# Patient Record
Sex: FEMALE | Race: WHITE | NOT HISPANIC OR LATINO | Employment: OTHER
[De-identification: names, ages, dates, MRNs, and addresses within clinical notes are randomized per-mention and may not be internally consistent; named-entity substitution may affect disease eponyms.]

---

## 2017-02-07 ENCOUNTER — MYAURORA ACCOUNT LINK (OUTPATIENT)
Dept: OTHER | Age: 67
End: 2017-02-07

## 2017-03-08 ENCOUNTER — APPOINTMENT (OUTPATIENT)
Dept: GENERAL RADIOLOGY | Facility: HOSPITAL | Age: 67
DRG: 190 | End: 2017-03-08
Attending: EMERGENCY MEDICINE
Payer: MEDICARE

## 2017-03-08 ENCOUNTER — PRIOR ORIGINAL RECORDS (OUTPATIENT)
Dept: OTHER | Age: 67
End: 2017-03-08

## 2017-03-08 ENCOUNTER — APPOINTMENT (OUTPATIENT)
Dept: CV DIAGNOSTICS | Facility: HOSPITAL | Age: 67
DRG: 190 | End: 2017-03-08
Attending: INTERNAL MEDICINE
Payer: MEDICARE

## 2017-03-08 ENCOUNTER — HOSPITAL ENCOUNTER (INPATIENT)
Facility: HOSPITAL | Age: 67
LOS: 10 days | Discharge: NURSING, REHAB OR LONG-TERM CARE | DRG: 190 | End: 2017-03-18
Attending: EMERGENCY MEDICINE | Admitting: INTERNAL MEDICINE
Payer: MEDICARE

## 2017-03-08 DIAGNOSIS — I50.9 CONGESTIVE HEART FAILURE OF UNKNOWN ETIOLOGY (HCC): Primary | ICD-10-CM

## 2017-03-08 LAB
ANION GAP SERPL CALC-SCNC: 7 MMOL/L (ref 0–18)
BASOPHILS # BLD: 0 K/UL (ref 0–0.2)
BASOPHILS NFR BLD: 1 %
BNP SERPL-MCNC: 1042 PG/ML (ref 0–100)
BUN SERPL-MCNC: 24 MG/DL (ref 8–20)
BUN/CREAT SERPL: 18.6 (ref 10–20)
CALCIUM SERPL-MCNC: 8.7 MG/DL (ref 8.5–10.5)
CHLORIDE SERPL-SCNC: 108 MMOL/L (ref 95–110)
CHOLEST SERPL-MCNC: 100 MG/DL (ref 110–200)
CO2 SERPL-SCNC: 24 MMOL/L (ref 22–32)
CREAT SERPL-MCNC: 1.29 MG/DL (ref 0.5–1.5)
EOSINOPHIL # BLD: 0.1 K/UL (ref 0–0.7)
EOSINOPHIL NFR BLD: 1 %
ERYTHROCYTE [DISTWIDTH] IN BLOOD BY AUTOMATED COUNT: 19.6 % (ref 11–15)
FLUAV + FLUBV RNA SPEC NAA+PROBE: NEGATIVE
GLUCOSE SERPL-MCNC: 118 MG/DL (ref 70–99)
HCT VFR BLD AUTO: 36.1 % (ref 35–48)
HDLC SERPL-MCNC: 30 MG/DL
HGB BLD-MCNC: 11.7 G/DL (ref 12–16)
INR BLD: 2.3 (ref 0.9–1.2)
LDLC SERPL CALC-MCNC: 43 MG/DL (ref 0–99)
LYMPHOCYTES # BLD: 0.8 K/UL (ref 1–4)
LYMPHOCYTES NFR BLD: 12 %
MCH RBC QN AUTO: 28 PG (ref 27–32)
MCHC RBC AUTO-ENTMCNC: 32.4 G/DL (ref 32–37)
MCV RBC AUTO: 86.5 FL (ref 80–100)
MONOCYTES # BLD: 0.7 K/UL (ref 0–1)
MONOCYTES NFR BLD: 11 %
MRSA DNA SPEC QL NAA+PROBE: POSITIVE
NEUTROPHILS # BLD AUTO: 5 K/UL (ref 1.8–7.7)
NEUTROPHILS NFR BLD: 75 %
NONHDLC SERPL-MCNC: 70 MG/DL
OSMOLALITY UR CALC.SUM OF ELEC: 293 MOSM/KG (ref 275–295)
PLATELET # BLD AUTO: 150 K/UL (ref 140–400)
PMV BLD AUTO: 10.1 FL (ref 7.4–10.3)
POTASSIUM SERPL-SCNC: 5.5 MMOL/L (ref 3.3–5.1)
PROTHROMBIN TIME: 24.9 SECONDS (ref 11.8–14.5)
RBC # BLD AUTO: 4.17 M/UL (ref 3.7–5.4)
SODIUM SERPL-SCNC: 139 MMOL/L (ref 136–144)
TRIGL SERPL-MCNC: 135 MG/DL (ref 1–149)
TROPONIN I SERPL-MCNC: 0.03 NG/ML (ref ?–0.03)
TROPONIN I SERPL-MCNC: 0.04 NG/ML (ref ?–0.03)
WBC # BLD AUTO: 6.6 K/UL (ref 4–11)

## 2017-03-08 PROCEDURE — 85025 COMPLETE CBC W/AUTO DIFF WBC: CPT | Performed by: EMERGENCY MEDICINE

## 2017-03-08 PROCEDURE — 96374 THER/PROPH/DIAG INJ IV PUSH: CPT

## 2017-03-08 PROCEDURE — 51702 INSERT TEMP BLADDER CATH: CPT

## 2017-03-08 PROCEDURE — 83880 ASSAY OF NATRIURETIC PEPTIDE: CPT | Performed by: EMERGENCY MEDICINE

## 2017-03-08 PROCEDURE — 87641 MR-STAPH DNA AMP PROBE: CPT | Performed by: EMERGENCY MEDICINE

## 2017-03-08 PROCEDURE — 93005 ELECTROCARDIOGRAM TRACING: CPT

## 2017-03-08 PROCEDURE — 80061 LIPID PANEL: CPT | Performed by: EMERGENCY MEDICINE

## 2017-03-08 PROCEDURE — 80048 BASIC METABOLIC PNL TOTAL CA: CPT | Performed by: EMERGENCY MEDICINE

## 2017-03-08 PROCEDURE — 84484 ASSAY OF TROPONIN QUANT: CPT | Performed by: EMERGENCY MEDICINE

## 2017-03-08 PROCEDURE — 94640 AIRWAY INHALATION TREATMENT: CPT

## 2017-03-08 PROCEDURE — 93010 ELECTROCARDIOGRAM REPORT: CPT | Performed by: EMERGENCY MEDICINE

## 2017-03-08 PROCEDURE — 99285 EMERGENCY DEPT VISIT HI MDM: CPT

## 2017-03-08 PROCEDURE — 85610 PROTHROMBIN TIME: CPT | Performed by: EMERGENCY MEDICINE

## 2017-03-08 PROCEDURE — 96375 TX/PRO/DX INJ NEW DRUG ADDON: CPT

## 2017-03-08 PROCEDURE — 93306 TTE W/DOPPLER COMPLETE: CPT

## 2017-03-08 PROCEDURE — 93306 TTE W/DOPPLER COMPLETE: CPT | Performed by: INTERNAL MEDICINE

## 2017-03-08 PROCEDURE — 87631 RESP VIRUS 3-5 TARGETS: CPT | Performed by: EMERGENCY MEDICINE

## 2017-03-08 PROCEDURE — 71010 XR CHEST AP PORTABLE  (CPT=71010): CPT

## 2017-03-08 RX ORDER — METHYLPREDNISOLONE SODIUM SUCCINATE 40 MG/ML
20 INJECTION, POWDER, LYOPHILIZED, FOR SOLUTION INTRAMUSCULAR; INTRAVENOUS EVERY 12 HOURS
Status: DISCONTINUED | OUTPATIENT
Start: 2017-03-08 | End: 2017-03-15

## 2017-03-08 RX ORDER — ATORVASTATIN CALCIUM 20 MG/1
20 TABLET, FILM COATED ORAL NIGHTLY
Status: DISCONTINUED | OUTPATIENT
Start: 2017-03-08 | End: 2017-03-18

## 2017-03-08 RX ORDER — CARVEDILOL 12.5 MG/1
50 TABLET ORAL 2 TIMES DAILY
Status: DISCONTINUED | OUTPATIENT
Start: 2017-03-08 | End: 2017-03-18

## 2017-03-08 RX ORDER — TEMAZEPAM 15 MG/1
15 CAPSULE ORAL NIGHTLY PRN
Status: DISCONTINUED | OUTPATIENT
Start: 2017-03-08 | End: 2017-03-18

## 2017-03-08 RX ORDER — IPRATROPIUM BROMIDE AND ALBUTEROL SULFATE 2.5; .5 MG/3ML; MG/3ML
3 SOLUTION RESPIRATORY (INHALATION) ONCE
Status: COMPLETED | OUTPATIENT
Start: 2017-03-08 | End: 2017-03-08

## 2017-03-08 RX ORDER — FUROSEMIDE 10 MG/ML
40 INJECTION INTRAMUSCULAR; INTRAVENOUS ONCE
Status: COMPLETED | OUTPATIENT
Start: 2017-03-08 | End: 2017-03-08

## 2017-03-08 RX ORDER — GUAIFENESIN 600 MG
600 TABLET, EXTENDED RELEASE 12 HR ORAL 2 TIMES DAILY
Status: DISCONTINUED | OUTPATIENT
Start: 2017-03-08 | End: 2017-03-18

## 2017-03-08 RX ORDER — METHYLPREDNISOLONE SODIUM SUCCINATE 125 MG/2ML
125 INJECTION, POWDER, LYOPHILIZED, FOR SOLUTION INTRAMUSCULAR; INTRAVENOUS ONCE
Status: COMPLETED | OUTPATIENT
Start: 2017-03-08 | End: 2017-03-08

## 2017-03-08 RX ORDER — ALLOPURINOL 100 MG/1
100 TABLET ORAL DAILY
Status: DISCONTINUED | OUTPATIENT
Start: 2017-03-09 | End: 2017-03-18

## 2017-03-08 RX ORDER — ASPIRIN 81 MG/1
81 TABLET, CHEWABLE ORAL DAILY
COMMUNITY

## 2017-03-08 RX ORDER — IPRATROPIUM BROMIDE AND ALBUTEROL SULFATE 2.5; .5 MG/3ML; MG/3ML
3 SOLUTION RESPIRATORY (INHALATION)
Status: DISCONTINUED | OUTPATIENT
Start: 2017-03-08 | End: 2017-03-09

## 2017-03-08 RX ORDER — PANTOPRAZOLE SODIUM 40 MG/1
40 TABLET, DELAYED RELEASE ORAL DAILY
Status: DISCONTINUED | OUTPATIENT
Start: 2017-03-09 | End: 2017-03-18

## 2017-03-08 RX ORDER — ASPIRIN 81 MG/1
81 TABLET, CHEWABLE ORAL DAILY
Status: DISCONTINUED | OUTPATIENT
Start: 2017-03-09 | End: 2017-03-18

## 2017-03-08 RX ORDER — 0.9 % SODIUM CHLORIDE 0.9 %
VIAL (ML) INJECTION
Status: COMPLETED
Start: 2017-03-08 | End: 2017-03-08

## 2017-03-08 RX ORDER — CODEINE PHOSPHATE AND GUAIFENESIN 10; 100 MG/5ML; MG/5ML
5 SOLUTION ORAL EVERY 6 HOURS PRN
Status: DISCONTINUED | OUTPATIENT
Start: 2017-03-08 | End: 2017-03-11

## 2017-03-08 RX ORDER — WARFARIN SODIUM 5 MG/1
5 TABLET ORAL NIGHTLY
Status: COMPLETED | OUTPATIENT
Start: 2017-03-08 | End: 2017-03-08

## 2017-03-08 RX ORDER — BUDESONIDE 0.5 MG/2ML
0.5 INHALANT ORAL
Status: DISCONTINUED | OUTPATIENT
Start: 2017-03-08 | End: 2017-03-18

## 2017-03-08 RX ORDER — NYSTATIN 100000 U/G
100000 CREAM TOPICAL 2 TIMES DAILY
Status: DISCONTINUED | OUTPATIENT
Start: 2017-03-08 | End: 2017-03-18

## 2017-03-08 RX ORDER — LISINOPRIL 20 MG/1
20 TABLET ORAL 2 TIMES DAILY
Status: DISCONTINUED | OUTPATIENT
Start: 2017-03-08 | End: 2017-03-09

## 2017-03-08 RX ORDER — HYDROCODONE BITARTRATE AND ACETAMINOPHEN 5; 325 MG/1; MG/1
1 TABLET ORAL EVERY 6 HOURS PRN
Status: DISCONTINUED | OUTPATIENT
Start: 2017-03-08 | End: 2017-03-18

## 2017-03-08 RX ORDER — EPLERENONE 25 MG/1
25 TABLET, FILM COATED ORAL DAILY
Status: DISCONTINUED | OUTPATIENT
Start: 2017-03-09 | End: 2017-03-09

## 2017-03-08 RX ORDER — CLONIDINE HYDROCHLORIDE 0.1 MG/1
0.1 TABLET ORAL 2 TIMES DAILY
Status: DISCONTINUED | OUTPATIENT
Start: 2017-03-08 | End: 2017-03-13

## 2017-03-08 RX ORDER — MONTELUKAST SODIUM 10 MG/1
10 TABLET ORAL NIGHTLY
Status: DISCONTINUED | OUTPATIENT
Start: 2017-03-08 | End: 2017-03-18

## 2017-03-08 RX ORDER — FUROSEMIDE 10 MG/ML
40 INJECTION INTRAMUSCULAR; INTRAVENOUS
Status: DISCONTINUED | OUTPATIENT
Start: 2017-03-08 | End: 2017-03-11

## 2017-03-08 RX ORDER — IPRATROPIUM BROMIDE AND ALBUTEROL SULFATE 2.5; .5 MG/3ML; MG/3ML
3 SOLUTION RESPIRATORY (INHALATION) EVERY 6 HOURS PRN
Status: DISCONTINUED | OUTPATIENT
Start: 2017-03-08 | End: 2017-03-18

## 2017-03-08 NOTE — ED NOTES
A copy of the patient's medication list was copied and placed in the chart. Pt is breathing much easier now.

## 2017-03-08 NOTE — ED PROVIDER NOTES
Patient Seen in: [facility] AND [facility] Emergency Department    History   Patient presents with:  Dyspnea DAWNA SOB (respiratory)    Stated Complaint: sob/ cough    HPI    Patient presents with difficulty breathing.   She has had cough and congestion for about a (two) times daily. allopurinol 100 MG Oral Tab,  Take 1 tablet (100 mg total) by mouth daily. Warfarin Sodium 5 MG Oral Tab,  Take 5 mg by mouth As Directed. eplerenone 25 MG Oral Tab,  Take 25 mg by mouth daily.    CloNIDine HCl (CATAPRES) 0.1 MG Ora 03/08/17 0900 Oral   SpO2 03/08/17 0900 93 %   O2 Device 03/08/17 0900 None (Room air)       Current:/63 mmHg  Pulse 72  Temp(Src) 97.9 °F (36.6 °C) (Oral)  Resp 19  Ht 162.6 cm (5' 4\")  Wt 136.079 kg  BMI 51.47 kg/m2  SpO2 95%        Physical Exam (B TYPE NATRIUERTIC PEPTIDE) - Abnormal; Notable for the following:     Beta Natriuretic Peptide 1042 (*)     All other components within normal limits   PROTHROMBIN TIME (PT) - Abnormal; Notable for the following:     PT 24.9 (*)     INR 2.3 (*)     All o 10/16/2016          ICD-10-CM Noted POA    Congestive heart failure of unknown etiology ([facility] I50.9 3/8/2017 Unknown

## 2017-03-08 NOTE — HISTORICAL OFFICE NOTE
Evan Ceballos  : 1950  ACCOUNT:  [id number]  [id number]  PCP: Dr. Onel Pandya     TODAY'S DATE: 2016  DICTATED BY:  Brian Collins MD]    CHIEF COMPLAINT: [Followup of [place] [facility] ICD 2214,5309 and Followup of Paroxysmal atrial fibrillat for echocardiography.       PAST HISTORY: obesity, acute renal failure 2/07 and cholecystectomy    PAST CV HISTORY: BM stent to RCA, cardiac cath 1/93 with normal coronary arteries & with LV dysfunction, cardiomyopathy, congestive heart failure, dyslipidemi ventricular  6. Coumadin Management, MHS  7. Coumadin Management, MHS  8. Dilated cardiomyopathy  9. Fatigue/malaise, chronic  10. History of bare metal stent to RCA 2/2015  11. Hypercholesteremia, pure  12. Hypertension, benign  13.  ICD [facility] 2006, 2

## 2017-03-08 NOTE — ED INITIAL ASSESSMENT (HPI)
Via medics from [facility]---c/o cough, cold like symptoms x 1 week with increase in SOB over the last few days    Hx of COPD---patient reports she has not christian taking her diuretic

## 2017-03-08 NOTE — PROGRESS NOTES
[facility]    Progress Note    Arianna Vergara Patient Status:  Inpatient    1950 MRN [id number]   Location [facility] 3W/SW Attending Genie Arnold, *   Hosp Day # 0 PCP Cyndie Sahu MD       Subjective:   Iman Delvalle normal    Assessment and Plan:     Congestive heart failure of unknown etiology ([facility]  Per cards      Asthmatic bronchitis , chronic (HCC)  Steroids,nebs, rocephin 9multiple abx interactions      Severe obesity (BMI 35.0-35.9 with comorbidity) ([facility]

## 2017-03-08 NOTE — CONSULTS
[facility]    Report of Consultation    Jayla Westbrook Patient Status:  Inpatient    1950 MRN [id number]   Location [facility] 1SW Attending Melissa Rivera, *   Hosp Day # 0 PCP Sonja Foster MD     Date of Ad much.  She has no associated fever or leg swelling. She chronically sleeps on the couch without change. Patient also has history of sleep apnea but has not use a mask for some time. Patient is limited in her mobility.   She is presently comfortable and d Medications:  furosemide (LASIX) injection 40 mg 40 mg Intravenous BID (Diuretic)   aspirin chewable tab 81 mg 81 mg Oral Daily   carvedilol (COREG) tab 50 mg 50 mg Oral BID   dronedarone HCl (MULTAQ) tab 400 mg 400 mg Oral BID with meals   CloNIDine HCl ( Net   -100 ml      This shift: I/O this shift:  In: -   Out: 100 [Urine:100]       Scheduled Meds:    • furosemide  40 mg Intravenous BID (Diuretic)   • aspirin  81 mg Oral Daily   • carvedilol  50 mg Oral BID   • dronedarone HCl  400 mg Oral BID with me

## 2017-03-08 NOTE — CONSULTS
[facility]    PATIENT'S NAME: Devante Hortonestine   ATTENDING PHYSICIAN: Pratik Fleming MD   CONSULTING PHYSICIAN: Wagner Lucas MD   PATIENT ACCOUNT#:   [id number]    LOCATION:  [address] [place] [address] #:   [id number]       DATE OF CHAN 22, heart rate 83, temperature 97.9, saturation 95% on room air. HEENT:  Normal.  NECK:  Supple. No JVP. LUNGS:  Diminished air entry, bilateral wheezing and crackles. HEART:  S1, S2 normal.  No S3 or S4. ABDOMEN:  Soft.     EXTREMITIES:  Minimal ped

## 2017-03-08 NOTE — RESPIRATORY THERAPY NOTE
MAURICIO ASSESSMENT: Patient stated that she used to wear CPAP at home but she stopped wearing it since she feels that it was not helping her. Explained the purpose and benefits of the CPAP machine to the patient. But patient refused to use one.

## 2017-03-09 LAB
ANION GAP SERPL CALC-SCNC: 6 MMOL/L (ref 0–18)
BUN SERPL-MCNC: 38 MG/DL (ref 8–20)
BUN/CREAT SERPL: 22.2 (ref 10–20)
CALCIUM SERPL-MCNC: 8.7 MG/DL (ref 8.5–10.5)
CHLORIDE SERPL-SCNC: 109 MMOL/L (ref 95–110)
CO2 SERPL-SCNC: 27 MMOL/L (ref 22–32)
CREAT SERPL-MCNC: 1.71 MG/DL (ref 0.5–1.5)
GLUCOSE SERPL-MCNC: 139 MG/DL (ref 70–99)
INR BLD: 2.3 (ref 0.9–1.2)
OSMOLALITY UR CALC.SUM OF ELEC: 305 MOSM/KG (ref 275–295)
POTASSIUM SERPL-SCNC: 5.3 MMOL/L (ref 3.3–5.1)
PROTHROMBIN TIME: 24.7 SECONDS (ref 11.8–14.5)
SODIUM SERPL-SCNC: 142 MMOL/L (ref 136–144)

## 2017-03-09 PROCEDURE — 85610 PROTHROMBIN TIME: CPT | Performed by: INTERNAL MEDICINE

## 2017-03-09 PROCEDURE — 94640 AIRWAY INHALATION TREATMENT: CPT

## 2017-03-09 PROCEDURE — 97162 PT EVAL MOD COMPLEX 30 MIN: CPT

## 2017-03-09 PROCEDURE — 80048 BASIC METABOLIC PNL TOTAL CA: CPT | Performed by: INTERNAL MEDICINE

## 2017-03-09 RX ORDER — 0.9 % SODIUM CHLORIDE 0.9 %
VIAL (ML) INJECTION
Status: COMPLETED
Start: 2017-03-09 | End: 2017-03-09

## 2017-03-09 RX ORDER — IPRATROPIUM BROMIDE AND ALBUTEROL SULFATE 2.5; .5 MG/3ML; MG/3ML
3 SOLUTION RESPIRATORY (INHALATION)
Status: DISCONTINUED | OUTPATIENT
Start: 2017-03-09 | End: 2017-03-12

## 2017-03-09 NOTE — PLAN OF CARE
RESPIRATORY - ADULT    • Achieves optimal ventilation and oxygenation Not Progressing    PRODUCTIVE COUGH~ ROBITUSSIN PRN &  NEBS       RISK FOR INFECTION - ADULT    • Absence of fever/infection during anticipated neutropenic period Progressing    ANTIBIOT

## 2017-03-09 NOTE — HOME CARE LIAISON
MET WITH PATIENT AND HER SISTER TO DISCUSS HOME HEALTH SERVICES AND COVERAGE CRITERIA. PATIENT IN AGREEMENT WITH SERVICES BEING PROVIDED BY [facility]IDENT[facility]. PROVIDED RESIDENTIAL BROCHURE WITH CONTACT INFORMATION, ALONG WITH LIAISON'S BUSINESS CARD.

## 2017-03-09 NOTE — H&P
[facility]    PATIENT'S NAME: Cooper Adin   ATTENDING PHYSICIAN: Vanessa Alexis MD   PATIENT ACCOUNT#:   [id number]    LOCATION:  [address] [place] [address] #:   [id number]       YOB: 1950  ADMISSION DATE:       03/08 hepatosplenomegaly. SKIN:  Warm, dry, and well perfused. NEUROLOGIC:  Moving all extremities equally. Good coordination. No cranial nerve asymmetry. PSYCHIATRIC:  Normal affect, oriented. No unusual behavior. Interacting well.     LABORATORY DATA:

## 2017-03-09 NOTE — DISCHARGE PLANNING
3/9CM-MD orders received in regards to discharge planning- Patient would like [facility] on discharge RN & PT Case Management previously noted that a referral was made to Brennan Jain ([id number])  [facility].      -[facility] [phone]

## 2017-03-09 NOTE — CM/SW NOTE
Met with patient to discuss discharge planning. Patient states she is home with her niece, use either a walker or a motorized electric scooter (newly bought).  Patient states she drives when she has to, but her niece does most of the shopping/errand running

## 2017-03-09 NOTE — PROGRESS NOTES
[place] [facility]    Progress Note    Star Poser Patient Status:  Inpatient    1950 MRN [id number]   Location [facility] 3W/SW Attending Jaxson Scanlon [facility] Day # 1 PCP Brittany Sutherland MD       Subjective:   Holly Macias thighs  Neurologic: Grossly normal    Assessment and Plan:     Congestive heart failure of unknown etiology ([facility]  cards      Asthmatic bronchitis , chronic (HCC)  Rocephin nebs steroids      Severe obesity (BMI 35.0-35.9 with comorbidity) (HCC)  chronic

## 2017-03-09 NOTE — PHYSICAL THERAPY NOTE
PHYSICAL THERAPY EVALUATION - INPATIENT     Room Number: 325/325-A  Evaluation Date: 3/9/2017  Type of Evaluation: Initial  Physician Order: PT Eval and Treat       Reason for Therapy: Mobility Dysfunction and Discharge Planning    History related to cur are getting to difficult to do\"    Patient self-stated goal is to sell her house    OBJECTIVE  Pt requires min A for bed mobility, transfers & ambulation    PAIN ASSESSMENT  Ratin  R shld with walker use secondary to R rotator cuff tear    COGNITION limitations in ambulation. The patient is below her baseline and would benefit from skilled inpatient PT to address the above deficits to assist patient in returning to prior level of function.     DISCHARGE RECOMMENDATIONS  PT Discharge Recommendations: S

## 2017-03-09 NOTE — PROGRESS NOTES
[facility]    Progress Note    Bon Martinez Patient Status:  Inpatient    1950 MRN [id number]   Location [facility] 3W/SW Attending Abdifatah Mena, [address] Day # 1 PCP Megan Ge MD       Assessment and Occurrence  0 x 0 x        Wt Readings from Last 4 Encounters:  03/09/17 : 311 lb 9.6 oz (141.341 kg)  10/15/16 : 317 lb 4.8 oz (143.926 kg)  10/15/16 : 300 lb (136.079 kg)  06/24/15 : 286 lb (129.729 kg)        Exam  Gen: No acute distress, alert and orie CONCLUSION:  1. Little change from October 20, 2016. 2. Cardiomegaly. 3. Atherosclerosis. 4. Prominent markings. 5. Defibrillator.          Ekg 12-lead    3/8/2017  ECG Report  Interpretation  -------------------------- Sinus Rhythm - occasional ectopic lory

## 2017-03-10 LAB
ALBUMIN SERPL BCP-MCNC: 3.2 G/DL (ref 3.5–4.8)
ALBUMIN/GLOB SERPL: 0.9 {RATIO} (ref 1–2)
ALP SERPL-CCNC: 104 U/L (ref 32–100)
ALT SERPL-CCNC: 22 U/L (ref 14–54)
ANION GAP SERPL CALC-SCNC: 11 MMOL/L (ref 0–18)
ANION GAP SERPL CALC-SCNC: 5 MMOL/L (ref 0–18)
AST SERPL-CCNC: 28 U/L (ref 15–41)
BILIRUB SERPL-MCNC: 0.5 MG/DL (ref 0.3–1.2)
BUN SERPL-MCNC: 57 MG/DL (ref 8–20)
BUN SERPL-MCNC: 59 MG/DL (ref 8–20)
BUN/CREAT SERPL: 35.8 (ref 10–20)
BUN/CREAT SERPL: 38.6 (ref 10–20)
CALCIUM SERPL-MCNC: 8.4 MG/DL (ref 8.5–10.5)
CALCIUM SERPL-MCNC: 8.5 MG/DL (ref 8.5–10.5)
CHLORIDE SERPL-SCNC: 106 MMOL/L (ref 95–110)
CHLORIDE SERPL-SCNC: 107 MMOL/L (ref 95–110)
CO2 SERPL-SCNC: 23 MMOL/L (ref 22–32)
CO2 SERPL-SCNC: 26 MMOL/L (ref 22–32)
CREAT SERPL-MCNC: 1.53 MG/DL (ref 0.5–1.5)
CREAT SERPL-MCNC: 1.59 MG/DL (ref 0.5–1.5)
GLOBULIN PLAS-MCNC: 3.5 G/DL (ref 2.5–3.7)
GLUCOSE SERPL-MCNC: 116 MG/DL (ref 70–99)
GLUCOSE SERPL-MCNC: 118 MG/DL (ref 70–99)
INR BLD: 2.4 (ref 0.9–1.2)
OSMOLALITY UR CALC.SUM OF ELEC: 303 MOSM/KG (ref 275–295)
OSMOLALITY UR CALC.SUM OF ELEC: 308 MOSM/KG (ref 275–295)
POTASSIUM SERPL-SCNC: 5.2 MMOL/L (ref 3.3–5.1)
POTASSIUM SERPL-SCNC: 5.4 MMOL/L (ref 3.3–5.1)
PROT SERPL-MCNC: 6.7 G/DL (ref 5.9–8.4)
PROTHROMBIN TIME: 25.7 SECONDS (ref 11.8–14.5)
SODIUM SERPL-SCNC: 138 MMOL/L (ref 136–144)
SODIUM SERPL-SCNC: 140 MMOL/L (ref 136–144)

## 2017-03-10 PROCEDURE — 85610 PROTHROMBIN TIME: CPT | Performed by: INTERNAL MEDICINE

## 2017-03-10 PROCEDURE — 97530 THERAPEUTIC ACTIVITIES: CPT

## 2017-03-10 PROCEDURE — 80053 COMPREHEN METABOLIC PANEL: CPT | Performed by: NURSE PRACTITIONER

## 2017-03-10 PROCEDURE — 97116 GAIT TRAINING THERAPY: CPT

## 2017-03-10 PROCEDURE — 94640 AIRWAY INHALATION TREATMENT: CPT

## 2017-03-10 PROCEDURE — 80048 BASIC METABOLIC PNL TOTAL CA: CPT | Performed by: NURSE PRACTITIONER

## 2017-03-10 RX ORDER — DOCUSATE SODIUM 100 MG/1
100 CAPSULE, LIQUID FILLED ORAL 2 TIMES DAILY
Status: DISCONTINUED | OUTPATIENT
Start: 2017-03-10 | End: 2017-03-18

## 2017-03-10 NOTE — PROGRESS NOTES
[place] [facility] - [facility]    Cardiology Progress Note  [facility] AND HEALTH SERVICES Specialists    Maximiliano Bach Patient Status:  Inpatient    1950 MRN [id number]   Location [facility] 3W/SW Attending Aleksandra Townsend, [facility] Day # 2 PCP 03/10/2017    03/10/2017   K 5.2* 03/10/2017    03/10/2017   CO2 26 03/10/2017   * 03/10/2017   CA 8.5 03/10/2017   ALB 2.8* 10/17/2016   ALKPHO 108* 10/17/2016   BILT 0.7 10/17/2016   TP 6.6 10/17/2016   AST 26 10/17/2016   ALT 21 10/17

## 2017-03-10 NOTE — PHYSICAL THERAPY NOTE
PHYSICAL THERAPY TREATMENT NOTE - INPATIENT    Room Number: 325/325-A            Problem List  Principal Problem:    Congestive heart failure of unknown etiology ([facility]  Active Problems:    Asthmatic bronchitis , chronic (HCC)    Severe obesity (BMI 35.0-3 Scale): 42.13   CMS Modifier (G-Code): CK    FUNCTIONAL ABILITY STATUS  Gait Assessment   Gait Assistance: Minimum assistance  Distance (ft):  (12)  Assistive Device: Rolling walker platform attachment    Additional information: pt requires CGA for transfe

## 2017-03-10 NOTE — PROGRESS NOTES
[facility]    Progress Note    Herb Coronado Patient Status:  Inpatient    1950 MRN [id number]   Location [facility] 3W/SW Attending Beata Bautista, [address] [place] [address] Day # 2 PCP Micheal Ruby MD     Subjective: 11.7* 03/08/2017   HCT 36.1 03/08/2017    03/08/2017   CREATSERUM 1.59* 03/10/2017   BUN 57* 03/10/2017    03/10/2017   K 5.2* 03/10/2017    03/10/2017   CO2 26 03/10/2017   * 03/10/2017   CA 8.5 03/10/2017   ALB 2.8* 10/17/2016

## 2017-03-10 NOTE — PROGRESS NOTES
[facility]    Progress Note    Bon Martinez Patient Status:  Inpatient    1950 MRN [id number]   Location [facility] 3W/SW Attending Abdifatah Mena, [facility] Day # 2 PCP Megan Ge MD       Subjective:   Laney Cotton bronchitis , chronic (HCC)  Steroids, nebs abx      Severe obesity (BMI 35.0-35.9 with comorbidity) ([facility]  chronic      Sleep apnea, obstructive  Refused tx      Cardiomyopathy with implantable cardioverter-defibrillator [facility])  chronic        Results:

## 2017-03-11 LAB
INR BLD: 2.1 (ref 0.9–1.2)
PROTHROMBIN TIME: 23.5 SECONDS (ref 11.8–14.5)

## 2017-03-11 PROCEDURE — 85610 PROTHROMBIN TIME: CPT | Performed by: INTERNAL MEDICINE

## 2017-03-11 PROCEDURE — 94640 AIRWAY INHALATION TREATMENT: CPT

## 2017-03-11 RX ORDER — CODEINE PHOSPHATE AND GUAIFENESIN 10; 100 MG/5ML; MG/5ML
5 SOLUTION ORAL EVERY 4 HOURS PRN
Status: DISCONTINUED | OUTPATIENT
Start: 2017-03-11 | End: 2017-03-18

## 2017-03-11 RX ORDER — FUROSEMIDE 10 MG/ML
40 INJECTION INTRAMUSCULAR; INTRAVENOUS DAILY
Status: DISCONTINUED | OUTPATIENT
Start: 2017-03-11 | End: 2017-03-15

## 2017-03-11 RX ORDER — 0.9 % SODIUM CHLORIDE 0.9 %
VIAL (ML) INJECTION
Status: DISPENSED
Start: 2017-03-11 | End: 2017-03-11

## 2017-03-11 NOTE — PLAN OF CARE
DISCHARGE PLANNING    • Discharge to home or other facility with appropriate resources Progressing    depending on clinical coarse, client IV lasix held due to kidney function, Iv steroids, client continues to feel SOB with activity    PAIN - ADULT    • Ve

## 2017-03-11 NOTE — PROGRESS NOTES
[facility]    Progress Note    Kasia Guerra Patient Status:  Inpatient    1950 MRN [id number]   Location [facility] 3W/SW Attending Ca Regalado, [facility] Day # 3 PCP Genie Hooks MD       Subjective:   Donnie Holt [facility]  cards      Asthmatic bronchitis , chronic (HCC)  Steroids, nebs      Severe obesity (BMI 35.0-35.9 with comorbidity) ([facility]  chronic      Sleep apnea, obstructive  Not compliant      Cardiomyopathy with implantable cardioverter-defibrillator

## 2017-03-11 NOTE — PLAN OF CARE
CARDIOVASCULAR - ADULT    • Maintains optimal cardiac output and hemodynamic stability Progressing    • Absence of cardiac arrhythmias or at baseline  REINFORCEMENT NEEDED IN REGARDS TO 24 HOUR FLUID RESTRICTION Progressing        DISCHARGE PLANNING    • D

## 2017-03-11 NOTE — PLAN OF CARE
CARDIOVASCULAR - ADULT    • Maintains optimal cardiac output and hemodynamic stability Progressing    • Absence of cardiac arrhythmias or at baseline Progressing    VSS. IV lasix being held per MD due to kidney fx.      DISCHARGE PLANNING    • Discharge to

## 2017-03-11 NOTE — PROGRESS NOTES
[place] [facility] - [facility]    Cardiology Progress Note  [facility] AND [facility]ALTH SERVICES Specialists    Abhilash Garcia Patient Status:  Inpatient    1950 MRN [id number]   Location [facility] 3W/SW Attending Afsaneh Harley, [address] Day # 3 PCP CREATSERUM 1.59* 03/10/2017   CREATSERUM 1.53* 03/10/2017   BUN 57* 03/10/2017   BUN 59* 03/10/2017    03/10/2017    03/10/2017   K 5.2* 03/10/2017   K 5.4* 03/10/2017    03/10/2017    03/10/2017   CO2 26 03/10/2017   CO2 23 03/10

## 2017-03-11 NOTE — PROGRESS NOTES
[facility]    Progress Note    Tito Garzon Patient Status:  Inpatient    1950 MRN [id number]   Location [facility] 3W/SW Attending Michael Espinoza, [address] Day # 3 PCP Krys Tello MD     Subjective: creatinine mild chronic renal insuffiiciency        Results:     Lab Results  Component Value Date   WBC 6.6 03/08/2017   HGB 11.7* 03/08/2017   HCT 36.1 03/08/2017    03/08/2017   CREATSERUM 1.59* 03/10/2017   CREATSERUM 1.53* 03/10/2017   BUN 57*

## 2017-03-12 LAB
INR BLD: 1.8 (ref 0.9–1.2)
PROTHROMBIN TIME: 20 SECONDS (ref 11.8–14.5)

## 2017-03-12 PROCEDURE — 94640 AIRWAY INHALATION TREATMENT: CPT

## 2017-03-12 PROCEDURE — 85610 PROTHROMBIN TIME: CPT | Performed by: INTERNAL MEDICINE

## 2017-03-12 RX ORDER — 0.9 % SODIUM CHLORIDE 0.9 %
VIAL (ML) INJECTION
Status: COMPLETED
Start: 2017-03-12 | End: 2017-03-12

## 2017-03-12 RX ORDER — WARFARIN SODIUM 2.5 MG/1
2.5 TABLET ORAL NIGHTLY
Status: DISCONTINUED | OUTPATIENT
Start: 2017-03-12 | End: 2017-03-13

## 2017-03-12 RX ORDER — IPRATROPIUM BROMIDE AND ALBUTEROL SULFATE 2.5; .5 MG/3ML; MG/3ML
3 SOLUTION RESPIRATORY (INHALATION) 2 TIMES DAILY
Status: DISCONTINUED | OUTPATIENT
Start: 2017-03-12 | End: 2017-03-18

## 2017-03-12 NOTE — PROGRESS NOTES
[place] [facility] - [facility]    Progress Note    David Lerma Patient Status:  Inpatient    1950 MRN [id number]   Location [facility] 3W/SW Attending Eleonora Quiros, [facility] Day # 4 PCP Diamond Esquivel MD     Subjective:  03/08/2017   CREATSERUM 1.59* 03/10/2017   CREATSERUM 1.53* 03/10/2017   BUN 57* 03/10/2017   BUN 59* 03/10/2017    03/10/2017    03/10/2017   K 5.2* 03/10/2017   K 5.4* 03/10/2017    03/10/2017    03/10/2017   CO2 26 03/1

## 2017-03-12 NOTE — PROGRESS NOTES
[place] [facility] - [facility]    Progress Note    Maximiliano Bach Patient Status:  Inpatient    1950 MRN [id number]   Location [facility] 3W/SW Attending Aleksandra Townsend, [address] Day # 4 PCP Mario Rodriges MD       Subjective:   Noreen Mcdowell ([facility]  cards      Asthmatic bronchitis , chronic (HCC)  Steroids, nebs, sbx      Severe obesity (BMI 35.0-35.9 with comorbidity) ([facility]        Sleep apnea, obstructive  refused      Cardiomyopathy with implantable cardioverter-defibrillator ([facility]          R

## 2017-03-12 NOTE — PROGRESS NOTES
[facility]  Progress Note    Richie Stanislaus Patient Status:  Inpatient    1950 MRN [id number]   Location [facility] 3W/SW Attending Jake Gordon, [facility] Day # 4 PCP KATHERINE Fraga 03/10/2017   CO2 26 03/10/2017   CO2 23 03/10/2017   * 03/10/2017   * 03/10/2017   CA 8.5 03/10/2017   CA 8.4* 03/10/2017   ALB 3.2* 03/10/2017   ALKPHO 104* 03/10/2017   BILT 0.5 03/10/2017   TP 6.7 03/10/2017   AST 28 03/10/2017   ALT 22 03

## 2017-03-13 LAB
ANION GAP SERPL CALC-SCNC: 8 MMOL/L (ref 0–18)
BASOPHILS # BLD: 0 K/UL (ref 0–0.2)
BASOPHILS NFR BLD: 0 %
BUN SERPL-MCNC: 90 MG/DL (ref 8–20)
BUN/CREAT SERPL: 54.5 (ref 10–20)
CALCIUM SERPL-MCNC: 8.4 MG/DL (ref 8.5–10.5)
CHLORIDE SERPL-SCNC: 101 MMOL/L (ref 95–110)
CO2 SERPL-SCNC: 27 MMOL/L (ref 22–32)
CREAT SERPL-MCNC: 1.65 MG/DL (ref 0.5–1.5)
EOSINOPHIL # BLD: 0 K/UL (ref 0–0.7)
EOSINOPHIL NFR BLD: 0 %
ERYTHROCYTE [DISTWIDTH] IN BLOOD BY AUTOMATED COUNT: 19.7 % (ref 11–15)
GLUCOSE SERPL-MCNC: 154 MG/DL (ref 70–99)
HCT VFR BLD AUTO: 40.3 % (ref 35–48)
HGB BLD-MCNC: 13.2 G/DL (ref 12–16)
INR BLD: 1.4 (ref 0.9–1.2)
LYMPHOCYTES # BLD: 0.5 K/UL (ref 1–4)
LYMPHOCYTES NFR BLD: 5 %
MCH RBC QN AUTO: 28 PG (ref 27–32)
MCHC RBC AUTO-ENTMCNC: 32.7 G/DL (ref 32–37)
MCV RBC AUTO: 85.5 FL (ref 80–100)
MONOCYTES # BLD: 0.3 K/UL (ref 0–1)
MONOCYTES NFR BLD: 4 %
NEUTROPHILS # BLD AUTO: 8.7 K/UL (ref 1.8–7.7)
NEUTROPHILS NFR BLD: 91 %
OSMOLALITY UR CALC.SUM OF ELEC: 313 MOSM/KG (ref 275–295)
PLATELET # BLD AUTO: 163 K/UL (ref 140–400)
PMV BLD AUTO: 9.7 FL (ref 7.4–10.3)
POTASSIUM SERPL-SCNC: 5.2 MMOL/L (ref 3.3–5.1)
PROTHROMBIN TIME: 16.7 SECONDS (ref 11.8–14.5)
RBC # BLD AUTO: 4.72 M/UL (ref 3.7–5.4)
SODIUM SERPL-SCNC: 136 MMOL/L (ref 136–144)
WBC # BLD AUTO: 9.5 K/UL (ref 4–11)

## 2017-03-13 PROCEDURE — 97110 THERAPEUTIC EXERCISES: CPT

## 2017-03-13 PROCEDURE — 97116 GAIT TRAINING THERAPY: CPT

## 2017-03-13 PROCEDURE — 85025 COMPLETE CBC W/AUTO DIFF WBC: CPT | Performed by: NURSE PRACTITIONER

## 2017-03-13 PROCEDURE — 80048 BASIC METABOLIC PNL TOTAL CA: CPT | Performed by: NURSE PRACTITIONER

## 2017-03-13 PROCEDURE — 94640 AIRWAY INHALATION TREATMENT: CPT

## 2017-03-13 PROCEDURE — 85610 PROTHROMBIN TIME: CPT | Performed by: NURSE PRACTITIONER

## 2017-03-13 RX ORDER — WARFARIN SODIUM 5 MG/1
5 TABLET ORAL NIGHTLY
Status: DISCONTINUED | OUTPATIENT
Start: 2017-03-13 | End: 2017-03-14

## 2017-03-13 RX ORDER — ISOSORBIDE DINITRATE 10 MG/1
10 TABLET ORAL
Status: DISCONTINUED | OUTPATIENT
Start: 2017-03-13 | End: 2017-03-18

## 2017-03-13 RX ORDER — 0.9 % SODIUM CHLORIDE 0.9 %
VIAL (ML) INJECTION
Status: COMPLETED
Start: 2017-03-13 | End: 2017-03-13

## 2017-03-13 RX ORDER — 0.9 % SODIUM CHLORIDE 0.9 %
3 VIAL (ML) INJECTION AS NEEDED
Status: DISCONTINUED | OUTPATIENT
Start: 2017-03-13 | End: 2017-03-18

## 2017-03-13 RX ORDER — HYDRALAZINE HYDROCHLORIDE 10 MG/1
10 TABLET, FILM COATED ORAL EVERY 8 HOURS SCHEDULED
Status: DISCONTINUED | OUTPATIENT
Start: 2017-03-13 | End: 2017-03-18

## 2017-03-13 NOTE — PROGRESS NOTES
[facility]  Progress Note    David Lerma Patient Status:  Inpatient    1950 MRN [id number]   Location [facility] 3W/SW Attending Eleonora Quiros, [facility] Day # 5 PCP Diamond Esquivel MD     Assessment:    1.   Nonischem 40.3 03/13/2017    03/13/2017       Lab Results  Component Value Date   INR 1.4* 03/13/2017       Lab Results  Component Value Date    03/13/2017   K 5.2 03/13/2017    03/13/2017   CO2 27 03/13/2017   BUN 90 03/13/2017   CREATSERUM 1.65

## 2017-03-13 NOTE — PROGRESS NOTES
[facility]    Progress Note    Radha Herradha Patient Status:  Inpatient    1950 MRN [id number]   Location [facility] 3W/SW Attending Alfredo Bowers, [facility] Day # 5 PCP Jostin Bal MD     Subjective: 1.65* 03/13/2017   BUN 90* 03/13/2017    03/13/2017   K 5.2* 03/13/2017    03/13/2017   CO2 27 03/13/2017   * 03/13/2017   CA 8.4* 03/13/2017   ALB 3.2* 03/10/2017   ALKPHO 104* 03/10/2017   BILT 0.5 03/10/2017   TP 6.7 03/10/2017   AST

## 2017-03-13 NOTE — PROGRESS NOTES
[facility]    Progress Note    Shanika Linares Patient Status:  Inpatient    1950 MRN [id number]   Location [facility] 3W/SW Attending Gm Stanton, [facility] Day # 5 PCP Estephania Mayer MD       Subjective:   Shalom Mindi Congestive heart failure of unknown etiology ([facility]  Per cards      Asthmatic bronchitis , chronic (HCC)  Steroids nebs      Severe obesity (BMI 35.0-35.9 with comorbidity) ([facility]        Sleep apnea, obstructive  Refused cpap, hme 2-3 days      Cardiomyopath

## 2017-03-13 NOTE — PHYSICAL THERAPY NOTE
PHYSICAL THERAPY TREATMENT NOTE - INPATIENT    Room Number: 325/325-A            Problem List  Principal Problem:    Congestive heart failure of unknown etiology ([facility]  Active Problems:    Asthmatic bronchitis , chronic (HCC)    Severe obesity (BMI 35.0-3 Modifier (G-Code): CK    FUNCTIONAL ABILITY STATUS  Gait Assessment   Gait Assistance: Minimum assistance  Distance (ft): 3 x 10 ft  Assistive Device: Rolling walker     Stoop/Curb Assistance: Not tested  Comment : pt needs forearm platforms    Additional

## 2017-03-13 NOTE — PLAN OF CARE
Problem: Patient/Family Goals  Goal: Patient/Family Long Term Goal  Patient’s Long Term Goal: Return home and be free or admission to hospital for 30 daays    Interventions:  - Follow instructions MD provided  - follow up with CHF clinic to decrease readmi

## 2017-03-14 ENCOUNTER — APPOINTMENT (OUTPATIENT)
Dept: GENERAL RADIOLOGY | Facility: HOSPITAL | Age: 67
DRG: 190 | End: 2017-03-14
Attending: INTERNAL MEDICINE
Payer: MEDICARE

## 2017-03-14 LAB
ANION GAP SERPL CALC-SCNC: 9 MMOL/L (ref 0–18)
BUN SERPL-MCNC: 88 MG/DL (ref 8–20)
BUN/CREAT SERPL: 58.7 (ref 10–20)
CALCIUM SERPL-MCNC: 8.6 MG/DL (ref 8.5–10.5)
CHLORIDE SERPL-SCNC: 102 MMOL/L (ref 95–110)
CO2 SERPL-SCNC: 26 MMOL/L (ref 22–32)
CREAT SERPL-MCNC: 1.5 MG/DL (ref 0.5–1.5)
GLUCOSE SERPL-MCNC: 155 MG/DL (ref 70–99)
INR BLD: 1.4 (ref 0.9–1.2)
MAGNESIUM SERPL-MCNC: 2.8 MG/DL (ref 1.8–2.5)
OSMOLALITY UR CALC.SUM OF ELEC: 314 MOSM/KG (ref 275–295)
POTASSIUM SERPL-SCNC: 5 MMOL/L (ref 3.3–5.1)
PROTHROMBIN TIME: 16.5 SECONDS (ref 11.8–14.5)
SODIUM SERPL-SCNC: 137 MMOL/L (ref 136–144)

## 2017-03-14 PROCEDURE — 97110 THERAPEUTIC EXERCISES: CPT

## 2017-03-14 PROCEDURE — 80048 BASIC METABOLIC PNL TOTAL CA: CPT | Performed by: INTERNAL MEDICINE

## 2017-03-14 PROCEDURE — 94640 AIRWAY INHALATION TREATMENT: CPT

## 2017-03-14 PROCEDURE — 83735 ASSAY OF MAGNESIUM: CPT | Performed by: INTERNAL MEDICINE

## 2017-03-14 PROCEDURE — 71020 XR CHEST PA + LAT CHEST (CPT=71020): CPT

## 2017-03-14 PROCEDURE — 85610 PROTHROMBIN TIME: CPT | Performed by: INTERNAL MEDICINE

## 2017-03-14 PROCEDURE — 97116 GAIT TRAINING THERAPY: CPT

## 2017-03-14 RX ORDER — WARFARIN SODIUM 7.5 MG/1
7.5 TABLET ORAL
Status: COMPLETED | OUTPATIENT
Start: 2017-03-14 | End: 2017-03-14

## 2017-03-14 RX ORDER — WARFARIN SODIUM 7.5 MG/1
7.5 TABLET ORAL
Status: DISCONTINUED | OUTPATIENT
Start: 2017-03-14 | End: 2017-03-14

## 2017-03-14 NOTE — PLAN OF CARE
Maintains optimal cardiac output and hemodynamic stability Progressing    Iv lasix on hold, continue to monitor fluid balance  Absence of cardiac arrhythmias or at baseline Progressing    Rhythm remains SR with a paced.   Discharge to home or other facility

## 2017-03-14 NOTE — PROGRESS NOTES
[facility]    Cardiology Progress Note    Clementeen Page Patient Status:  Inpatient    1950 MRN [id number]   Location [facility] 3W/SW Attending Yosi Carroll, *   Hosp Day # 6 PCP MD Geraldine Millan Oral BID   • aspirin  81 mg Oral Daily   • carvedilol  50 mg Oral BID   • dronedarone HCl  400 mg Oral BID with meals   • allopurinol  100 mg Oral Daily   • Montelukast Sodium  10 mg Oral Nightly   • nystatin  100,000 Units Topical BID   • Atorvastatin Moreno

## 2017-03-14 NOTE — PHYSICAL THERAPY NOTE
PHYSICAL THERAPY TREATMENT NOTE - INPATIENT    Room Number: 325/325-A            Problem List  Principal Problem:    Congestive heart failure of unknown etiology ([facility]  Active Problems:    Asthmatic bronchitis , chronic (HCC)    Severe obesity (BMI 35.0-3 much difficulty does the patient currently have. ..  -   Turning over in bed (including adjusting bedclothes, sheets and blankets)?: A Little   -   Sitting down on and standing up from a chair with arms (e.g., wheelchair, bedside commode, etc.): A Little walker - platform at assistance level: modified independent    10' with SBA RW 3/14/2017     Goal #4   Pt will be ind in AROM on B LE while seated   Goal #5      Goal #6

## 2017-03-14 NOTE — PLAN OF CARE
CARDIOVASCULAR - ADULT    • Maintains optimal cardiac output and hemodynamic stability Progressing    • Absence of cardiac arrhythmias or at baseline Progressing    VSS    DISCHARGE PLANNING    • Discharge to home or other facility with appropriate resourc

## 2017-03-14 NOTE — SPIRITUAL CARE NOTE
Chp checked in on pt - length of stay 6 days. Pt mentioned her desire to improve walking so she can get home and take care matters there. Chp provided pastoral support through empathetic listening and prayer.  Pt hopes to be discharged soon; welcomes period

## 2017-03-14 NOTE — PROGRESS NOTES
[facility]    Progress Note    Cynthia Keen Patient Status:  Inpatient    1950 MRN [id number]   Location [facility] 3W/SW Attending Robert Isbell, [address] Day # 6 PCP Hemant Jacobsen MD     Subjective: 03/13/2017   HGB 13.2 03/13/2017   HCT 40.3 03/13/2017    03/13/2017   CREATSERUM 1.50 03/14/2017   BUN 88* 03/14/2017    03/14/2017   K 5.0 03/14/2017    03/14/2017   CO2 26 03/14/2017   * 03/14/2017   CA 8.6 03/14/2017   ALB 3.2

## 2017-03-14 NOTE — PROGRESS NOTES
[place] [facility] - [facility]    Progress Note    Esau Marlee Patient Status:  Inpatient    1950 MRN [id number]   Location [facility] 3W/SW Attending Keanu Elizabeth, [address] Day # 6 PCP Libertad Mendez MD       Subjective:   Rebel Kim the calves or thighs  Neurologic: Grossly normal    Assessment and Plan:     Congestive heart failure of unknown etiology ([facility]  Per cards      Asthmatic bronchitis , chronic ([address]  D/c abx      Severe obesity (BMI 35.0-35.9 with comorbidity) ([facility]  chroni

## 2017-03-15 LAB
ANION GAP SERPL CALC-SCNC: 4 MMOL/L (ref 0–18)
BUN SERPL-MCNC: 79 MG/DL (ref 8–20)
BUN/CREAT SERPL: 56.8 (ref 10–20)
CALCIUM SERPL-MCNC: 8.4 MG/DL (ref 8.5–10.5)
CHLORIDE SERPL-SCNC: 106 MMOL/L (ref 95–110)
CO2 SERPL-SCNC: 27 MMOL/L (ref 22–32)
CREAT SERPL-MCNC: 1.39 MG/DL (ref 0.5–1.5)
GLUCOSE SERPL-MCNC: 131 MG/DL (ref 70–99)
INR BLD: 1.4 (ref 0.9–1.2)
MAGNESIUM SERPL-MCNC: 2.6 MG/DL (ref 1.8–2.5)
OSMOLALITY UR CALC.SUM OF ELEC: 309 MOSM/KG (ref 275–295)
POTASSIUM SERPL-SCNC: 5.1 MMOL/L (ref 3.3–5.1)
PROTHROMBIN TIME: 16.5 SECONDS (ref 11.8–14.5)
SODIUM SERPL-SCNC: 137 MMOL/L (ref 136–144)

## 2017-03-15 PROCEDURE — 83735 ASSAY OF MAGNESIUM: CPT | Performed by: INTERNAL MEDICINE

## 2017-03-15 PROCEDURE — 85610 PROTHROMBIN TIME: CPT | Performed by: INTERNAL MEDICINE

## 2017-03-15 PROCEDURE — 94640 AIRWAY INHALATION TREATMENT: CPT

## 2017-03-15 PROCEDURE — 80048 BASIC METABOLIC PNL TOTAL CA: CPT | Performed by: INTERNAL MEDICINE

## 2017-03-15 PROCEDURE — 97116 GAIT TRAINING THERAPY: CPT

## 2017-03-15 RX ORDER — PREDNISONE 20 MG/1
20 TABLET ORAL
Status: DISCONTINUED | OUTPATIENT
Start: 2017-03-16 | End: 2017-03-18

## 2017-03-15 RX ORDER — 0.9 % SODIUM CHLORIDE 0.9 %
VIAL (ML) INJECTION
Status: COMPLETED
Start: 2017-03-15 | End: 2017-03-15

## 2017-03-15 RX ORDER — DOXYCYCLINE 100 MG/1
100 CAPSULE ORAL EVERY 12 HOURS SCHEDULED
Status: DISCONTINUED | OUTPATIENT
Start: 2017-03-15 | End: 2017-03-18

## 2017-03-15 RX ORDER — WARFARIN SODIUM 10 MG/1
10 TABLET ORAL ONCE
Status: COMPLETED | OUTPATIENT
Start: 2017-03-15 | End: 2017-03-15

## 2017-03-15 NOTE — PLAN OF CARE
GASTROINTESTINAL - ADULT    • Maintains or returns to baseline bowel function Completed    • Achieves appropriate nutritional intake (bariatric) Completed          CARDIOVASCULAR - ADULT    • Maintains optimal cardiac output and hemodynamic stability Progr

## 2017-03-15 NOTE — PHYSICAL THERAPY NOTE
PHYSICAL THERAPY TREATMENT NOTE - INPATIENT    Room Number: 325/325-A            Problem List  Principal Problem:    Congestive heart failure of unknown etiology ([facility]  Active Problems:    Asthmatic bronchitis , chronic (HCC)    Severe obesity (BMI 35.0-3 bedclothes, sheets and blankets)?: A Little   -   Sitting down on and standing up from a chair with arms (e.g., wheelchair, bedside commode, etc.): A Little   -   Moving from lying on back to sitting on the side of the bed?: A Little   How much help from a

## 2017-03-15 NOTE — PROGRESS NOTES
[facility]    Cardiology Progress Note    Herb Coronado Patient Status:  Inpatient    1950 MRN [id number]   Location [facility] 3W/SW Attending Beata Bautista, *   Hosp Day # 7 PCP MD Geraldine Lake BID   • docusate sodium  100 mg Oral BID   • aspirin  81 mg Oral Daily   • carvedilol  50 mg Oral BID   • dronedarone HCl  400 mg Oral BID with meals   • allopurinol  100 mg Oral Daily   • Montelukast Sodium  10 mg Oral Nightly   • nystatin  100,000 Units left lung base from March 8, 2017 secondary to atelectasis/consolidation. 2. Cardiomegaly and mild CHF remains. 3. Kyphoscoliosis. 4. Demineralization. 5. Osteoarthritis. 6. Chronic wedging of multiple vertebra. 7. Defibrillator.                  Luana Fulling

## 2017-03-15 NOTE — PLAN OF CARE
CARDIOVASCULAR - ADULT    • Maintains optimal cardiac output and hemodynamic stability Progressing    • Absence of cardiac arrhythmias or at baseline Progressing      HAD  SHORT EPISODE OF V-TACK    DISCHARGE PLANNING    • Discharge to home or other facili

## 2017-03-15 NOTE — PROGRESS NOTES
Holding sinus on Multaq  Has ICD   SOB - mulifactorial  Multaq unlikely contributing to sob/chf, ok to continue

## 2017-03-15 NOTE — PROGRESS NOTES
[facility]    Abhilash Garcia Patient Status:  Inpatient    1950 MRN [id number]   Location [facility] 3W/SW Attending Afsaneh Harley, [facility] Day # 7 PCP Salma Russell MD       Subjective:   Christie Fontana Congestive heart failure of unknown etiology (HCC)  detter      Asthmatic bronchitis , chronic (HCC)  Levaquin, prednisone neb IS      Severe obesity (BMI 35.0-35.9 with comorbidity) (HCC)        Sleep apnea, obstructive  refused      Cardiomyopathy with i

## 2017-03-15 NOTE — PROGRESS NOTES
[facility]    Progress Note    Tito Garzon Patient Status:  Inpatient    1950 MRN [id number]   Location [facility] 3W/SW Attending Michael Espinoza, [address] Day # 7 PCP Krys Tello MD     Subjective:  03/15/2017   CO2 27 03/15/2017   * 03/15/2017   CA 8.4* 03/15/2017   ALB 3.2* 03/10/2017   ALKPHO 104* 03/10/2017   BILT 0.5 03/10/2017   TP 6.7 03/10/2017   AST 28 03/10/2017   ALT 22 03/10/2017   INR 1.4* 03/15/2017   MG 2.6* 03/15/2017

## 2017-03-15 NOTE — DISCHARGE PLANNING
315/17 CM Discharge planning   Met with pt, discussed short term rehab options and provided NH Resource List.  Pt requested rehab at [facility] where she has been in the past.  Referral and medical records faxed to [facility], also requested NH DON f

## 2017-03-16 LAB
ANION GAP SERPL CALC-SCNC: 5 MMOL/L (ref 0–18)
BUN SERPL-MCNC: 84 MG/DL (ref 8–20)
BUN/CREAT SERPL: 52.8 (ref 10–20)
CALCIUM SERPL-MCNC: 8.3 MG/DL (ref 8.5–10.5)
CHLORIDE SERPL-SCNC: 105 MMOL/L (ref 95–110)
CO2 SERPL-SCNC: 28 MMOL/L (ref 22–32)
CREAT SERPL-MCNC: 1.59 MG/DL (ref 0.5–1.5)
GLUCOSE SERPL-MCNC: 108 MG/DL (ref 70–99)
INR BLD: 1.4 (ref 0.9–1.2)
MAGNESIUM SERPL-MCNC: 2.6 MG/DL (ref 1.8–2.5)
OSMOLALITY UR CALC.SUM OF ELEC: 312 MOSM/KG (ref 275–295)
POTASSIUM SERPL-SCNC: 4.1 MMOL/L (ref 3.3–5.1)
PROTHROMBIN TIME: 17.1 SECONDS (ref 11.8–14.5)
SODIUM SERPL-SCNC: 138 MMOL/L (ref 136–144)

## 2017-03-16 PROCEDURE — 94640 AIRWAY INHALATION TREATMENT: CPT

## 2017-03-16 PROCEDURE — 80048 BASIC METABOLIC PNL TOTAL CA: CPT | Performed by: INTERNAL MEDICINE

## 2017-03-16 PROCEDURE — 85610 PROTHROMBIN TIME: CPT | Performed by: INTERNAL MEDICINE

## 2017-03-16 PROCEDURE — 83735 ASSAY OF MAGNESIUM: CPT | Performed by: INTERNAL MEDICINE

## 2017-03-16 RX ORDER — WARFARIN SODIUM 10 MG/1
10 TABLET ORAL
Status: COMPLETED | OUTPATIENT
Start: 2017-03-16 | End: 2017-03-16

## 2017-03-16 NOTE — PLAN OF CARE
Up in chair, kelsy. Well, plan [facility] rehab when ready.        CARDIOVASCULAR - ADULT    • Maintains optimal cardiac output and hemodynamic stability Progressing    • Absence of cardiac arrhythmias or at baseline Progressing        DISCHARGE PLANN

## 2017-03-16 NOTE — PROGRESS NOTES
[facility] Heart Cardiology Progress Note      Kendal Jean Paulirvin Patient Status:  Inpatient    1950 MRN [id number]   Location [facility] 3W/SW Attending Salbador Dobson, *   Hosp Day # 8 PCP KATHERINE To Polo 20 mg Oral Daily with breakfast   • hydrALAzine HCl  10 mg Oral Q8H [address]   • isosorbide dinitrate  10 mg Oral TID (Nitrates)   • ipratropium-albuterol  3 mL Nebulization BID   • docusate sodium  100 mg Oral BID   • aspirin  81 mg Oral Daily   • carvedilol

## 2017-03-16 NOTE — PROGRESS NOTES
[facility] Heart Cardiology Progress Note      Lakia Cristian Patient Status:  Inpatient    1950 MRN [id number]   Location [facility] 3W/SW Attending Justo Perez, *   Hosp Day # 8 PCP KATHERINE Keen predniSONE  20 mg Oral Daily with breakfast   • hydrALAzine HCl  10 mg Oral Q8H [facility]   • isosorbide dinitrate  10 mg Oral TID (Nitrates)   • ipratropium-albuterol  3 mL Nebulization BID   • docusate sodium  100 mg Oral BID   • aspirin  81 mg Oral Daily   •

## 2017-03-16 NOTE — PLAN OF CARE
CARDIOVASCULAR - ADULT    • Maintains optimal cardiac output and hemodynamic stability Progressing    • Absence of cardiac arrhythmias or at baseline Progressing    VSS. A-paced/NSR on tele with non-sustaining beats of v-tach. MD is aware.      DISCHARGE PL

## 2017-03-16 NOTE — PROGRESS NOTES
[facility]    Progress Note    Maximiliano Bach Patient Status:  Inpatient    1950 MRN [id number]   Location [facility] 3W/SW Attending Aleksandra Townsend, [facility] Day # 8 PCP Mario Rodriges MD     Subjective: 03/13/2017    03/13/2017   CREATSERUM 1.59* 03/16/2017   BUN 84* 03/16/2017    03/16/2017   K 4.1 03/16/2017    03/16/2017   CO2 28 03/16/2017   * 03/16/2017   CA 8.3* 03/16/2017   ALB 3.2* 03/10/2017   ALKPHO 104* 03/10/2017   BI

## 2017-03-16 NOTE — RESPIRATORY THERAPY NOTE
Guadalupe Riedel MAURICIO ASSESSMENT:    Pt does have a previous diagnosis of MAURICIO. Pt does not routinely use a CPAP device at home. This pt is suspected to be at high risk for MAURICIO.     RCP recommends: Pt states she has a CPAP at home but it is in the closet and has not used it

## 2017-03-16 NOTE — PROGRESS NOTES
[facility]    Progress Note    Maximiliano Bach Patient Status:  Inpatient    1950 MRN [id number]   Location [facility] 3W/SW Attending Aleksandra Townsend, [address] Day # 8 PCP Mario Rodriges MD       Subjective:   Noreen Mcdowell failure of unknown etiology ([facility]  Per cards      Asthmatic bronchitis , chronic ([facility]  abx nebs steroids      Severe obesity (BMI 35.0-35.9 with comorbidity) ([facility]        Sleep apnea, obstructive  Refused        Cardiomyopathy with implantable cardioverte

## 2017-03-17 LAB
INR BLD: 1.7 (ref 0.9–1.2)
PROTHROMBIN TIME: 19.7 SECONDS (ref 11.8–14.5)

## 2017-03-17 PROCEDURE — 85610 PROTHROMBIN TIME: CPT | Performed by: INTERNAL MEDICINE

## 2017-03-17 PROCEDURE — 97110 THERAPEUTIC EXERCISES: CPT

## 2017-03-17 PROCEDURE — 94640 AIRWAY INHALATION TREATMENT: CPT

## 2017-03-17 PROCEDURE — 97116 GAIT TRAINING THERAPY: CPT

## 2017-03-17 RX ORDER — WARFARIN SODIUM 10 MG/1
10 TABLET ORAL
Status: COMPLETED | OUTPATIENT
Start: 2017-03-17 | End: 2017-03-17

## 2017-03-17 NOTE — PROGRESS NOTES
[facility]    Progress Note    Star Poser Patient Status:  Inpatient    1950 MRN [id number]   Location [facility] 3W/SW Attending Jaxson Scanlon [facility] Day # 9 PCP Brittany Sutherland MD       Subjective:   Holly Macias normal    Assessment and Plan:     Congestive heart failure of unknown etiology ([facility]  cards      Asthmatic bronchitis , chronic (HCC)  Prednisone, nebs      Severe obesity (BMI 35.0-35.9 with comorbidity) ([facility]        Sleep apnea, obstructive  D/w pt tx b

## 2017-03-17 NOTE — PROGRESS NOTES
[facility]    Progress Note    Soto Moody Patient Status:  Inpatient    1950 MRN [id number]   Location [facility] 3W/SW Attending Micheline Koyanagi, [address] Day # 9 PCP Jyothi Brito MD       Assessment and ,no murmur  Abd: Abdomen soft, nontender, nondistended, no organomegaly, bowel sounds present  Ext:  no clubbing, no cyanosis,no edema  Neuro: no focal deficits  Skin: no rashes or lesions    Scheduled Meds:   • Doxycycline Monohydrate  100 mg Oral 2 times

## 2017-03-17 NOTE — PHYSICAL THERAPY NOTE
PHYSICAL THERAPY TREATMENT NOTE - INPATIENT    Room Number: 325/325-A            Problem List  Principal Problem:    Congestive heart failure of unknown etiology ([facility]  Active Problems:    Asthmatic bronchitis , chronic (HCC)    Severe obesity (BMI 35.0-3 Little   -   Moving from lying on back to sitting on the side of the bed?: A Little   How much help from another person does the patient currently need. ..   -   Moving to and from a bed to a chair (including a wheelchair)?: A Little   -   Need to walk in h

## 2017-03-17 NOTE — PROGRESS NOTES
[facility]    Progress Note    Alexandra Rice Patient Status:  Inpatient    1950 MRN [id number]   Location [facility] 3W/SW Attending Rolan Coleman, [facility] Day # 9 PCP Rae Azar MD     Subjective:  03/13/2017   CREATSERUM 1.59* 03/16/2017   BUN 84* 03/16/2017    03/16/2017   K 4.1 03/16/2017    03/16/2017   CO2 28 03/16/2017   * 03/16/2017   CA 8.3* 03/16/2017   ALB 3.2* 03/10/2017   ALKPHO 104* 03/10/2017   BILT 0.5 03/1

## 2017-03-17 NOTE — PLAN OF CARE
Problem: PAIN - ADULT  Goal: Verbalizes/displays adequate comfort level or patient’s stated pain goal  INTERVENTIONS:  - Encourage pt to monitor pain and request assistance  - Assess pain using appropriate pain scale  - Administer analgesics based on type wound care, etc)  - Arrange for interpreters to assist at discharge as needed  - Consider post-discharge preferences of patient/family/discharge partner  - Complete POLST form as appropriate  - Assess patient’s ability to be responsible for managing their HOLD, FLUID RESTRICTION, EF LOW, HAS ICD   Goal: Absence of cardiac arrhythmias or at baseline  INTERVENTIONS:  - Continuous cardiac monitoring, monitor vital signs, obtain 12 lead EKG if indicated  - Evaluate effectiveness of antiarrhythmic and heart rate

## 2017-03-17 NOTE — RESPIRATORY THERAPY NOTE
RT CPAP PROGRESS NOTE:    Patient is compliant with nightly CPAP:  NO  Patient refused:  YES  WILL MONITOR THE PATIENT

## 2017-03-18 VITALS
SYSTOLIC BLOOD PRESSURE: 118 MMHG | HEART RATE: 75 BPM | TEMPERATURE: 97 F | DIASTOLIC BLOOD PRESSURE: 73 MMHG | HEIGHT: 64 IN | WEIGHT: 293 LBS | OXYGEN SATURATION: 97 % | BODY MASS INDEX: 50.02 KG/M2 | RESPIRATION RATE: 19 BRPM

## 2017-03-18 LAB
INR BLD: 1.8 (ref 0.9–1.2)
PROTHROMBIN TIME: 20.6 SECONDS (ref 11.8–14.5)

## 2017-03-18 PROCEDURE — 94640 AIRWAY INHALATION TREATMENT: CPT

## 2017-03-18 PROCEDURE — 85610 PROTHROMBIN TIME: CPT | Performed by: INTERNAL MEDICINE

## 2017-03-18 RX ORDER — FUROSEMIDE 40 MG/1
80 TABLET ORAL DAILY
Qty: 40 TABLET | Refills: 0 | Status: ON HOLD | OUTPATIENT
Start: 2017-03-18 | End: 2017-04-09

## 2017-03-18 RX ORDER — ALPRAZOLAM 0.25 MG/1
0.25 TABLET ORAL 2 TIMES DAILY
Qty: 60 TABLET | Refills: 0 | Status: SHIPPED | OUTPATIENT
Start: 2017-03-18

## 2017-03-18 RX ORDER — PERMETHRIN 50 MG/G
CREAM TOPICAL ONCE
Status: DISCONTINUED | OUTPATIENT
Start: 2017-03-19 | End: 2017-03-18

## 2017-03-18 RX ORDER — PERMETHRIN 50 MG/G
CREAM TOPICAL ONCE
Status: COMPLETED | OUTPATIENT
Start: 2017-03-18 | End: 2017-03-18

## 2017-03-18 RX ORDER — ALLOPURINOL 100 MG/1
100 TABLET ORAL DAILY
Qty: 40 TABLET | Refills: 0 | Status: SHIPPED | OUTPATIENT
Start: 2017-03-18

## 2017-03-18 RX ORDER — PREDNISONE 20 MG/1
20 TABLET ORAL
Qty: 10 TABLET | Refills: 0 | Status: ON HOLD | OUTPATIENT
Start: 2017-03-18 | End: 2017-04-09

## 2017-03-18 RX ORDER — ISOSORBIDE DINITRATE 10 MG/1
10 TABLET ORAL
Qty: 60 TABLET | Refills: 0 | Status: SHIPPED | OUTPATIENT
Start: 2017-03-18 | End: 2017-12-21

## 2017-03-18 RX ORDER — FUROSEMIDE 40 MG/1
40 TABLET ORAL DAILY
Status: DISCONTINUED | OUTPATIENT
Start: 2017-03-18 | End: 2017-03-18

## 2017-03-18 RX ORDER — DOXYCYCLINE 100 MG/1
100 CAPSULE ORAL EVERY 12 HOURS SCHEDULED
Qty: 30 CAPSULE | Refills: 0 | Status: ON HOLD | OUTPATIENT
Start: 2017-03-18 | End: 2017-04-15

## 2017-03-18 RX ORDER — PSEUDOEPHEDRINE HCL 30 MG
100 TABLET ORAL 2 TIMES DAILY
Qty: 40 CAPSULE | Refills: 0 | Status: ON HOLD | OUTPATIENT
Start: 2017-03-18 | End: 2018-10-26

## 2017-03-18 RX ORDER — WARFARIN SODIUM 10 MG/1
10 TABLET ORAL
Status: COMPLETED | OUTPATIENT
Start: 2017-03-18 | End: 2017-03-18

## 2017-03-18 RX ORDER — TEMAZEPAM 15 MG/1
15 CAPSULE ORAL NIGHTLY PRN
Qty: 40 CAPSULE | Refills: 0 | Status: SHIPPED | OUTPATIENT
Start: 2017-03-18

## 2017-03-18 RX ORDER — FUROSEMIDE 40 MG/1
40 TABLET ORAL DAILY
Qty: 40 TABLET | Refills: 0 | Status: SHIPPED | OUTPATIENT
Start: 2017-03-18 | End: 2017-09-06

## 2017-03-18 RX ORDER — ALPRAZOLAM 0.25 MG/1
0.25 TABLET ORAL 2 TIMES DAILY
Status: DISCONTINUED | OUTPATIENT
Start: 2017-03-18 | End: 2017-03-18

## 2017-03-18 RX ORDER — GUAIFENESIN 600 MG
600 TABLET, EXTENDED RELEASE 12 HR ORAL 2 TIMES DAILY
Qty: 10 TABLET | Refills: 0 | Status: ON HOLD | OUTPATIENT
Start: 2017-03-18 | End: 2017-08-30

## 2017-03-18 NOTE — DISCHARGE PLANNING
The pt. Does not need a second treatment for lice, because the first was preventative. The pt. Is scheduled to discharge to [facility] today 3/18 at 5p, via [facility]. The pt. Is aware of medicar costs.       Report [phone]    Karl Morfin

## 2017-03-18 NOTE — DISCHARGE SUMMARY
[facility]    PATIENT'S NAME: Devante Chambers   ATTENDING PHYSICIAN: Pratik Fleming MD   PATIENT ACCOUNT#:   [id number]    LOCATION:  [address] [place] [address] #:   [id number]       YOB: 1950  ADMISSION DATE:       03/08 HEART:  Regular. ABDOMEN:  Soft. EXTREMITIES:  No edema. DISPOSITION:  Transfer to the nursing home today.       Dictated By Rachael Dhillon MD  d: 03/18/2017 11:31:10  t: 03/18/2017 12:41:31  Job [id number]  AP/

## 2017-03-18 NOTE — DISCHARGE PLANNING
SW was notified of the pt. Being exposed to lice. The pt. Was treated with lice shampoo today. Per [employer] the pt. Will need to be shampooed twice prior to discharge. Plan is for the pt.  To be treated again tomorrow and discharge will be able t

## 2017-03-18 NOTE — PLAN OF CARE
CARDIOVASCULAR - ADULT    • Maintains optimal cardiac output and hemodynamic stability Completed    • Absence of cardiac arrhythmias or at baseline Completed        DISCHARGE PLANNING    • Discharge to home or other facility with appropriate resources Comp

## 2017-03-18 NOTE — RESPIRATORY THERAPY NOTE
RT CPAP PROGRESS NOTE:    Patient is compliant with nightly CPAP:No  Patient refused: Yes  AutoCPAP in use: No      RCP recommends: pt resused to use the cpap for tonight. Uncomfortable with the mask. do not wants to bring her own mask from home and pt said

## 2017-03-18 NOTE — PROGRESS NOTES
[facility] - [facility]    Cardiology Progress Note    Nicolasa Cade Patient Status:  Inpatient    1950 MRN [id number]   Location [facility] 3W/SW Attending Halina Chapa, *   Hosp Day # 10 PCP Shira Barron MD         A with meals   • allopurinol  100 mg Oral Daily   • Montelukast Sodium  10 mg Oral Nightly   • nystatin  100,000 Units Topical BID   • Atorvastatin Calcium  20 mg Oral Nightly   • Pantoprazole Sodium  40 mg Oral Daily   • budesonide  0.5 mg Nebulization Ananth

## 2017-03-18 NOTE — PROGRESS NOTES
[facility]    Progress Note    Julianne Mccarthy Patient Status:  Inpatient    1950 MRN [id number]   Location [facility] 3W/SW Attending Dennis Linares, [facility] Day # 10 PCP Tracy Graff MD       Subjective: MD  3/18/2017

## 2017-03-18 NOTE — PLAN OF CARE
Follow-Up Infection Control and Prevention:  Received notification from [facility] advised they would not accept patient until tomorrow and requested 2nd treatment to be administered for Lice.   Infection Control spoke with Kevin Ramirez Supervisor

## 2017-03-20 ENCOUNTER — SNF/IP PROF CHARGE ONLY (OUTPATIENT)
Dept: INTERNAL MEDICINE CLINIC | Facility: CLINIC | Age: 67
End: 2017-03-20

## 2017-03-20 DIAGNOSIS — Z95.810 CARDIOMYOPATHY WITH IMPLANTABLE CARDIOVERTER-DEFIBRILLATOR (HCC): Chronic | ICD-10-CM

## 2017-03-20 DIAGNOSIS — G47.33 SLEEP APNEA, OBSTRUCTIVE: Chronic | ICD-10-CM

## 2017-03-20 DIAGNOSIS — R26.2 DIFFICULTY WALKING: ICD-10-CM

## 2017-03-20 DIAGNOSIS — E66.01 SEVERE OBESITY (BMI 35.0-35.9 WITH COMORBIDITY) (HCC): Chronic | ICD-10-CM

## 2017-03-20 DIAGNOSIS — I50.9 CONGESTIVE HEART FAILURE OF UNKNOWN ETIOLOGY (HCC): ICD-10-CM

## 2017-03-20 DIAGNOSIS — J44.9 ASTHMATIC BRONCHITIS , CHRONIC (HCC): Primary | Chronic | ICD-10-CM

## 2017-03-20 DIAGNOSIS — I42.9 CARDIOMYOPATHY WITH IMPLANTABLE CARDIOVERTER-DEFIBRILLATOR (HCC): Chronic | ICD-10-CM

## 2017-03-20 DIAGNOSIS — I48.20 CHRONIC ATRIAL FIBRILLATION (HCC): ICD-10-CM

## 2017-03-20 PROCEDURE — 99305 1ST NF CARE MODERATE MDM 35: CPT | Performed by: INTERNAL MEDICINE

## 2017-03-23 PROCEDURE — 99308 SBSQ NF CARE LOW MDM 20: CPT | Performed by: INTERNAL MEDICINE

## 2017-03-24 ENCOUNTER — SNF/IP PROF CHARGE ONLY (OUTPATIENT)
Dept: INTERNAL MEDICINE CLINIC | Facility: CLINIC | Age: 67
End: 2017-03-24

## 2017-03-24 DIAGNOSIS — E66.01 SEVERE OBESITY (BMI 35.0-35.9 WITH COMORBIDITY) (HCC): Chronic | ICD-10-CM

## 2017-03-24 DIAGNOSIS — I42.9 CARDIOMYOPATHY WITH IMPLANTABLE CARDIOVERTER-DEFIBRILLATOR (HCC): Chronic | ICD-10-CM

## 2017-03-24 DIAGNOSIS — I50.9 CONGESTIVE HEART FAILURE OF UNKNOWN ETIOLOGY (HCC): ICD-10-CM

## 2017-03-24 DIAGNOSIS — G47.33 SLEEP APNEA, OBSTRUCTIVE: Chronic | ICD-10-CM

## 2017-03-24 DIAGNOSIS — Z95.810 CARDIOMYOPATHY WITH IMPLANTABLE CARDIOVERTER-DEFIBRILLATOR (HCC): Chronic | ICD-10-CM

## 2017-03-24 DIAGNOSIS — J44.9 ASTHMATIC BRONCHITIS , CHRONIC (HCC): Primary | Chronic | ICD-10-CM

## 2017-04-03 ENCOUNTER — SNF/IP PROF CHARGE ONLY (OUTPATIENT)
Dept: INTERNAL MEDICINE CLINIC | Facility: CLINIC | Age: 67
End: 2017-04-03

## 2017-04-03 DIAGNOSIS — G47.33 SLEEP APNEA, OBSTRUCTIVE: Chronic | ICD-10-CM

## 2017-04-03 DIAGNOSIS — I50.9 CONGESTIVE HEART FAILURE OF UNKNOWN ETIOLOGY (HCC): ICD-10-CM

## 2017-04-03 DIAGNOSIS — R26.2 DIFFICULTY WALKING: ICD-10-CM

## 2017-04-03 DIAGNOSIS — Z95.810 CARDIOMYOPATHY WITH IMPLANTABLE CARDIOVERTER-DEFIBRILLATOR (HCC): Chronic | ICD-10-CM

## 2017-04-03 DIAGNOSIS — I42.9 CARDIOMYOPATHY WITH IMPLANTABLE CARDIOVERTER-DEFIBRILLATOR (HCC): Chronic | ICD-10-CM

## 2017-04-03 DIAGNOSIS — E66.01 SEVERE OBESITY (BMI 35.0-35.9 WITH COMORBIDITY) (HCC): Chronic | ICD-10-CM

## 2017-04-03 DIAGNOSIS — D69.6 THROMBOCYTOPENIA (HCC): ICD-10-CM

## 2017-04-03 DIAGNOSIS — J44.9 ASTHMATIC BRONCHITIS , CHRONIC (HCC): Primary | Chronic | ICD-10-CM

## 2017-04-03 PROCEDURE — 99308 SBSQ NF CARE LOW MDM 20: CPT | Performed by: INTERNAL MEDICINE

## 2017-04-05 PROCEDURE — 99308 SBSQ NF CARE LOW MDM 20: CPT | Performed by: INTERNAL MEDICINE

## 2017-04-09 ENCOUNTER — HOSPITAL ENCOUNTER (INPATIENT)
Facility: HOSPITAL | Age: 67
LOS: 4 days | Discharge: NURSING, REHAB OR LONG-TERM CARE | DRG: 292 | End: 2017-04-15
Attending: EMERGENCY MEDICINE | Admitting: INTERNAL MEDICINE
Payer: MEDICARE

## 2017-04-09 ENCOUNTER — APPOINTMENT (OUTPATIENT)
Dept: GENERAL RADIOLOGY | Facility: HOSPITAL | Age: 67
DRG: 292 | End: 2017-04-09
Attending: EMERGENCY MEDICINE
Payer: MEDICARE

## 2017-04-09 DIAGNOSIS — I50.21 ACUTE SYSTOLIC CONGESTIVE HEART FAILURE (HCC): ICD-10-CM

## 2017-04-09 DIAGNOSIS — I25.5 ISCHEMIC CARDIOMYOPATHY: Primary | ICD-10-CM

## 2017-04-09 PROCEDURE — 83880 ASSAY OF NATRIURETIC PEPTIDE: CPT | Performed by: EMERGENCY MEDICINE

## 2017-04-09 PROCEDURE — 93010 ELECTROCARDIOGRAM REPORT: CPT | Performed by: EMERGENCY MEDICINE

## 2017-04-09 PROCEDURE — 71020 XR CHEST PA + LAT CHEST (CPT=71020): CPT

## 2017-04-09 PROCEDURE — 96375 TX/PRO/DX INJ NEW DRUG ADDON: CPT

## 2017-04-09 PROCEDURE — 80048 BASIC METABOLIC PNL TOTAL CA: CPT | Performed by: EMERGENCY MEDICINE

## 2017-04-09 PROCEDURE — 36415 COLL VENOUS BLD VENIPUNCTURE: CPT

## 2017-04-09 PROCEDURE — 87641 MR-STAPH DNA AMP PROBE: CPT | Performed by: EMERGENCY MEDICINE

## 2017-04-09 PROCEDURE — 93005 ELECTROCARDIOGRAM TRACING: CPT

## 2017-04-09 PROCEDURE — 94640 AIRWAY INHALATION TREATMENT: CPT

## 2017-04-09 PROCEDURE — 85610 PROTHROMBIN TIME: CPT | Performed by: EMERGENCY MEDICINE

## 2017-04-09 PROCEDURE — 85025 COMPLETE CBC W/AUTO DIFF WBC: CPT | Performed by: EMERGENCY MEDICINE

## 2017-04-09 PROCEDURE — 96374 THER/PROPH/DIAG INJ IV PUSH: CPT

## 2017-04-09 PROCEDURE — 71010 XR CHEST AP PORTABLE  (CPT=71010): CPT

## 2017-04-09 PROCEDURE — 99285 EMERGENCY DEPT VISIT HI MDM: CPT

## 2017-04-09 PROCEDURE — 87040 BLOOD CULTURE FOR BACTERIA: CPT | Performed by: EMERGENCY MEDICINE

## 2017-04-09 RX ORDER — ASPIRIN 81 MG/1
81 TABLET, CHEWABLE ORAL DAILY
Status: DISCONTINUED | OUTPATIENT
Start: 2017-04-10 | End: 2017-04-15

## 2017-04-09 RX ORDER — IPRATROPIUM BROMIDE AND ALBUTEROL SULFATE 2.5; .5 MG/3ML; MG/3ML
3 SOLUTION RESPIRATORY (INHALATION) ONCE
Status: COMPLETED | OUTPATIENT
Start: 2017-04-09 | End: 2017-04-09

## 2017-04-09 RX ORDER — FLUTICASONE PROPIONATE 50 MCG
1 SPRAY, SUSPENSION (ML) NASAL DAILY
Status: DISCONTINUED | OUTPATIENT
Start: 2017-04-10 | End: 2017-04-15

## 2017-04-09 RX ORDER — GUAIFENESIN 600 MG
600 TABLET, EXTENDED RELEASE 12 HR ORAL 2 TIMES DAILY
Status: DISCONTINUED | OUTPATIENT
Start: 2017-04-09 | End: 2017-04-15

## 2017-04-09 RX ORDER — METHYLPREDNISOLONE SODIUM SUCCINATE 40 MG/ML
40 INJECTION, POWDER, LYOPHILIZED, FOR SOLUTION INTRAMUSCULAR; INTRAVENOUS EVERY 6 HOURS
Status: DISCONTINUED | OUTPATIENT
Start: 2017-04-09 | End: 2017-04-10

## 2017-04-09 RX ORDER — CODEINE PHOSPHATE AND GUAIFENESIN 10; 100 MG/5ML; MG/5ML
5 SOLUTION ORAL 3 TIMES DAILY PRN
Status: DISCONTINUED | OUTPATIENT
Start: 2017-04-09 | End: 2017-04-15

## 2017-04-09 RX ORDER — ATORVASTATIN CALCIUM 20 MG/1
20 TABLET, FILM COATED ORAL NIGHTLY
COMMUNITY
Start: 2017-04-09

## 2017-04-09 RX ORDER — POTASSIUM CHLORIDE 20 MEQ/1
20 TABLET, EXTENDED RELEASE ORAL 2 TIMES DAILY
COMMUNITY
End: 2017-09-21

## 2017-04-09 RX ORDER — ATORVASTATIN CALCIUM 20 MG/1
20 TABLET, FILM COATED ORAL NIGHTLY
Status: DISCONTINUED | OUTPATIENT
Start: 2017-04-09 | End: 2017-04-15

## 2017-04-09 RX ORDER — POTASSIUM CHLORIDE 20 MEQ/1
20 TABLET, EXTENDED RELEASE ORAL 2 TIMES DAILY
Status: DISCONTINUED | OUTPATIENT
Start: 2017-04-09 | End: 2017-04-15

## 2017-04-09 RX ORDER — ALPRAZOLAM 0.25 MG/1
0.25 TABLET ORAL 2 TIMES DAILY
Status: DISCONTINUED | OUTPATIENT
Start: 2017-04-09 | End: 2017-04-15

## 2017-04-09 RX ORDER — TEMAZEPAM 15 MG/1
15 CAPSULE ORAL NIGHTLY PRN
Status: DISCONTINUED | OUTPATIENT
Start: 2017-04-09 | End: 2017-04-15

## 2017-04-09 RX ORDER — NYSTATIN 100000 U/G
100000 CREAM TOPICAL 2 TIMES DAILY
Status: DISCONTINUED | OUTPATIENT
Start: 2017-04-09 | End: 2017-04-09

## 2017-04-09 RX ORDER — IPRATROPIUM BROMIDE AND ALBUTEROL SULFATE 2.5; .5 MG/3ML; MG/3ML
3 SOLUTION RESPIRATORY (INHALATION)
Status: DISCONTINUED | OUTPATIENT
Start: 2017-04-09 | End: 2017-04-10

## 2017-04-09 RX ORDER — METHYLPREDNISOLONE SODIUM SUCCINATE 125 MG/2ML
125 INJECTION, POWDER, LYOPHILIZED, FOR SOLUTION INTRAMUSCULAR; INTRAVENOUS ONCE
Status: COMPLETED | OUTPATIENT
Start: 2017-04-09 | End: 2017-04-09

## 2017-04-09 RX ORDER — CLONIDINE HYDROCHLORIDE 0.1 MG/1
0.1 TABLET ORAL 2 TIMES DAILY
Status: DISCONTINUED | OUTPATIENT
Start: 2017-04-09 | End: 2017-04-15

## 2017-04-09 RX ORDER — CARVEDILOL 25 MG/1
50 TABLET ORAL 2 TIMES DAILY
Status: DISCONTINUED | OUTPATIENT
Start: 2017-04-09 | End: 2017-04-15

## 2017-04-09 RX ORDER — MONTELUKAST SODIUM 10 MG/1
10 TABLET ORAL NIGHTLY
Status: DISCONTINUED | OUTPATIENT
Start: 2017-04-09 | End: 2017-04-15

## 2017-04-09 RX ORDER — FLUTICASONE PROPIONATE 50 MCG
1 SPRAY, SUSPENSION (ML) NASAL DAILY
Status: ON HOLD | COMMUNITY
Start: 2017-04-09 | End: 2017-08-30

## 2017-04-09 RX ORDER — PANTOPRAZOLE SODIUM 40 MG/1
40 TABLET, DELAYED RELEASE ORAL DAILY
Status: DISCONTINUED | OUTPATIENT
Start: 2017-04-10 | End: 2017-04-15

## 2017-04-09 RX ORDER — EPLERENONE 25 MG/1
25 TABLET, FILM COATED ORAL DAILY
Status: DISCONTINUED | OUTPATIENT
Start: 2017-04-10 | End: 2017-04-15

## 2017-04-09 RX ORDER — FLUTICASONE PROPIONATE AND SALMETEROL 500; 50 UG/1; UG/1
1 POWDER RESPIRATORY (INHALATION) 2 TIMES DAILY
COMMUNITY
Start: 2017-04-09 | End: 2018-03-21

## 2017-04-09 RX ORDER — ALLOPURINOL 100 MG/1
100 TABLET ORAL DAILY
Status: DISCONTINUED | OUTPATIENT
Start: 2017-04-10 | End: 2017-04-15

## 2017-04-09 RX ORDER — IPRATROPIUM BROMIDE AND ALBUTEROL SULFATE 2.5; .5 MG/3ML; MG/3ML
3 SOLUTION RESPIRATORY (INHALATION) EVERY 6 HOURS PRN
Status: DISCONTINUED | OUTPATIENT
Start: 2017-04-09 | End: 2017-04-10

## 2017-04-09 RX ORDER — ISOSORBIDE DINITRATE 10 MG/1
10 TABLET ORAL
Status: DISCONTINUED | OUTPATIENT
Start: 2017-04-09 | End: 2017-04-15

## 2017-04-09 RX ORDER — FUROSEMIDE 10 MG/ML
40 INJECTION INTRAMUSCULAR; INTRAVENOUS ONCE
Status: COMPLETED | OUTPATIENT
Start: 2017-04-09 | End: 2017-04-09

## 2017-04-09 RX ORDER — FUROSEMIDE 10 MG/ML
40 INJECTION INTRAMUSCULAR; INTRAVENOUS
Status: DISCONTINUED | OUTPATIENT
Start: 2017-04-09 | End: 2017-04-12

## 2017-04-09 RX ORDER — DOCUSATE SODIUM 100 MG/1
100 CAPSULE, LIQUID FILLED ORAL 2 TIMES DAILY
Status: DISCONTINUED | OUTPATIENT
Start: 2017-04-09 | End: 2017-04-15

## 2017-04-09 NOTE — PLAN OF CARE
Received pt from ed with c/o increasing sob over the past 5-6 days. Pt has audible wheezes noted. Vital signs stable. Awaiting further orders from dr. Marco Antonio Torres (called twice),  And [facility]. Will continue with theresa. Pt resting comfortably in bed.   Will con

## 2017-04-09 NOTE — ED PROVIDER NOTES
Patient Seen in: [facility] AND [facility] Emergency Department    History   Patient presents with:  Dyspnea DAWNA SOB (respiratory)    Stated Complaint: Shortness of breath    HPI  Pt is 79 yo F who p/w SOB and wheezing x 5-6 days.   Patient was admitted to the [facility] (20 mg total) by mouth daily with breakfast.   GuaiFENesin  MG Oral Tablet 12 Hr,  Take 1 tablet (600 mg total) by mouth 2 (two) times daily. furosemide (LASIX) 40 MG Oral Tab,  Take 2 tablets (80 mg total) by mouth daily.    furosemide 40 MG Oral T Alcohol Use: No                Review of Systems    Positive for stated complaint: Shortness of breath  Other systems are as noted in HPI. Constitutional and vital signs reviewed.       All other systems reviewed and negative except as note panel order CBC WITH DIFFERENTIAL WITH PLATELET.   Procedure                               Abnormality         Status                     ---------                               -----------         ------                     CBC W/ DIFFERENTIAL[[id number]]

## 2017-04-09 NOTE — CONSULTS
[facility] HOSP - [facility]    Cardiology Consultation    Maximiliano Edsonjenni Location: [address] [place] [address] 1950 MRN [id number]   Consulting Date 2017 [facility] [id number]   Consulting Physician Jay Ku MD Attending Physician Aleksandra Townsend, have any smokeless tobacco history on file. She reports that she does not drink alcohol or use illicit drugs.     Allergies:    Penicillins               Shellfish-Derived P*      Ultrasound Gel          Hives    Comment:Needs prednisone prior to testing OR) Take 50 mg by mouth 2 (two) times daily. Disp:  Rfl:    Montelukast Sodium (SINGULAIR) 10 MG Oral Tab Take 10 mg by mouth nightly. Disp:  Rfl:    nystatin [id number] UNIT/GM External Cream Apply 100,000 Units topically 2 (two) times daily.  Disp: 30 g Rfl acute CHF. Restart it as soon as she is compensated/stable. -- hold warfarin today. Check INR in AM.  -- Lasix 40 IV BID  -- monitor I/Os, weights, renal fxn, lytes    Thank you for the consultation.      Whitley Guzman MD  Cardiology  [facility]  4/9/2017

## 2017-04-09 NOTE — ED NOTES
Patient in room 42, complaining of shortness of breath with audible wheezing. Says she was just released two weeks ago from this hospital for asthma, bronchitis, and COPD exacerbation.

## 2017-04-10 ENCOUNTER — SNF/IP PROF CHARGE ONLY (OUTPATIENT)
Dept: INTERNAL MEDICINE CLINIC | Facility: CLINIC | Age: 67
End: 2017-04-10

## 2017-04-10 DIAGNOSIS — G47.33 SLEEP APNEA, OBSTRUCTIVE: Chronic | ICD-10-CM

## 2017-04-10 DIAGNOSIS — R60.0 LOCALIZED EDEMA: ICD-10-CM

## 2017-04-10 DIAGNOSIS — J44.9 ASTHMATIC BRONCHITIS , CHRONIC (HCC): Primary | Chronic | ICD-10-CM

## 2017-04-10 DIAGNOSIS — E66.01 SEVERE OBESITY (BMI 35.0-35.9 WITH COMORBIDITY) (HCC): Chronic | ICD-10-CM

## 2017-04-10 DIAGNOSIS — I42.9 CARDIOMYOPATHY WITH IMPLANTABLE CARDIOVERTER-DEFIBRILLATOR (HCC): Chronic | ICD-10-CM

## 2017-04-10 DIAGNOSIS — Z95.810 CARDIOMYOPATHY WITH IMPLANTABLE CARDIOVERTER-DEFIBRILLATOR (HCC): Chronic | ICD-10-CM

## 2017-04-10 DIAGNOSIS — I25.5 ISCHEMIC CARDIOMYOPATHY: ICD-10-CM

## 2017-04-10 DIAGNOSIS — R26.2 DIFFICULTY WALKING: ICD-10-CM

## 2017-04-10 PROCEDURE — 85610 PROTHROMBIN TIME: CPT | Performed by: INTERNAL MEDICINE

## 2017-04-10 PROCEDURE — 85025 COMPLETE CBC W/AUTO DIFF WBC: CPT | Performed by: INTERNAL MEDICINE

## 2017-04-10 PROCEDURE — 02HV33Z INSERTION OF INFUSION DEVICE INTO SUPERIOR VENA CAVA, PERCUTANEOUS APPROACH: ICD-10-PCS | Performed by: INTERNAL MEDICINE

## 2017-04-10 PROCEDURE — 80048 BASIC METABOLIC PNL TOTAL CA: CPT | Performed by: INTERNAL MEDICINE

## 2017-04-10 PROCEDURE — 94640 AIRWAY INHALATION TREATMENT: CPT

## 2017-04-10 PROCEDURE — 36569 INSJ PICC 5 YR+ W/O IMAGING: CPT

## 2017-04-10 RX ORDER — IPRATROPIUM BROMIDE AND ALBUTEROL SULFATE 2.5; .5 MG/3ML; MG/3ML
3 SOLUTION RESPIRATORY (INHALATION)
Status: DISCONTINUED | OUTPATIENT
Start: 2017-04-11 | End: 2017-04-15

## 2017-04-10 RX ORDER — METHYLPREDNISOLONE SODIUM SUCCINATE 40 MG/ML
40 INJECTION, POWDER, LYOPHILIZED, FOR SOLUTION INTRAMUSCULAR; INTRAVENOUS EVERY 8 HOURS
Status: DISCONTINUED | OUTPATIENT
Start: 2017-04-10 | End: 2017-04-11

## 2017-04-10 RX ORDER — BENZONATATE 100 MG/1
100 CAPSULE ORAL 2 TIMES DAILY PRN
Status: DISCONTINUED | OUTPATIENT
Start: 2017-04-10 | End: 2017-04-15

## 2017-04-10 RX ORDER — SODIUM CHLORIDE 9 MG/ML
INJECTION, SOLUTION INTRAVENOUS
Status: COMPLETED
Start: 2017-04-10 | End: 2017-04-10

## 2017-04-10 RX ORDER — IPRATROPIUM BROMIDE AND ALBUTEROL SULFATE 2.5; .5 MG/3ML; MG/3ML
3 SOLUTION RESPIRATORY (INHALATION) AS NEEDED
Status: DISCONTINUED | OUTPATIENT
Start: 2017-04-10 | End: 2017-04-15

## 2017-04-10 RX ORDER — SODIUM CHLORIDE 0.9 % (FLUSH) 0.9 %
10 SYRINGE (ML) INJECTION AS NEEDED
Status: DISCONTINUED | OUTPATIENT
Start: 2017-04-10 | End: 2017-04-15

## 2017-04-10 NOTE — PROGRESS NOTES
[facility]    Progress Note    Anette Verakeley Patient Status:  Observation    1950 MRN [id number]   Location [facility] Attending Rochelle Gupta, *   Hosp Day # 1 PCP Delia Nicolas MD thighs  Neurologic: Grossly normal    Assessment and Plan:     Ischemic cardiomyopathy  Cards follow      Acute systolic congestive heart failure (HCC)  Cards    Copd-steroids, nebs abx    cherie- consult to follow        Results:     Lab Results  Component V compared with ECG of 03/08/2017 09:00:38 No significant changes have occurred Electronically signed on 04/10/2017 at 09:13 by Ashli Noel MD  4/10/2017

## 2017-04-10 NOTE — PLAN OF CARE
Vss, assessment wnl. Will continue iv lasix, iv steroids, iv abx, and neb tx. Will encourage pt to increase activity as tolerated. No complaints at this time. Will continue to monitor pt closely for changes.

## 2017-04-10 NOTE — HISTORICAL OFFICE NOTE
Peg Rob  [phone]  : 1950  ACCOUNT: [id number]  PCP: Jakob Becker M.D.   CARDIOLOGIST: [address]: [facility]  Admitted: 2017  Discharged: 2017    Ana COURSE: The patient Angi Lucero reduced. EF 20-25%. Regional wall motion abnormalities could not be excluded. Mitral valve with mildly calcified annulus. Mildly thickened leaflets. Anterior with moderate regurgitation. Left atrium is severely dilated.  Systolic pressure is mildly increase 2015 she developed some coronary artery disease on top of her dilated cardiomyopathy. She had a bare metal stent placed to her RCA in February of 2015. She comes in to the office today in a wheelchair as she usually is.   Her biggest limitation is her RESIDENCE: homeowner and lives with niece & her boyfriend. ILLICIT DRUG USE: denies use of street drugs. VITAL SIGNS: [B/P - / ]    CONS: morbid obesity, wheelchair bound. WEIGHT: BMI parameters reviewed and discussed.  HEAD/FACE: no trauma and normocep the side effect profile on amiodarone, she is happy to stay on the Multaq. She will continue to follow in our ICD Clinic.   I will plan on seeing her in a years time, sooner if there are any new EP or arrhythmia issues.]      Higinio Gonzalez MD  SC:lls/cs heart failure, dyslipidemia, GDT, implantable cardioverter-defibrillator 2006 and PTCA/Stent February 2015    FAMILY HISTORY: Significant for premature CAD. Negative for AAA. SOCIAL HISTORY: SMOKING: Never used tobacco. Denies smoking.  CAFFEINE: >5 beve Coumadin Management,[facility]  8. Dilated cardiomyopathy  9. Fatigue/malaise, chronic  10. History of bare metal stent to RCA 2/2015  11. Hypercholesterolemia  12. Hypertension, benign  13. ICD [facility] 2006, 2013  14. ICD reprogramming  15.  Morbid Obesity  16 : 1950                 ACCOUNT: [id number]  Patient Phone: [phone]              Date: 2015  Ordering Physician:  Zeina Medina MD              PCP:  Dr. Trudi Zepeda by: Karen Ruby  Interpreted by: Kathleen Mcdonnell MD    Age: 10 79% of predicted oxygen consumption (11.6 ml/kg/min, Blum Class C). O2 consumption corrected for ideal body weight, exceeded the predicted value, suggestive of the effect of weight on exertion.   The expended work provided by the patient was submaximal. Valve area: 2.1 cm² (Vmax). ·  Mitral valve: Mild regurgitation. ·  Left atrium: The atrium was moderately dilated. ·  Right atrium: The atrium was mildly dilated. ·  Pericardium, extracardiac: There was no pericardial effusion.   Pacemaker/ICD wire vis There was no evidence for stenosis. Trivial  regurgitation. Tricuspid valve: Structurally normal valve. Leaflet separation was normal. Doppler: Transvalvular velocity was within the  normal range. There was no evidence for stenosis. Trivial regurgitation.   ­­     Aorta  Root diam, ED    31.24 mm    71­90     Left atrium  AP dim, ES    * 69.76 mm    94­87     AP dim index, ES    * 2.91 cm/m²   <2.2     Legend:    Mean values are shown as µ=mean value.    Asterisk (*) marks values outside specified normal ra

## 2017-04-10 NOTE — H&P
[facility]    PATIENT'S NAME: Kaylie Yoshi   ATTENDING PHYSICIAN: Anjum Mcintosh MD   PATIENT ACCOUNT#:   [id number]    LOCATION:  [address] RECORD #:   [id number]       YOB: 1950  ADMISSION DATE:       04/09/2 bilaterally. No retractions. HEART:    ABDOMEN:  Soft, nontender, obese. EXTREMITIES:  All extremities 1-2+ pitting bilateral edema. NEUROLOGIC:  CN intact. Normal speech. 5/4 motor strength, lower extremities. No focal deficits.   SKIN:  Warm and dr

## 2017-04-10 NOTE — PLAN OF CARE
Pt's integumentary status will be adequate for discharge Progressing      Patient/Family Long Term Goal Progressing      Patient/Family Short Term Goal Progressing      Achieves optimal ventilation and oxygenation Progressing

## 2017-04-10 NOTE — PROGRESS NOTES
MIDLine Catheter Insertion Note    Procedure: Insertion of # g18 powerglide Midline    Indications:  Poor Access    Procedure Details   Patient and/or significant others educated regarding insertion of PICC line, rationale for procedure, and after care.  In

## 2017-04-10 NOTE — PROGRESS NOTES
[facility] Heart Cardiology Progress Note      Gene Fry Patient Status:  Observation    1950 MRN [id number]   Location [address] [place] [address] Attending Isabel Floyd [facility] Day # 1 PCP Timothy Dailey Abdomen soft, nontender, nondistended, no organomegaly, bowel sounds present  Ext:  no clubbing, no cyanosis, trace bilat ankle edema  Neuro: no focal deficits  Skin: no rashes or lesions, left chest ICD incision well healed.     Scheduled Meds:   • cefTRIA limited by technical factors related to the patient's size and body habitus. 3. No gross large lower lobe consolidation or large effusion. Small effusions can't be excluded.  4. Overall appearance of the chest consistent with cardiomyopathy and chronic yamileth

## 2017-04-10 NOTE — DISCHARGE PLANNING
URIEL confirmed the pt is a resident of [facility] . Updated clinicals sent. Pt is currently Observation status.     CADENCE bradford [id number]

## 2017-04-10 NOTE — PROGRESS NOTES
[facility] Pharmacy Note: Antimicrobial Weight Dose Adjustment for: Rocephin (ceftriaxone)    Nicolasa Cade is a 78year old female who has been prescribed Rocephin (ceftriaxone) 1 gm Q 24 hours. Pt has a weight of 300 lbs.  Based on this criteria and renal funct

## 2017-04-11 PROBLEM — J44.1 COPD EXACERBATION (HCC): Status: ACTIVE | Noted: 2017-04-11

## 2017-04-11 PROCEDURE — 94640 AIRWAY INHALATION TREATMENT: CPT

## 2017-04-11 PROCEDURE — 85610 PROTHROMBIN TIME: CPT | Performed by: INTERNAL MEDICINE

## 2017-04-11 RX ORDER — METHYLPREDNISOLONE SODIUM SUCCINATE 40 MG/ML
40 INJECTION, POWDER, LYOPHILIZED, FOR SOLUTION INTRAMUSCULAR; INTRAVENOUS EVERY 12 HOURS
Status: DISCONTINUED | OUTPATIENT
Start: 2017-04-11 | End: 2017-04-12

## 2017-04-11 NOTE — PROGRESS NOTES
[facility]    Progress Note    Tito Garzon Patient Status:  Inpatient    1950 MRN [id number]   Location [facility] Attending Michael Espinoza, *   Hosp Day # 2 PCP Krys Tello MD       Subjective:   Shea Mcmahon redness or tenderness in the calves or thighs  Neurologic: Grossly normal    Assessment and Plan:     Ischemic cardiomyopathy  cards      Asthmatic bronchitis , chronic (HCC)  Decrease steroids      Sleep apnea, obstructive  Refuses tx      Acute systolic

## 2017-04-11 NOTE — CONSULTS
[facility]    PATIENT'S NAME: Darryle Javy   ATTENDING PHYSICIAN: Rachael Dhillon MD   CONSULTING PHYSICIAN: Wagner Chowdhury MD   PATIENT ACCOUNT#:   [id number]    LOCATION:  [address] [place] [address] Road #:   [id number]       DATE OF BIRTH: edema.    LABORATORY DATA:  INR is 3.8. Sodium 145, potassium 4.2, chloride 104, CO2 of 31, BUN 33, creatinine 1.22. WBC 3.9, hemoglobin 11.8, platelets 027.     Chest x-ray showed cardiomegaly, vascular congestion consistent with congestive heart failu

## 2017-04-11 NOTE — PROGRESS NOTES
[facility]    Progress Note    Candi Manifold Patient Status:  Observation    1950 MRN [id number]   Location [facility] Attending Charla Miller, [facility] Day # 2 PCP Merry Short MD     Subjective:     C 04/10/2017   BUN 33* 04/10/2017   * 04/10/2017   K 4.2 04/10/2017    04/10/2017   CO2 31 04/10/2017   * 04/10/2017   CA 8.5 04/10/2017   ALB 3.2* 03/10/2017   ALKPHO 104* 03/10/2017   BILT 0.5 03/10/2017   TP 6.7 03/10/2017   AST 28 03/1

## 2017-04-11 NOTE — PROGRESS NOTES
[facility]    Progress Note    Gene Fry Patient Status:  Observation    1950 MRN [id number]   Location [address] [place] [address] Attending [facility], [address] Day # 1 PCP Timothy Dailey MD     Subjective:     C K 4.2 04/10/2017    04/10/2017   CO2 31 04/10/2017   * 04/10/2017   CA 8.5 04/10/2017   ALB 3.2* 03/10/2017   ALKPHO 104* 03/10/2017   BILT 0.5 03/10/2017   TP 6.7 03/10/2017   AST 28 03/10/2017   ALT 22 03/10/2017   INR 3.8* 04/10/2017   MG

## 2017-04-11 NOTE — PROGRESS NOTES
[facility] Heart Cardiology  Progress Note    Marty Hardy Patient Status:  Observation    1950 MRN [id number]   Location [facility] Attendin HCT 36.5 04/10/2017   * 04/10/2017   CREATSERUM 1.22 04/10/2017   BUN 33* 04/10/2017   * 04/10/2017   K 4.2 04/10/2017    04/10/2017   CO2 31 04/10/2017   * 04/10/2017   CA 8.5 04/10/2017   ALB 3.2* 03/10/2017   ALKPHO 104* 03/1

## 2017-04-12 PROCEDURE — 97161 PT EVAL LOW COMPLEX 20 MIN: CPT

## 2017-04-12 PROCEDURE — 94640 AIRWAY INHALATION TREATMENT: CPT

## 2017-04-12 PROCEDURE — 97165 OT EVAL LOW COMPLEX 30 MIN: CPT

## 2017-04-12 PROCEDURE — 85610 PROTHROMBIN TIME: CPT | Performed by: INTERNAL MEDICINE

## 2017-04-12 RX ORDER — PREDNISONE 20 MG/1
40 TABLET ORAL
Status: DISCONTINUED | OUTPATIENT
Start: 2017-04-13 | End: 2017-04-15

## 2017-04-12 RX ORDER — BISACODYL 10 MG
10 SUPPOSITORY, RECTAL RECTAL
Status: DISCONTINUED | OUTPATIENT
Start: 2017-04-12 | End: 2017-04-15

## 2017-04-12 RX ORDER — FUROSEMIDE 40 MG/1
40 TABLET ORAL DAILY
Status: DISCONTINUED | OUTPATIENT
Start: 2017-04-13 | End: 2017-04-15

## 2017-04-12 RX ORDER — FUROSEMIDE 10 MG/ML
40 INJECTION INTRAMUSCULAR; INTRAVENOUS ONCE
Status: COMPLETED | OUTPATIENT
Start: 2017-04-12 | End: 2017-04-12

## 2017-04-12 RX ORDER — FUROSEMIDE 40 MG/1
40 TABLET ORAL DAILY
Status: DISCONTINUED | OUTPATIENT
Start: 2017-04-12 | End: 2017-04-12

## 2017-04-12 NOTE — PROGRESS NOTES
[facility] Heart Cardiology  Progress Note    Bon Ces Patient Status:  Inpatient    1950 MRN [id number]   Location [facility] Attending Abdifatah Mena, *   Hosp Day # 3 PCP Megan Ge MD * 04/10/2017   CA 8.5 04/10/2017   ALB 3.2* 03/10/2017   ALKPHO 104* 03/10/2017   BILT 0.5 03/10/2017   TP 6.7 03/10/2017   AST 28 03/10/2017   ALT 22 03/10/2017   INR 4.3* 04/12/2017   MG 2.6* 03/16/2017   TROP 0.03 03/08/2017

## 2017-04-12 NOTE — PROGRESS NOTES
[facility]    Progress Note    Soto Moody Patient Status:  Inpatient    1950 MRN [id number]   Location [facility] Attending Micheline Koyanagi, *   Hosp Day # 3 PCP Jyothi Brito MD       Subjective:   Mary Tolentino regular rate and rhythm  Abdominal: soft, non-tender; bowel sounds normal  Extremities: no edema, redness or tenderness in the calves or thighs  Neurologic: Grossly normal    Assessment and Plan:     Ischemic cardiomyopathy  cards      Asthmatic bronchitis

## 2017-04-12 NOTE — PROGRESS NOTES
[facility]    Progress Note    Rosalie Mercer Patient Status:  Inpatient    1950 MRN [id number]   Location [facility]5W Attending Kristal Tejada, *   Hosp Day # 3 PCP Papi Carroll MD     Subjective:     Con 145* 04/10/2017   K 4.2 04/10/2017    04/10/2017   CO2 31 04/10/2017   * 04/10/2017   CA 8.5 04/10/2017   ALB 3.2* 03/10/2017   ALKPHO 104* 03/10/2017   BILT 0.5 03/10/2017   TP 6.7 03/10/2017   AST 28 03/10/2017   ALT 22 03/10/2017   INR 4.3*

## 2017-04-12 NOTE — OCCUPATIONAL THERAPY NOTE
OCCUPATIONAL THERAPY EVALUATION - INPATIENT     Room Number: 507/507-A  Evaluation Date: 4/12/2017  Type of Evaluation: Initial  Presenting Problem:  (SOB; wheezing for 5 days with BLE swelling); dx: ischemic cardiomyopathy, acute systolic CHF    Physician heart failure (HCC)    COPD exacerbation (HCC)      Past Medical History  Past Medical History   Diagnosis Date   • CHF (congestive heart failure) ([facility]    • A-fib (HCC)    • Asthma    • Rotator cuff (capsule) sprain    • Kidney stone    • Gall bladder inf functional limits  Orientation Level:  oriented x4  Following Commands:  follows one step commands consistently    VISION  Current Vision: wears glasses for distance only  Acuity:  able to read clock/calendar on wall without difficulty      SENSATION  [facility] aware of session/findings; All patient questions and concerns addressed      OT Goals     Patient will complete functional transfer with SBA w/RW. Comment:     Patient will complete toileting with min.  A.  Comment:     Patient will tolerate stand

## 2017-04-12 NOTE — PHYSICAL THERAPY NOTE
PHYSICAL THERAPY EVALUATION - INPATIENT     Room Number: 507/507-A  Evaluation Date: 4/12/2017  Type of Evaluation: Initial  Physician Order: PT Eval and Treat    Presenting Problem: ischemic cardiomyopathy  Reason for Therapy: Mobility Dysfunction and Ischemic cardiomyopathy  Active Problems:    Asthmatic bronchitis , chronic (HCC)    Sleep apnea, obstructive    Acute systolic congestive heart failure (HCC)    COPD exacerbation (HCC)      Past Medical History  Past Medical History   Diagnosis Date   • C limits  · Orientation Level:  oriented x4    RANGE OF MOTION AND STRENGTH ASSESSMENT    Lower extremity ROM is within functional limits     Lower extremity strength is within functional limits Right Hip flexion  4+/5  Left Hip flexion  4+/5  Right Knee ext to demonstrate supine - sit EOB @ level: independent     Goal #1   Current Status NT pt up in chair   Goal #2 Patient is able to demonstrate transfers Sit to/from Stand at assistance level: modified independent with walker - rolling     Goal #2  Current St

## 2017-04-13 PROCEDURE — 85610 PROTHROMBIN TIME: CPT | Performed by: INTERNAL MEDICINE

## 2017-04-13 PROCEDURE — 80048 BASIC METABOLIC PNL TOTAL CA: CPT | Performed by: NURSE PRACTITIONER

## 2017-04-13 PROCEDURE — 85025 COMPLETE CBC W/AUTO DIFF WBC: CPT | Performed by: NURSE PRACTITIONER

## 2017-04-13 PROCEDURE — 83735 ASSAY OF MAGNESIUM: CPT | Performed by: NURSE PRACTITIONER

## 2017-04-13 PROCEDURE — 94640 AIRWAY INHALATION TREATMENT: CPT

## 2017-04-13 RX ORDER — WARFARIN SODIUM 2 MG/1
2 TABLET ORAL
Status: COMPLETED | OUTPATIENT
Start: 2017-04-13 | End: 2017-04-13

## 2017-04-13 NOTE — PROGRESS NOTES
[facility]    Progress Note    Colten Ruma Patient Status:  Inpatient    1950 MRN [id number]   Location [facility]5W Attending Sravanthi Espinosa, [facility] Day # 4 PCP Cindy Brown MD     Subjective:     Con 04/13/2017   CO2 31 04/13/2017   * 04/13/2017   CA 8.6 04/13/2017   ALB 3.2* 03/10/2017   ALKPHO 104* 03/10/2017   BILT 0.5 03/10/2017   TP 6.7 03/10/2017   AST 28 03/10/2017   ALT 22 03/10/2017   INR 2.8* 04/13/2017   MG 2.6* 03/16/2017   TROP 0.03

## 2017-04-13 NOTE — PROGRESS NOTES
[facility]    Progress Note    Terrell Fisher Patient Status:  Inpatient    1950 MRN [id number]   Location [facility]5 [facility] Attending Myriam Squires, *   Hosp Day # 4 PCP Donnie Medrano MD       Subjective:   Meir Thompson rhythm  Abdominal: soft, non-tender; bowel sounds normal  Extremities: no edema, redness or tenderness in the calves or thighs  Neurologic: Grossly normal    Assessment and Plan:     Ischemic cardiomyopathy  chronic      Asthmatic bronchitis , chronic ([address]

## 2017-04-13 NOTE — PLAN OF CARE
Problem: Patient/Family Goals  Goal: Patient/Family Long Term Goal  Patient’s Long Term Goal: Return to optimal health  Interventions:  - take all meds as prescribed  - increase activity as tolerated  - follow strict fluid restriction  - follow low salt/lo edema, trend weights   Outcome: Progressing    Comments:   VSS, no complaints of pain, prn cough medicine given, lasix and prednisone PO to begin today, iv abx as ordered, will cont to monitor

## 2017-04-13 NOTE — PROGRESS NOTES
[facility] Heart Cardiology   Progress Note    Bon Martinez Patient Status:  Inpatient    1950 MRN [id number]   Location [facility]5W Attending Abdifatah Mena, [facility] Day # 4 PCP NARCISA Gallardo Isordil   -considering Cardiomems device although patient seems reluctant, per Dr. Tosin Mohamud    2) Asthmatic bronchitis , chronic ([facility] [address]  -per PCP and pulmonology    3) History of afib  -NSR/paced on telemetry with acceptable rates  -Multaq 400mg BID restarted

## 2017-04-14 PROCEDURE — 85610 PROTHROMBIN TIME: CPT | Performed by: NURSE PRACTITIONER

## 2017-04-14 PROCEDURE — 97530 THERAPEUTIC ACTIVITIES: CPT

## 2017-04-14 PROCEDURE — 97116 GAIT TRAINING THERAPY: CPT

## 2017-04-14 PROCEDURE — 97535 SELF CARE MNGMENT TRAINING: CPT

## 2017-04-14 PROCEDURE — 80048 BASIC METABOLIC PNL TOTAL CA: CPT | Performed by: INTERNAL MEDICINE

## 2017-04-14 PROCEDURE — 94640 AIRWAY INHALATION TREATMENT: CPT

## 2017-04-14 RX ORDER — SODIUM CHLORIDE 9 MG/ML
INJECTION, SOLUTION INTRAVENOUS
Status: COMPLETED
Start: 2017-04-14 | End: 2017-04-14

## 2017-04-14 RX ORDER — WARFARIN SODIUM 4 MG/1
4 TABLET ORAL NIGHTLY
Status: DISCONTINUED | OUTPATIENT
Start: 2017-04-14 | End: 2017-04-15

## 2017-04-14 RX ORDER — 0.9 % SODIUM CHLORIDE 0.9 %
VIAL (ML) INJECTION
Status: COMPLETED
Start: 2017-04-14 | End: 2017-04-14

## 2017-04-14 NOTE — PROGRESS NOTES
[facility]    Progress Note    Shirley Restrepo Patient Status:  Inpatient    1950 MRN [id number]   Location [employer] [facility] Attending Dagoberto Kerns, *   Hosp Day # 5 PCP Donnie James MD       Subjective:   Madai Ohara tenderness in the calves or thighs  Neurologic: Grossly normal    Assessment and Plan:     Ischemic cardiomyopathy  cards      Asthmatic bronchitis , chronic (HCC)  Prednisone nebs      Sleep apnea, obstructive  refusing      Acute systolic congestive hear

## 2017-04-14 NOTE — PROGRESS NOTES
[facility]    Cardiology Progress Note  [facility]    Arianna Vergara Patient Status:  Inpatient    1950 MRN [id number]   Location [address] [place] [address] Attending Genie Arnold, [facility] Day # 5 PCP BERTRAND Value Date   WBC 7.8 04/13/2017   HGB 12.4 04/13/2017   HCT 38.0 04/13/2017    04/13/2017   CREATSERUM 1.33 04/14/2017   BUN 56* 04/14/2017    04/14/2017   K 4.8 04/14/2017   CL 98 04/14/2017   CO2 34* 04/14/2017   * 04/14/2017   CA 8.7

## 2017-04-14 NOTE — PROGRESS NOTES
Cardiology Progress Note  [facility]  [address] Patient Status:  Bryanna Rangel 12/28/1962  [facility] [id number]    [address] [facility]5W  Attending  Beth Chowdhury MD    Hosp Day #  2  PCP  Jose Maguire MD      Assessment and Plan:    Subclavian stenosis  Syncope    Await cv surg input  No cardiac guerrero needed otherwise        Results:      Lab Results  Component  Value  Date    [facility] SHEA 6.8  04/12/2017    HGB  13.7  04/12/2017    HCT  40.9  04/12/2017    PLT  2 Chart Correction History       Date/Time User Action        4/14/2017 12:02 PM Jimmie Bonds MD Delete Data

## 2017-04-14 NOTE — OCCUPATIONAL THERAPY NOTE
Attempted to see patient for follow up OT treatment session. However, nursing requested therapy be deferred at this time as patient was with excessive coughing. Will follow up as appropriate.

## 2017-04-14 NOTE — PHYSICAL THERAPY NOTE
PHYSICAL THERAPY TREATMENT NOTE - INPATIENT    Room Number: 736/915-G       Presenting Problem: ischemic cardiomyopathy    Problem List  Principal Problem:    Ischemic cardiomyopathy  Active Problems:    Asthmatic bronchitis , chronic (HCC)    Sleep apnea in bed (including adjusting bedclothes, sheets and blankets)?: A Little   -   Sitting down on and standing up from a chair with arms (e.g., wheelchair, bedside commode, etc.): A Little   -   Moving from lying on back to sitting on the side of the bed?: A L gait pattern with wide KIET and SOB with activity. Goal #4 Patient to demonstrate independence with home activity/exercise instructions provided to patient in preparation for discharge.    Goal #4   Current Status NT   Goal #5    Goal #5   Current Status

## 2017-04-14 NOTE — PROGRESS NOTES
[facility]    Progress Note    Martykhushi Hardy Patient Status:  Inpatient    1950 MRN [id number]   Location [facility]5W Attending Jin Mcelroy, [address] Day # 5 PCP Lisandro Cleveland MD     Subjective:     Con 34* 04/14/2017   * 04/14/2017   CA 8.7 04/14/2017   ALB 3.2* 03/10/2017   ALKPHO 104* 03/10/2017   BILT 0.5 03/10/2017   TP 6.7 03/10/2017   AST 28 03/10/2017   ALT 22 03/10/2017   INR 1.7* 04/14/2017   MG 2.1 04/13/2017   TROP 0.03 03/08/2017

## 2017-04-14 NOTE — PLAN OF CARE
Problem: Patient/Family Goals  Goal: Patient/Family Long Term Goal  Patient’s Long Term Goal: Return to optimal health  Interventions:  - take all meds as prescribed  - increase activity as tolerated  - follow strict fluid restriction  - follow low salt/lo edema, trend weights   Outcome: Progressing  Goal: Absence of cardiac arrhythmias or at baseline  INTERVENTIONS:  - Continuous cardiac monitoring, monitor vital signs, obtain 12 lead EKG if indicated  - Evaluate effectiveness of antiarrhythmic and heart ra

## 2017-04-14 NOTE — OCCUPATIONAL THERAPY NOTE
OCCUPATIONAL THERAPY TREATMENT NOTE - INPATIENT     Room Number: 568/439-V          Presenting Problem:  (SOB; wheezing for 5 days with BLE swelling)    Problem List  Principal Problem:    Ischemic cardiomyopathy  Active Problems:    Asthmatic bronchitis , Restriction: None                PAIN ASSESSMENT  Ratin           ACTIVITY TOLERANCE  No SOB    ACTIVITIES OF DAILY LIVING ASSESSMENT  AM-PAC ‘6-Clicks’ Inpatient Daily Activity Short Form  How much help from another person does the patient currently n

## 2017-04-15 VITALS
DIASTOLIC BLOOD PRESSURE: 62 MMHG | TEMPERATURE: 98 F | WEIGHT: 293 LBS | OXYGEN SATURATION: 93 % | HEART RATE: 65 BPM | BODY MASS INDEX: 52 KG/M2 | SYSTOLIC BLOOD PRESSURE: 137 MMHG | RESPIRATION RATE: 18 BRPM

## 2017-04-15 PROCEDURE — 94640 AIRWAY INHALATION TREATMENT: CPT

## 2017-04-15 PROCEDURE — 85610 PROTHROMBIN TIME: CPT | Performed by: INTERNAL MEDICINE

## 2017-04-15 RX ORDER — CODEINE PHOSPHATE AND GUAIFENESIN 10; 100 MG/5ML; MG/5ML
5 SOLUTION ORAL 3 TIMES DAILY PRN
Qty: 100 ML | Refills: 0 | Status: ON HOLD | OUTPATIENT
Start: 2017-04-15 | End: 2017-08-30

## 2017-04-15 RX ORDER — BENZONATATE 100 MG/1
100 CAPSULE ORAL 2 TIMES DAILY PRN
Qty: 30 CAPSULE | Refills: 1 | Status: ON HOLD | OUTPATIENT
Start: 2017-04-15 | End: 2017-08-30

## 2017-04-15 RX ORDER — PREDNISONE 20 MG/1
40 TABLET ORAL
Qty: 10 TABLET | Refills: 1 | Status: ON HOLD | OUTPATIENT
Start: 2017-04-15 | End: 2017-08-30

## 2017-04-15 RX ORDER — WARFARIN SODIUM 5 MG/1
5 TABLET ORAL ONCE
Qty: 1 TABLET | Refills: 0 | Status: SHIPPED | OUTPATIENT
Start: 2017-04-15 | End: 2017-04-15

## 2017-04-15 RX ORDER — IPRATROPIUM BROMIDE AND ALBUTEROL SULFATE 2.5; .5 MG/3ML; MG/3ML
3 SOLUTION RESPIRATORY (INHALATION)
Qty: 100 ML | Refills: 0 | Status: SHIPPED | OUTPATIENT
Start: 2017-04-15 | End: 2017-09-27

## 2017-04-15 RX ORDER — WARFARIN SODIUM 5 MG/1
5 TABLET ORAL ONCE
Status: COMPLETED | OUTPATIENT
Start: 2017-04-15 | End: 2017-04-15

## 2017-04-15 RX ORDER — BISACODYL 10 MG
10 SUPPOSITORY, RECTAL RECTAL
Qty: 10 SUPPOSITORY | Refills: 0 | Status: ON HOLD | OUTPATIENT
Start: 2017-04-15 | End: 2017-08-30

## 2017-04-15 NOTE — PLAN OF CARE
Problem: Patient/Family Goals  Goal: Patient/Family Long Term Goal  Patient’s Long Term Goal: Return to optimal health  Interventions:  - take all meds as prescribed  - increase activity as tolerated  - follow strict fluid restriction  - follow low salt/lo Monitor vital signs, rhythm, and trends  - Monitor for bleeding, hypotension and signs of decreased cardiac output  - Evaluate effectiveness of vasoactive medications to optimize hemodynamic stability  - Monitor arterial and/or venous puncture sites for bl

## 2017-04-15 NOTE — DISCHARGE SUMMARY
[facility]    PATIENT'S NAME: Jerel Lung   ATTENDING PHYSICIAN: Santo Scanlon MD   PATIENT ACCOUNT#:   [id number]    LOCATION:  [facility]  RECORD #:   [id number]       YOB: 1950  ADMISSION DATE:       04/09/2

## 2017-04-15 NOTE — DISCHARGE PLANNING
URIEL informed by RN that pt is stable to return to snf. RN states MD will round in about an hour, so request made for 5pm. SW confirmed the bed at [facility] for today.  RN states the pt is most appropriate for a Medicar, so this has been arranged with a wi

## 2017-04-17 PROCEDURE — 99308 SBSQ NF CARE LOW MDM 20: CPT | Performed by: INTERNAL MEDICINE

## 2017-04-19 PROCEDURE — 99308 SBSQ NF CARE LOW MDM 20: CPT | Performed by: INTERNAL MEDICINE

## 2017-04-24 PROCEDURE — 99308 SBSQ NF CARE LOW MDM 20: CPT | Performed by: INTERNAL MEDICINE

## 2017-04-26 PROCEDURE — 99308 SBSQ NF CARE LOW MDM 20: CPT | Performed by: INTERNAL MEDICINE

## 2017-04-30 ENCOUNTER — SNF/IP PROF CHARGE ONLY (OUTPATIENT)
Dept: INTERNAL MEDICINE CLINIC | Facility: CLINIC | Age: 67
End: 2017-04-30

## 2017-04-30 DIAGNOSIS — R26.2 DIFFICULTY WALKING: ICD-10-CM

## 2017-04-30 DIAGNOSIS — R33.9 URINARY RETENTION: ICD-10-CM

## 2017-04-30 DIAGNOSIS — J44.1 COPD EXACERBATION (HCC): ICD-10-CM

## 2017-04-30 DIAGNOSIS — I42.9 CARDIOMYOPATHY WITH IMPLANTABLE CARDIOVERTER-DEFIBRILLATOR (HCC): Primary | Chronic | ICD-10-CM

## 2017-04-30 DIAGNOSIS — E66.01 SEVERE OBESITY (BMI 35.0-35.9 WITH COMORBIDITY) (HCC): Chronic | ICD-10-CM

## 2017-04-30 DIAGNOSIS — G47.33 SLEEP APNEA, OBSTRUCTIVE: Chronic | ICD-10-CM

## 2017-04-30 DIAGNOSIS — Z95.810 CARDIOMYOPATHY WITH IMPLANTABLE CARDIOVERTER-DEFIBRILLATOR (HCC): Primary | Chronic | ICD-10-CM

## 2017-04-30 DIAGNOSIS — I25.5 ISCHEMIC CARDIOMYOPATHY: ICD-10-CM

## 2017-05-01 ENCOUNTER — PRIOR ORIGINAL RECORDS (OUTPATIENT)
Dept: OTHER | Age: 67
End: 2017-05-01

## 2017-05-01 ENCOUNTER — LAB REQUISITION (OUTPATIENT)
Dept: LAB | Facility: HOSPITAL | Age: 67
End: 2017-05-01
Attending: INTERNAL MEDICINE
Payer: MEDICARE

## 2017-05-01 DIAGNOSIS — I48.91 ATRIAL FIBRILLATION (HCC): ICD-10-CM

## 2017-05-01 PROCEDURE — 80048 BASIC METABOLIC PNL TOTAL CA: CPT | Performed by: INTERNAL MEDICINE

## 2017-05-02 LAB
BUN: 37 MG/DL
CALCIUM: 8.7 MG/DL
CHLORIDE: 101 MEQ/L
CREATININE, SERUM: 1.53 MG/DL
GLUCOSE: 109 MG/DL
POTASSIUM, SERUM: 3.8 MEQ/L
SODIUM: 141 MEQ/L

## 2017-05-03 ENCOUNTER — TELEPHONE (OUTPATIENT)
Dept: CARDIOLOGY CLINIC | Facility: HOSPITAL | Age: 67
End: 2017-05-03

## 2017-05-03 NOTE — TELEPHONE ENCOUNTER
Pt called to cancel her appt for today at the HF clinic. She is fatigued and does not want to come to the appt, she was discharged from rehab 5 days ago. Denies worsening sob, swelling or wt gain. [facility]idential home health rn following with her.  Recommended

## 2017-05-09 LAB
CHOLESTEROL, TOTAL: 100 MG/DL
HDL CHOLESTEROL: 30 MG/DL
LDL CHOLESTEROL: 43 MG/DL
TRIGLYCERIDES: 135 MG/DL

## 2017-05-10 ENCOUNTER — MYAURORA ACCOUNT LINK (OUTPATIENT)
Dept: OTHER | Age: 67
End: 2017-05-10

## 2017-05-10 ENCOUNTER — PRIOR ORIGINAL RECORDS (OUTPATIENT)
Dept: OTHER | Age: 67
End: 2017-05-10

## 2017-05-24 ENCOUNTER — SNF/IP PROF CHARGE ONLY (OUTPATIENT)
Dept: INTERNAL MEDICINE CLINIC | Facility: CLINIC | Age: 67
End: 2017-05-24

## 2017-05-24 DIAGNOSIS — J44.1 COPD EXACERBATION (HCC): ICD-10-CM

## 2017-05-24 DIAGNOSIS — Z95.810 CARDIOMYOPATHY WITH IMPLANTABLE CARDIOVERTER-DEFIBRILLATOR (HCC): Chronic | ICD-10-CM

## 2017-05-24 DIAGNOSIS — I42.9 CARDIOMYOPATHY WITH IMPLANTABLE CARDIOVERTER-DEFIBRILLATOR (HCC): Chronic | ICD-10-CM

## 2017-05-24 DIAGNOSIS — I50.9 CONGESTIVE HEART FAILURE OF UNKNOWN ETIOLOGY (HCC): ICD-10-CM

## 2017-05-24 DIAGNOSIS — J44.9 ASTHMATIC BRONCHITIS , CHRONIC (HCC): Chronic | ICD-10-CM

## 2017-05-24 DIAGNOSIS — E66.01 SEVERE OBESITY (BMI 35.0-35.9 WITH COMORBIDITY) (HCC): Chronic | ICD-10-CM

## 2017-05-24 DIAGNOSIS — R26.2 DIFFICULTY WALKING: ICD-10-CM

## 2017-05-24 DIAGNOSIS — G47.33 SLEEP APNEA, OBSTRUCTIVE: Chronic | ICD-10-CM

## 2017-05-24 DIAGNOSIS — I50.21 ACUTE SYSTOLIC CONGESTIVE HEART FAILURE (HCC): Primary | ICD-10-CM

## 2017-05-24 DIAGNOSIS — I25.5 ISCHEMIC CARDIOMYOPATHY: ICD-10-CM

## 2017-06-07 ENCOUNTER — PRIOR ORIGINAL RECORDS (OUTPATIENT)
Dept: OTHER | Age: 67
End: 2017-06-07

## 2017-08-30 ENCOUNTER — HOSPITAL ENCOUNTER (INPATIENT)
Facility: HOSPITAL | Age: 67
LOS: 7 days | Discharge: HOME HEALTH | DRG: 291 | End: 2017-09-06
Attending: EMERGENCY MEDICINE | Admitting: INTERNAL MEDICINE
Payer: MEDICARE

## 2017-08-30 ENCOUNTER — APPOINTMENT (OUTPATIENT)
Dept: CV DIAGNOSTICS | Facility: HOSPITAL | Age: 67
DRG: 291 | End: 2017-08-30
Attending: INTERNAL MEDICINE
Payer: MEDICARE

## 2017-08-30 ENCOUNTER — APPOINTMENT (OUTPATIENT)
Dept: GENERAL RADIOLOGY | Facility: HOSPITAL | Age: 67
DRG: 291 | End: 2017-08-30
Attending: EMERGENCY MEDICINE
Payer: MEDICARE

## 2017-08-30 ENCOUNTER — PRIOR ORIGINAL RECORDS (OUTPATIENT)
Dept: OTHER | Age: 67
End: 2017-08-30

## 2017-08-30 DIAGNOSIS — I50.9 ACUTE ON CHRONIC CONGESTIVE HEART FAILURE, UNSPECIFIED CONGESTIVE HEART FAILURE TYPE: Primary | ICD-10-CM

## 2017-08-30 LAB
ANION GAP SERPL CALC-SCNC: 8 MMOL/L (ref 0–18)
BASOPHILS # BLD: 0.1 K/UL (ref 0–0.2)
BASOPHILS NFR BLD: 1 %
BILIRUB UR QL: NEGATIVE
BNP SERPL-MCNC: 920 PG/ML (ref 0–100)
BUN SERPL-MCNC: 22 MG/DL (ref 8–20)
BUN/CREAT SERPL: 14.6 (ref 10–20)
CALCIUM SERPL-MCNC: 8.5 MG/DL (ref 8.5–10.5)
CHLORIDE SERPL-SCNC: 99 MMOL/L (ref 95–110)
CHOLEST SERPL-MCNC: 77 MG/DL (ref 110–200)
CO2 SERPL-SCNC: 29 MMOL/L (ref 22–32)
COLOR UR: YELLOW
CREAT SERPL-MCNC: 1.51 MG/DL (ref 0.5–1.5)
EOSINOPHIL # BLD: 0.1 K/UL (ref 0–0.7)
EOSINOPHIL NFR BLD: 2 %
ERYTHROCYTE [DISTWIDTH] IN BLOOD BY AUTOMATED COUNT: 20.9 % (ref 11–15)
GLUCOSE SERPL-MCNC: 114 MG/DL (ref 70–99)
GLUCOSE UR-MCNC: NEGATIVE MG/DL
HCT VFR BLD AUTO: 34.6 % (ref 35–48)
HDLC SERPL-MCNC: 32 MG/DL
HGB BLD-MCNC: 11.1 G/DL (ref 12–16)
INR BLD: 4.1 (ref 0.9–1.2)
KETONES UR-MCNC: NEGATIVE MG/DL
LDLC SERPL CALC-MCNC: 28 MG/DL (ref 0–99)
LYMPHOCYTES # BLD: 0.7 K/UL (ref 1–4)
LYMPHOCYTES NFR BLD: 15 %
MCH RBC QN AUTO: 25.8 PG (ref 27–32)
MCHC RBC AUTO-ENTMCNC: 31.9 G/DL (ref 32–37)
MCV RBC AUTO: 80.8 FL (ref 80–100)
MONOCYTES # BLD: 0.5 K/UL (ref 0–1)
MONOCYTES NFR BLD: 11 %
NEUTROPHILS # BLD AUTO: 3.3 K/UL (ref 1.8–7.7)
NEUTROPHILS NFR BLD: 72 %
NITRITE UR QL STRIP.AUTO: NEGATIVE
NONHDLC SERPL-MCNC: 45 MG/DL
OSMOLALITY UR CALC.SUM OF ELEC: 286 MOSM/KG (ref 275–295)
PH UR: 6 [PH] (ref 5–8)
PLATELET # BLD AUTO: 135 K/UL (ref 140–400)
PMV BLD AUTO: 9.9 FL (ref 7.4–10.3)
POTASSIUM SERPL-SCNC: 4.3 MMOL/L (ref 3.3–5.1)
PROT UR-MCNC: NEGATIVE MG/DL
PROTHROMBIN TIME: 39.2 SECONDS (ref 11.8–14.5)
RBC # BLD AUTO: 4.28 M/UL (ref 3.7–5.4)
RBC #/AREA URNS AUTO: 11 /HPF
SODIUM SERPL-SCNC: 136 MMOL/L (ref 136–144)
SP GR UR STRIP: 1 (ref 1–1.03)
TRIGL SERPL-MCNC: 87 MG/DL (ref 1–149)
TROPONIN I SERPL-MCNC: 0.03 NG/ML (ref ?–0.03)
TROPONIN I SERPL-MCNC: 0.03 NG/ML (ref ?–0.03)
UROBILINOGEN UR STRIP-ACNC: <2
VIT C UR-MCNC: NEGATIVE MG/DL
WBC # BLD AUTO: 4.7 K/UL (ref 4–11)
WBC #/AREA URNS AUTO: 15 /HPF

## 2017-08-30 PROCEDURE — 93306 TTE W/DOPPLER COMPLETE: CPT | Performed by: INTERNAL MEDICINE

## 2017-08-30 PROCEDURE — 5A09357 ASSISTANCE WITH RESPIRATORY VENTILATION, LESS THAN 24 CONSECUTIVE HOURS, CONTINUOUS POSITIVE AIRWAY PRESSURE: ICD-10-PCS | Performed by: INTERNAL MEDICINE

## 2017-08-30 PROCEDURE — 99222 1ST HOSP IP/OBS MODERATE 55: CPT | Performed by: INTERNAL MEDICINE

## 2017-08-30 PROCEDURE — 71020 XR CHEST PA + LAT CHEST (CPT=71020): CPT | Performed by: EMERGENCY MEDICINE

## 2017-08-30 RX ORDER — COLCHICINE 0.6 MG/1
0.6 TABLET ORAL DAILY
COMMUNITY
End: 2017-12-21

## 2017-08-30 RX ORDER — CARVEDILOL 25 MG/1
50 TABLET ORAL 2 TIMES DAILY WITH MEALS
Status: DISCONTINUED | OUTPATIENT
Start: 2017-08-30 | End: 2017-08-31

## 2017-08-30 RX ORDER — EPLERENONE 25 MG/1
25 TABLET, FILM COATED ORAL DAILY
Status: DISCONTINUED | OUTPATIENT
Start: 2017-08-31 | End: 2017-09-06

## 2017-08-30 RX ORDER — FUROSEMIDE 10 MG/ML
40 INJECTION INTRAMUSCULAR; INTRAVENOUS 3 TIMES DAILY
Status: DISCONTINUED | OUTPATIENT
Start: 2017-08-30 | End: 2017-09-03

## 2017-08-30 RX ORDER — TEMAZEPAM 15 MG/1
15 CAPSULE ORAL NIGHTLY PRN
Status: DISCONTINUED | OUTPATIENT
Start: 2017-08-30 | End: 2017-09-06

## 2017-08-30 RX ORDER — ASPIRIN 81 MG/1
81 TABLET, CHEWABLE ORAL DAILY
Status: DISCONTINUED | OUTPATIENT
Start: 2017-08-30 | End: 2017-09-06

## 2017-08-30 RX ORDER — FUROSEMIDE 10 MG/ML
40 INJECTION INTRAMUSCULAR; INTRAVENOUS ONCE
Status: COMPLETED | OUTPATIENT
Start: 2017-08-30 | End: 2017-08-30

## 2017-08-30 RX ORDER — ATORVASTATIN CALCIUM 20 MG/1
20 TABLET, FILM COATED ORAL NIGHTLY
Status: DISCONTINUED | OUTPATIENT
Start: 2017-08-30 | End: 2017-09-06

## 2017-08-30 RX ORDER — NYSTATIN 100000 U/G
100000 CREAM TOPICAL 2 TIMES DAILY
Status: DISCONTINUED | OUTPATIENT
Start: 2017-08-30 | End: 2017-09-02

## 2017-08-30 RX ORDER — COLCHICINE 0.6 MG/1
0.6 TABLET ORAL DAILY
Status: DISCONTINUED | OUTPATIENT
Start: 2017-08-31 | End: 2017-09-06

## 2017-08-30 RX ORDER — IPRATROPIUM BROMIDE AND ALBUTEROL SULFATE 2.5; .5 MG/3ML; MG/3ML
3 SOLUTION RESPIRATORY (INHALATION)
Status: DISCONTINUED | OUTPATIENT
Start: 2017-08-30 | End: 2017-08-31

## 2017-08-30 RX ORDER — ALPRAZOLAM 0.25 MG/1
0.25 TABLET ORAL 2 TIMES DAILY PRN
Status: DISCONTINUED | OUTPATIENT
Start: 2017-08-30 | End: 2017-09-01

## 2017-08-30 RX ORDER — POTASSIUM CHLORIDE 20 MEQ/1
20 TABLET, EXTENDED RELEASE ORAL 2 TIMES DAILY
Status: DISCONTINUED | OUTPATIENT
Start: 2017-08-30 | End: 2017-09-06

## 2017-08-30 RX ORDER — DOCUSATE SODIUM 100 MG/1
100 CAPSULE, LIQUID FILLED ORAL 2 TIMES DAILY
Status: DISCONTINUED | OUTPATIENT
Start: 2017-08-30 | End: 2017-09-06

## 2017-08-30 RX ORDER — CLONIDINE HYDROCHLORIDE 0.2 MG/1
0.2 TABLET ORAL 2 TIMES DAILY
Status: DISCONTINUED | OUTPATIENT
Start: 2017-08-30 | End: 2017-08-31

## 2017-08-30 RX ORDER — 0.9 % SODIUM CHLORIDE 0.9 %
VIAL (ML) INJECTION
Status: COMPLETED
Start: 2017-08-30 | End: 2017-08-30

## 2017-08-30 RX ORDER — PANTOPRAZOLE SODIUM 40 MG/1
40 TABLET, DELAYED RELEASE ORAL DAILY
Status: DISCONTINUED | OUTPATIENT
Start: 2017-08-31 | End: 2017-09-06

## 2017-08-30 RX ORDER — ALLOPURINOL 100 MG/1
100 TABLET ORAL DAILY
Status: DISCONTINUED | OUTPATIENT
Start: 2017-08-31 | End: 2017-09-06

## 2017-08-30 RX ORDER — MONTELUKAST SODIUM 10 MG/1
10 TABLET ORAL NIGHTLY
Status: DISCONTINUED | OUTPATIENT
Start: 2017-08-30 | End: 2017-09-06

## 2017-08-30 RX ORDER — ISOSORBIDE DINITRATE 10 MG/1
10 TABLET ORAL
Status: DISCONTINUED | OUTPATIENT
Start: 2017-08-30 | End: 2017-09-06

## 2017-08-30 NOTE — CONSULTS
Chen NOTE  Cardiology Consultation    [facility] Patient Status:  Inpatient    1950 MRN [id number]   Location [facility] 3W/SW Attending Josue Robb, *   Hosp Day # 0 PCP Cindy Vieyra MD     Reas • Asthma    • [facility] 10/16/2016   • Cardiomyopathy [facility]    • Cardiomyopathy with implantable cardioverter-defibrillator ([facility] 10/16/2016   • Cellulitis    • Cellulitis of leg, left 10/15/2016   • CHF (congestive heart failu fluticasone-salmeterol 500-50 MCG/DOSE Inhalation Aerosol Powder, Breath Activated Inhale 1 puff into the lungs 2 (two) times daily. Disp:  Rfl:  8/30/2017 at 0800   Potassium Chloride ER 20 MEQ Oral Tab CR Take 20 mEq by mouth 2 (two) times daily.  Disp: 8/30/2017 at 0800   Montelukast Sodium (SINGULAIR) 10 MG Oral Tab Take 10 mg by mouth nightly.  Disp:  Rfl:  8/29/2017 at 2100       Current Hospital Medications:    Current Facility-Administered Medications:   •  ipratropium-albuterol (DUONEB) nebulizer so 6.4 oz (136.3 kg)  04/14/17 0600 : (!) 300 lb 12.8 oz (136.4 kg)  04/13/17 0500 : (!) 302 lb 4.8 oz (137.1 kg)  04/12/17 0500 : 300 lb (136.1 kg)  04/11/17 0500 : 299 lb 11.2 oz (135.9 kg)  04/10/17 0450 : 298 lb 6.4 oz (135.4 kg)  03/18/17 0532 : (!) 306

## 2017-08-30 NOTE — ED NOTES
PT safe to transport to the floor per MD. Report given to Aleah Dean, [facility]. Telebox applied.

## 2017-08-30 NOTE — ED NOTES
Pt had multiple runs of MD Renetta aware. Strip put in chart. Pt denies CP. Speaking in full sentences, RR even and nonlabored.

## 2017-08-30 NOTE — ED PROVIDER NOTES
Patient Seen in: [facility] AND [facility] Emergency Department    History   No chief complaint on file.     Stated Complaint: Dyspnea    HPI    79-year-old female with history of atrial fibrillation presently taking Coumadin, hypertension, hypercholesterolemia, (CORONARY)  2010: CARDIAC DEFIBRILLATOR PLACEMENT  No date: CARDIAC PACEMAKER PLACEMENT    Medications :   ipratropium-albuterol 0.5-2.5 (3) MG/3ML Inhalation Solution,  Take 3 mL by nebulization every 4 (four) hours while awake - 4 times daily.    predniSO Oral Tab,  Take 5 mg by mouth As Directed. eplerenone 25 MG Oral Tab,  Take 25 mg by mouth daily. CloNIDine HCl (CATAPRES) 0.1 MG Oral Tab,  Take 0.1 mg by mouth 2 (two) times daily.    dronedarone HCl (MULTAQ) 400 MG Oral Tab,  Take 400 mg by mouth 2 ( tender        Extremities are symmetrical, full range of motion. Bilateral leg edema. No erythema or warmth noted. No tenderness noted.   Psychiatric: patient is oriented X 3, there is no agitation    DIFFERENTIAL DIAGNOSIS: After history and physical ex RAINBOW DRAW BLUE   RAINBOW DRAW GOLD   RAINBOW DRAW DARK GREEN   RAINBOW DRAW LAVENDER TALL (BNP)     EKG    Rate, intervals and axes as noted on EKG Report.   Rate: 70  Rhythm: Sinus Rhythm  Axis: Normal  Reading: Nonspecific EKG, no significant change

## 2017-08-30 NOTE — HISTORICAL OFFICE NOTE
Shonna Yepez  : 1950  ACCOUNT:  [id number]  [phone]  PCP: Dr. Jennifer Rucker    TODAY'S DATE: 2017  DICTATED BY:  Gamal Mina M.D.]    CHIEF COMPLAINT: [Followup of . CAD, Established, Followup of .  CHF, left ventricular, Followu sedentary lifestyle. OCCUPATION: retired. RESIDENCE: homeowner and lives with niece & her boyfriend. ILLICIT DRUG USE: denies use of street drugs.     ALLERGIES: Penicillins - CLASS    MEDICATIONS: Selected prescriptions see below    VITAL SIGNS: [B/P -132/ NEEDED.  01/09/17 *Warfarin Sodium      5MG       1 and 1/2 tabs daily as directed  01/03/17 *Carvedilol           25MG      2 TABLETS BY MOUTH TWICE DAILY  11/08/16 *Multaq               400MG     1 TABLET TWICE DAILY WITH FOOD.  06/20/16 *Eplerenone exertion. 5.  Normal oximetry without exercise-induced oxygen desaturation. 6.  Minimal gas exchange abnormalities with abnormal breathing reserve consistent with a ventilatory mechanical defect.      CPX FINDINGS:      INDICATION: Cardiomyopathy, ICD, P with abnormal spirometry and a ventilatory mechanical defect is present. Treadmill portion will be reported separately.     Preliminary Report/MW  Formal Interpretation to follow           Cassi Mosher M.D.  ERIN//randy - DD: 02/04/2015 DT: 02/04/2015 - Dyspnea  R0      Recovery 0           140/78    108   O2 sat, 98%, Dyspnea  R1      Min. Post-ex  2:00   144/80    70    O2 sat, 98%  R2      Min.  Post-ex  4:00   114/68    67    O2 sat, 98%    Total Test Time: 3:00         Reason For Stopping Test: Dyspne

## 2017-08-30 NOTE — H&P
[facility]    PATIENT'S NAME: Anoop Vasquez   ATTENDING PHYSICIAN: Michael Peters MD   PATIENT ACCOUNT#:   [id number]    LOCATION:  [address] [place] [address] #:   [id number]       YOB: 1950  ADMISSION DATE:       08/30 nontender. EXTREMITIES:  Edema 2+. JOINTS:  Arthritis in both knees. NEUROLOGIC:  Intact. Strength is equal.  Alert and oriented. LABORATORY DATA:  Urinalysis 11 RBCs, 15 WBCs, few bacteria. .   Glucose 114, sodium 136, potassium 4.3, chlor

## 2017-08-30 NOTE — PROGRESS NOTES
08/30/17 1421   Clinical Encounter Type   Visited With Patient   Routine Visit Introduction   Continue Visiting Yes   Referral From Patient   Denominational Encounters   Spiritual Requests During Visit / Hospitalization Confucianism Communion  (Daily)   Chp intr

## 2017-08-30 NOTE — CONSULTS
[facility]    PATIENT'S NAME: [facility]   ATTENDING PHYSICIAN: Iraida Chahal MD   CONSULTING PHYSICIAN: Wagner He MD   PATIENT ACCOUNT#:   [id number]    LOCATION:  [address] [place] [address] #:   [id number]       DATE OF CHAN JVP.  LUNGS:  Diminished air entry to both lungs. Minimal bibasilar crackles. HEART:  S1, S2 normal.  No S3 or S4. ABDOMEN:  Soft. EXTREMITIES:  Minimal pedal edema. No erythema.     LABORATORY DATA:  Sodium 136, potassium 4.3, chloride 99, CO2 of 29,

## 2017-08-30 NOTE — PROGRESS NOTES
[facility]    Progress Note    Jennifer Wiggins Patient Status:  Inpatient    1950 MRN [id number]   Location [facility] 3W/SW Attending Roma Osorio, *   Hosp Day # 0 PCP Kiesha Larsen MD       Subjective:   Angela Clifford 03/10/2017   ALT 22 03/10/2017   INR 4.1 (H) 08/30/2017   MG 2.1 04/13/2017   TROP 0.03 08/30/2017       Xr Chest Pa + Lat Chest ([id number])    Result Date: 8/30/2017  CONCLUSION:  1. Moderate cardiomegaly and mild pulmonary congestion.  Right ventricular l

## 2017-08-31 LAB
ALBUMIN SERPL BCP-MCNC: 3.2 G/DL (ref 3.5–4.8)
ALBUMIN/GLOB SERPL: 1.1 {RATIO} (ref 1–2)
ALP SERPL-CCNC: 125 U/L (ref 32–100)
ALT SERPL-CCNC: 21 U/L (ref 14–54)
ANION GAP SERPL CALC-SCNC: 6 MMOL/L (ref 0–18)
AST SERPL-CCNC: 23 U/L (ref 15–41)
BILIRUB SERPL-MCNC: 0.9 MG/DL (ref 0.3–1.2)
BUN SERPL-MCNC: 26 MG/DL (ref 8–20)
BUN/CREAT SERPL: 18.6 (ref 10–20)
CALCIUM SERPL-MCNC: 8.5 MG/DL (ref 8.5–10.5)
CHLORIDE SERPL-SCNC: 102 MMOL/L (ref 95–110)
CO2 SERPL-SCNC: 33 MMOL/L (ref 22–32)
CREAT SERPL-MCNC: 1.4 MG/DL (ref 0.5–1.5)
GLOBULIN PLAS-MCNC: 3 G/DL (ref 2.5–3.7)
GLUCOSE SERPL-MCNC: 104 MG/DL (ref 70–99)
OSMOLALITY UR CALC.SUM OF ELEC: 297 MOSM/KG (ref 275–295)
POTASSIUM SERPL-SCNC: 3.9 MMOL/L (ref 3.3–5.1)
PROT SERPL-MCNC: 6.2 G/DL (ref 5.9–8.4)
SODIUM SERPL-SCNC: 141 MMOL/L (ref 136–144)

## 2017-08-31 PROCEDURE — 99232 SBSQ HOSP IP/OBS MODERATE 35: CPT | Performed by: INTERNAL MEDICINE

## 2017-08-31 RX ORDER — 0.9 % SODIUM CHLORIDE 0.9 %
3 VIAL (ML) INJECTION AS NEEDED
Status: DISCONTINUED | OUTPATIENT
Start: 2017-08-31 | End: 2017-09-06

## 2017-08-31 RX ORDER — HYDRALAZINE HYDROCHLORIDE 25 MG/1
25 TABLET, FILM COATED ORAL EVERY 8 HOURS SCHEDULED
Status: DISCONTINUED | OUTPATIENT
Start: 2017-08-31 | End: 2017-09-06

## 2017-08-31 RX ORDER — 0.9 % SODIUM CHLORIDE 0.9 %
VIAL (ML) INJECTION
Status: COMPLETED
Start: 2017-08-31 | End: 2017-08-31

## 2017-08-31 RX ORDER — POLYETHYLENE GLYCOL 3350 17 G/17G
17 POWDER, FOR SOLUTION ORAL 2 TIMES DAILY PRN
Status: DISCONTINUED | OUTPATIENT
Start: 2017-08-31 | End: 2017-09-06

## 2017-08-31 RX ORDER — IPRATROPIUM BROMIDE AND ALBUTEROL SULFATE 2.5; .5 MG/3ML; MG/3ML
3 SOLUTION RESPIRATORY (INHALATION)
Status: DISCONTINUED | OUTPATIENT
Start: 2017-08-31 | End: 2017-09-02

## 2017-08-31 RX ORDER — CARVEDILOL 25 MG/1
25 TABLET ORAL 2 TIMES DAILY WITH MEALS
Status: DISCONTINUED | OUTPATIENT
Start: 2017-08-31 | End: 2017-09-06

## 2017-08-31 NOTE — CONSULTS
[facility]    Report of Consultation    Richie San Mateo Patient Status:  Inpatient    1950 MRN [id number]   Location [facility] 3W/SW Attending Jaek Gordon, *   Hosp Day # 0 PCP Lonny Self MD     Date of obstructive 10/16/2016   • TIA (transient ischemic attack)        Past Surgical History  Past Surgical History:  2014: ANGIOPLASTY (CORONARY)  2010: CARDIAC DEFIBRILLATOR PLACEMENT  No date: CARDIAC PACEMAKER PLACEMENT    Family History  Family History   P MG Oral Tab Take 0.6 mg by mouth daily. ipratropium-albuterol 0.5-2.5 (3) MG/3ML Inhalation Solution Take 3 mL by nebulization every 4 (four) hours while awake - 4 times daily. Atorvastatin Calcium 20 MG Oral Tab Take 20 mg by mouth nightly.    fluticas vision loss  Cardio: denies chest pain, chest pressure, palpitations  Respiratory: dyspnea, cough, denies wheezing, hemoptysis   GI: denies nausea, vomiting, abdominal pain  : denies dysuria, hematuria  Musculoskeletal: denies arthralgia, myalgia  Integu multifactorial in nature. Severe systolic heart failure with ejection fraction 10% on current hospitalization. Also in the patient has underlying asthma, paroxysmal atrial fibrillation, MAURICIO/OHS.   -Gentle diuresis as tolerated  -Chest x-ray reviewed with

## 2017-08-31 NOTE — PLAN OF CARE
PT. ADMITTED FROM ER TODAY FOR CHF, IVP LASIX STARTED, EKG, CXR, AND 2D ECHO DONE TODAY, ROTHMAN CATHETER INSERTED, NEBS ORDERED

## 2017-08-31 NOTE — PROGRESS NOTES
Core measure update: EF 10%. On carvedilol. On eplerenone. Currently no ace/arb secondary to prior renal issues.

## 2017-08-31 NOTE — PROGRESS NOTES
S: no c/o     O:   Blood pressure 115/77, pulse 77, temperature (!) 97.4 °F (36.3 °C), temperature source Oral, resp. rate 19, height 160 cm (5' 3\"), weight 290 lb (131.5 kg), SpO2 95 %.        08/30/17  2219 08/31/17  0000 08/31/17  0076 08/31/17  0720 HCl  0.2 mg Oral BID   • colchicine  0.6 mg Oral Daily   • docusate sodium  100 mg Oral BID   • eplerenone  25 mg Oral Daily   • Fluticasone Furoate-Vilanterol  1 puff Inhalation Q24H   • isosorbide dinitrate  10 mg Oral TID (Nitrates)   • Montelukast Sodi Recc:   1. Miguel Angel  2. Eventual RHC on this admission  3. Potential Cardiomems device implant candidate   4.  Start afterload reduction     Bonifacio Nair MD, [facility]  Heart Failure Transplant / Pulmonary HTN

## 2017-08-31 NOTE — PROGRESS NOTES
[facility]    Progress Note    Soto Moody Patient Status:  Inpatient    1950 MRN [id number]   Location [facility] 3W/SW Attending Micheline Koyanagi, [facility] Day # 1 PCP Jyothi Brito MD     Subjective: 08/30/2017   HGB 11.1 (L) 08/30/2017   HCT 34.6 (L) 08/30/2017    (L) 08/30/2017   CREATSERUM 1.40 08/31/2017   BUN 26 (H) 08/31/2017    08/31/2017   K 3.9 08/31/2017    08/31/2017   CO2 33 (H) 08/31/2017    (H) 08/31/2017   CA 8.

## 2017-08-31 NOTE — PROGRESS NOTES
[facility]     Progress Note        Marty Hardy Patient Status:  Inpatient    1950 MRN [id number]   Location [facility] 3W/SW Attending Jin Mcelroy, [facility] Day # 1 PCP Lisandro Cleveland MD       Subjective (RESTORIL) cap 15 mg 15 mg Oral Nightly PRN   furosemide (LASIX) injection 40 mg 40 mg Intravenous TID       Continuous Infusions:      Physical Exam  Constitutional: no acute distress, alert, oriented  HEENT: PERRL  Cardio: RRR, S1 S2 + JVD  Respiratory: steroids from pulmonary standpoint at this time.  -Nebulizer treatments  -ICS/L ZACKERY  -Nightly CPAP. Stressed importance of compliance with CPAP therapy given her severe cardiomyopathy.   Encouraged weight loss if possible.  -Coumadin on hold due to suprath

## 2017-08-31 NOTE — RESPIRATORY THERAPY NOTE
RESPIRATORY THERAPY CPAP PROGRESS NOTE:    Patient is compliant with nightly CPAP: yes  Patient refused: no  AutoCPAP in use: no  Peak EPAP noted: 4 cmH20  CPAP interface:Nasal  Patient tolerating: fair    RCP recommends: pt.stated that her family will mariah

## 2017-09-01 LAB
INR BLD: 2.8 (ref 0.9–1.2)
PROTHROMBIN TIME: 29.1 SECONDS (ref 11.8–14.5)

## 2017-09-01 PROCEDURE — 99233 SBSQ HOSP IP/OBS HIGH 50: CPT | Performed by: INTERNAL MEDICINE

## 2017-09-01 RX ORDER — 0.9 % SODIUM CHLORIDE 0.9 %
VIAL (ML) INJECTION
Status: COMPLETED
Start: 2017-09-01 | End: 2017-09-01

## 2017-09-01 RX ORDER — SOTALOL HYDROCHLORIDE 80 MG/1
80 TABLET ORAL EVERY 12 HOURS SCHEDULED
Status: DISCONTINUED | OUTPATIENT
Start: 2017-09-01 | End: 2017-09-06

## 2017-09-01 RX ORDER — ALPRAZOLAM 0.25 MG/1
0.25 TABLET ORAL 3 TIMES DAILY PRN
Status: DISCONTINUED | OUTPATIENT
Start: 2017-09-01 | End: 2017-09-06

## 2017-09-01 NOTE — PROGRESS NOTES
[facility]    Cardiology Progress Note  [facility]    Leah Tyler Patient Status:  Inpatient    1950 MRN [id number]   Location [facility] 3W/SW Attending Ignacio Persaud, [address] [place] [address] Day # 2 PCP WORSTENING LV   CONT IV DIURETICS. HAS RESPONDED WELL SO FAR  HAS CAD BUT LIKELY NOT CULPRIT NOW   POSS CARDIOMEMS ELECTIVELY    AF, IN NSR. MULTAQ STOPPED. SEES DR CHOPRA. DR Lacho Wilson HAS ASKED FOR EP TO [facility]   ON ANTICOGS    NSVT.  HAS ICD

## 2017-09-01 NOTE — HOME CARE LIAISON
MET WITH PATIENT EARLIER TO DISCUSS HOME HEALTH SERVICES. PATIENT IN AGREEMENT WITH SERVICES BEING PROVIDED BY [facility]. PROVIDED RESIDENT[facility] BROCHURE WITH CONTACT INFORMATION, ALONG WITH LIAISON'S BUSINESS CARD.

## 2017-09-01 NOTE — PROGRESS NOTES
[place] [facility]    Progress Note    Terrell Fisher Patient Status:  Inpatient    1950 MRN [id number]   Location [facility] 3W/SW Attending Myriam Squires, [address] [place] [address] Day # 2 PCP Donnie Medrano MD     Subjective: 26 (H) 08/31/2017    08/31/2017   K 3.9 08/31/2017    08/31/2017   CO2 33 (H) 08/31/2017    (H) 08/31/2017   CA 8.5 08/31/2017   ALB 3.2 (L) 08/31/2017   ALKPHO 125 (H) 08/31/2017   BILT 0.9 08/31/2017   TP 6.2 08/31/2017   AST 23 08/31/

## 2017-09-01 NOTE — CM/SW NOTE
MD order home health RN/PT. [id number]       Rupesh RobertsDeborah [place] [address]

## 2017-09-01 NOTE — PROGRESS NOTES
[facility]    Progress Note    Richie Prather Patient Status:  Inpatient    1950 MRN [id number]   Location [facility] 3W/SW Attending Jake Gordon, [address] Day # 2 PCP Lonny Self MD     Subjective: patient  Follow-up with this patient during hospital course          Results:     Lab Results  Component Value Date   WBC 4.7 08/30/2017   HGB 11.1 (L) 08/30/2017   HCT 34.6 (L) 08/30/2017    (L) 08/30/2017   CREATSERUM 1.40 08/31/2017   BUN 26 (H)

## 2017-09-01 NOTE — PLAN OF CARE
Patient Centered Care    • Patient preferences are identified and integrated in the patient's plan of care Progressing        RESPIRATORY - ADULT    • Achieves optimal ventilation and oxygenation Progressing          Patient getting IVP lasix and currently

## 2017-09-02 LAB
ANION GAP SERPL CALC-SCNC: 9 MMOL/L (ref 0–18)
BUN SERPL-MCNC: 27 MG/DL (ref 8–20)
BUN/CREAT SERPL: 18.6 (ref 10–20)
CALCIUM SERPL-MCNC: 9 MG/DL (ref 8.5–10.5)
CHLORIDE SERPL-SCNC: 97 MMOL/L (ref 95–110)
CO2 SERPL-SCNC: 33 MMOL/L (ref 22–32)
CREAT SERPL-MCNC: 1.45 MG/DL (ref 0.5–1.5)
GLUCOSE SERPL-MCNC: 112 MG/DL (ref 70–99)
INR BLD: 1.7 (ref 0.9–1.2)
MAGNESIUM SERPL-MCNC: 1.8 MG/DL (ref 1.8–2.5)
OSMOLALITY UR CALC.SUM OF ELEC: 294 MOSM/KG (ref 275–295)
POTASSIUM SERPL-SCNC: 3.8 MMOL/L (ref 3.3–5.1)
PROTHROMBIN TIME: 19.6 SECONDS (ref 11.8–14.5)
SODIUM SERPL-SCNC: 139 MMOL/L (ref 136–144)

## 2017-09-02 PROCEDURE — 99232 SBSQ HOSP IP/OBS MODERATE 35: CPT | Performed by: INTERNAL MEDICINE

## 2017-09-02 RX ORDER — IPRATROPIUM BROMIDE AND ALBUTEROL SULFATE 2.5; .5 MG/3ML; MG/3ML
3 SOLUTION RESPIRATORY (INHALATION)
Status: DISCONTINUED | OUTPATIENT
Start: 2017-09-02 | End: 2017-09-06

## 2017-09-02 RX ORDER — IPRATROPIUM BROMIDE AND ALBUTEROL SULFATE 2.5; .5 MG/3ML; MG/3ML
3 SOLUTION RESPIRATORY (INHALATION) EVERY 6 HOURS PRN
Status: DISCONTINUED | OUTPATIENT
Start: 2017-09-02 | End: 2017-09-06

## 2017-09-02 RX ORDER — WARFARIN SODIUM 4 MG/1
4 TABLET ORAL NIGHTLY
Status: DISCONTINUED | OUTPATIENT
Start: 2017-09-02 | End: 2017-09-04

## 2017-09-02 NOTE — PROGRESS NOTES
[place] [facility] - [facility]    Cardiology Progress Note  [facility] AND HEALTH SERVICES Specialists    Alexandra Rice Patient Status:  Inpatient    1950 MRN [id number]   Location [facility] 3W/SW Attending Rolan Coleman [facility] Day # 3 PCP STOPPED, SOTALOL STARTED, QT OK    NSVT, NOW ON SOTALOL, ICD IN PLACE             Results:     Lab Results  Component Value Date   WBC 4.7 08/30/2017   HGB 11.1 (L) 08/30/2017   HCT 34.6 (L) 08/30/2017    (L) 08/30/2017   CREATSERUM 1.40 08/31/2017

## 2017-09-02 NOTE — PROGRESS NOTES
[facility]    Progress Note      Assessment and Plan:   1. Chronic dyspnea–the patient has severe cardiomyopathy which I believe accounts for the majority of this clinical syndrome. Recommendations: As per cardiology    2.   Obstructive

## 2017-09-02 NOTE — PROGRESS NOTES
[facility]    Progress Note    Rosalie Mercer Patient Status:  Inpatient    1950 MRN [id number]   Location [facility] 3W/SW Attending Kristal Tejada [facility] Day # 3 PCP Papi Carroll MD     Subjective: HCT 34.6 (L) 08/30/2017    (L) 08/30/2017   CREATSERUM 1.45 09/02/2017   BUN 27 (H) 09/02/2017    09/02/2017   K 3.8 09/02/2017   CL 97 09/02/2017   CO2 33 (H) 09/02/2017    (H) 09/02/2017   CA 9.0 09/02/2017   ALB 3.2 (L) 08/31/2017

## 2017-09-03 LAB
ANION GAP SERPL CALC-SCNC: 7 MMOL/L (ref 0–18)
BNP SERPL-MCNC: 637 PG/ML (ref 0–100)
BUN SERPL-MCNC: 33 MG/DL (ref 8–20)
BUN/CREAT SERPL: 25.8 (ref 10–20)
CALCIUM SERPL-MCNC: 9 MG/DL (ref 8.5–10.5)
CHLORIDE SERPL-SCNC: 99 MMOL/L (ref 95–110)
CO2 SERPL-SCNC: 35 MMOL/L (ref 22–32)
CREAT SERPL-MCNC: 1.28 MG/DL (ref 0.5–1.5)
GLUCOSE SERPL-MCNC: 123 MG/DL (ref 70–99)
INR BLD: 1.3 (ref 0.9–1.2)
MAGNESIUM SERPL-MCNC: 1.7 MG/DL (ref 1.8–2.5)
OSMOLALITY UR CALC.SUM OF ELEC: 301 MOSM/KG (ref 275–295)
POTASSIUM SERPL-SCNC: 3.6 MMOL/L (ref 3.3–5.1)
PROTHROMBIN TIME: 16.1 SECONDS (ref 11.8–14.5)
SODIUM SERPL-SCNC: 141 MMOL/L (ref 136–144)

## 2017-09-03 PROCEDURE — 99232 SBSQ HOSP IP/OBS MODERATE 35: CPT | Performed by: INTERNAL MEDICINE

## 2017-09-03 RX ORDER — POTASSIUM CHLORIDE 20 MEQ/1
40 TABLET, EXTENDED RELEASE ORAL EVERY 4 HOURS
Status: COMPLETED | OUTPATIENT
Start: 2017-09-03 | End: 2017-09-03

## 2017-09-03 RX ORDER — WARFARIN SODIUM 5 MG/1
5 TABLET ORAL NIGHTLY
Status: DISCONTINUED | OUTPATIENT
Start: 2017-09-03 | End: 2017-09-04

## 2017-09-03 RX ORDER — HEPARIN SODIUM 5000 [USP'U]/ML
5000 INJECTION, SOLUTION INTRAVENOUS; SUBCUTANEOUS EVERY 12 HOURS
Status: DISCONTINUED | OUTPATIENT
Start: 2017-09-03 | End: 2017-09-04

## 2017-09-03 RX ORDER — FUROSEMIDE 10 MG/ML
40 INJECTION INTRAMUSCULAR; INTRAVENOUS
Status: DISCONTINUED | OUTPATIENT
Start: 2017-09-04 | End: 2017-09-05

## 2017-09-03 RX ORDER — MAGNESIUM OXIDE 400 MG (241.3 MG MAGNESIUM) TABLET
400 TABLET ONCE
Status: COMPLETED | OUTPATIENT
Start: 2017-09-03 | End: 2017-09-03

## 2017-09-03 NOTE — PROGRESS NOTES
[facility]    Progress Note    Alexandra Krystal Patient Status:  Inpatient    1950 MRN [id number]   Location [facility] 3W/SW Attending Rolan Coleman, *   Hosp Day # 4 PCP Rae Azar MD       CARDIOLOGY ATTE 0422 : (!) 305 lb 14.4 oz (138.8 kg)  03/14/17 0500 : (!) 308 lb (139.7 kg)  03/13/17 0500 : (!) 305 lb 8 oz (138.6 kg)  03/12/17 0606 : (!) 310 lb (140.6 kg)  03/12/17 0404 : (!) 310 lb (140.6 kg)  03/11/17 0451 : (!) 315 lb 1.6 oz (142.9 kg)  03/10/17 05 edema.   Peripheral pulses are 2+, equal and bilateral.        Assessment and Plan:           Acute on chronic congestive heart failure, unspecified congestive heart failure type ([address]  SYSTOLIC HF - LVEF 50%  SEVERE, DECLINING LV FUNCTION  INC ISORDILL TO

## 2017-09-03 NOTE — PHYSICAL THERAPY NOTE
PHYSICAL THERAPY EVALUATION - INPATIENT     Room Number: 325/325-A  Evaluation Date: 9/3/2017  Type of Evaluation: Initial  Physician Order: PT Eval and Treat    Presenting Problem: CHF exacerbation of some chronic asthmatic bronchitis  Reason for Ther RW)  Patient Regularly Uses: Reading glasses    Prior Level of [place]:  Pt was modified I in bed mobility, transfers, a community ambulator for short distance with the use of RW, longer distance rollator, still drives.     SUBJECTIVE  \"I am just so t Rolling walker  Pattern:  (dec lenore, dec heel strike, dec push off)  Stoop/Curb Assistance: Not tested  Comment :  (Pt easily gets tired)    Skilled Therapy Provided:  Established HEP, therapeutic activities, gait training, education on energy conservat independent     Goal #2 Patient is able to demonstrate transfers Sit to/from Stand at assistance level: supervision     Goal #3 Patient is able to ambulate 150  feet with assist device: walker - rolling at assistance level: supervision     Goal #4 Pt will

## 2017-09-03 NOTE — PROGRESS NOTES
[facility]    Progress Note    Arianna Sees Patient Status:  Inpatient    1950 MRN [id number]   Location [facility] 3W/SW Attending Genie Arnold, [address] Day # 4 PCP Cyndie Sahu MD     Subjective: 09/02/2017    09/02/2017   K 3.8 09/02/2017   CL 97 09/02/2017   CO2 33 (H) 09/02/2017    (H) 09/02/2017   CA 9.0 09/02/2017   ALB 3.2 (L) 08/31/2017   ALKPHO 125 (H) 08/31/2017   BILT 0.9 08/31/2017   TP 6.2 08/31/2017   AST 23 08/31/2017   A

## 2017-09-03 NOTE — PROGRESS NOTES
[facility]    Progress Note      Assessment and Plan:   1. Chronic dyspnea–the patient has severe cardiomyopathy which I believe accounts for the majority of this clinical syndrome.   She notes that she is breathing somewhat better at this

## 2017-09-03 NOTE — PLAN OF CARE
CARDIOVASCULAR - ADULT    • Maintains optimal cardiac output and hemodynamic stability Progressing        Patient Centered Care    • Patient preferences are identified and integrated in the patient's plan of care Progressing        RESPIRATORY - ADULT    •

## 2017-09-04 ENCOUNTER — PRIOR ORIGINAL RECORDS (OUTPATIENT)
Dept: OTHER | Age: 67
End: 2017-09-04

## 2017-09-04 LAB
ANION GAP SERPL CALC-SCNC: 8 MMOL/L (ref 0–18)
BASOPHILS # BLD: 0 K/UL (ref 0–0.2)
BASOPHILS NFR BLD: 1 %
BUN SERPL-MCNC: 35 MG/DL (ref 8–20)
BUN/CREAT SERPL: 27.3 (ref 10–20)
CALCIUM SERPL-MCNC: 9.5 MG/DL (ref 8.5–10.5)
CHLORIDE SERPL-SCNC: 99 MMOL/L (ref 95–110)
CO2 SERPL-SCNC: 33 MMOL/L (ref 22–32)
CREAT SERPL-MCNC: 1.28 MG/DL (ref 0.5–1.5)
EOSINOPHIL # BLD: 0.1 K/UL (ref 0–0.7)
EOSINOPHIL NFR BLD: 2 %
ERYTHROCYTE [DISTWIDTH] IN BLOOD BY AUTOMATED COUNT: 21.6 % (ref 11–15)
GLUCOSE SERPL-MCNC: 117 MG/DL (ref 70–99)
HCT VFR BLD AUTO: 38.4 % (ref 35–48)
HGB BLD-MCNC: 12.2 G/DL (ref 12–16)
INR BLD: 1.2 (ref 0.9–1.2)
LYMPHOCYTES # BLD: 0.9 K/UL (ref 1–4)
LYMPHOCYTES NFR BLD: 15 %
MAGNESIUM SERPL-MCNC: 2 MG/DL (ref 1.8–2.5)
MCH RBC QN AUTO: 25.9 PG (ref 27–32)
MCHC RBC AUTO-ENTMCNC: 31.7 G/DL (ref 32–37)
MCV RBC AUTO: 81.6 FL (ref 80–100)
MONOCYTES # BLD: 0.7 K/UL (ref 0–1)
MONOCYTES NFR BLD: 12 %
NEUTROPHILS # BLD AUTO: 4.1 K/UL (ref 1.8–7.7)
NEUTROPHILS NFR BLD: 69 %
OSMOLALITY UR CALC.SUM OF ELEC: 299 MOSM/KG (ref 275–295)
PLATELET # BLD AUTO: 182 K/UL (ref 140–400)
PMV BLD AUTO: 10 FL (ref 7.4–10.3)
POTASSIUM SERPL-SCNC: 4.5 MMOL/L (ref 3.3–5.1)
PROTHROMBIN TIME: 15 SECONDS (ref 11.8–14.5)
RBC # BLD AUTO: 4.7 M/UL (ref 3.7–5.4)
SODIUM SERPL-SCNC: 140 MMOL/L (ref 136–144)
WBC # BLD AUTO: 6 K/UL (ref 4–11)

## 2017-09-04 PROCEDURE — 99232 SBSQ HOSP IP/OBS MODERATE 35: CPT | Performed by: INTERNAL MEDICINE

## 2017-09-04 RX ORDER — WARFARIN SODIUM 10 MG/1
10 TABLET ORAL NIGHTLY
Status: DISCONTINUED | OUTPATIENT
Start: 2017-09-04 | End: 2017-09-06

## 2017-09-04 NOTE — PLAN OF CARE
Maintains optimal cardiac output and hemodynamic stability Progressing      Absence of cardiac arrhythmias or at baseline Progressing      Patient preferences are identified and integrated in the patient's plan of care Progressing      Achieves optimal lory

## 2017-09-04 NOTE — PROGRESS NOTES
[place] [facility] - [facility]    Cardiology Progress Note    Carlos Pac Patient Status:  Inpatient    1950 MRN [id number]   Location [facility] 3W/SW Attending Blake Ceballos, [facility] Day # 5 PCP Vee Mazariegos MD         As 81 mg Oral Daily   • atorvastatin  20 mg Oral Nightly   • colchicine  0.6 mg Oral Daily   • docusate sodium  100 mg Oral BID   • eplerenone  25 mg Oral Daily   • Fluticasone Furoate-Vilanterol  1 puff Inhalation Q24H   • isosorbide dinitrate  10 mg Oral T

## 2017-09-04 NOTE — PROGRESS NOTES
[facility]    Progress Note    Nicolasa Cade Patient Status:  Inpatient    1950 MRN [id number]   Location [facility] 3W/SW Attending Halina Chpaa, [facility] Day # 5 PCP Shira Barron MD     Subjective:   Sarah Horan

## 2017-09-04 NOTE — PROGRESS NOTES
[facility]    Progress Note    Alexandra Rice Patient Status:  Inpatient    1950 MRN [id number]   Location [facility] 3W/SW Attending Rolan Coleman, [address] Day # 5 PCP Rae Azar MD     Subjective: 09/04/2017   CL 99 09/04/2017   CO2 33 (H) 09/04/2017    (H) 09/04/2017   CA 9.5 09/04/2017   ALB 3.2 (L) 08/31/2017   ALKPHO 125 (H) 08/31/2017   BILT 0.9 08/31/2017   TP 6.2 08/31/2017   AST 23 08/31/2017   ALT 21 08/31/2017   INR 1.2 09/04/2017

## 2017-09-05 PROCEDURE — 99232 SBSQ HOSP IP/OBS MODERATE 35: CPT | Performed by: INTERNAL MEDICINE

## 2017-09-05 RX ORDER — FUROSEMIDE 40 MG/1
40 TABLET ORAL
Status: DISCONTINUED | OUTPATIENT
Start: 2017-09-05 | End: 2017-09-06

## 2017-09-05 NOTE — PLAN OF CARE
Patient preferences are identified and integrated in the patient's plan of care Progressing      Maintains optimal cardiac output and hemodynamic stability Progressing      Absence of cardiac arrhythmias or at baseline Progressing      Achieves optimal lory

## 2017-09-05 NOTE — PROGRESS NOTES
[facility]    Progress Note    Marty Hardy Patient Status:  Inpatient    1950 MRN [id number]   Location [facility] 3W/SW Attending Jin Mcelroy, [facility] Day # 6 PCP Lisandro Cleveland MD     Subjective:   Benji Kumar 09/04/2017   MG 2.0 09/04/2017   TROP 0.03 08/30/2017                         Sean Paz MD  9/5/2017

## 2017-09-05 NOTE — PROGRESS NOTES
[place] [facility] - [facility]    Cardiology Progress Note  [facility] Special[facility]    Julianne Mccarthy Patient Status:  Inpatient    1950 MRN [id number]   Location [facility] 3W/SW Attending Dennis Linares, [address] Day # 6 PCP improving  Will change to po lasix.   Consider ace/arb in addition to isordil/hydr if renal fx ok           Results:     Lab Results  Component Value Date   WBC 6.0 09/04/2017   HGB 12.2 09/04/2017   HCT 38.4 09/04/2017    09/04/2017   CREATSERUM 1.2

## 2017-09-05 NOTE — PHYSICAL THERAPY NOTE
PHYSICAL THERAPY TREATMENT NOTE - INPATIENT    Room Number: 325/325-A       Presenting Problem: CHF exacerbation of some chronic asthmatic bronchitis    Problem List  Principal Problem:    Acute on chronic congestive heart failure, unspecified congestive activity tolerance and home management/self-cares. Patient states the plan is to go home tomorrow and that her niece will be staying with her. No equipment needs as patient owns RW, Rollator and scooter.      DISCHARGE RECOMMENDATIONS  PT Discharge Recommen of chair mid-way to rest)  Distance (ft): 80', 20', 100', 6'x2  Assistive Device: Rolling walker  Pattern:  (laterally flexes to the right and leans on R elbow on walker)  Stoop/Curb Assistance:  (CGA and guarding, chair at bottom of stairs to rest pre/pos

## 2017-09-05 NOTE — PROGRESS NOTES
[place] [facility]    Progress Note    Nathaly Cookgalindo Patient Status:  Inpatient    1950 MRN [id number]   Location [facility] 3W/SW Attending Shyla Fernandez, [facility] Day # 6 PCP Maira Valadez MD     Subjective:

## 2017-09-06 ENCOUNTER — PRIOR ORIGINAL RECORDS (OUTPATIENT)
Dept: OTHER | Age: 67
End: 2017-09-06

## 2017-09-06 VITALS
BODY MASS INDEX: 49.99 KG/M2 | WEIGHT: 282.13 LBS | TEMPERATURE: 98 F | DIASTOLIC BLOOD PRESSURE: 57 MMHG | RESPIRATION RATE: 18 BRPM | OXYGEN SATURATION: 98 % | HEIGHT: 63 IN | SYSTOLIC BLOOD PRESSURE: 108 MMHG | HEART RATE: 82 BPM

## 2017-09-06 LAB
ANION GAP SERPL CALC-SCNC: 9 MMOL/L (ref 0–18)
BNP SERPL-MCNC: 247 PG/ML (ref 0–100)
BUN SERPL-MCNC: 42 MG/DL (ref 8–20)
BUN/CREAT SERPL: 32.1 (ref 10–20)
CALCIUM SERPL-MCNC: 9.2 MG/DL (ref 8.5–10.5)
CHLORIDE SERPL-SCNC: 102 MMOL/L (ref 95–110)
CO2 SERPL-SCNC: 31 MMOL/L (ref 22–32)
CREAT SERPL-MCNC: 1.31 MG/DL (ref 0.5–1.5)
GLUCOSE SERPL-MCNC: 108 MG/DL (ref 70–99)
INR BLD: 1.5 (ref 0.9–1.2)
OSMOLALITY UR CALC.SUM OF ELEC: 305 MOSM/KG (ref 275–295)
POTASSIUM SERPL-SCNC: 4 MMOL/L (ref 3.3–5.1)
PROTHROMBIN TIME: 17.2 SECONDS (ref 11.8–14.5)
SODIUM SERPL-SCNC: 142 MMOL/L (ref 136–144)

## 2017-09-06 PROCEDURE — 99232 SBSQ HOSP IP/OBS MODERATE 35: CPT | Performed by: INTERNAL MEDICINE

## 2017-09-06 RX ORDER — HEPARIN SODIUM 5000 [USP'U]/ML
5000 INJECTION, SOLUTION INTRAVENOUS; SUBCUTANEOUS ONCE
Status: COMPLETED | OUTPATIENT
Start: 2017-09-06 | End: 2017-09-06

## 2017-09-06 RX ORDER — SOTALOL HYDROCHLORIDE 80 MG/1
80 TABLET ORAL EVERY 12 HOURS SCHEDULED
Qty: 180 TABLET | Refills: 1 | Status: SHIPPED | OUTPATIENT
Start: 2017-09-06

## 2017-09-06 RX ORDER — LISINOPRIL 10 MG/1
10 TABLET ORAL 2 TIMES DAILY
Status: DISCONTINUED | OUTPATIENT
Start: 2017-09-06 | End: 2017-09-06

## 2017-09-06 RX ORDER — FUROSEMIDE 40 MG/1
40 TABLET ORAL 3 TIMES DAILY
Qty: 180 TABLET | Refills: 1 | Status: SHIPPED | OUTPATIENT
Start: 2017-09-06 | End: 2017-10-17

## 2017-09-06 RX ORDER — HYDRALAZINE HYDROCHLORIDE 25 MG/1
25 TABLET, FILM COATED ORAL EVERY 8 HOURS SCHEDULED
Qty: 90 TABLET | Refills: 1 | Status: SHIPPED | OUTPATIENT
Start: 2017-09-06 | End: 2017-09-26

## 2017-09-06 RX ORDER — LISINOPRIL 10 MG/1
10 TABLET ORAL 2 TIMES DAILY
Qty: 120 TABLET | Refills: 1 | Status: SHIPPED | OUTPATIENT
Start: 2017-09-06 | End: 2017-10-17

## 2017-09-06 NOTE — PROGRESS NOTES
[facility]    Progress Note    Star Poser Patient Status:  Inpatient    1950 MRN [id number]   Location [facility] 3W/SW Attending Jaxson Scanlon [facility] Day # 7 PCP Brittany Sutherland MD     Subjective: Date   WBC 6.0 09/04/2017   HGB 12.2 09/04/2017   HCT 38.4 09/04/2017    09/04/2017   CREATSERUM 1.31 09/06/2017   BUN 42 (H) 09/06/2017    09/06/2017   K 4.0 09/06/2017    09/06/2017   CO2 31 09/06/2017    (H) 09/06/2017   CA 9.2

## 2017-09-06 NOTE — PROGRESS NOTES
Patient's oxygen saturations were checked : at rest without oxygen room air 96%; after ambulating 200 feet on room air 84%; after 2 liters of oxygen applied to patient ambulating, she returned to 96% Spo2.  Will call to make [address] aware of these readin

## 2017-09-06 NOTE — CM/SW NOTE
Patient with need for home oxygen. Be [address] notified ([phone]). Signed order in chart, waiting on documentation of O2 sats. RN Gia Mendosa aware.      [place] [phone]

## 2017-09-07 ENCOUNTER — TELEPHONE (OUTPATIENT)
Dept: CARDIOLOGY UNIT | Facility: HOSPITAL | Age: 67
End: 2017-09-07

## 2017-09-07 NOTE — DISCHARGE SUMMARY
[facility]    PATIENT'S NAME: Carolina Pueblo   ATTENDING PHYSICIAN: Iraida Chahal MD   PATIENT ACCOUNT#:   [id number]    LOCATION:  [address] [place] [address] #:   [id number]       YOB: 1950  ADMISSION DATE:       08/30

## 2017-09-12 ENCOUNTER — OFFICE VISIT (OUTPATIENT)
Dept: CARDIOLOGY CLINIC | Facility: HOSPITAL | Age: 67
End: 2017-09-12
Attending: INTERNAL MEDICINE
Payer: MEDICARE

## 2017-09-12 VITALS
SYSTOLIC BLOOD PRESSURE: 105 MMHG | DIASTOLIC BLOOD PRESSURE: 54 MMHG | WEIGHT: 285.63 LBS | HEART RATE: 82 BPM | BODY MASS INDEX: 51 KG/M2 | OXYGEN SATURATION: 92 %

## 2017-09-12 DIAGNOSIS — I50.21 ACUTE SYSTOLIC CONGESTIVE HEART FAILURE (HCC): Primary | ICD-10-CM

## 2017-09-12 DIAGNOSIS — J44.1 COPD EXACERBATION (HCC): ICD-10-CM

## 2017-09-12 DIAGNOSIS — I50.9 CHF (CONGESTIVE HEART FAILURE) (HCC): ICD-10-CM

## 2017-09-12 LAB
ANION GAP SERPL CALC-SCNC: 9 MMOL/L (ref 0–18)
BUN SERPL-MCNC: 54 MG/DL (ref 8–20)
BUN/CREAT SERPL: 32.1 (ref 10–20)
CALCIUM SERPL-MCNC: 8.4 MG/DL (ref 8.5–10.5)
CHLORIDE SERPL-SCNC: 105 MMOL/L (ref 95–110)
CO2 SERPL-SCNC: 23 MMOL/L (ref 22–32)
CREAT SERPL-MCNC: 1.68 MG/DL (ref 0.5–1.5)
GLUCOSE SERPL-MCNC: 98 MG/DL (ref 70–99)
OSMOLALITY UR CALC.SUM OF ELEC: 299 MOSM/KG (ref 275–295)
POTASSIUM SERPL-SCNC: 4.8 MMOL/L (ref 3.3–5.1)
SODIUM SERPL-SCNC: 137 MMOL/L (ref 136–144)

## 2017-09-12 PROCEDURE — 99211 OFF/OP EST MAY X REQ PHY/QHP: CPT | Performed by: NURSE PRACTITIONER

## 2017-09-12 PROCEDURE — 80048 BASIC METABOLIC PNL TOTAL CA: CPT | Performed by: NURSE PRACTITIONER

## 2017-09-12 PROCEDURE — 36415 COLL VENOUS BLD VENIPUNCTURE: CPT | Performed by: NURSE PRACTITIONER

## 2017-09-12 PROCEDURE — 99214 OFFICE O/P EST MOD 30 MIN: CPT | Performed by: NURSE PRACTITIONER

## 2017-09-12 NOTE — PATIENT INSTRUCTIONS
Decrease lisinopril to 10 mg daily    Hold tonight's furosemide dose, then resume tomorrow    Monitor yourself for signs and symptoms of fluid overload, increased shortness of breath, difficulty breathing when laying down etc. Call the clinic if any of the

## 2017-09-12 NOTE — PROGRESS NOTES
[facility] [address] Patient Status:  Outpatient    1950 MRN [id number]   Location MD Arnulfo Liriano MD Deena Sees is a 78year old female who presents to  09/12/2017 12:14 PM   ALB 3.2 (L) 08/31/2017 06:12 AM   ALKPHO 125 (H) 08/31/2017 06:12 AM   BILT 0.9 08/31/2017 06:12 AM   TP 6.2 08/31/2017 06:12 AM   AST 23 08/31/2017 06:12 AM   ALT 21 08/31/2017 06:12 AM   INR 1.5 (H) 09/06/2017 05:22 AM   PTP 17.2 (H however, morbidly obese and does not perform many activities throughout out the day. Ambulates with walker and arrived to clinic in [address] No JVD, lungs clear to ausculation, trace LE edema bilaterally. Denies bloating and states has + appetite.     Dietary spirometer 2 sets of 10 daily and use the Acapella/pickle/hippo breathing devices  2 sets of 10 daily    Follow up appointment with Dr. Ingrid Ortiz as scheduled    Follow up appointment with Dr. Rosa Lawrence as scheduled    Return to clinic on  9/21/17    [facility]

## 2017-09-13 ENCOUNTER — TELEPHONE (OUTPATIENT)
Dept: CARDIOLOGY CLINIC | Facility: HOSPITAL | Age: 67
End: 2017-09-13

## 2017-09-14 ENCOUNTER — TELEPHONE (OUTPATIENT)
Dept: PULMONOLOGY | Facility: CLINIC | Age: 67
End: 2017-09-14

## 2017-09-14 NOTE — TELEPHONE ENCOUNTER
Pt called to schedule hosp follow up appt. Pt was discharged on 9/6/17. No appts available. Please call.

## 2017-09-14 NOTE — TELEPHONE ENCOUNTER
Pt offered hospital f/u for 9/15 pt declined does not want to go to [facility], pt accepted appt for 9/27 @ 3 pm at [facility].

## 2017-09-21 ENCOUNTER — PRIOR ORIGINAL RECORDS (OUTPATIENT)
Dept: OTHER | Age: 67
End: 2017-09-21

## 2017-09-21 ENCOUNTER — OFFICE VISIT (OUTPATIENT)
Dept: CARDIOLOGY CLINIC | Facility: HOSPITAL | Age: 67
End: 2017-09-21
Attending: INTERNAL MEDICINE
Payer: MEDICARE

## 2017-09-21 VITALS
BODY MASS INDEX: 50 KG/M2 | SYSTOLIC BLOOD PRESSURE: 117 MMHG | OXYGEN SATURATION: 94 % | WEIGHT: 281 LBS | HEART RATE: 81 BPM | RESPIRATION RATE: 18 BRPM | DIASTOLIC BLOOD PRESSURE: 63 MMHG

## 2017-09-21 DIAGNOSIS — I50.21 ACUTE SYSTOLIC CONGESTIVE HEART FAILURE (HCC): Primary | ICD-10-CM

## 2017-09-21 DIAGNOSIS — I50.9 HEART FAILURE (HCC): ICD-10-CM

## 2017-09-21 DIAGNOSIS — J44.1 COPD EXACERBATION (HCC): ICD-10-CM

## 2017-09-21 LAB
ANION GAP SERPL CALC-SCNC: 8 MMOL/L (ref 0–18)
BUN SERPL-MCNC: 39 MG/DL (ref 8–20)
BUN/CREAT SERPL: 31 (ref 10–20)
CALCIUM SERPL-MCNC: 8.7 MG/DL (ref 8.5–10.5)
CHLORIDE SERPL-SCNC: 101 MMOL/L (ref 95–110)
CO2 SERPL-SCNC: 30 MMOL/L (ref 22–32)
CREAT SERPL-MCNC: 1.26 MG/DL (ref 0.5–1.5)
GLUCOSE SERPL-MCNC: 105 MG/DL (ref 70–99)
OSMOLALITY UR CALC.SUM OF ELEC: 298 MOSM/KG (ref 275–295)
POTASSIUM SERPL-SCNC: 3.5 MMOL/L (ref 3.3–5.1)
SODIUM SERPL-SCNC: 139 MMOL/L (ref 136–144)

## 2017-09-21 PROCEDURE — 99214 OFFICE O/P EST MOD 30 MIN: CPT | Performed by: NURSE PRACTITIONER

## 2017-09-21 PROCEDURE — 99211 OFF/OP EST MAY X REQ PHY/QHP: CPT | Performed by: NURSE PRACTITIONER

## 2017-09-21 PROCEDURE — 36415 COLL VENOUS BLD VENIPUNCTURE: CPT | Performed by: NURSE PRACTITIONER

## 2017-09-21 PROCEDURE — 80048 BASIC METABOLIC PNL TOTAL CA: CPT | Performed by: NURSE PRACTITIONER

## 2017-09-21 RX ORDER — POTASSIUM CHLORIDE 20 MEQ/1
20 TABLET, EXTENDED RELEASE ORAL 2 TIMES DAILY
Qty: 60 TABLET | Refills: 1 | Status: SHIPPED | OUTPATIENT
Start: 2017-09-21 | End: 2017-11-27

## 2017-09-21 NOTE — PROGRESS NOTES
[facility] [address] Patient Status:  Outpatient    1950 MRN [id number]   Location MD Arnulfo Liriano MD Deena Sees is a 78year old female who presents to  BILT 0.9 08/31/2017 06:12 AM   TP 6.2 08/31/2017 06:12 AM   AST 23 08/31/2017 06:12 AM   ALT 21 08/31/2017 06:12 AM   INR 1.5 (H) 09/06/2017 05:22 AM   PTP 17.2 (H) 09/06/2017 05:22 AM   MG 2.0 09/04/2017 07:21 AM   TROP 0.03 08/30/2017 03:02 PM       Cl bid instead of decreasing to once daily. Continues to improve and having a bit more energy however, morbidly obese and does not perform many activities throughout out the day. Ambulates with walker and arrived to clinic in [address].  No JVD, lungs clear to auscu daily and use the Acapella/pickle/hippo breathing devices  2 sets of 10 daily    Follow up appointment with Dr. Edu Blanchard 9/27/17    Follow up appointment with Dr. Bon Vaughn as scheduled    Return to clinic on  10/6/17    Schedule follow up appointment wit

## 2017-09-21 NOTE — PATIENT INSTRUCTIONS
Continue current medications    Monitor yourself for signs and symptoms of fluid overload, increased shortness of breath, difficulty breathing when laying down etc. Call the clinic if any of these signs develop at ph: [phone]    Call if having any diz

## 2017-09-27 ENCOUNTER — OFFICE VISIT (OUTPATIENT)
Dept: PULMONOLOGY | Facility: CLINIC | Age: 67
End: 2017-09-27

## 2017-09-27 VITALS
OXYGEN SATURATION: 94 % | BODY MASS INDEX: 52.87 KG/M2 | HEART RATE: 73 BPM | DIASTOLIC BLOOD PRESSURE: 62 MMHG | WEIGHT: 280 LBS | SYSTOLIC BLOOD PRESSURE: 100 MMHG | HEIGHT: 61 IN

## 2017-09-27 DIAGNOSIS — I50.22 CHRONIC SYSTOLIC CONGESTIVE HEART FAILURE (HCC): ICD-10-CM

## 2017-09-27 DIAGNOSIS — G47.33 OSA (OBSTRUCTIVE SLEEP APNEA): Primary | ICD-10-CM

## 2017-09-27 DIAGNOSIS — J44.9 CHRONIC OBSTRUCTIVE PULMONARY DISEASE, UNSPECIFIED COPD TYPE (HCC): ICD-10-CM

## 2017-09-27 DIAGNOSIS — I26.99 PE (PULMONARY THROMBOEMBOLISM) (HCC): ICD-10-CM

## 2017-09-27 DIAGNOSIS — J43.2 CENTRILOBULAR EMPHYSEMA (HCC): ICD-10-CM

## 2017-09-27 DIAGNOSIS — J45.909 UNCOMPLICATED ASTHMA, UNSPECIFIED ASTHMA SEVERITY: ICD-10-CM

## 2017-09-27 PROBLEM — I50.9 ACUTE ON CHRONIC CONGESTIVE HEART FAILURE, UNSPECIFIED CONGESTIVE HEART FAILURE TYPE: Status: RESOLVED | Noted: 2017-08-30 | Resolved: 2017-09-27

## 2017-09-27 PROCEDURE — G0463 HOSPITAL OUTPT CLINIC VISIT: HCPCS | Performed by: INTERNAL MEDICINE

## 2017-09-27 PROCEDURE — 94761 N-INVAS EAR/PLS OXIMETRY MLT: CPT | Performed by: INTERNAL MEDICINE

## 2017-09-27 PROCEDURE — 99215 OFFICE O/P EST HI 40 MIN: CPT | Performed by: INTERNAL MEDICINE

## 2017-09-27 RX ORDER — IPRATROPIUM BROMIDE AND ALBUTEROL SULFATE 2.5; .5 MG/3ML; MG/3ML
3 SOLUTION RESPIRATORY (INHALATION) EVERY 6 HOURS PRN
Qty: 100 ML | Refills: 0 | Status: SHIPPED | OUTPATIENT
Start: 2017-09-27

## 2017-09-27 NOTE — PATIENT INSTRUCTIONS
Wear your oxygen every night that you do not use your CPAP    Please get an influenza \"Flu\" vaccine in the fall.

## 2017-09-27 NOTE — PROGRESS NOTES
Pulmonary Follow Up Note    HPI:   Colten Hendrix is a 78year old female with No chief complaint on file.     Cindy Brown MD    Back home  CHF clinic f/u x 2  Plan to see Dr DIEGO soon too    No SOB  No cough  No wheeze         Used CPAP last night Sotalol HCl 80 MG Oral Tab Take 1 tablet (80 mg total) by mouth every 12 (twelve) hours. Disp: 180 tablet Rfl: 1   furosemide 40 MG Oral Tab Take 1 tablet (40 mg total) by mouth 3 (three) times daily.  (Patient taking differently: Take 40 mg by mouth 2 (t Take 40 mg by mouth daily. Disp:  Rfl:    Carvedilol (COREG OR) Take 25 mg by mouth 2 (two) times daily. Disp:  Rfl:    Montelukast Sodium (SINGULAIR) 10 MG Oral Tab Take 10 mg by mouth nightly.  Disp:  Rfl:          Allergies:    Penicillins 2015  Plan: cont nebs for now   PFTs in future   Started counseling   Cont Advair for now    (G14.610) Uncomplicated asthma, unspecified asthma severity  Plan: see above    (I26.99) PE (pulmonary thromboembolism) ([facility]  Dx 4-5 yrs ago at time of pericardia

## 2017-09-28 ENCOUNTER — TELEPHONE (OUTPATIENT)
Dept: PULMONOLOGY | Facility: CLINIC | Age: 67
End: 2017-09-28

## 2017-09-28 DIAGNOSIS — J44.9 CHRONIC OBSTRUCTIVE PULMONARY DISEASE, UNSPECIFIED COPD TYPE (HCC): Primary | ICD-10-CM

## 2017-09-28 NOTE — TELEPHONE ENCOUNTER
Pt states per MM she will no longer need Oxygen and would like to know if she has to call to cancel Oxygen services or will the office call for her.  Please call Thank You

## 2017-09-28 NOTE — PROGRESS NOTES
Pulmonary Follow Up Note     HPI:   Bon Martinez is a 78year old female with No chief complaint on file.  Trudi Broussard MD     Back home  CHF clinic f/u x 2  Plan to see Dr Bang Layne soon too     No SOB  No cough  No wheeze           Used CPAP last n mouth 2 (two) times daily. Disp: 60 tablet Rfl: 1   Sotalol HCl 80 MG Oral Tab Take 1 tablet (80 mg total) by mouth every 12 (twelve) hours. Disp: 180 tablet Rfl: 1   furosemide 40 MG Oral Tab Take 1 tablet (40 mg total) by mouth 3 (three) times daily.  (Pa times daily. Disp:  Rfl:    PANTOPRAZOLE SODIUM OR Take 40 mg by mouth daily. Disp:  Rfl:    Carvedilol (COREG OR) Take 25 mg by mouth 2 (two) times daily. Disp:  Rfl:    Montelukast Sodium (SINGULAIR) 10 MG Oral Tab Take 10 mg by mouth nightly.  Disp last 2 years   Does not have COPD  May have asthma but suspect not  PFTs normal in 2015  Plan: cont nebs for now                PFTs in future                Started counseling                Cont Advair for now     (R90.410) Uncomplicated asthma, unspecif

## 2017-09-28 NOTE — TELEPHONE ENCOUNTER
Pt needs order to discontinue oxygen. Pt did not desat in office during amb ox. Pt stts she does not use oxygen at night. Pt stts she uses cpap machine a night. Please advise.

## 2017-10-02 ENCOUNTER — PRIOR ORIGINAL RECORDS (OUTPATIENT)
Dept: OTHER | Age: 67
End: 2017-10-02

## 2017-10-05 LAB
BNP: 247 PMOL/L
BUN: 39 MG/DL
CALCIUM: 8.7 MG/DL
CHLORIDE: 101 MEQ/L
CHOLESTEROL, TOTAL: 77 MG/DL
CREATININE, SERUM: 1.26 MG/DL
GLUCOSE: 105 MG/DL
HDL CHOLESTEROL: 32 MG/DL
HEMATOCRIT: 38.4 %
HEMOGLOBIN: 12.2 G/DL
LDL CHOLESTEROL: 28 MG/DL
NON-HDL CHOLESTEROL: 45 MG/DL
PLATELETS: 182 K/UL
POTASSIUM, SERUM: 3.5 MEQ/L
RED BLOOD COUNT: 4.7 X 10-6/U
SODIUM: 139 MEQ/L
TRIGLYCERIDES: 87 MG/DL
WHITE BLOOD COUNT: 6 X 10-3/U

## 2017-10-09 ENCOUNTER — TELEPHONE (OUTPATIENT)
Dept: PULMONOLOGY | Facility: CLINIC | Age: 67
End: 2017-10-09

## 2017-10-09 DIAGNOSIS — G47.33 OSA (OBSTRUCTIVE SLEEP APNEA): Primary | ICD-10-CM

## 2017-10-09 NOTE — TELEPHONE ENCOUNTER
Pt informed new order has been placed reading to turn tv off after pt falls asleep and to sleep in recliner, pt voiced understanding. Pt also stts she will be calling [facility] to have original PSG sent over.

## 2017-10-09 NOTE — TELEPHONE ENCOUNTER
Pt called to speak to RN about issues that she had when trying to  schedule sleep study. Please call.

## 2017-10-09 NOTE — TELEPHONE ENCOUNTER
Pt requesting orders to sleep in recliner during sleep study as well as to leave the TV on all night. Pt stts sleep lab told her she would not be able to leave TV on all night, pt stts she will not be able to sleep without it on.  Pt stts she sleeps in a

## 2017-10-09 NOTE — TELEPHONE ENCOUNTER
Please ask the lab to accomodate her as best possible  They can turn the TV off after she falls asleep.     My order \"turn tv off after pt falls asleep and sleep in recling chair as possible\"    thx

## 2017-10-17 ENCOUNTER — OFFICE VISIT (OUTPATIENT)
Dept: CARDIOLOGY CLINIC | Facility: HOSPITAL | Age: 67
End: 2017-10-17
Attending: INTERNAL MEDICINE
Payer: MEDICARE

## 2017-10-17 VITALS
BODY MASS INDEX: 53 KG/M2 | WEIGHT: 280.19 LBS | HEART RATE: 74 BPM | DIASTOLIC BLOOD PRESSURE: 74 MMHG | SYSTOLIC BLOOD PRESSURE: 125 MMHG | OXYGEN SATURATION: 93 %

## 2017-10-17 DIAGNOSIS — I50.9 CHF (CONGESTIVE HEART FAILURE) (HCC): Primary | ICD-10-CM

## 2017-10-17 DIAGNOSIS — I50.23 ACUTE ON CHRONIC SYSTOLIC CONGESTIVE HEART FAILURE (HCC): ICD-10-CM

## 2017-10-17 PROCEDURE — 36415 COLL VENOUS BLD VENIPUNCTURE: CPT | Performed by: CLINICAL NURSE SPECIALIST

## 2017-10-17 PROCEDURE — 99211 OFF/OP EST MAY X REQ PHY/QHP: CPT | Performed by: CLINICAL NURSE SPECIALIST

## 2017-10-17 PROCEDURE — 99214 OFFICE O/P EST MOD 30 MIN: CPT | Performed by: CLINICAL NURSE SPECIALIST

## 2017-10-17 PROCEDURE — 80048 BASIC METABOLIC PNL TOTAL CA: CPT | Performed by: CLINICAL NURSE SPECIALIST

## 2017-10-17 RX ORDER — LISINOPRIL 10 MG/1
15 TABLET ORAL 2 TIMES DAILY
Qty: 90 TABLET | Refills: 0 | Status: SHIPPED | OUTPATIENT
Start: 2017-10-17 | End: 2017-12-21 | Stop reason: ALTCHOICE

## 2017-10-17 RX ORDER — FUROSEMIDE 40 MG/1
40 TABLET ORAL 2 TIMES DAILY
COMMUNITY
End: 2018-01-16

## 2017-10-17 NOTE — PATIENT INSTRUCTIONS
Increase lisinopril to 15 mg twice daily, call with any dizziness or lightheadedness  Reduce potassium 20 mEq to only once daily  [facility] to repeat BMP 10/24, orders faxed  Monitor yourself for signs and symptoms of fluid overload, increase

## 2017-10-17 NOTE — PROGRESS NOTES
[facility] [address] Patient Status:  Outpatient    1950 MRN [id number]   Location Mortimer Farber, MD Rueben Pry, MD Hattie Emily is a 78year old female who presents to  282    General appearance: Alert and cooperative  Neck: No JVD noted   Lungs: Clear to auscultation, diminished in the bases  Abdomen: Soft, nondistended  Extremities: Trace bilateral lower extremity edema  Neurologic: Grossly normal    Cardiographics:  Ec shortness of breath, difficulty breathing when laying down etc. Call the clinic if any of these signs develop at ph: [phone]  Call if having any dizziness, lightheadedness, heart racing, palpitations, chest pain, shortness of breath, coughing, swellin

## 2017-10-24 ENCOUNTER — PRIOR ORIGINAL RECORDS (OUTPATIENT)
Dept: OTHER | Age: 67
End: 2017-10-24

## 2017-10-24 ENCOUNTER — LAB REQUISITION (OUTPATIENT)
Dept: LAB | Facility: HOSPITAL | Age: 67
End: 2017-10-24
Payer: MEDICARE

## 2017-10-24 ENCOUNTER — TELEPHONE (OUTPATIENT)
Dept: CARDIOLOGY CLINIC | Facility: HOSPITAL | Age: 67
End: 2017-10-24

## 2017-10-24 DIAGNOSIS — I50.9 HEART FAILURE (HCC): ICD-10-CM

## 2017-10-24 PROCEDURE — 80048 BASIC METABOLIC PNL TOTAL CA: CPT | Performed by: INTERNAL MEDICINE

## 2017-10-24 NOTE — TELEPHONE ENCOUNTER
Call to let patient know that we are currently planning to implant a cardio mems device November 16 at [address] [place] [address].  Patient states that she understands this and is agreeable to that date.   She understands that 4 days before the procedure she w

## 2017-10-25 ENCOUNTER — TELEPHONE (OUTPATIENT)
Dept: CARDIOLOGY CLINIC | Facility: HOSPITAL | Age: 67
End: 2017-10-25

## 2017-10-25 LAB
BUN: 34 MG/DL
CALCIUM: 8.4 MG/DL
CHLORIDE: 109 MEQ/L
CREATININE, SERUM: 1.15 MG/DL
GLUCOSE: 87 MG/DL
POTASSIUM, SERUM: 4.3 MEQ/L
SODIUM: 142 MEQ/L

## 2017-10-25 NOTE — TELEPHONE ENCOUNTER
At last clinic visit, lisinopril dose was increased and potassium dose was decreased. Patient states she is feeling great. Repeat labs reflect improved creatinine, 1.1, as well as potassium level of 4.3.   Will continue present management

## 2017-11-09 ENCOUNTER — PRIOR ORIGINAL RECORDS (OUTPATIENT)
Dept: OTHER | Age: 67
End: 2017-11-09

## 2017-11-16 ENCOUNTER — DIAGNOSTIC TRANS (OUTPATIENT)
Dept: OTHER | Age: 67
End: 2017-11-16

## 2017-11-16 ENCOUNTER — HOSPITAL (OUTPATIENT)
Dept: OTHER | Age: 67
End: 2017-11-16
Attending: INTERNAL MEDICINE

## 2017-11-16 ENCOUNTER — PRIOR ORIGINAL RECORDS (OUTPATIENT)
Dept: OTHER | Age: 67
End: 2017-11-16

## 2017-11-16 LAB
ANALYZER ANC (IANC): ABNORMAL
ANION GAP SERPL CALC-SCNC: 13 MMOL/L (ref 10–20)
BASOPHILS # BLD: 0 THOUSAND/MCL (ref 0–0.3)
BASOPHILS NFR BLD: 0 %
BUN SERPL-MCNC: 60 MG/DL (ref 6–20)
BUN/CREAT SERPL: 45 (ref 7–25)
CALCIUM SERPL-MCNC: 8.9 MG/DL (ref 8.4–10.2)
CHLORIDE: 107 MMOL/L (ref 98–107)
CO2 SERPL-SCNC: 28 MMOL/L (ref 21–32)
CREAT SERPL-MCNC: 1.33 MG/DL (ref 0.51–0.95)
DIFFERENTIAL METHOD BLD: ABNORMAL
EOSINOPHIL # BLD: 0.2 THOUSAND/MCL (ref 0.1–0.5)
EOSINOPHIL NFR BLD: 3 %
ERYTHROCYTE [DISTWIDTH] IN BLOOD: 20.7 % (ref 11–15)
GLUCOSE SERPL-MCNC: 106 MG/DL (ref 65–99)
HEMATOCRIT: 39.3 % (ref 36–46.5)
HGB BLD-MCNC: 12.4 GM/DL (ref 12–15.5)
INR PPP: 1.3
LYMPHOCYTES # BLD: 1.2 THOUSAND/MCL (ref 1–4)
LYMPHOCYTES NFR BLD: 20 %
MCH RBC QN AUTO: 26.9 PG (ref 26–34)
MCHC RBC AUTO-ENTMCNC: 31.6 GM/DL (ref 32–36.5)
MCV RBC AUTO: 85.2 FL (ref 78–100)
MONOCYTES # BLD: 0.4 THOUSAND/MCL (ref 0.3–0.9)
MONOCYTES NFR BLD: 7 %
NEUTROPHILS # BLD: 4 THOUSAND/MCL (ref 1.8–7.7)
NEUTROPHILS NFR BLD: 70 %
NEUTS SEG NFR BLD: ABNORMAL %
PERCENT NRBC: ABNORMAL
PLATELET # BLD: 147 THOUSAND/MCL (ref 140–450)
POTASSIUM SERPL-SCNC: 4.3 MMOL/L (ref 3.4–5.1)
PROTHROMBIN TIME: 14.4 SECONDS (ref 9.7–11.8)
PROTHROMBIN TIME: ABNORMAL
RBC # BLD: 4.61 MILLION/MCL (ref 4–5.2)
SODIUM SERPL-SCNC: 144 MMOL/L (ref 135–145)
WBC # BLD: 5.7 THOUSAND/MCL (ref 4.2–11)

## 2017-11-17 LAB
BUN: 60 MG/DL
CALCIUM: 8.9 MG/DL
CHLORIDE: 107 MEQ/L
CREATININE, SERUM: 1.33 MG/DL
GLUCOSE: 106 MG/DL
HEMATOCRIT: 39.3 %
HEMOGLOBIN: 12.4 G/DL
PLATELETS: 147 K/UL
POTASSIUM, SERUM: 4.3 MEQ/L
RED BLOOD COUNT: 4.61 X 10-6/U
SODIUM: 144 MEQ/L
WHITE BLOOD COUNT: 5.7 X 10-3/U

## 2017-11-27 ENCOUNTER — OFFICE VISIT (OUTPATIENT)
Dept: CARDIOLOGY CLINIC | Facility: HOSPITAL | Age: 67
End: 2017-11-27
Attending: INTERNAL MEDICINE
Payer: MEDICARE

## 2017-11-27 ENCOUNTER — PRIOR ORIGINAL RECORDS (OUTPATIENT)
Dept: OTHER | Age: 67
End: 2017-11-27

## 2017-11-27 ENCOUNTER — OFFICE VISIT (OUTPATIENT)
Dept: SLEEP CENTER | Age: 67
End: 2017-11-27
Attending: INTERNAL MEDICINE
Payer: MEDICARE

## 2017-11-27 VITALS
SYSTOLIC BLOOD PRESSURE: 110 MMHG | BODY MASS INDEX: 53 KG/M2 | DIASTOLIC BLOOD PRESSURE: 49 MMHG | HEART RATE: 67 BPM | WEIGHT: 279.69 LBS | OXYGEN SATURATION: 94 %

## 2017-11-27 DIAGNOSIS — I50.9 HEART FAILURE, UNSPECIFIED (HCC): Primary | ICD-10-CM

## 2017-11-27 DIAGNOSIS — Z76.89 SLEEP CONCERN: Primary | ICD-10-CM

## 2017-11-27 DIAGNOSIS — I50.23 ACUTE ON CHRONIC SYSTOLIC HEART FAILURE (HCC): ICD-10-CM

## 2017-11-27 PROCEDURE — 99214 OFFICE O/P EST MOD 30 MIN: CPT | Performed by: CLINICAL NURSE SPECIALIST

## 2017-11-27 PROCEDURE — 36415 COLL VENOUS BLD VENIPUNCTURE: CPT | Performed by: CLINICAL NURSE SPECIALIST

## 2017-11-27 PROCEDURE — 95811 POLYSOM 6/>YRS CPAP 4/> PARM: CPT

## 2017-11-27 PROCEDURE — 99211 OFF/OP EST MAY X REQ PHY/QHP: CPT | Performed by: CLINICAL NURSE SPECIALIST

## 2017-11-27 PROCEDURE — 80048 BASIC METABOLIC PNL TOTAL CA: CPT | Performed by: CLINICAL NURSE SPECIALIST

## 2017-11-27 RX ORDER — POTASSIUM CHLORIDE 20 MEQ/1
20 TABLET, EXTENDED RELEASE ORAL DAILY
COMMUNITY
End: 2018-01-22

## 2017-11-27 NOTE — PROGRESS NOTES
[address] [place] [address] Patient Status:  Outpatient    1950 MRN [id number]   Location MD Neva Gonzalez MD Alelizzeth Rice is a 78year old female who presents to  2) 282    General appearance: Alert and cooperative  Neck: No JVD noted   Lungs: Clear to auscultation, diminished in the bases  Abdomen: Soft, nondistended  Extremities: Trace bilateral lower extremity edema  Neurologic: Grossly normal    Cardiographics develop at ph: [phone]  Call if having any dizziness, lightheadedness, heart racing, palpitations, chest pain, shortness of breath, coughing, swelling, weight gain or worsening symptoms.    32–64 ounces of fluid daily  Less than 2000 mg salt/sodium ron

## 2017-11-27 NOTE — PATIENT INSTRUCTIONS
Continue lisinopril  15 mg twice daily, call with any dizziness or lightheadedness  Monitor yourself for signs and symptoms of fluid overload, increased shortness of breath, difficulty breathing when laying down etc. Call the clinic if any of these signs d

## 2017-11-29 NOTE — PROCEDURES
[address] [facility]  Accredited by the [employer] (AASM)    PATIENT'S NAME: Woody Barnhart   ATTENDING PHYSICIAN: Grayson West MD   REFERRING PHYSICIAN: Grayson West MD   PATIENT ACCOUNT #: [de-identified] LOCATION: [facility] the spontaneous arousal index is 20.5 events per hour for a combined arousal index of 37.9 events per hour. There were no significant periodic limb movements. The lowest desaturation was to 84%.   The average heart rate was 60 beats per minute, and there

## 2017-12-01 DIAGNOSIS — G47.33 OSA (OBSTRUCTIVE SLEEP APNEA): Primary | ICD-10-CM

## 2017-12-01 LAB
BUN: 60 MG/DL
CALCIUM: 8.5 MG/DL
CHLORIDE: 105 MEQ/L
CREATININE, SERUM: 1.24 MG/DL
GLUCOSE: 106 MG/DL
POTASSIUM, SERUM: 4.3 MEQ/L
SODIUM: 139 MEQ/L

## 2017-12-04 ENCOUNTER — PRIOR ORIGINAL RECORDS (OUTPATIENT)
Dept: OTHER | Age: 67
End: 2017-12-04

## 2017-12-04 ENCOUNTER — LAB REQUISITION (OUTPATIENT)
Dept: LAB | Facility: HOSPITAL | Age: 67
End: 2017-12-04
Payer: MEDICARE

## 2017-12-04 DIAGNOSIS — I11.0 HYPERTENSIVE HEART DISEASE WITH HEART FAILURE (HCC): ICD-10-CM

## 2017-12-04 PROCEDURE — 80048 BASIC METABOLIC PNL TOTAL CA: CPT | Performed by: INTERNAL MEDICINE

## 2017-12-05 ENCOUNTER — PRIOR ORIGINAL RECORDS (OUTPATIENT)
Dept: OTHER | Age: 67
End: 2017-12-05

## 2017-12-05 LAB
BUN: 46 MG/DL
CALCIUM: 8.7 MG/DL
CHLORIDE: 104 MEQ/L
CREATININE, SERUM: 1.35 MG/DL
GLUCOSE: 98 MG/DL
POTASSIUM, SERUM: 4.2 MEQ/L
SODIUM: 140 MEQ/L

## 2017-12-06 ENCOUNTER — PRIOR ORIGINAL RECORDS (OUTPATIENT)
Dept: OTHER | Age: 67
End: 2017-12-06

## 2017-12-06 ENCOUNTER — TELEPHONE (OUTPATIENT)
Dept: PULMONOLOGY | Facility: CLINIC | Age: 67
End: 2017-12-06

## 2017-12-06 NOTE — TELEPHONE ENCOUNTER
Pt is requesting to get an order to get set up with a new cpap machine. Please call to discuss further.

## 2017-12-06 NOTE — TELEPHONE ENCOUNTER
See results note 12-1-17 for sleep study. Orders were sent to [facility]. Pt sates she has already been in contact with [facility] and they will be setting her up with the machine. Pt would like to get rid of her old machine and looking to donate.   Pt notified the of

## 2017-12-20 ENCOUNTER — TELEPHONE (OUTPATIENT)
Dept: PULMONOLOGY | Facility: CLINIC | Age: 67
End: 2017-12-20

## 2017-12-21 ENCOUNTER — OFFICE VISIT (OUTPATIENT)
Dept: CARDIOLOGY CLINIC | Facility: HOSPITAL | Age: 67
End: 2017-12-21
Attending: INTERNAL MEDICINE
Payer: MEDICARE

## 2017-12-21 VITALS
BODY MASS INDEX: 53 KG/M2 | OXYGEN SATURATION: 97 % | SYSTOLIC BLOOD PRESSURE: 125 MMHG | DIASTOLIC BLOOD PRESSURE: 76 MMHG | HEART RATE: 68 BPM | WEIGHT: 281.31 LBS

## 2017-12-21 DIAGNOSIS — I50.9 HEART FAILURE, UNSPECIFIED (HCC): Primary | ICD-10-CM

## 2017-12-21 DIAGNOSIS — I50.23 ACUTE ON CHRONIC SYSTOLIC HEART FAILURE (HCC): ICD-10-CM

## 2017-12-21 PROCEDURE — 80048 BASIC METABOLIC PNL TOTAL CA: CPT | Performed by: CLINICAL NURSE SPECIALIST

## 2017-12-21 PROCEDURE — 99211 OFF/OP EST MAY X REQ PHY/QHP: CPT | Performed by: CLINICAL NURSE SPECIALIST

## 2017-12-21 PROCEDURE — 99214 OFFICE O/P EST MOD 30 MIN: CPT | Performed by: CLINICAL NURSE SPECIALIST

## 2017-12-21 PROCEDURE — 36415 COLL VENOUS BLD VENIPUNCTURE: CPT | Performed by: CLINICAL NURSE SPECIALIST

## 2017-12-21 NOTE — PROGRESS NOTES
[address] [place] [address] Patient Status:  Outpatient    1950 MRN [id number]   Location Shepard Greaves, MD Jerry Soulier, MD Sharen Bustle is a 78year old female who presents to  2) 282    General appearance: Alert and cooperative  Neck: No JVD noted   Lungs: Diminished in the bases  Abdomen: Soft, nondistended  Extremities: Trace bilateral lower extremity edema  Neurologic: Grossly normal    Cardiographics:  Echocardiogram symptoms of fluid overload, increased shortness of breath, difficulty breathing when laying down etc. Call the clinic if any of these signs develop at ph: [phone]  Call if having any dizziness, lightheadedness, heart racing, palpitations, chest pain,

## 2017-12-21 NOTE — PATIENT INSTRUCTIONS
Today and tomorrow in addition to current dose please take an additional furosemide 40 mg, then continue furosemide 40 mg twice daily  Monitor yourself for signs and symptoms of fluid overload, increased shortness of breath, difficulty breathing when layin

## 2017-12-27 NOTE — TELEPHONE ENCOUNTER
CMN signed by Dr. Em Garcia. CMN faxed to [facility] [phone] and left for Beni Woodward JAYCEE to . Fax confirmation received.

## 2018-01-03 ENCOUNTER — TELEPHONE (OUTPATIENT)
Dept: PULMONOLOGY | Facility: CLINIC | Age: 68
End: 2018-01-03

## 2018-01-03 NOTE — TELEPHONE ENCOUNTER
Explained she will need 30-90 day f/u s/p starting CPAP. Pt stts HME delivered on 12/8. Sched pt on 2/14 @ 11:30 am at [facility]. She was given appt time, location, & parking. Informed pt that we are able to access download on [employer].  Pt verbalized unde

## 2018-01-09 ENCOUNTER — OFFICE VISIT (OUTPATIENT)
Dept: CARDIOLOGY CLINIC | Facility: HOSPITAL | Age: 68
End: 2018-01-09
Attending: INTERNAL MEDICINE
Payer: MEDICARE

## 2018-01-09 ENCOUNTER — PRIOR ORIGINAL RECORDS (OUTPATIENT)
Dept: OTHER | Age: 68
End: 2018-01-09

## 2018-01-09 VITALS
OXYGEN SATURATION: 97 % | SYSTOLIC BLOOD PRESSURE: 136 MMHG | BODY MASS INDEX: 53 KG/M2 | HEART RATE: 68 BPM | WEIGHT: 280 LBS | DIASTOLIC BLOOD PRESSURE: 77 MMHG

## 2018-01-09 DIAGNOSIS — I50.23 ACUTE ON CHRONIC SYSTOLIC HEART FAILURE (HCC): ICD-10-CM

## 2018-01-09 DIAGNOSIS — I50.9 HEART FAILURE, UNSPECIFIED (HCC): Primary | ICD-10-CM

## 2018-01-09 LAB
ANION GAP SERPL CALC-SCNC: 8 MMOL/L (ref 0–18)
BUN SERPL-MCNC: 48 MG/DL (ref 8–20)
BUN/CREAT SERPL: 42.1 (ref 10–20)
CALCIUM SERPL-MCNC: 8.6 MG/DL (ref 8.5–10.5)
CHLORIDE SERPL-SCNC: 102 MMOL/L (ref 95–110)
CO2 SERPL-SCNC: 27 MMOL/L (ref 22–32)
CREAT SERPL-MCNC: 1.14 MG/DL (ref 0.5–1.5)
GLUCOSE SERPL-MCNC: 115 MG/DL (ref 70–99)
MAGNESIUM SERPL-MCNC: 2.2 MG/DL (ref 1.8–2.5)
OSMOLALITY UR CALC.SUM OF ELEC: 298 MOSM/KG (ref 275–295)
POTASSIUM SERPL-SCNC: 4.1 MMOL/L (ref 3.3–5.1)
SODIUM SERPL-SCNC: 137 MMOL/L (ref 136–144)

## 2018-01-09 PROCEDURE — 83735 ASSAY OF MAGNESIUM: CPT | Performed by: CLINICAL NURSE SPECIALIST

## 2018-01-09 PROCEDURE — 99211 OFF/OP EST MAY X REQ PHY/QHP: CPT | Performed by: CLINICAL NURSE SPECIALIST

## 2018-01-09 PROCEDURE — 80048 BASIC METABOLIC PNL TOTAL CA: CPT | Performed by: CLINICAL NURSE SPECIALIST

## 2018-01-09 PROCEDURE — 99214 OFFICE O/P EST MOD 30 MIN: CPT | Performed by: CLINICAL NURSE SPECIALIST

## 2018-01-09 PROCEDURE — 36415 COLL VENOUS BLD VENIPUNCTURE: CPT | Performed by: CLINICAL NURSE SPECIALIST

## 2018-01-09 NOTE — PATIENT INSTRUCTIONS
Continue current medications  Monitor yourself for signs and symptoms of fluid overload, increased shortness of breath, difficulty breathing when laying down etc. Call the clinic if any of these signs develop at ph: [phone]  Call if having any dizzine

## 2018-01-09 NOTE — PROGRESS NOTES
[address] [place] [address] Patient Status:  Outpatient    1950 MRN [id number]   Location MD King Negrete MD Christain Prairie is a 78year old female who presents to  2) 282    General appearance: Alert and cooperative  Neck: No JVD noted   Lungs: Diminished in the bases  Abdomen: Soft, nondistended  Extremities: Trace bilateral lower extremity edema  Neurologic: Grossly normal    Cardiographics:  Echocardiogram: shortness of breath, coughing, swelling, weight gain or worsening symptoms. 32–64 ounces of fluid daily  Less than 2000 mg salt/sodium daily.  Common high sodium foods include frozen dinners, soups (not homemade), some cereal, vegetable juice, canned vege

## 2018-02-14 ENCOUNTER — OFFICE VISIT (OUTPATIENT)
Dept: PULMONOLOGY | Facility: CLINIC | Age: 68
End: 2018-02-14

## 2018-02-14 ENCOUNTER — PRIOR ORIGINAL RECORDS (OUTPATIENT)
Dept: OTHER | Age: 68
End: 2018-02-14

## 2018-02-14 VITALS
SYSTOLIC BLOOD PRESSURE: 109 MMHG | HEART RATE: 71 BPM | WEIGHT: 279 LBS | OXYGEN SATURATION: 96 % | DIASTOLIC BLOOD PRESSURE: 61 MMHG | HEIGHT: 63 IN | BODY MASS INDEX: 49.43 KG/M2 | RESPIRATION RATE: 19 BRPM

## 2018-02-14 DIAGNOSIS — G47.33 OSA (OBSTRUCTIVE SLEEP APNEA): Primary | ICD-10-CM

## 2018-02-14 PROCEDURE — G0463 HOSPITAL OUTPT CLINIC VISIT: HCPCS | Performed by: INTERNAL MEDICINE

## 2018-02-14 PROCEDURE — 99213 OFFICE O/P EST LOW 20 MIN: CPT | Performed by: INTERNAL MEDICINE

## 2018-02-14 NOTE — PROGRESS NOTES
Pulmonary Follow Up Note    HPI:   Cynthia Keen is a 80year old female with Patient presents with: Follow - Up: Cpap compliance. Pt stts cpap was missing a part/piece. Part/piece was recieved about a week agoand placed on cpap machine.     KATHERINE MOYA Sacubitril-Valsartan (ENTRESTO) 24-26 MG Oral Tab Take 1 tablet by mouth 2 (two) times daily.  Disp:  Rfl:    ipratropium-albuterol 0.5-2.5 (3) MG/3ML Inhalation Solution Take 3 mL by nebulization every 6 (six) hours as needed (for shortness of breath or of children:               Social History Main Topics    Smoking status: Former Smoker                                                                Packs/day: 0.10      Years: 0.00           Types: Cigarettes    Smokeless tobacco: Never Used

## 2018-02-19 LAB
BUN: 48 MG/DL
CALCIUM: 8.6 MG/DL
CHLORIDE: 102 MEQ/L
CREATININE, SERUM: 1.14 MG/DL
GLUCOSE: 115 MG/DL
MAGNESIUM: 2.2 MG/DL
POTASSIUM, SERUM: 4.1 MEQ/L
SODIUM: 137 MEQ/L

## 2018-03-14 ENCOUNTER — PRIOR ORIGINAL RECORDS (OUTPATIENT)
Dept: OTHER | Age: 68
End: 2018-03-14

## 2018-03-14 ENCOUNTER — OFFICE VISIT (OUTPATIENT)
Dept: CARDIOLOGY CLINIC | Facility: HOSPITAL | Age: 68
End: 2018-03-14
Attending: INTERNAL MEDICINE
Payer: MEDICARE

## 2018-03-14 VITALS
HEART RATE: 64 BPM | DIASTOLIC BLOOD PRESSURE: 71 MMHG | SYSTOLIC BLOOD PRESSURE: 128 MMHG | WEIGHT: 282.63 LBS | OXYGEN SATURATION: 94 % | BODY MASS INDEX: 50 KG/M2

## 2018-03-14 DIAGNOSIS — I50.23 ACUTE ON CHRONIC SYSTOLIC HEART FAILURE (HCC): ICD-10-CM

## 2018-03-14 DIAGNOSIS — I50.9 HEART FAILURE, UNSPECIFIED (HCC): Primary | ICD-10-CM

## 2018-03-14 LAB
ANION GAP SERPL CALC-SCNC: 10 MMOL/L (ref 0–18)
BUN SERPL-MCNC: 32 MG/DL (ref 8–20)
BUN/CREAT SERPL: 27.6 (ref 10–20)
CALCIUM SERPL-MCNC: 8.8 MG/DL (ref 8.5–10.5)
CHLORIDE SERPL-SCNC: 104 MMOL/L (ref 95–110)
CO2 SERPL-SCNC: 25 MMOL/L (ref 22–32)
CREAT SERPL-MCNC: 1.16 MG/DL (ref 0.5–1.5)
GLUCOSE SERPL-MCNC: 124 MG/DL (ref 70–99)
OSMOLALITY UR CALC.SUM OF ELEC: 296 MOSM/KG (ref 275–295)
POTASSIUM SERPL-SCNC: 4.2 MMOL/L (ref 3.3–5.1)
SODIUM SERPL-SCNC: 139 MMOL/L (ref 136–144)

## 2018-03-14 PROCEDURE — 99214 OFFICE O/P EST MOD 30 MIN: CPT | Performed by: CLINICAL NURSE SPECIALIST

## 2018-03-14 PROCEDURE — 80048 BASIC METABOLIC PNL TOTAL CA: CPT | Performed by: CLINICAL NURSE SPECIALIST

## 2018-03-14 PROCEDURE — 99211 OFF/OP EST MAY X REQ PHY/QHP: CPT | Performed by: CLINICAL NURSE SPECIALIST

## 2018-03-14 PROCEDURE — 36415 COLL VENOUS BLD VENIPUNCTURE: CPT | Performed by: CLINICAL NURSE SPECIALIST

## 2018-03-14 NOTE — PATIENT INSTRUCTIONS
Increase Entresto to 49/51 twice daily, call with any dizziness or lightheadedness  Monitor yourself for signs and symptoms of fluid overload, increased shortness of breath, difficulty breathing when laying down etc. Call the clinic if any of these signs d

## 2018-03-14 NOTE — PROGRESS NOTES
[address] [place] [address] Patient Status:  Outpatient    1950 MRN [id number]   Location MD Cabrera Price MD Dedra Peels is a 80year old female who presents to  Wt:                   2) 282    General appearance: Alert and cooperative  Neck: No JVD noted   Lungs: Diminished in the bases  Abdomen: Soft, nondistended  Extremities: Trace bilateral lower extremity edema  Neurologic: Grossly normal    Cardiographics: good.  Will increase dose. Patient will call with any dizziness or lightheadedness. Patient will return in 2 weeks for blood pressure check and assessment.   Patient understands that she may not need as many \"as needed\" doses of Lasix once she in crease

## 2018-03-21 ENCOUNTER — OFFICE VISIT (OUTPATIENT)
Dept: PULMONOLOGY | Facility: CLINIC | Age: 68
End: 2018-03-21

## 2018-03-21 VITALS
BODY MASS INDEX: 50.74 KG/M2 | SYSTOLIC BLOOD PRESSURE: 121 MMHG | HEART RATE: 71 BPM | RESPIRATION RATE: 19 BRPM | DIASTOLIC BLOOD PRESSURE: 65 MMHG | WEIGHT: 286.38 LBS | HEIGHT: 63 IN | OXYGEN SATURATION: 98 %

## 2018-03-21 DIAGNOSIS — J45.909 UNCOMPLICATED ASTHMA, UNSPECIFIED ASTHMA SEVERITY, UNSPECIFIED WHETHER PERSISTENT: ICD-10-CM

## 2018-03-21 DIAGNOSIS — I50.20 SYSTOLIC CONGESTIVE HEART FAILURE, UNSPECIFIED HF CHRONICITY (HCC): ICD-10-CM

## 2018-03-21 DIAGNOSIS — G47.33 OSA (OBSTRUCTIVE SLEEP APNEA): Primary | ICD-10-CM

## 2018-03-21 PROCEDURE — 94010 BREATHING CAPACITY TEST: CPT | Performed by: INTERNAL MEDICINE

## 2018-03-21 PROCEDURE — 99214 OFFICE O/P EST MOD 30 MIN: CPT | Performed by: INTERNAL MEDICINE

## 2018-03-21 PROCEDURE — G0463 HOSPITAL OUTPT CLINIC VISIT: HCPCS | Performed by: INTERNAL MEDICINE

## 2018-03-21 NOTE — PROGRESS NOTES
Pulmonary/Critical Care Follow Up Note    HPI:   Lakia Foster is a 80year old female with Patient presents with: Follow - Up: Cpap compliance      PCP Luis Whitt MD  Admission Attending No admitting provider for patient encounter.     [facility]ospita

## 2018-03-21 NOTE — PROGRESS NOTES
Pulmonary Follow Up Note    HPI:   Kasia Guerra is a 80year old female with Patient presents with:   Follow - Up: Cpap compliance    Genie Hooks MD    Recent CHF clinic f/u  No sob  No change in AM  No wheeze    Using CPAP nightly  Less naps du ipratropium-albuterol 0.5-2.5 (3) MG/3ML Inhalation Solution Take 3 mL by nebulization every 6 (six) hours as needed (for shortness of breath or cough or wheeze).  Disp: 100 mL Rfl: 0   Sotalol HCl 80 MG Oral Tab Take 1 tablet (80 mg total) by mouth every Packs/day: 0.10      Years: 0.00           Types: Cigarettes    Smokeless tobacco: Never Used                        Comment: social in high school    Alcohol use:  Yes                Comment: socially    Drug use: N long non smoker  MDI- metered dose inhaler  RAD- Reactive airway disease  MARGARITA- Short acting antimuscarinic agent (ipratropium)  TBM- Tracheobronchomalacia  VCD- Vocal cord dysfunction

## 2018-03-30 ENCOUNTER — PRIOR ORIGINAL RECORDS (OUTPATIENT)
Dept: OTHER | Age: 68
End: 2018-03-30

## 2018-03-30 ENCOUNTER — OFFICE VISIT (OUTPATIENT)
Dept: CARDIOLOGY CLINIC | Facility: HOSPITAL | Age: 68
End: 2018-03-30
Attending: INTERNAL MEDICINE
Payer: MEDICARE

## 2018-03-30 VITALS
BODY MASS INDEX: 50 KG/M2 | HEART RATE: 73 BPM | WEIGHT: 281.38 LBS | DIASTOLIC BLOOD PRESSURE: 62 MMHG | SYSTOLIC BLOOD PRESSURE: 104 MMHG | OXYGEN SATURATION: 96 %

## 2018-03-30 DIAGNOSIS — I50.23 ACUTE ON CHRONIC SYSTOLIC HEART FAILURE (HCC): ICD-10-CM

## 2018-03-30 DIAGNOSIS — I50.9 HEART FAILURE, UNSPECIFIED (HCC): Primary | ICD-10-CM

## 2018-03-30 LAB
ANION GAP SERPL CALC-SCNC: 11 MMOL/L (ref 0–18)
BUN SERPL-MCNC: 36 MG/DL (ref 8–20)
BUN/CREAT SERPL: 31.3 (ref 10–20)
CALCIUM SERPL-MCNC: 8.7 MG/DL (ref 8.5–10.5)
CHLORIDE SERPL-SCNC: 101 MMOL/L (ref 95–110)
CO2 SERPL-SCNC: 25 MMOL/L (ref 22–32)
CREAT SERPL-MCNC: 1.15 MG/DL (ref 0.5–1.5)
GLUCOSE SERPL-MCNC: 116 MG/DL (ref 70–99)
OSMOLALITY UR CALC.SUM OF ELEC: 293 MOSM/KG (ref 275–295)
POTASSIUM SERPL-SCNC: 4.2 MMOL/L (ref 3.3–5.1)
SODIUM SERPL-SCNC: 137 MMOL/L (ref 136–144)

## 2018-03-30 PROCEDURE — 99211 OFF/OP EST MAY X REQ PHY/QHP: CPT | Performed by: CLINICAL NURSE SPECIALIST

## 2018-03-30 PROCEDURE — 80048 BASIC METABOLIC PNL TOTAL CA: CPT | Performed by: CLINICAL NURSE SPECIALIST

## 2018-03-30 PROCEDURE — 36415 COLL VENOUS BLD VENIPUNCTURE: CPT | Performed by: CLINICAL NURSE SPECIALIST

## 2018-03-30 PROCEDURE — 99214 OFFICE O/P EST MOD 30 MIN: CPT | Performed by: CLINICAL NURSE SPECIALIST

## 2018-03-30 RX ORDER — FUROSEMIDE 40 MG/1
40 TABLET ORAL SEE ADMIN INSTRUCTIONS
Qty: 220 TABLET | Refills: 0 | Status: SHIPPED | OUTPATIENT
Start: 2018-03-30 | End: 2018-07-19 | Stop reason: DRUGHIGH

## 2018-03-30 NOTE — PROGRESS NOTES
[address] [place] [address] Patient Status:  Outpatient    1950 MRN [id number]   Location MD Kristan Soto MD Artemio Darnel is a 80year old female who presents to  [address]:                   2) 634    General appearance: Alert and cooperative  Neck: No JVD noted   Lungs: Diminished in the bases  Abdomen: Soft, nondistended  Extremities: Trace bilateral lower extremity edema  Neurologic: Grossly normal    Cardiog days where her weight is up, as previously stated this has resulted in her taking 80 mg in the a.m. and 40 mg in the p.m pretty consistently over the last couple weeks. Renal function remains stable with a creatinine of 1.1.   Tolerated increase in Ramana

## 2018-03-30 NOTE — PATIENT INSTRUCTIONS
Continue present medications  Continue to transmit cardiomems daily  Monitor yourself for signs and symptoms of fluid overload, increased shortness of breath, difficulty breathing when laying down etc. Call the clinic if any of these signs develop at ph: 3

## 2018-05-11 LAB
BUN: 32 MG/DL
BUN: 36 MG/DL
CALCIUM: 8.7 MG/DL
CALCIUM: 8.8 MG/DL
CHLORIDE: 101 MEQ/L
CHLORIDE: 104 MEQ/L
CREATININE, SERUM: 1.15 MG/DL
CREATININE, SERUM: 1.16 MG/DL
GLUCOSE: 116 MG/DL
GLUCOSE: 124 MG/DL
POTASSIUM, SERUM: 4.2 MEQ/L
POTASSIUM, SERUM: 4.2 MEQ/L
SODIUM: 137 MEQ/L
SODIUM: 139 MEQ/L

## 2018-05-14 ENCOUNTER — MYAURORA ACCOUNT LINK (OUTPATIENT)
Dept: OTHER | Age: 68
End: 2018-05-14

## 2018-05-14 ENCOUNTER — PRIOR ORIGINAL RECORDS (OUTPATIENT)
Dept: OTHER | Age: 68
End: 2018-05-14

## 2018-06-18 RX ORDER — SACUBITRIL AND VALSARTAN 49; 51 MG/1; MG/1
TABLET, FILM COATED ORAL
Qty: 60 TABLET | Refills: 0 | Status: SHIPPED | OUTPATIENT
Start: 2018-06-18 | End: 2018-07-19

## 2018-07-18 ENCOUNTER — OFFICE VISIT (OUTPATIENT)
Dept: CARDIOLOGY CLINIC | Facility: HOSPITAL | Age: 68
End: 2018-07-18
Attending: NURSE PRACTITIONER
Payer: MEDICARE

## 2018-07-18 ENCOUNTER — PRIOR ORIGINAL RECORDS (OUTPATIENT)
Dept: OTHER | Age: 68
End: 2018-07-18

## 2018-07-18 VITALS
SYSTOLIC BLOOD PRESSURE: 106 MMHG | BODY MASS INDEX: 50 KG/M2 | OXYGEN SATURATION: 96 % | HEART RATE: 63 BPM | DIASTOLIC BLOOD PRESSURE: 68 MMHG | WEIGHT: 283.38 LBS

## 2018-07-18 DIAGNOSIS — I50.22 CHRONIC SYSTOLIC CONGESTIVE HEART FAILURE (HCC): Primary | ICD-10-CM

## 2018-07-18 DIAGNOSIS — I25.5 ISCHEMIC CARDIOMYOPATHY: ICD-10-CM

## 2018-07-18 DIAGNOSIS — E78.00 PURE HYPERCHOLESTEROLEMIA: ICD-10-CM

## 2018-07-18 DIAGNOSIS — I50.9 HEART FAILURE, UNSPECIFIED (HCC): ICD-10-CM

## 2018-07-18 LAB
ALBUMIN SERPL BCP-MCNC: 3.8 G/DL (ref 3.5–4.8)
ALBUMIN/GLOB SERPL: 1.2 {RATIO} (ref 1–2)
ALP SERPL-CCNC: 131 U/L (ref 32–100)
ALT SERPL-CCNC: 15 U/L (ref 14–54)
ANION GAP SERPL CALC-SCNC: 9 MMOL/L (ref 0–18)
AST SERPL-CCNC: 22 U/L (ref 15–41)
BASOPHILS # BLD: 0 K/UL (ref 0–0.2)
BASOPHILS NFR BLD: 1 %
BILIRUB SERPL-MCNC: 0.8 MG/DL (ref 0.3–1.2)
BUN SERPL-MCNC: 26 MG/DL (ref 8–20)
BUN/CREAT SERPL: 22.6 (ref 10–20)
CALCIUM SERPL-MCNC: 8.6 MG/DL (ref 8.5–10.5)
CHLORIDE SERPL-SCNC: 105 MMOL/L (ref 95–110)
CHOLEST SERPL-MCNC: 124 MG/DL (ref 110–200)
CO2 SERPL-SCNC: 26 MMOL/L (ref 22–32)
CREAT SERPL-MCNC: 1.15 MG/DL (ref 0.5–1.5)
EOSINOPHIL # BLD: 0.2 K/UL (ref 0–0.7)
EOSINOPHIL NFR BLD: 4 %
ERYTHROCYTE [DISTWIDTH] IN BLOOD BY AUTOMATED COUNT: 16.9 % (ref 11–15)
GLOBULIN PLAS-MCNC: 3.2 G/DL (ref 2.5–3.7)
GLUCOSE SERPL-MCNC: 110 MG/DL (ref 70–99)
HCT VFR BLD AUTO: 40.8 % (ref 35–48)
HDLC SERPL-MCNC: 35 MG/DL
HGB BLD-MCNC: 13.1 G/DL (ref 12–16)
LDLC SERPL CALC-MCNC: 66 MG/DL (ref 0–99)
LYMPHOCYTES # BLD: 0.9 K/UL (ref 1–4)
LYMPHOCYTES NFR BLD: 16 %
MCH RBC QN AUTO: 28.9 PG (ref 27–32)
MCHC RBC AUTO-ENTMCNC: 32.1 G/DL (ref 32–37)
MCV RBC AUTO: 90.1 FL (ref 80–100)
MONOCYTES # BLD: 0.5 K/UL (ref 0–1)
MONOCYTES NFR BLD: 9 %
NEUTROPHILS # BLD AUTO: 3.8 K/UL (ref 1.8–7.7)
NEUTROPHILS NFR BLD: 71 %
NONHDLC SERPL-MCNC: 89 MG/DL
OSMOLALITY UR CALC.SUM OF ELEC: 295 MOSM/KG (ref 275–295)
PATIENT FASTING: YES
PLATELET # BLD AUTO: 151 K/UL (ref 140–400)
PMV BLD AUTO: 9.2 FL (ref 7.4–10.3)
POTASSIUM SERPL-SCNC: 4.1 MMOL/L (ref 3.3–5.1)
PRO-BETA NATRIURETIC PEPTIDE: 1168 PG/ML (ref ?–125)
PROT SERPL-MCNC: 7 G/DL (ref 5.9–8.4)
RBC # BLD AUTO: 4.52 M/UL (ref 3.7–5.4)
SODIUM SERPL-SCNC: 140 MMOL/L (ref 136–144)
TRIGL SERPL-MCNC: 117 MG/DL (ref 1–149)
TSH SERPL-ACNC: 1.95 UIU/ML (ref 0.45–5.33)
WBC # BLD AUTO: 5.4 K/UL (ref 4–11)

## 2018-07-18 PROCEDURE — 80053 COMPREHEN METABOLIC PANEL: CPT | Performed by: NURSE PRACTITIONER

## 2018-07-18 PROCEDURE — 99213 OFFICE O/P EST LOW 20 MIN: CPT | Performed by: NURSE PRACTITIONER

## 2018-07-18 PROCEDURE — 36415 COLL VENOUS BLD VENIPUNCTURE: CPT | Performed by: NURSE PRACTITIONER

## 2018-07-18 PROCEDURE — 99211 OFF/OP EST MAY X REQ PHY/QHP: CPT | Performed by: NURSE PRACTITIONER

## 2018-07-18 PROCEDURE — 84443 ASSAY THYROID STIM HORMONE: CPT | Performed by: NURSE PRACTITIONER

## 2018-07-18 PROCEDURE — 80061 LIPID PANEL: CPT | Performed by: NURSE PRACTITIONER

## 2018-07-18 PROCEDURE — 83880 ASSAY OF NATRIURETIC PEPTIDE: CPT | Performed by: NURSE PRACTITIONER

## 2018-07-18 PROCEDURE — 85025 COMPLETE CBC W/AUTO DIFF WBC: CPT | Performed by: NURSE PRACTITIONER

## 2018-07-18 RX ORDER — FUROSEMIDE 40 MG/1
40 TABLET ORAL 2 TIMES DAILY
COMMUNITY
End: 2018-07-19

## 2018-07-18 NOTE — PATIENT INSTRUCTIONS
Continue present medications  Monitor yourself for signs and symptoms of fluid overload, increased shortness of breath, difficulty breathing when laying down etc. Call the clinic if any of these signs develop at ph: [phone]  Call if having any dizzine

## 2018-07-18 NOTE — PROGRESS NOTES
[facility] [address] Patient Status:  Outpatient    1950 MRN [id number]   Location Mortimer Farber, MD Rueben Pry, MD Hattie Emily is a 80year old female who presents to  AM   AST 23 08/31/2017 06:12 AM   ALT 21 08/31/2017 06:12 AM   INR 1.5 (H) 09/06/2017 05:22 AM   PTP 17.2 (H) 09/06/2017 05:22 AM   MG 2.2 01/09/2018 09:02 AM   TROP 0.03 08/30/2017 03:02 PM       Clinical labs drawn by MA: BMP, creatinine stable at 1.1 Reviewed disease process, sodium restriction, fluid restriction cardio mems and medications, 30 minutes spent in education, receptive. Will continue to monitor cardio mems and adjust as needed. Renal function remains stable with a creatinine of 1.1.

## 2018-07-19 ENCOUNTER — TELEPHONE (OUTPATIENT)
Dept: CARDIOLOGY CLINIC | Facility: HOSPITAL | Age: 68
End: 2018-07-19

## 2018-07-19 DIAGNOSIS — I50.22 CHRONIC SYSTOLIC CONGESTIVE HEART FAILURE (HCC): ICD-10-CM

## 2018-07-19 LAB
ALBUMIN: 3.8 G/DL
ALKALINE PHOSPHATATE(ALK PHOS): 131 IU/L
ALT (SGPT): 15 U/L
AST (SGOT): 22 U/L
BILIRUBIN TOTAL: 0.8 MG/DL
BUN: 26 MG/DL
CALCIUM: 8.6 MG/DL
CHLORIDE: 105 MEQ/L
CHOLESTEROL, TOTAL: 124 MG/DL
CREATININE, SERUM: 1.15 MG/DL
GLOBULIN: 3.2 G/DL
GLUCOSE: 110 MG/DL
GLUCOSE: 110 MG/DL
HDL CHOLESTEROL: 35 MG/DL
HEMATOCRIT: 40.8 %
HEMOGLOBIN: 13.1 G/DL
LDL CHOLESTEROL: 66 MG/DL
NON-HDL CHOLESTEROL: 89 MG/DL
PLATELETS: 151 K/UL
POTASSIUM, SERUM: 4.1 MEQ/L
PROBNP: 1168 PG/ML
PROTEIN, TOTAL: 7 G/DL
RED BLOOD COUNT: 4.52 X 10-6/U
SGOT (AST): 22 IU/L
SGPT (ALT): 15 IU/L
SODIUM: 140 MEQ/L
THYROID STIMULATING HORMONE: 1.95 MLU/L
TRIGLYCERIDES: 117 MG/DL
WHITE BLOOD COUNT: 5.4 X 10-3/U

## 2018-07-19 RX ORDER — FUROSEMIDE 40 MG/1
40 TABLET ORAL SEE ADMIN INSTRUCTIONS
Qty: 90 TABLET | Refills: 0 | Status: SHIPPED | OUTPATIENT
Start: 2018-07-19 | End: 2018-08-20

## 2018-07-19 RX ORDER — SACUBITRIL AND VALSARTAN 49; 51 MG/1; MG/1
TABLET, FILM COATED ORAL
Qty: 60 TABLET | Refills: 0 | Status: SHIPPED | OUTPATIENT
Start: 2018-07-19 | End: 2018-08-20

## 2018-07-19 NOTE — TELEPHONE ENCOUNTER
Patient called wanting refills of medications. Merlin accessed and found PAD up to 24.  Patient still waiting for new pillow unclear if number are true or not, but as PAD has increased over last 3 days, will increase furosemide to to 80 mg bid x 3 days, th

## 2018-08-03 ENCOUNTER — MYAURORA ACCOUNT LINK (OUTPATIENT)
Dept: OTHER | Age: 68
End: 2018-08-03

## 2018-08-08 ENCOUNTER — PRIOR ORIGINAL RECORDS (OUTPATIENT)
Dept: OTHER | Age: 68
End: 2018-08-08

## 2018-08-09 ENCOUNTER — TELEPHONE (OUTPATIENT)
Dept: CARDIOLOGY CLINIC | Facility: HOSPITAL | Age: 68
End: 2018-08-09

## 2018-08-13 ENCOUNTER — TELEPHONE (OUTPATIENT)
Dept: CARDIOLOGY CLINIC | Facility: HOSPITAL | Age: 68
End: 2018-08-13

## 2018-08-13 NOTE — TELEPHONE ENCOUNTER
Shaun Sevilla,   Patient was waiting on an order for her cardio mems pillow but has not received it. She was wondering if there is anything you can do to speed up the process, since she will be moving soon. Please advise.

## 2018-08-16 ENCOUNTER — TELEPHONE (OUTPATIENT)
Dept: CARDIOLOGY CLINIC | Facility: HOSPITAL | Age: 68
End: 2018-08-16

## 2018-08-16 NOTE — TELEPHONE ENCOUNTER
Called patient regarding elevated PAD pressures on Cardiomems of 25 mmHg. Patient admits to eating Chinese food over the past 2 days. Received new pillow yesterday.   Instructed to increase her furosemide to 80 mg bid x 3 days, then back to 80mg in am and

## 2018-08-20 DIAGNOSIS — I50.22 CHRONIC SYSTOLIC CONGESTIVE HEART FAILURE (HCC): ICD-10-CM

## 2018-08-20 NOTE — TELEPHONE ENCOUNTER
Last seen Lilibeth Later 07/18/18  No future      08/22/18 with ellen    Requesting 2 separate refills    1) furosemide  40 mg tabs #90      Take 2 tabs (80mg) in the morning       Take 1 (40mg) in the evening    2 ) furosemide 40 mg  #220      1 tab BID

## 2018-08-21 RX ORDER — FUROSEMIDE 40 MG/1
40 TABLET ORAL SEE ADMIN INSTRUCTIONS
Qty: 90 TABLET | Refills: 0 | Status: SHIPPED
Start: 2018-08-21 | End: 2018-10-04

## 2018-08-22 ENCOUNTER — PRIOR ORIGINAL RECORDS (OUTPATIENT)
Dept: OTHER | Age: 68
End: 2018-08-22

## 2018-08-23 ENCOUNTER — TELEPHONE (OUTPATIENT)
Dept: CARDIOLOGY CLINIC | Facility: HOSPITAL | Age: 68
End: 2018-08-23

## 2018-08-23 NOTE — TELEPHONE ENCOUNTER
Was seen Dr. Michelle Sanchez yesterday. Entresto dose increased to 97-103mg twice daily. Cardiomems reading 16 mmHg. States has lost 5 lbs. Over the last couple days. Follow up with Dr. Michelle Sanchez in 3 month.  Follow up in heart failure clinic 9/12/18

## 2018-09-06 ENCOUNTER — PRIOR ORIGINAL RECORDS (OUTPATIENT)
Dept: OTHER | Age: 68
End: 2018-09-06

## 2018-09-06 LAB — INR: 2.6

## 2018-09-12 ENCOUNTER — OFFICE VISIT (OUTPATIENT)
Dept: CARDIOLOGY CLINIC | Facility: HOSPITAL | Age: 68
End: 2018-09-12
Attending: INTERNAL MEDICINE
Payer: MEDICARE

## 2018-09-12 VITALS
BODY MASS INDEX: 50 KG/M2 | DIASTOLIC BLOOD PRESSURE: 68 MMHG | SYSTOLIC BLOOD PRESSURE: 116 MMHG | WEIGHT: 285 LBS | HEART RATE: 66 BPM | OXYGEN SATURATION: 100 %

## 2018-09-12 DIAGNOSIS — I25.5 ISCHEMIC CARDIOMYOPATHY: ICD-10-CM

## 2018-09-12 DIAGNOSIS — I50.9 HEART FAILURE, UNSPECIFIED (HCC): ICD-10-CM

## 2018-09-12 DIAGNOSIS — I50.22 CHRONIC SYSTOLIC CONGESTIVE HEART FAILURE (HCC): Primary | ICD-10-CM

## 2018-09-12 LAB
ANION GAP SERPL CALC-SCNC: 8 MMOL/L (ref 0–18)
BUN SERPL-MCNC: 34 MG/DL (ref 8–20)
BUN/CREAT SERPL: 28.3 (ref 10–20)
CALCIUM SERPL-MCNC: 8.6 MG/DL (ref 8.5–10.5)
CHLORIDE SERPL-SCNC: 108 MMOL/L (ref 95–110)
CO2 SERPL-SCNC: 23 MMOL/L (ref 22–32)
CREAT SERPL-MCNC: 1.2 MG/DL (ref 0.5–1.5)
GLUCOSE SERPL-MCNC: 128 MG/DL (ref 70–99)
OSMOLALITY UR CALC.SUM OF ELEC: 297 MOSM/KG (ref 275–295)
POTASSIUM SERPL-SCNC: 4 MMOL/L (ref 3.3–5.1)
SODIUM SERPL-SCNC: 139 MMOL/L (ref 136–144)

## 2018-09-12 PROCEDURE — 36415 COLL VENOUS BLD VENIPUNCTURE: CPT | Performed by: NURSE PRACTITIONER

## 2018-09-12 PROCEDURE — 99214 OFFICE O/P EST MOD 30 MIN: CPT | Performed by: NURSE PRACTITIONER

## 2018-09-12 PROCEDURE — 80048 BASIC METABOLIC PNL TOTAL CA: CPT | Performed by: NURSE PRACTITIONER

## 2018-09-12 PROCEDURE — 99212 OFFICE O/P EST SF 10 MIN: CPT | Performed by: NURSE PRACTITIONER

## 2018-09-12 NOTE — PROGRESS NOTES
[address] [place] [address] Patient Status:  Outpatient    1950 MRN [id number]   Location MD Cabrera Price MD Dedra Peels is a 80year old female who presents to  05:22 AM    PTP 17.2 (H) 09/06/2017 05:22 AM    TSH 1.95 07/18/2018 08:55 AM    MG 2.2 01/09/2018 09:02 AM    TROP 0.03 08/30/2017 03:02 PM       Clinical labs drawn by MA: BMP-results reviewed with patient    /68   Pulse 66   Wt 285 lb (129.3 kg) creatinine of 1.2. Continue Entresto  bid. Will see Dr. Laura Clark in about 5 weeks. She will continue to transmit cardio mems daily. Will call clinic with any questions or concerns.     Plan:  Continue present medications  Monitor yourself for signs

## 2018-09-19 ENCOUNTER — OFFICE VISIT (OUTPATIENT)
Dept: PULMONOLOGY | Facility: CLINIC | Age: 68
End: 2018-09-19
Payer: MEDICARE

## 2018-09-19 VITALS
HEIGHT: 63 IN | BODY MASS INDEX: 50.29 KG/M2 | OXYGEN SATURATION: 99 % | HEART RATE: 65 BPM | DIASTOLIC BLOOD PRESSURE: 57 MMHG | WEIGHT: 283.81 LBS | RESPIRATION RATE: 19 BRPM | SYSTOLIC BLOOD PRESSURE: 92 MMHG

## 2018-09-19 DIAGNOSIS — J45.20 MILD INTERMITTENT ASTHMA WITHOUT COMPLICATION: ICD-10-CM

## 2018-09-19 DIAGNOSIS — I42.9 CARDIOMYOPATHY WITH IMPLANTABLE CARDIOVERTER-DEFIBRILLATOR (HCC): Chronic | ICD-10-CM

## 2018-09-19 DIAGNOSIS — Z95.810 CARDIOMYOPATHY WITH IMPLANTABLE CARDIOVERTER-DEFIBRILLATOR (HCC): Chronic | ICD-10-CM

## 2018-09-19 DIAGNOSIS — G47.33 SLEEP APNEA, OBSTRUCTIVE: Primary | Chronic | ICD-10-CM

## 2018-09-19 PROCEDURE — G0463 HOSPITAL OUTPT CLINIC VISIT: HCPCS | Performed by: INTERNAL MEDICINE

## 2018-09-19 PROCEDURE — 99213 OFFICE O/P EST LOW 20 MIN: CPT | Performed by: INTERNAL MEDICINE

## 2018-09-19 NOTE — PROGRESS NOTES
Pulmonary Follow Up Note    HPI:   Colten Hendrix is a 80year old female with Patient presents with:   Follow - Up: Cpap compliance    KATHERINE TURNER MD    Here for f/u for MAURICIO, asthma    Does not like  Wearing > 70% of time > 4 hrs  Frustrated in g (40 mg total) by mouth See Admin Instructions. Takes 80 mg in am and 40 mg in pm Disp: 90 tablet Rfl: 0   carvedilol (COREG) 25 MG Oral Tab Take 1 tablet (25 mg total) by mouth 2 (two) times daily. Disp: 60 tablet Rfl: 0   Tiotropium Bromide Monohydrate 2. Comment:Needs prednisone prior to testing    Social History:  Social History    Socioeconomic History      Marital status: Single      Spouse name: Not on file      Number of children: Not on file      Years of education: Not on file      Highest education attempt to use more regularly (however current use seems to be helping)   Prn EMELINA   Follow sx this winter   Flu vax today          Ilda Correia MD  9/19/2018  10:59 AM    Common Pulmonary Abbreviations  CTD- Connective tissue disease, ex lupus  GGO- Pj

## 2018-09-24 ENCOUNTER — PRIOR ORIGINAL RECORDS (OUTPATIENT)
Dept: OTHER | Age: 68
End: 2018-09-24

## 2018-09-27 ENCOUNTER — TELEPHONE (OUTPATIENT)
Dept: CARDIOLOGY CLINIC | Facility: HOSPITAL | Age: 68
End: 2018-09-27

## 2018-09-27 NOTE — TELEPHONE ENCOUNTER
Phoned patient regarding cardiomems readings. PAD readings staying stable. PAD 22 today. Patient feeling well.  Will continue with current medications Entresto 97-103mg bid, furosemide 80mg in am and 40 mg in pm.

## 2018-10-01 ENCOUNTER — MYAURORA ACCOUNT LINK (OUTPATIENT)
Dept: OTHER | Age: 68
End: 2018-10-01

## 2018-10-04 ENCOUNTER — PRIOR ORIGINAL RECORDS (OUTPATIENT)
Dept: OTHER | Age: 68
End: 2018-10-04

## 2018-10-04 DIAGNOSIS — I50.22 CHRONIC SYSTOLIC CONGESTIVE HEART FAILURE (HCC): ICD-10-CM

## 2018-10-04 LAB — INR: 2.2

## 2018-10-04 RX ORDER — FUROSEMIDE 40 MG/1
40 TABLET ORAL SEE ADMIN INSTRUCTIONS
Qty: 270 TABLET | Refills: 1 | Status: SHIPPED | OUTPATIENT
Start: 2018-10-04

## 2018-10-04 NOTE — TELEPHONE ENCOUNTER
Pt called in requesting 90 day supply      New pharmacy    [facility] in [address]

## 2018-10-18 ENCOUNTER — TELEPHONE (OUTPATIENT)
Dept: CARDIOLOGY CLINIC | Facility: HOSPITAL | Age: 68
End: 2018-10-18

## 2018-10-18 ENCOUNTER — PRIOR ORIGINAL RECORDS (OUTPATIENT)
Dept: OTHER | Age: 68
End: 2018-10-18

## 2018-10-18 LAB — INR: 2.6

## 2018-10-18 NOTE — TELEPHONE ENCOUNTER
Pt returning [employer] phone call about increase in cardio mems numbers going up, I informed pt Shirley Bryan is out of the office and will not return until Monday, instructed pt to call cardiologist with any further questions.

## 2018-10-22 NOTE — TELEPHONE ENCOUNTER
Spoke with patient. Her PAD is 24 today. She is feeling good. Recommend taking furosemide 80 mg bid today only then resume furosemide 80 mg in am and 40 mg in the afternoon.    Sergio Haji

## 2018-10-23 ENCOUNTER — HOSPITAL ENCOUNTER (OUTPATIENT)
Age: 68
Discharge: HOME | DRG: 264 | End: 2018-10-23
Attending: FAMILY MEDICINE
Payer: MEDICARE

## 2018-10-23 ENCOUNTER — TELEPHONE (OUTPATIENT)
Dept: CARDIOLOGY CLINIC | Facility: HOSPITAL | Age: 68
End: 2018-10-23

## 2018-10-23 VITALS
DIASTOLIC BLOOD PRESSURE: 56 MMHG | OXYGEN SATURATION: 97 % | RESPIRATION RATE: 18 BRPM | SYSTOLIC BLOOD PRESSURE: 110 MMHG | TEMPERATURE: 98 F | BODY MASS INDEX: 45 KG/M2 | WEIGHT: 254 LBS | HEIGHT: 63 IN | HEART RATE: 67 BPM

## 2018-10-23 DIAGNOSIS — L03.115 CELLULITIS OF RIGHT LOWER EXTREMITY: Primary | ICD-10-CM

## 2018-10-23 PROCEDURE — 96372 THER/PROPH/DIAG INJ SC/IM: CPT

## 2018-10-23 PROCEDURE — 99214 OFFICE O/P EST MOD 30 MIN: CPT

## 2018-10-23 RX ORDER — CEFTRIAXONE 500 MG/1
1 INJECTION, POWDER, FOR SOLUTION INTRAMUSCULAR; INTRAVENOUS ONCE
Status: COMPLETED | OUTPATIENT
Start: 2018-10-23 | End: 2018-10-23

## 2018-10-23 RX ORDER — SULFAMETHOXAZOLE AND TRIMETHOPRIM 800; 160 MG/1; MG/1
1 TABLET ORAL 2 TIMES DAILY
Qty: 20 TABLET | Refills: 0 | Status: ON HOLD | OUTPATIENT
Start: 2018-10-23 | End: 2018-11-07

## 2018-10-23 NOTE — ED PROVIDER NOTES
Patient Seen in: [facility] AND CLINICS Immediate Care In [address] [place] [address]    History   Patient presents with:  Lower Extremity Injury (musculoskeletal)    Stated Complaint: leg infection    HPI    Patient is here for wound check.   Per patient she initially noted a s Laterality Date   • ANGIOPLASTY (CORONARY)  2014   • CARDIAC DEFIBRILLATOR PLACEMENT  2010   • CARDIAC PACEMAKER PLACEMENT         Family history reviewed and is not pertinent to presenting problem.     Social History    Tobacco Use      Smoking status: For Nursing note and vitals reviewed. ED Course     Rocephin 1 g given in clinic intramuscularly.     MDM     Disposition and Plan     Clinical Impression:  Cellulitis of right lower extremity  (primary encounter diagnosis)     Continue Keflex from omid

## 2018-10-23 NOTE — ED NOTES
IM given left VG. Allergies confirmed. Wound care reviewed and to follow up pcp tomorrow in home health visit. Warm compress to wound as directed start bactrim today continue keflex till tomorrow per dr. Aleks Kendall.  Go to the ed for increased swelling pain fev

## 2018-10-23 NOTE — ED INITIAL ASSESSMENT (HPI)
Angelica Richardson in the bath tub over one week ago has been seen by pcp few times given keflex and feels her rt lower leg is getting redder . Last visit with pcp was last week-when he refilled her prescription.  Scabbed rt wound mid shin with extensive redness ant shin

## 2018-10-23 NOTE — TELEPHONE ENCOUNTER
Patient PAD 27 this am despite increased lasix dose yesterday. States she fell yesterday which has resulted in a LE cellulitis. Was seen in urgent care this am and given IV rocephin. Dr. Pandey Bills to re-examine tomorrow. May need hospitalization.   In

## 2018-10-24 ENCOUNTER — PRIOR ORIGINAL RECORDS (OUTPATIENT)
Dept: OTHER | Age: 68
End: 2018-10-24

## 2018-10-24 ENCOUNTER — HOSPITAL ENCOUNTER (OUTPATIENT)
Age: 68
Discharge: HOME | DRG: 264 | End: 2018-10-24
Attending: EMERGENCY MEDICINE
Payer: MEDICARE

## 2018-10-24 VITALS
BODY MASS INDEX: 50 KG/M2 | WEIGHT: 284 LBS | RESPIRATION RATE: 16 BRPM | DIASTOLIC BLOOD PRESSURE: 87 MMHG | SYSTOLIC BLOOD PRESSURE: 133 MMHG | OXYGEN SATURATION: 98 % | TEMPERATURE: 98 F | HEART RATE: 63 BPM

## 2018-10-24 DIAGNOSIS — L03.115 CELLULITIS OF RIGHT LEG WITHOUT FOOT: Primary | ICD-10-CM

## 2018-10-24 LAB — INR: 0

## 2018-10-24 PROCEDURE — 99211 OFF/OP EST MAY X REQ PHY/QHP: CPT

## 2018-10-24 PROCEDURE — 96372 THER/PROPH/DIAG INJ SC/IM: CPT

## 2018-10-24 NOTE — ED INITIAL ASSESSMENT (HPI)
Was here yesterday and received an Rocephin shot and return to day because she feels she is to receive daily im shots in IC of Rocephin. NO ORDERS FOR OP IM NOTED IN RECORD.

## 2018-10-24 NOTE — ED PROVIDER NOTES
Patient Seen in: [facility] AND CLINICS Immediate Care In [address] [place] [address]    History   Patient presents with:  Medication Administration    Stated Complaint: cellulittis    HPI    The patient is a 16-year-old female with past history of COPD, CHF, atrial fibrillatio Cigarettes      Smokeless tobacco: Never Used      Tobacco comment: social in high school    Alcohol use: Yes      Comment: socially    Drug use: No      Comment: FORMER      Review of Systems    Positive for stated complaint: cellulittis  Other systems ar IM Rocephin today and tomorrow if possible. This will be discussed with the nursing staff here as that is not typical for this location. Case was discussed with Ofe Salas, the nurse manager the immediate care.   She will discussed the case with the patient

## 2018-10-25 ENCOUNTER — HOSPITAL ENCOUNTER (OUTPATIENT)
Age: 68
Discharge: HOME | DRG: 264 | End: 2018-10-25
Attending: FAMILY MEDICINE
Payer: MEDICARE

## 2018-10-25 VITALS
RESPIRATION RATE: 24 BRPM | OXYGEN SATURATION: 98 % | WEIGHT: 293 LBS | HEART RATE: 71 BPM | BODY MASS INDEX: 68 KG/M2 | TEMPERATURE: 99 F

## 2018-10-25 DIAGNOSIS — L03.115 CELLULITIS OF RIGHT LEG: Primary | ICD-10-CM

## 2018-10-25 PROCEDURE — 99211 OFF/OP EST MAY X REQ PHY/QHP: CPT

## 2018-10-25 PROCEDURE — 96372 THER/PROPH/DIAG INJ SC/IM: CPT

## 2018-10-25 RX ORDER — CEFTRIAXONE 500 MG/1
1000 INJECTION, POWDER, FOR SOLUTION INTRAMUSCULAR; INTRAVENOUS ONCE
Status: COMPLETED | OUTPATIENT
Start: 2018-10-25 | End: 2018-10-25

## 2018-10-25 NOTE — ED INITIAL ASSESSMENT (HPI)
patient states she bumped her 11 days ago. The patient had been on Keflex as an outpatient with minimal improvement.     Patient is presenting now for repeat Rocephin IM injection.

## 2018-10-25 NOTE — ED PROVIDER NOTES
Pt seen in [facility] AND CLINICS Immediate Care In [place]      Stated Complaint: Patient presents with:  Medication Administration      --------------   RN assessment:     Medication admin  --------------    CC: med admin    HPI:  Terrell Fisher is a 80 year Mother    • Hypertension Sister    • Hypertension Brother        Allergies:  Penicillins;  Shellfish-Derived Products; Ultrasound Gel    Home Meds:    (Not in a hospital admission)    Social History:  Social History    Socioeconomic History      Marital sta blood pressure today and you need to follow up with your doctor for a repeat blood pressure check and further discussion of lifestyle modifications that include Weight Reduction - Dietary Sodium Restriction - Increased Physical Activity and Moderation in a cx's, wound care, ID consult etc: pcp agrees INB)  ------------------------------------------------  MDM:  Differential diagnosis(es) d/w: cellulitis  dw pt: my rec to admit, iv abtcs/wound care: debridements/eschar removal, etc  AT LEAST: daily images, ou

## 2018-10-26 ENCOUNTER — HOSPITAL ENCOUNTER (INPATIENT)
Facility: HOSPITAL | Age: 68
LOS: 12 days | Discharge: HOME | DRG: 264 | End: 2018-11-07
Attending: INTERNAL MEDICINE | Admitting: INTERNAL MEDICINE
Payer: MEDICARE

## 2018-10-26 ENCOUNTER — APPOINTMENT (OUTPATIENT)
Dept: GENERAL RADIOLOGY | Facility: HOSPITAL | Age: 68
DRG: 264 | End: 2018-10-26
Attending: INTERNAL MEDICINE
Payer: MEDICARE

## 2018-10-26 LAB
ALBUMIN SERPL BCP-MCNC: 3.4 G/DL (ref 3.5–4.8)
ALBUMIN/GLOB SERPL: 1.1 {RATIO} (ref 1–2)
ALP SERPL-CCNC: 119 U/L (ref 32–100)
ALT SERPL-CCNC: 12 U/L (ref 14–54)
ANION GAP SERPL CALC-SCNC: 8 MMOL/L (ref 0–18)
AST SERPL-CCNC: 17 U/L (ref 15–41)
BASOPHILS # BLD: 0.1 K/UL (ref 0–0.2)
BASOPHILS NFR BLD: 1 %
BILIRUB SERPL-MCNC: 0.6 MG/DL (ref 0.3–1.2)
BUN SERPL-MCNC: 37 MG/DL (ref 8–20)
BUN/CREAT SERPL: 23.4 (ref 10–20)
CALCIUM SERPL-MCNC: 8.6 MG/DL (ref 8.5–10.5)
CHLORIDE SERPL-SCNC: 107 MMOL/L (ref 95–110)
CO2 SERPL-SCNC: 23 MMOL/L (ref 22–32)
CREAT SERPL-MCNC: 1.58 MG/DL (ref 0.5–1.5)
EOSINOPHIL # BLD: 0.3 K/UL (ref 0–0.7)
EOSINOPHIL NFR BLD: 5 %
ERYTHROCYTE [DISTWIDTH] IN BLOOD BY AUTOMATED COUNT: 16.2 % (ref 11–15)
GLOBULIN PLAS-MCNC: 3.1 G/DL (ref 2.5–3.7)
GLUCOSE SERPL-MCNC: 95 MG/DL (ref 70–99)
HCT VFR BLD AUTO: 36.8 % (ref 35–48)
HGB BLD-MCNC: 12.2 G/DL (ref 12–16)
INR BLD: 2.7 (ref 0.9–1.2)
LYMPHOCYTES # BLD: 0.8 K/UL (ref 1–4)
LYMPHOCYTES NFR BLD: 13 %
MCH RBC QN AUTO: 29.8 PG (ref 27–32)
MCHC RBC AUTO-ENTMCNC: 33.1 G/DL (ref 32–37)
MCV RBC AUTO: 89.9 FL (ref 80–100)
MONOCYTES # BLD: 0.6 K/UL (ref 0–1)
MONOCYTES NFR BLD: 10 %
NEUTROPHILS # BLD AUTO: 4.4 K/UL (ref 1.8–7.7)
NEUTROPHILS NFR BLD: 71 %
OSMOLALITY UR CALC.SUM OF ELEC: 294 MOSM/KG (ref 275–295)
PLATELET # BLD AUTO: 159 K/UL (ref 140–400)
PMV BLD AUTO: 8.8 FL (ref 7.4–10.3)
POTASSIUM SERPL-SCNC: 4.9 MMOL/L (ref 3.3–5.1)
PROT SERPL-MCNC: 6.5 G/DL (ref 5.9–8.4)
PROTHROMBIN TIME: 28 SECONDS (ref 11.8–14.5)
RBC # BLD AUTO: 4.09 M/UL (ref 3.7–5.4)
SODIUM SERPL-SCNC: 138 MMOL/L (ref 136–144)
WBC # BLD AUTO: 6.2 K/UL (ref 4–11)

## 2018-10-26 PROCEDURE — A4216 STERILE WATER/SALINE, 10 ML: HCPCS

## 2018-10-26 PROCEDURE — 80053 COMPREHEN METABOLIC PANEL: CPT | Performed by: INTERNAL MEDICINE

## 2018-10-26 PROCEDURE — 85610 PROTHROMBIN TIME: CPT | Performed by: INTERNAL MEDICINE

## 2018-10-26 PROCEDURE — 85025 COMPLETE CBC W/AUTO DIFF WBC: CPT | Performed by: INTERNAL MEDICINE

## 2018-10-26 PROCEDURE — 73590 X-RAY EXAM OF LOWER LEG: CPT | Performed by: INTERNAL MEDICINE

## 2018-10-26 PROCEDURE — 96365 THER/PROPH/DIAG IV INF INIT: CPT

## 2018-10-26 RX ORDER — SODIUM CHLORIDE 9 MG/ML
INJECTION, SOLUTION INTRAVENOUS
Status: COMPLETED
Start: 2018-10-26 | End: 2018-10-26

## 2018-10-26 RX ORDER — WARFARIN SODIUM 5 MG/1
5 TABLET ORAL SEE ADMIN INSTRUCTIONS
COMMUNITY

## 2018-10-26 RX ORDER — ALLOPURINOL 100 MG/1
100 TABLET ORAL DAILY
Status: DISCONTINUED | OUTPATIENT
Start: 2018-10-27 | End: 2018-11-07

## 2018-10-26 RX ORDER — POTASSIUM CHLORIDE 20 MEQ/1
20 TABLET, EXTENDED RELEASE ORAL DAILY
Status: DISCONTINUED | OUTPATIENT
Start: 2018-10-27 | End: 2018-11-07

## 2018-10-26 RX ORDER — SOTALOL HYDROCHLORIDE 80 MG/1
80 TABLET ORAL EVERY 12 HOURS SCHEDULED
Status: DISCONTINUED | OUTPATIENT
Start: 2018-10-26 | End: 2018-11-07

## 2018-10-26 RX ORDER — SULFAMETHOXAZOLE AND TRIMETHOPRIM 800; 160 MG/1; MG/1
1 TABLET ORAL 2 TIMES DAILY
Status: DISCONTINUED | OUTPATIENT
Start: 2018-10-26 | End: 2018-10-29

## 2018-10-26 RX ORDER — LIDOCAINE HYDROCHLORIDE AND EPINEPHRINE 10; 10 MG/ML; UG/ML
1 INJECTION, SOLUTION INFILTRATION; PERINEURAL ONCE
Status: DISCONTINUED | OUTPATIENT
Start: 2018-10-26 | End: 2018-11-07

## 2018-10-26 RX ORDER — FUROSEMIDE 10 MG/ML
40 INJECTION INTRAMUSCULAR; INTRAVENOUS ONCE
Status: DISCONTINUED | OUTPATIENT
Start: 2018-10-26 | End: 2018-10-26

## 2018-10-26 RX ORDER — FUROSEMIDE 40 MG/1
40 TABLET ORAL EVERY EVENING
Status: DISCONTINUED | OUTPATIENT
Start: 2018-10-26 | End: 2018-11-07

## 2018-10-26 RX ORDER — ALPRAZOLAM 0.25 MG/1
0.25 TABLET ORAL 2 TIMES DAILY PRN
Status: DISCONTINUED | OUTPATIENT
Start: 2018-10-26 | End: 2018-11-07

## 2018-10-26 RX ORDER — 0.9 % SODIUM CHLORIDE 0.9 %
VIAL (ML) INJECTION
Status: COMPLETED
Start: 2018-10-26 | End: 2018-10-26

## 2018-10-26 RX ORDER — MONTELUKAST SODIUM 10 MG/1
10 TABLET ORAL NIGHTLY
Status: DISCONTINUED | OUTPATIENT
Start: 2018-10-26 | End: 2018-11-07

## 2018-10-26 RX ORDER — WARFARIN SODIUM 7.5 MG/1
7.5 TABLET ORAL
Status: DISCONTINUED | OUTPATIENT
Start: 2018-10-26 | End: 2018-11-07

## 2018-10-26 RX ORDER — ASPIRIN 81 MG/1
81 TABLET, CHEWABLE ORAL DAILY
Status: DISCONTINUED | OUTPATIENT
Start: 2018-10-27 | End: 2018-11-07

## 2018-10-26 RX ORDER — IPRATROPIUM BROMIDE AND ALBUTEROL SULFATE 2.5; .5 MG/3ML; MG/3ML
3 SOLUTION RESPIRATORY (INHALATION) EVERY 6 HOURS PRN
Status: DISCONTINUED | OUTPATIENT
Start: 2018-10-26 | End: 2018-11-07

## 2018-10-26 RX ORDER — NYSTATIN 100000 U/G
100000 CREAM TOPICAL 2 TIMES DAILY
Status: DISCONTINUED | OUTPATIENT
Start: 2018-10-26 | End: 2018-10-27

## 2018-10-26 RX ORDER — ATORVASTATIN CALCIUM 20 MG/1
20 TABLET, FILM COATED ORAL NIGHTLY
Status: DISCONTINUED | OUTPATIENT
Start: 2018-10-26 | End: 2018-11-07

## 2018-10-26 RX ORDER — TEMAZEPAM 15 MG/1
15 CAPSULE ORAL NIGHTLY PRN
Status: DISCONTINUED | OUTPATIENT
Start: 2018-10-26 | End: 2018-11-07

## 2018-10-26 RX ORDER — WARFARIN SODIUM 5 MG/1
5 TABLET ORAL
Status: DISCONTINUED | OUTPATIENT
Start: 2018-10-28 | End: 2018-11-07

## 2018-10-26 RX ORDER — EPLERENONE 25 MG/1
25 TABLET, FILM COATED ORAL DAILY
Status: DISCONTINUED | OUTPATIENT
Start: 2018-10-27 | End: 2018-11-07

## 2018-10-26 RX ORDER — CARVEDILOL 25 MG/1
25 TABLET ORAL 2 TIMES DAILY
Status: DISCONTINUED | OUTPATIENT
Start: 2018-10-26 | End: 2018-11-07

## 2018-10-26 RX ORDER — FUROSEMIDE 80 MG
80 TABLET ORAL DAILY
Status: DISCONTINUED | OUTPATIENT
Start: 2018-10-27 | End: 2018-11-07

## 2018-10-27 LAB
INR BLD: 2.8 (ref 0.9–1.2)
PROTHROMBIN TIME: 28.8 SECONDS (ref 11.8–14.5)

## 2018-10-27 PROCEDURE — 94640 AIRWAY INHALATION TREATMENT: CPT

## 2018-10-27 PROCEDURE — A4216 STERILE WATER/SALINE, 10 ML: HCPCS

## 2018-10-27 PROCEDURE — 85610 PROTHROMBIN TIME: CPT | Performed by: INTERNAL MEDICINE

## 2018-10-27 RX ORDER — 0.9 % SODIUM CHLORIDE 0.9 %
VIAL (ML) INJECTION
Status: COMPLETED
Start: 2018-10-27 | End: 2018-10-27

## 2018-10-27 NOTE — H&P
[facility]    PATIENT'S NAME: Annabelle Falcon   ATTENDING PHYSICIAN: Janna Dunne MD   PATIENT ACCOUNT#:   [de-identified]    LOCATION:   [place] [place] [address] RECORD #:   [id number]       YOB: 1950  ADMISSION DATE:       10/26/2 Conjunctivae are anicteric. NECK:  Supple. No lymphadenopathy or thyromegaly. No carotid bruits. LUNGS:  Clear. HEART:  Regular. ABDOMEN:  Severely obese, nontender. No masses or organomegaly. EXTREMITIES:  No edema.   There is an eschar, 1 x 2 c

## 2018-10-27 NOTE — PLAN OF CARE
Problem: Patient Centered Care  Goal: Patient preferences are identified and integrated in the patient's plan of care  Interventions:  - What would you like us to know as we care for you  - Provide timely, complete, and accurate information to patient/fami strengthening/mobility  - Encourage toileting schedule  Outcome: Progressing      Problem: PAIN - ADULT  Goal: Verbalizes/displays adequate comfort level or patient's stated pain goal  INTERVENTIONS:  - Encourage pt to monitor pain and request assistance

## 2018-10-27 NOTE — PLAN OF CARE
Problem: Patient Centered Care  Goal: Patient preferences are identified and integrated in the patient's plan of care  Interventions:  - What would you like us to know as we care for you  - Provide timely, complete, and accurate information to patient/fami Consider OT/PT consult to assist with strengthening/mobility  - Encourage toileting schedule  Outcome: Progressing      Problem: PAIN - ADULT  Goal: Verbalizes/displays adequate comfort level or patient's stated pain goal  INTERVENTIONS:  - Encourage pt to light within reach - patient using appropriately. Encouraged patient to express needs/concerns and to ask questions.

## 2018-10-27 NOTE — PLAN OF CARE
Problem: Patient/Family Goals  Goal: Patient/Family Short Term Goal  Patient's Short Term Goal: resolve right leg cellulitis/infection  Interventions:   Administer IV antibiotics as ordered   prep patient for bedside debridement by MD    Follow up with isela activity and pre-medicate as appropriate  Outcome: Progressing      Problem: DISCHARGE PLANNING  Goal: Discharge to home or other facility with appropriate resources  INTERVENTIONS:  - Identify barriers to discharge w/pt and caregiver  - Include patient/fa

## 2018-10-27 NOTE — CONSULTS
[facility]    Report of Consultation    Nicolasa Cade Patient Status:  Inpatient    1950 MRN [id number]   Location [facility] 5SW/SE Attending Halina Chapa, *   Hosp Day # 1 PCP Shira Barron MD     Date Mother    • Heart Disorder Mother    • Stroke Mother    • Hypertension Sister    • Hypertension Brother       reports that she has quit smoking. Her smoking use included cigarettes. She smoked 0.10 packs per day.  she has never used smokeless tobacco. She r furosemide (LASIX) tab 40 mg, 40 mg, Oral, QPM  •  lidocaine-EPINEPHrine 1 %-1:[id number] injection 1 mL, 1 mL, Subcutaneous, Once    Review of Systems:  A comprehensive review of systems was negative.     Physical Exam:  Blood pressure 117/48, pulse 64, temper cardioverter-defibrillator ([facility]     Congestive heart failure of unknown etiology ([facility]     Ischemic cardiomyopathy     Chronic systolic congestive heart failure (HCC)     COPD exacerbation (HCC)     Acute on chronic congestive heart failure ([facility]      RLE

## 2018-10-27 NOTE — PROGRESS NOTES
[facility]    Progress Note    Terrell Fisher Patient Status:  Inpatient    1950 MRN [id number]   Location [facility] 5SW/SE Attending Myriam Squires, [facility] Day # 1 PCP Donnie Medrano MD        Subjective: WBC 6.2 10/26/2018    HGB 12.2 10/26/2018    HCT 36.8 10/26/2018     10/26/2018    CREATSERUM 1.58 (H) 10/26/2018    BUN 37 (H) 10/26/2018     10/26/2018    K 4.9 10/26/2018     10/26/2018    CO2 23 10/26/2018    GLU 95 10/26/2018

## 2018-10-28 LAB
INR BLD: 2.5 (ref 0.9–1.2)
PROTHROMBIN TIME: 26.5 SECONDS (ref 11.8–14.5)

## 2018-10-28 PROCEDURE — A4216 STERILE WATER/SALINE, 10 ML: HCPCS

## 2018-10-28 PROCEDURE — 85610 PROTHROMBIN TIME: CPT | Performed by: INTERNAL MEDICINE

## 2018-10-28 RX ORDER — 0.9 % SODIUM CHLORIDE 0.9 %
VIAL (ML) INJECTION
Status: COMPLETED
Start: 2018-10-28 | End: 2018-10-28

## 2018-10-28 NOTE — PROGRESS NOTES
[facility] dosing service    Initial Pharmacokinetic Consult for Vancomycin Dosing     Terrell Fisher is a 80year old female being treated for cellulitis. She is allergic to penicillins; shellfish-derived products; and ultrasound gel.     Other current upon, adjusted weight, renal function, and pharmacokinetics. 2.  Pharmacy ordered Vancomycin trough level(s) prior to 3rd dose. Goal trough level 10-15 ug/mL. 3.  Pharmacy will need BUN/Scr daily while on Vancomycin to assess renal function.     4.

## 2018-10-28 NOTE — PLAN OF CARE
Problem: Patient Centered Care  Goal: Patient preferences are identified and integrated in the patient's plan of care  Interventions:  - What would you like us to know as we care for you  - Provide timely, complete, and accurate information to patient/fami as appropriate  - Consider OT/PT consult to assist with strengthening/mobility  - Encourage toileting schedule  Outcome: Progressing      Problem: PAIN - ADULT  Goal: Verbalizes/displays adequate comfort level or patient's stated pain goal  INTERVENTIONS:

## 2018-10-28 NOTE — PLAN OF CARE
PAIN - ADULT    • Verbalizes/displays adequate comfort level or patient's stated pain goal Progressing        Patient/Family Goals    • Patient/Family Long Term Goal-plan home at discharge Progressing    • Patient/Family Short Term Goal- redness and garyi

## 2018-10-28 NOTE — PROGRESS NOTES
[place] [facility]    Progress Note    Clementeen Page Patient Status:  Inpatient    1950 MRN [id number]   Location [facility] 5SW/SE Attending Yosi Carroll, [facility] Day # 2 PCP Corrien Oliver MD     Subjective:  I ([facility]     Congestive heart failure of unknown etiology ([facility]     Ischemic cardiomyopathy     Chronic systolic congestive heart failure (HCC)     COPD exacerbation (HCC)     Acute on chronic congestive heart failure (HCC)      TRAUMATIC INJURY TO RLE AFTER

## 2018-10-28 NOTE — PROGRESS NOTES
[facility]    Progress Note    Leah Samaritan Patient Status:  Inpatient    1950 MRN [id number]   Location [facility] 5SW/SE Attending Ignacio Persaud, [facility] Day # 2 PCP Enoc Morris MD        Subjective: 23 10/26/2018    GLU 95 10/26/2018    CA 8.6 10/26/2018    ALB 3.4 (L) 10/26/2018    ALKPHO 119 (H) 10/26/2018    BILT 0.6 10/26/2018    TP 6.5 10/26/2018    AST 17 10/26/2018    ALT 12 (L) 10/26/2018    INR 2.5 (H) 10/28/2018    TSH 1.95 07/18/2018    MG

## 2018-10-29 LAB
ANION GAP SERPL CALC-SCNC: 7 MMOL/L (ref 0–18)
BUN SERPL-MCNC: 51 MG/DL (ref 8–20)
BUN/CREAT SERPL: 25.5 (ref 10–20)
CALCIUM SERPL-MCNC: 8.3 MG/DL (ref 8.5–10.5)
CHLORIDE SERPL-SCNC: 104 MMOL/L (ref 95–110)
CO2 SERPL-SCNC: 23 MMOL/L (ref 22–32)
CREAT SERPL-MCNC: 2 MG/DL (ref 0.5–1.5)
GLUCOSE SERPL-MCNC: 103 MG/DL (ref 70–99)
INR BLD: 2.4 (ref 0.9–1.2)
OSMOLALITY UR CALC.SUM OF ELEC: 292 MOSM/KG (ref 275–295)
POTASSIUM SERPL-SCNC: 5 MMOL/L (ref 3.3–5.1)
PROTHROMBIN TIME: 25.7 SECONDS (ref 11.8–14.5)
SODIUM SERPL-SCNC: 134 MMOL/L (ref 136–144)

## 2018-10-29 PROCEDURE — 80048 BASIC METABOLIC PNL TOTAL CA: CPT | Performed by: INTERNAL MEDICINE

## 2018-10-29 PROCEDURE — 80202 ASSAY OF VANCOMYCIN: CPT | Performed by: INTERNAL MEDICINE

## 2018-10-29 PROCEDURE — A4216 STERILE WATER/SALINE, 10 ML: HCPCS

## 2018-10-29 PROCEDURE — 85610 PROTHROMBIN TIME: CPT | Performed by: INTERNAL MEDICINE

## 2018-10-29 RX ORDER — 0.9 % SODIUM CHLORIDE 0.9 %
VIAL (ML) INJECTION
Status: DISPENSED
Start: 2018-10-29 | End: 2018-10-30

## 2018-10-29 RX ORDER — SODIUM CHLORIDE 9 MG/ML
INJECTION, SOLUTION INTRAVENOUS
Status: DISPENSED
Start: 2018-10-29 | End: 2018-10-30

## 2018-10-29 RX ORDER — 0.9 % SODIUM CHLORIDE 0.9 %
3 VIAL (ML) INJECTION AS NEEDED
Status: DISCONTINUED | OUTPATIENT
Start: 2018-10-29 | End: 2018-11-07

## 2018-10-29 NOTE — PROGRESS NOTES
[facility]    Progress Note    Lakia Foster Patient Status:  Inpatient    1950 MRN [id number]   Location [facility] 5SW/SE Attending Justo Perez, *   Hosp Day # 3 PCP Luis Whitt MD     Subjective:  I unknown etiology ([facility] [address]     Ischemic cardiomyopathy     Chronic systolic congestive heart failure (HCC)     COPD exacerbation (HCC)     Acute on chronic congestive heart failure (HCC)      TRAUMATIC INJURY TO RLE WITH CELLULITIS  NO SURGERY INIDCATED  CONT

## 2018-10-29 NOTE — PLAN OF CARE
Problem: Patient Centered Care  Goal: Patient preferences are identified and integrated in the patient's plan of care  Interventions:  - What would you like us to know as we care for you  - Provide timely, complete, and accurate information to patient/fami appropriate  - Consider OT/PT consult to assist with strengthening/mobility  - Encourage toileting schedule  Outcome: Progressing      Problem: PAIN - ADULT  Goal: Verbalizes/displays adequate comfort level or patient's stated pain goal  INTERVENTIONS:  -

## 2018-10-29 NOTE — CM/SW NOTE
Pt has been screened per chart review and during nursing rounds. Pt is from home w/ niece. Pt owns a walker and motorized electric scooter. Pt lives in a 1 level ranch house w/ 4 external steps. Pt also owns a shower chair, grab bar and raised toilet seat.

## 2018-10-29 NOTE — PROGRESS NOTES
[place] [facility]    Progress Note    Coleman Manifold Patient Status:  Inpatient    1950 MRN [id number]   Location [facility] 5SW/SE Attending Charla Miller, *   Hosp Day # 3 PCP Merry Short MD        Subjective: 52 (H) 10/29/2018     (L) 10/29/2018    K 5.0 10/29/2018     10/29/2018    CO2 23 10/29/2018     (H) 10/29/2018    CA 8.3 (L) 10/29/2018    ALB 3.4 (L) 10/26/2018    ALKPHO 119 (H) 10/26/2018    BILT 0.6 10/26/2018    TP 6.5 10/26/2018

## 2018-10-30 PROBLEM — I50.23 CHF (CONGESTIVE HEART FAILURE), NYHA CLASS I, ACUTE ON CHRONIC, SYSTOLIC (HCC): Status: ACTIVE | Noted: 2017-04-09

## 2018-10-30 PROBLEM — N18.9 CHRONIC RENAL INSUFFICIENCY: Status: ACTIVE | Noted: 2018-10-30

## 2018-10-30 PROBLEM — L03.119 CELLULITIS OF ANTERIOR LOWER LEG: Status: ACTIVE | Noted: 2018-10-30

## 2018-10-30 PROBLEM — I10 HTN, GOAL BELOW 140/80: Status: ACTIVE | Noted: 2018-10-30

## 2018-10-30 LAB
ANION GAP SERPL CALC-SCNC: 5 MMOL/L (ref 0–18)
BUN SERPL-MCNC: 51 MG/DL (ref 8–20)
BUN/CREAT SERPL: 26.2 (ref 10–20)
CALCIUM SERPL-MCNC: 8.4 MG/DL (ref 8.5–10.5)
CHLORIDE SERPL-SCNC: 106 MMOL/L (ref 95–110)
CO2 SERPL-SCNC: 24 MMOL/L (ref 22–32)
CREAT SERPL-MCNC: 1.95 MG/DL (ref 0.5–1.5)
GLUCOSE SERPL-MCNC: 95 MG/DL (ref 70–99)
OSMOLALITY UR CALC.SUM OF ELEC: 293 MOSM/KG (ref 275–295)
POTASSIUM SERPL-SCNC: 5.1 MMOL/L (ref 3.3–5.1)
SODIUM SERPL-SCNC: 135 MMOL/L (ref 136–144)
VANCOMYCIN TROUGH SERPL-MCNC: 15 MCG/ML (ref 10–20)
VANCOMYCIN TROUGH SERPL-MCNC: 21.4 MCG/ML (ref 10–20)

## 2018-10-30 PROCEDURE — A4216 STERILE WATER/SALINE, 10 ML: HCPCS | Performed by: INTERNAL MEDICINE

## 2018-10-30 PROCEDURE — 80202 ASSAY OF VANCOMYCIN: CPT | Performed by: INTERNAL MEDICINE

## 2018-10-30 PROCEDURE — 80048 BASIC METABOLIC PNL TOTAL CA: CPT | Performed by: INTERNAL MEDICINE

## 2018-10-30 NOTE — PROGRESS NOTES
[place] [facility]    Progress Note    Fatemeh Garibay Patient Status:  Inpatient    1950 MRN [id number]   Location [facility] 5SW/SE Attending Warden Henderson, *   Hosp Day # 4 PCP Sean Chan MD        Subjective:

## 2018-10-30 NOTE — PROGRESS NOTES
[facility]    Progress Note    Kasia Guerra Patient Status:  Inpatient    1950 MRN [id number]   Location [facility] 5SW/SE Attending Ca Regalado, *   Hosp Day # 4 PCP Genie Hooks MD        Subjective: Date    WBC 6.2 10/26/2018    HGB 12.2 10/26/2018    HCT 36.8 10/26/2018     10/26/2018    CREATSERUM 1.95 (H) 10/30/2018    BUN 51 (H) 10/30/2018     (L) 10/30/2018    K 5.1 10/30/2018     10/30/2018    CO2 24 10/30/2018    GLU 95 10/30

## 2018-10-30 NOTE — PROGRESS NOTES
[facility]    [facility] Note    Kemi Del Real Patient Status:  Inpatient    1950 MRN [id number]   Location [facility] 5SW/SE Attending Rebecca Santiago, [facility] Day # 4 PCP Oneyda Magallanes MD     Subjective:  I unknown etiology ([facility] [address]     Ischemic cardiomyopathy     Chronic systolic congestive heart failure (HCC)     COPD exacerbation (HCC)     Acute on chronic congestive heart failure (HCC)      TRAUMATIC CELLULITIS RLE  NO DEBRIDEMENT NECESSARY AT THIS TIME  JEANETH

## 2018-10-30 NOTE — PLAN OF CARE
Problem: Patient Centered Care  Goal: Patient preferences are identified and integrated in the patient's plan of care  Interventions:  - What would you like us to know as we care for you:  I live with my niece.   - Provide timely, complete, and accurate inf devices as appropriate  - Consider OT/PT consult to assist with strengthening/mobility  - Encourage toileting schedule  Outcome: Progressing      Problem: PAIN - ADULT  Goal: Verbalizes/displays adequate comfort level or patient's stated pain goal  INTERVE couple days. States he does not think home health is necessary. States he will see later. Patient up in chair. All needs attended to. Will continue to monitor.

## 2018-10-30 NOTE — HOME CARE LIAISON
Received referral from [address] [place] [address], [address]. Met with patient at the bedside. Patient is agreeable to [facility], pending orders. Brochure and liaison's card provided with contact information. All questions addressed and answered.      Patient will

## 2018-10-30 NOTE — CM/SW NOTE
Explanation of of BPCI/Medicare program provided. Patient was enrolled under   Patient/family agreed to phone f/u for 3 months from [address] [facility] after discharge from [address] [place] [address]. BPCI/Medicare letter and brochure provided.

## 2018-10-30 NOTE — PLAN OF CARE
Problem: Patient Centered Care  Goal: Patient preferences are identified and integrated in the patient's plan of care  Interventions:  - What would you like us to know as we care for you:  I live with my niece.   - Provide timely, complete, and accurate inf injury  - Provide assistive devices as appropriate  - Consider OT/PT consult to assist with strengthening/mobility  - Encourage toileting schedule  Outcome: Progressing  Bed locked and in lowest position, pt instructed to call for help.  Call light within r

## 2018-10-31 PROCEDURE — A4216 STERILE WATER/SALINE, 10 ML: HCPCS | Performed by: INTERNAL MEDICINE

## 2018-10-31 NOTE — HOME CARE LIAISON
Contacted Dr. Daniel Ceja via phone, received phone order for home health services upon discharge. Patient will need order for next INR check upon discharge for home health nurse to collect. Residential to follow at home.

## 2018-10-31 NOTE — PROGRESS NOTES
[facility] dosing service    Follow-up Pharmacokinetic Consult for Vancomycin Dosing     Marty Hardy is a 80year old female admitted on 10/28/2018 who is being treated for celluliitis. Patient is on day 3 of Vancomycin 1250mg  IV Q24H.   Goal trough daily while on Vancomycin to assess renal function. 4.  Pharmacy will follow and monitor renal function changes, toxicity and efficacy.     Phong [facility]  10/30/2018  9:36 PM  [address]: [phone]

## 2018-10-31 NOTE — PROGRESS NOTES
[facility]    Progress Note    Lakia Foster Patient Status:  Inpatient    1950 MRN [id number]   Location [facility] 5SW/SE Attending Justo Perez, [address] Day # 5 PCP Luis Whitt MD     Subjective:  1 systolic (HCC)     COPD exacerbation (HCC)     Acute on chronic congestive heart failure (HCC)     HTN, goal below 140/80     Chronic renal insufficiency     Cellulitis of anterior lower leg      TRAUMATIC INJURY TO RIGHT LOWER EXT IN BATHTUB  CELLULITIS I

## 2018-10-31 NOTE — PLAN OF CARE
Problem: Patient Centered Care  Goal: Patient preferences are identified and integrated in the patient's plan of care  Interventions:  - What would you like us to know as we care for you:  I live with my niece.   - Provide timely, complete, and accurate inf environment to reduce risk of injury  - Provide assistive devices as appropriate  - Consider OT/PT consult to assist with strengthening/mobility  - Encourage toileting schedule  Outcome: Progressing      Problem: PAIN - ADULT  Goal: Verbalizes/displays annamaria medically cleared.

## 2018-10-31 NOTE — PROGRESS NOTES
[facility]    Progress Note    Cynthia Keen Patient Status:  Inpatient    1950 MRN [id number]   Location [facility] 5SW/SE Attending Robert Isbell, [facility] Day # 5 PCP Hemant Jacobsen MD        Subjective: Component Value Date    WBC 6.2 10/26/2018    HGB 12.2 10/26/2018    HCT 36.8 10/26/2018     10/26/2018    CREATSERUM 1.95 (H) 10/30/2018    BUN 51 (H) 10/30/2018     (L) 10/30/2018    K 5.1 10/30/2018     10/30/2018    CO2 24 10/30/20

## 2018-10-31 NOTE — PLAN OF CARE
Problem: Patient Centered Care  Goal: Patient preferences are identified and integrated in the patient's plan of care  Interventions:  - What would you like us to know as we care for you:  I live with my niece.   - Provide timely, complete, and accurate inf devices as appropriate  - Consider OT/PT consult to assist with strengthening/mobility  - Encourage toileting schedule  Outcome: Progressing      Problem: PAIN - ADULT  Goal: Verbalizes/displays adequate comfort level or patient's stated pain goal  INTERVE yesterday. Up in the chair. All needs attended to. To continue to monitor.

## 2018-11-01 PROCEDURE — A4216 STERILE WATER/SALINE, 10 ML: HCPCS | Performed by: INTERNAL MEDICINE

## 2018-11-01 NOTE — PROGRESS NOTES
[facility]    Progress Note    Richie Prather Patient Status:  Inpatient    1950 MRN [id number]   Location [facility] 5SW/SE Attending Jake Gordon, [address] Day # 6 PCP Lonny Self MD     Subjective:  I heart failure), NYHA class I, acute on chronic, systolic (HCC)     COPD exacerbation (HCC)     Acute on chronic congestive heart failure (HCC)     HTN, goal below 140/80     Chronic renal insufficiency     Cellulitis of anterior lower leg      TRAUMATIC WO

## 2018-11-01 NOTE — PLAN OF CARE
Problem: Patient Centered Care  Goal: Patient preferences are identified and integrated in the patient's plan of care  Interventions:  - What would you like us to know as we care for you:  I live with my niece.   - Provide timely, complete, and accurate inf devices as appropriate  - Consider OT/PT consult to assist with strengthening/mobility  - Encourage toileting schedule  Outcome: Progressing      Problem: PAIN - ADULT  Goal: Verbalizes/displays adequate comfort level or patient's stated pain goal  INTERVE

## 2018-11-01 NOTE — PLAN OF CARE
Problem: Patient Centered Care  Goal: Patient preferences are identified and integrated in the patient's plan of care  Interventions:  - What would you like us to know as we care for you:  I live with my niece.   - Provide timely, complete, and accurate inf on assessment  - Modify environment to reduce risk of injury  - Provide assistive devices as appropriate  - Consider OT/PT consult to assist with strengthening/mobility  - Encourage toileting schedule  Outcome: Progressing  Bed alarm on, bed locked and in system  Outcome: Progressing  Plan is to d/c with [facility] when medically cleared.

## 2018-11-02 LAB
ANION GAP SERPL CALC-SCNC: 9 MMOL/L (ref 0–18)
BUN SERPL-MCNC: 50 MG/DL (ref 8–20)
BUN/CREAT SERPL: 35.5 (ref 10–20)
CALCIUM SERPL-MCNC: 8.5 MG/DL (ref 8.5–10.5)
CHLORIDE SERPL-SCNC: 104 MMOL/L (ref 95–110)
CO2 SERPL-SCNC: 22 MMOL/L (ref 22–32)
CREAT SERPL-MCNC: 1.41 MG/DL (ref 0.5–1.5)
GLUCOSE SERPL-MCNC: 165 MG/DL (ref 70–99)
OSMOLALITY UR CALC.SUM OF ELEC: 297 MOSM/KG (ref 275–295)
POTASSIUM SERPL-SCNC: 4 MMOL/L (ref 3.3–5.1)
SODIUM SERPL-SCNC: 135 MMOL/L (ref 136–144)
VANCOMYCIN TROUGH SERPL-MCNC: 15.2 MCG/ML (ref 10–20)

## 2018-11-02 PROCEDURE — 80048 BASIC METABOLIC PNL TOTAL CA: CPT | Performed by: INTERNAL MEDICINE

## 2018-11-02 PROCEDURE — 80202 ASSAY OF VANCOMYCIN: CPT | Performed by: INTERNAL MEDICINE

## 2018-11-02 PROCEDURE — 99211 OFF/OP EST MAY X REQ PHY/QHP: CPT

## 2018-11-02 NOTE — PROGRESS NOTES
[facility] Dosing Service  Antibiotic Dosing    Star Yoni is a 80year old female for whom pharmacy is dosing Cefepime for treatment of  cellulitis.  .  Other antibiotics (Not dosed by pharmacy): none    Allergies: is allergic to penicillins; shellfis

## 2018-11-02 NOTE — PLAN OF CARE
Problem: Patient Centered Care  Goal: Patient preferences are identified and integrated in the patient's plan of care  Interventions:  - What would you like us to know as we care for you:  I live with my niece.   - Provide timely, complete, and accurate inf injury  - Provide assistive devices as appropriate  - Consider OT/PT consult to assist with strengthening/mobility  - Encourage toileting schedule  Outcome: Progressing  Pt. Has call light within reach.      Problem: PAIN - ADULT  Goal: Verbalizes/displays

## 2018-11-02 NOTE — PROGRESS NOTES
[facility]    Progress Note    Gibson Devoid Patient Status:  Inpatient    1950 MRN [id number]   Location [facility] 5SW/SE Attending Lew Valera [facility] Day # 7 PCP Carolyne Lee MD        Subjective: 12.2 10/26/2018    HCT 36.8 10/26/2018     10/26/2018    CREATSERUM 1.41 11/02/2018    BUN 50 (H) 11/02/2018     (L) 11/02/2018    K 4.0 11/02/2018     11/02/2018    CO2 22 11/02/2018     (H) 11/02/2018    CA 8.5 11/02/2018    ALB

## 2018-11-02 NOTE — WOUND PROGRESS NOTE
WOUND CARE NOTE      PLAN   Recommendations:  Dr. Priya Del Valle is following the pt.     Elevate legs and feet in the bed and in the chair  Dietary consult for recommendations for nutrition to optimize wound healing  Turn schedules  Heels elevated using pill 10/30/2018    CO2 24 10/30/2018    GLU 95 10/30/2018    CA 8.4 (L) 10/30/2018    ALB 3.4 (L) 10/26/2018    ALKPHO 119 (H) 10/26/2018    BILT 0.6 10/26/2018    TP 6.5 10/26/2018    AST 17 10/26/2018    ALT 12 (L) 10/26/2018    MG 2.2 01/09/2018

## 2018-11-02 NOTE — PROGRESS NOTES
[facility]    Progress Note    Bon Martinez Patient Status:  Inpatient    1950 MRN [id number]   Location [facility] 5SW/SE Attending Abdifatah Mena [facility] Day # 6 PCP Megan Ge MD        Subjective: 10/30/2018    CA 8.4 (L) 10/30/2018    ALB 3.4 (L) 10/26/2018    ALKPHO 119 (H) 10/26/2018    BILT 0.6 10/26/2018    TP 6.5 10/26/2018    AST 17 10/26/2018    ALT 12 (L) 10/26/2018    INR 2.4 (H) 10/29/2018    TSH 1.95 07/18/2018    MG 2.2 01/09/2018    TR

## 2018-11-03 PROCEDURE — A4216 STERILE WATER/SALINE, 10 ML: HCPCS

## 2018-11-03 PROCEDURE — 0JCN0ZZ EXTIRPATION OF MATTER FROM RIGHT LOWER LEG SUBCUTANEOUS TISSUE AND FASCIA, OPEN APPROACH: ICD-10-PCS | Performed by: SURGERY

## 2018-11-03 PROCEDURE — 0JBN0ZZ EXCISION OF RIGHT LOWER LEG SUBCUTANEOUS TISSUE AND FASCIA, OPEN APPROACH: ICD-10-PCS | Performed by: SURGERY

## 2018-11-03 RX ORDER — HYDROCODONE BITARTRATE AND ACETAMINOPHEN 5; 325 MG/1; MG/1
1 TABLET ORAL EVERY 6 HOURS PRN
Status: DISCONTINUED | OUTPATIENT
Start: 2018-11-03 | End: 2018-11-07

## 2018-11-03 RX ORDER — 0.9 % SODIUM CHLORIDE 0.9 %
VIAL (ML) INJECTION
Status: DISPENSED
Start: 2018-11-03 | End: 2018-11-04

## 2018-11-03 NOTE — PROGRESS NOTES
[facility] dosing service    Follow-up Pharmacokinetic Consult for Vancomycin Dosing     Jennifer Wiggins is a 80year old female admitted on 10/26/18 who is being treated for cellulitis. Patient is on day 6 of Vancomycin 1 gm IV Q 24 hours.   Goal trough function. 4.  Pharmacy will follow and monitor renal function changes, toxicity and efficacy.     Enzo Wilson, PharmD  11/2/2018  7:33 PM  [address]: [phone]

## 2018-11-03 NOTE — PROGRESS NOTES
[facility]    Progress Note    Alexandra Rice Patient Status:  Inpatient    1950 MRN [id number]   Location [facility] 5SW/SE Attending Rolan Coleman, [address] [place] [address] Day # 8 PCP Rae Azar MD        Subjective: 11/02/2018    BUN 50 (H) 11/02/2018     (L) 11/02/2018    K 4.0 11/02/2018     11/02/2018    CO2 22 11/02/2018     (H) 11/02/2018    CA 8.5 11/02/2018    ALB 3.4 (L) 10/26/2018    ALKPHO 119 (H) 10/26/2018    BILT 0.6 10/26/2018    TP 6.

## 2018-11-03 NOTE — OPERATIVE REPORT
[facility]    PATIENT'S NAME: Melanie Estrada   ATTENDING PHYSICIAN: Elizabeth Plaza MD   OPERATING PHYSICIAN: Macario Kaur.  Randell Arce MD   PATIENT ACCOUNT#:   [id number]    LOCATION:  [address] [place] [address] RECORD #:   [id number]       DATE Silvadene and gauze and Kerlix roll. I will change the dressing in the morning, in 24 hours. Dictated By Shashi Olvera.  Julito Hirsch MD  d: 11/03/2018 07:50:01  t: 11/03/2018 08:15:06  [facility] [id number]  SPB/

## 2018-11-03 NOTE — PROGRESS NOTES
SEE DICTATED NOTE  BEDSIDE DEBRIDEMENT PERFORMED UNDER LOCAL ANESTHESIA  ESCHAR AND UNDERLYING CLOTS REMOVED FROM TRAUMATIC INJURY  REDRESSED WITH SILVADENE AND GAUZE  WILL CHANGE DRESSING AGAIN IN THE MORNING

## 2018-11-04 PROCEDURE — A4216 STERILE WATER/SALINE, 10 ML: HCPCS | Performed by: INTERNAL MEDICINE

## 2018-11-04 NOTE — CONSULTS
INFECTIOUS DISEASE CONSULT NOTE    Shirley Restrepo Patient Status:  Inpatient    1950 MRN [id number]   Location [facility] 5SW/SE Attending Dagoberto Kerns [facility] Day # 9 PCP AN Past Surgical History:   Procedure Laterality Date   • ANGIOPLASTY (CORONARY)  2014   • CARDIAC DEFIBRILLATOR PLACEMENT  2010   • CARDIAC PACEMAKER PLACEMENT       Family History   Problem Relation Age of Onset   • Heart Disorder Father    • Heart Nito Waters (ENTRESTO)  MG per tab 1 tablet, 1 tablet, Oral, BID  •  Sotalol HCl (BETAPACE) tab 80 mg, 80 mg, Oral, 2 times per day  •  temazepam (RESTORIL) cap 15 mg, 15 mg, Oral, Nightly PRN  •  Umeclidinium Bromide (INCRUSE ELLIPTA) 62.5 MCG/INH inhaler 1 puf NEPRELIM, WBC, PLT, NEUT, LYMPH, MON, EOS, NRBC in the last 168 hours.   Recent Labs   Lab  10/29/18   0532  10/30/18   0657  11/02/18   1054   GLU  103*  95  165*   BUN  51*  51*  50*   CREATSERUM  2.00*  1.95*  1.41   GFRAA  29*  30*  44*   GFRNAA  25*  2

## 2018-11-04 NOTE — PLAN OF CARE
Problem: Patient Centered Care  Goal: Patient preferences are identified and integrated in the patient's plan of care  Interventions:  - What would you like us to know as we care for you:  I live with my niece.   - Provide timely, complete, and accurate inf devices as appropriate  - Consider OT/PT consult to assist with strengthening/mobility  - Encourage toileting schedule  Outcome: Progressing      Problem: PAIN - ADULT  Goal: Verbalizes/displays adequate comfort level or patient's stated pain goal  INTERVE monitor.

## 2018-11-04 NOTE — PROGRESS NOTES
[facility]    Progress Note    Jennifer Wiggins Patient Status:  Inpatient    1950 MRN [id number]   Location [facility] 5SW/SE Attending Roma Osorio, [facility] Day # 9 PCP Kiesha Larsen MD        Subjective: HGB 12.2 10/26/2018    HCT 36.8 10/26/2018     10/26/2018    CREATSERUM 1.41 11/02/2018    BUN 50 (H) 11/02/2018     (L) 11/02/2018    K 4.0 11/02/2018     11/02/2018    CO2 22 11/02/2018     (H) 11/02/2018    CA 8.5 11/02/2018

## 2018-11-05 LAB
INR BLD: 2.5 (ref 0.9–1.2)
PROTHROMBIN TIME: 26.3 SECONDS (ref 11.8–14.5)

## 2018-11-05 PROCEDURE — 85610 PROTHROMBIN TIME: CPT | Performed by: INTERNAL MEDICINE

## 2018-11-05 PROCEDURE — A4216 STERILE WATER/SALINE, 10 ML: HCPCS | Performed by: INTERNAL MEDICINE

## 2018-11-05 RX ORDER — SODIUM CHLORIDE 9 MG/ML
INJECTION, SOLUTION INTRAVENOUS
Status: COMPLETED
Start: 2018-11-05 | End: 2018-11-05

## 2018-11-05 NOTE — PROGRESS NOTES
[facility]    Progress Note    Esau Whalen Patient Status:  Inpatient    1950 MRN [id number]   Location [facility] 5SW/SE Attending Keanu Elizabeth, [facility] Day # 10 PCP Libertad Mendez MD        Subjective home            Results:     Lab Results   Component Value Date    WBC 6.2 10/26/2018    HGB 12.2 10/26/2018    HCT 36.8 10/26/2018     10/26/2018    CREATSERUM 1.41 11/02/2018    BUN 50 (H) 11/02/2018     (L) 11/02/2018    K 4.0 11/02/2018

## 2018-11-05 NOTE — PLAN OF CARE
Problem: Patient Centered Care  Goal: Patient preferences are identified and integrated in the patient's plan of care  Interventions:  - What would you like us to know as we care for you:  I live with my niece.   - Provide timely, complete, and accurate inf injury  - Provide assistive devices as appropriate  - Consider OT/PT consult to assist with strengthening/mobility  - Encourage toileting schedule  Outcome: Progressing  Bed locked and in lowest position, pt instructed to call for help.  Call light within r medically clear. Comments: Dressing to right lower extremity changed per orders. Clean, dry, intact.

## 2018-11-06 ENCOUNTER — PRIOR ORIGINAL RECORDS (OUTPATIENT)
Dept: OTHER | Age: 68
End: 2018-11-06

## 2018-11-06 LAB
ANION GAP SERPL CALC-SCNC: 7 MMOL/L (ref 0–18)
BUN SERPL-MCNC: 56 MG/DL (ref 8–20)
BUN/CREAT SERPL: 44.4 (ref 10–20)
CALCIUM SERPL-MCNC: 8.5 MG/DL (ref 8.5–10.5)
CHLORIDE SERPL-SCNC: 108 MMOL/L (ref 95–110)
CO2 SERPL-SCNC: 22 MMOL/L (ref 22–32)
CREAT SERPL-MCNC: 1.26 MG/DL (ref 0.5–1.5)
GLUCOSE SERPL-MCNC: 98 MG/DL (ref 70–99)
OSMOLALITY UR CALC.SUM OF ELEC: 299 MOSM/KG (ref 275–295)
POTASSIUM SERPL-SCNC: 5.1 MMOL/L (ref 3.3–5.1)
SODIUM SERPL-SCNC: 137 MMOL/L (ref 136–144)

## 2018-11-06 PROCEDURE — 80048 BASIC METABOLIC PNL TOTAL CA: CPT | Performed by: INTERNAL MEDICINE

## 2018-11-06 RX ORDER — DOXYCYCLINE 100 MG/1
100 TABLET ORAL 2 TIMES DAILY
Qty: 10 TABLET | Refills: 0 | Status: SHIPPED | OUTPATIENT
Start: 2018-11-06 | End: 2018-11-11

## 2018-11-06 RX ORDER — CEFADROXIL 500 MG/1
500 CAPSULE ORAL 2 TIMES DAILY
Qty: 10 CAPSULE | Refills: 0 | Status: SHIPPED | OUTPATIENT
Start: 2018-11-06 | End: 2018-11-11

## 2018-11-06 NOTE — PROGRESS NOTES
INFECTIOUS DISEASE PROGRESS NOTE    Alexandra Rice Patient Status:  Inpatient    1950 MRN [id number]   Location [facility] 5SW/SE Attending Brandt Rodriguez Day # 6 PCP Rae Azar MD     Subjective:  Awake, sittin eschar and cellulitis and Gen Surg debrided 11/3 with hematoma evacuation and debridement of dead skin. No pus, purulence was expressed. Vitals, afebrile, HDS. Labs unremarkable. No imaging, no cultures obtained.   She has been on vanco (10/31 - present)

## 2018-11-06 NOTE — PLAN OF CARE
Problem: Patient Centered Care  Goal: Patient preferences are identified and integrated in the patient's plan of care  Interventions:  - What would you like us to know as we care for you:  I live with my niece.   - Provide timely, complete, and accurate inf non-pharmacological measures as appropriate and evaluate response  - Consider cultural and social influences on pain and pain management  - Manage/alleviate anxiety  - Utilize distraction and/or relaxation techniques  - Monitor for opioid side effects  - N

## 2018-11-06 NOTE — PROGRESS NOTES
KAREN IS ABOUT THE SAME  STILL WITH SOME ERYTHEMA BUT IMPROVING  WOUND ITSELF WITH FULL THICKNESS INJURY INTO THE SUBCUTANEOUS FAT  NO PUS  NO ODOR  OLD CLOT REMOVED  REDRESSED WITH SILVADENE AND OVERLAY  DISPOSITION TO BE DETERMINED BASED ON HH/NURSING R

## 2018-11-06 NOTE — PROGRESS NOTES
INFECTIOUS DISEASE PROGRESS NOTE    Eddienatasha Whalen Patient Status:  Inpatient    1950 MRN [id number]   Location [facility] 5SW/SE Attending Keanu Elizabeth, [facility] Day # 10 PCP Libertad Mendez MD     Subjective:   Tolerating ab debridement of dead skin. No pus, purulence was expressed. Vitals, afebrile, HDS. Labs unremarkable. No imaging, no cultures obtained. She has been on vanco (10/31 - present) and cefepime (11/1 - present).   ID consulted for assistance in management of

## 2018-11-06 NOTE — PROGRESS NOTES
[facility]    Progress Note    Lakia Foster Patient Status:  Inpatient    1950 MRN [id number]   Location [facility] 5SW/SE Attending Justo Perez [facility] Day # 11 PCP Luis Whitt MD        Subjective Lab Results   Component Value Date    WBC 6.2 10/26/2018    HGB 12.2 10/26/2018    HCT 36.8 10/26/2018     10/26/2018    CREATSERUM 1.26 11/06/2018    BUN 56 (H) 11/06/2018     11/06/2018    K 5.1 11/06/2018     11/06/2018    CO2 22

## 2018-11-07 VITALS
HEIGHT: 63 IN | WEIGHT: 291.81 LBS | RESPIRATION RATE: 18 BRPM | BODY MASS INDEX: 51.7 KG/M2 | OXYGEN SATURATION: 95 % | DIASTOLIC BLOOD PRESSURE: 54 MMHG | TEMPERATURE: 97 F | SYSTOLIC BLOOD PRESSURE: 113 MMHG | HEART RATE: 66 BPM

## 2018-11-07 NOTE — CM/SW NOTE
RN stated that pt will be medically stable to discharge today and Michael 78 orders have been entered. SW notified Sugar Moeller from [facility] regarding discharge.      Valentino Andujar

## 2018-11-07 NOTE — PLAN OF CARE
Pt discharge from hospital prescriptions and follow up appointments given. Daughter educated on dressing changes and antibiotic prescriptions given, pt will follow up with coumadin clinic, and home health nurse to see pt. Left in stable condition.

## 2018-11-07 NOTE — PROGRESS NOTES
INFECTIOUS DISEASE PROGRESS NOTE    Eddienatasha Whalen Patient Status:  Inpatient    1950 MRN [id number]   Location [facility] 5SW/SE Attending Cornell Bruce # 12 PCP Libertad Mendez MD     Subjective:  Awake, pain c cellulitis and Gen Surg debrided 11/3 with hematoma evacuation and debridement of dead skin. No pus, purulence was expressed. Vitals, afebrile, HDS. Labs unremarkable. No imaging, no cultures obtained.   She has been on vanco (10/31 - present) and cefepi

## 2018-11-07 NOTE — PLAN OF CARE
Problem: Patient Centered Care  Goal: Patient preferences are identified and integrated in the patient's plan of care  Interventions:  - What would you like us to know as we care for you:  I live with my niece.   - Provide timely, complete, and accurate inf of injury  - Provide assistive devices as appropriate  - Consider OT/PT consult to assist with strengthening/mobility  - Encourage toileting schedule  Outcome: Progressing  Bed locked and in lowest position, pt instructed to call for help.  Call light withi when medically cleared.

## 2018-11-08 ENCOUNTER — PRIOR ORIGINAL RECORDS (OUTPATIENT)
Dept: OTHER | Age: 68
End: 2018-11-08

## 2018-11-08 LAB — INR: 4.4

## 2018-11-08 NOTE — DISCHARGE SUMMARY
[facility]    PATIENT'S NAME: Lana Johnson   ATTENDING PHYSICIAN: Graciela Rogel MD   PATIENT ACCOUNT#:   [de-identified]    LOCATION:  [address] [place] [address] RECORD #:   [id number]       YOB: 1950  ADMISSION DATE:       10/2

## 2018-11-12 LAB — INR: 6.5

## 2018-11-13 ENCOUNTER — LAB REQUISITION (OUTPATIENT)
Dept: LAB | Facility: HOSPITAL | Age: 68
End: 2018-11-13
Payer: MEDICARE

## 2018-11-13 ENCOUNTER — PRIOR ORIGINAL RECORDS (OUTPATIENT)
Dept: OTHER | Age: 68
End: 2018-11-13

## 2018-11-13 DIAGNOSIS — Z79.01 LONG TERM CURRENT USE OF ANTICOAGULANT: ICD-10-CM

## 2018-11-13 DIAGNOSIS — L03.119 CELLULITIS OF EXTREMITY: ICD-10-CM

## 2018-11-13 LAB — INR: 4.12

## 2018-11-13 PROCEDURE — 85610 PROTHROMBIN TIME: CPT | Performed by: INTERNAL MEDICINE

## 2018-11-15 ENCOUNTER — MYAURORA ACCOUNT LINK (OUTPATIENT)
Dept: OTHER | Age: 68
End: 2018-11-15

## 2018-11-15 ENCOUNTER — PRIOR ORIGINAL RECORDS (OUTPATIENT)
Dept: OTHER | Age: 68
End: 2018-11-15

## 2018-11-16 ENCOUNTER — PRIOR ORIGINAL RECORDS (OUTPATIENT)
Dept: OTHER | Age: 68
End: 2018-11-16

## 2018-11-16 LAB — INR: 3.6

## 2018-11-20 ENCOUNTER — PRIOR ORIGINAL RECORDS (OUTPATIENT)
Dept: OTHER | Age: 68
End: 2018-11-20

## 2018-11-20 LAB — INR: 2.1

## 2018-11-23 ENCOUNTER — PRIOR ORIGINAL RECORDS (OUTPATIENT)
Dept: OTHER | Age: 68
End: 2018-11-23

## 2018-11-23 LAB — INR: 2.7

## 2018-11-28 ENCOUNTER — PRIOR ORIGINAL RECORDS (OUTPATIENT)
Dept: OTHER | Age: 68
End: 2018-11-28

## 2018-11-28 LAB — INR: 2.3

## 2018-12-03 ENCOUNTER — PRIOR ORIGINAL RECORDS (OUTPATIENT)
Dept: OTHER | Age: 68
End: 2018-12-03

## 2018-12-03 LAB
BUN: 56 MG/DL
CALCIUM: 8.5 MG/DL
CHLORIDE: 108 MEQ/L
CREATININE, SERUM: 1.2 MG/DL
GLUCOSE: 98 MG/DL
POTASSIUM, SERUM: 5.1 MEQ/L
SODIUM: 137 MEQ/L

## 2018-12-05 LAB — INR: 1.8

## 2018-12-06 ENCOUNTER — PRIOR ORIGINAL RECORDS (OUTPATIENT)
Dept: OTHER | Age: 68
End: 2018-12-06

## 2018-12-12 ENCOUNTER — LAB REQUISITION (OUTPATIENT)
Dept: LAB | Facility: HOSPITAL | Age: 68
End: 2018-12-12
Payer: MEDICARE

## 2018-12-12 ENCOUNTER — PRIOR ORIGINAL RECORDS (OUTPATIENT)
Dept: OTHER | Age: 68
End: 2018-12-12

## 2018-12-12 DIAGNOSIS — I50.23 ACUTE ON CHRONIC SYSTOLIC CONGESTIVE HEART FAILURE (HCC): ICD-10-CM

## 2018-12-12 LAB — INR: 1.5

## 2018-12-12 PROCEDURE — 83880 ASSAY OF NATRIURETIC PEPTIDE: CPT | Performed by: INTERNAL MEDICINE

## 2018-12-12 PROCEDURE — 80048 BASIC METABOLIC PNL TOTAL CA: CPT | Performed by: INTERNAL MEDICINE

## 2018-12-13 LAB
BNP: 140 PMOL/L
BUN: 32 MG/DL
CALCIUM: 8.5 MG/DL
CHLORIDE: 109 MEQ/L
CREATININE, SERUM: 1.17 MG/DL
GLUCOSE: 112 MG/DL
POTASSIUM, SERUM: 4.1 MEQ/L
SODIUM: 143 MEQ/L

## 2018-12-18 ENCOUNTER — PRIOR ORIGINAL RECORDS (OUTPATIENT)
Dept: OTHER | Age: 68
End: 2018-12-18

## 2018-12-20 ENCOUNTER — PRIOR ORIGINAL RECORDS (OUTPATIENT)
Dept: OTHER | Age: 68
End: 2018-12-20

## 2018-12-20 LAB — INR: 1.5

## 2018-12-27 LAB — INR: 2.2

## 2018-12-28 ENCOUNTER — PRIOR ORIGINAL RECORDS (OUTPATIENT)
Dept: OTHER | Age: 68
End: 2018-12-28

## 2019-01-04 ENCOUNTER — PRIOR ORIGINAL RECORDS (OUTPATIENT)
Dept: OTHER | Age: 69
End: 2019-01-04

## 2019-01-04 LAB — INR: 2.5

## 2019-01-11 ENCOUNTER — PRIOR ORIGINAL RECORDS (OUTPATIENT)
Dept: OTHER | Age: 69
End: 2019-01-11

## 2019-01-11 LAB — INR: 2.5

## 2019-01-18 ENCOUNTER — PRIOR ORIGINAL RECORDS (OUTPATIENT)
Dept: OTHER | Age: 69
End: 2019-01-18

## 2019-01-18 LAB — INR: 2.4

## 2019-01-25 ENCOUNTER — PRIOR ORIGINAL RECORDS (OUTPATIENT)
Dept: OTHER | Age: 69
End: 2019-01-25

## 2019-01-25 LAB — INR: 2.1

## 2019-02-01 ENCOUNTER — PRIOR ORIGINAL RECORDS (OUTPATIENT)
Dept: OTHER | Age: 69
End: 2019-02-01

## 2019-02-01 LAB — INR: 1.6

## 2019-02-08 ENCOUNTER — PRIOR ORIGINAL RECORDS (OUTPATIENT)
Dept: OTHER | Age: 69
End: 2019-02-08

## 2019-02-08 LAB — INR: 3.8

## 2019-02-11 ENCOUNTER — PRIOR ORIGINAL RECORDS (OUTPATIENT)
Dept: OTHER | Age: 69
End: 2019-02-11

## 2019-02-11 LAB — INR: 0

## 2019-02-18 ENCOUNTER — PRIOR ORIGINAL RECORDS (OUTPATIENT)
Dept: OTHER | Age: 69
End: 2019-02-18

## 2019-02-18 LAB — INR: 2.4

## 2019-02-22 ENCOUNTER — PRIOR ORIGINAL RECORDS (OUTPATIENT)
Dept: OTHER | Age: 69
End: 2019-02-22

## 2019-02-22 LAB — INR: 2.2

## 2019-02-28 VITALS
SYSTOLIC BLOOD PRESSURE: 112 MMHG | BODY MASS INDEX: 47.63 KG/M2 | HEART RATE: 60 BPM | WEIGHT: 279 LBS | HEIGHT: 64 IN | DIASTOLIC BLOOD PRESSURE: 62 MMHG

## 2019-02-28 VITALS
HEART RATE: 73 BPM | WEIGHT: 279 LBS | RESPIRATION RATE: 20 BRPM | BODY MASS INDEX: 47.63 KG/M2 | HEIGHT: 64 IN | DIASTOLIC BLOOD PRESSURE: 60 MMHG | SYSTOLIC BLOOD PRESSURE: 102 MMHG | OXYGEN SATURATION: 98 %

## 2019-02-28 VITALS — HEIGHT: 64 IN | HEART RATE: 68 BPM | DIASTOLIC BLOOD PRESSURE: 70 MMHG | SYSTOLIC BLOOD PRESSURE: 98 MMHG

## 2019-02-28 VITALS
BODY MASS INDEX: 47.63 KG/M2 | WEIGHT: 279 LBS | RESPIRATION RATE: 18 BRPM | SYSTOLIC BLOOD PRESSURE: 114 MMHG | HEART RATE: 67 BPM | DIASTOLIC BLOOD PRESSURE: 70 MMHG | HEIGHT: 64 IN

## 2019-02-28 VITALS
RESPIRATION RATE: 20 BRPM | OXYGEN SATURATION: 96 % | BODY MASS INDEX: 48.49 KG/M2 | HEART RATE: 62 BPM | HEIGHT: 64 IN | DIASTOLIC BLOOD PRESSURE: 60 MMHG | WEIGHT: 284 LBS | SYSTOLIC BLOOD PRESSURE: 90 MMHG

## 2019-02-28 VITALS
SYSTOLIC BLOOD PRESSURE: 102 MMHG | DIASTOLIC BLOOD PRESSURE: 60 MMHG | BODY MASS INDEX: 47.46 KG/M2 | RESPIRATION RATE: 20 BRPM | HEIGHT: 64 IN | HEART RATE: 64 BPM | WEIGHT: 278 LBS

## 2019-02-28 VITALS
RESPIRATION RATE: 16 BRPM | HEIGHT: 64 IN | HEART RATE: 68 BPM | WEIGHT: 283 LBS | BODY MASS INDEX: 48.32 KG/M2 | SYSTOLIC BLOOD PRESSURE: 125 MMHG | DIASTOLIC BLOOD PRESSURE: 64 MMHG

## 2019-03-01 ENCOUNTER — PRIOR ORIGINAL RECORDS (OUTPATIENT)
Dept: OTHER | Age: 69
End: 2019-03-01

## 2019-03-01 VITALS
RESPIRATION RATE: 18 BRPM | BODY MASS INDEX: 50.02 KG/M2 | HEART RATE: 75 BPM | WEIGHT: 293 LBS | HEIGHT: 64 IN | SYSTOLIC BLOOD PRESSURE: 118 MMHG | DIASTOLIC BLOOD PRESSURE: 68 MMHG

## 2019-03-01 VITALS
HEART RATE: 76 BPM | DIASTOLIC BLOOD PRESSURE: 70 MMHG | OXYGEN SATURATION: 94 % | SYSTOLIC BLOOD PRESSURE: 132 MMHG | BODY MASS INDEX: 67.58 KG/M2 | WEIGHT: 292 LBS | HEIGHT: 55 IN

## 2019-03-01 LAB — INR: 2.8

## 2019-03-02 PROBLEM — I48.0 PAROXYSMAL ATRIAL FIBRILLATION (CMD): Status: ACTIVE | Noted: 2019-03-02

## 2019-03-08 ENCOUNTER — ANTI-COAG (OUTPATIENT)
Dept: CARDIOLOGY | Age: 69
End: 2019-03-08

## 2019-03-08 DIAGNOSIS — I48.0 PAROXYSMAL ATRIAL FIBRILLATION (CMD): ICD-10-CM

## 2019-03-08 LAB — INR PPP: 3

## 2019-03-10 RX ORDER — CARVEDILOL 25 MG/1
1 TABLET ORAL 2 TIMES DAILY
COMMUNITY
Start: 2018-08-22 | End: 2019-05-13 | Stop reason: SDUPTHER

## 2019-03-10 RX ORDER — WARFARIN SODIUM 5 MG/1
10 TABLET ORAL DAILY
COMMUNITY
Start: 2017-08-22 | End: 2021-05-18 | Stop reason: SDUPTHER

## 2019-03-10 RX ORDER — FUROSEMIDE 40 MG/1
TABLET ORAL
COMMUNITY
Start: 2018-08-22 | End: 2020-10-12 | Stop reason: CLARIF

## 2019-03-10 RX ORDER — ALLOPURINOL 100 MG/1
1 TABLET ORAL DAILY
COMMUNITY
Start: 2017-06-07 | End: 2022-10-05 | Stop reason: SDUPTHER

## 2019-03-10 RX ORDER — MONTELUKAST SODIUM 10 MG/1
10 TABLET ORAL DAILY
COMMUNITY
Start: 2015-10-26

## 2019-03-10 RX ORDER — SOTALOL HYDROCHLORIDE 80 MG/1
1 TABLET ORAL 2 TIMES DAILY
COMMUNITY
Start: 2017-10-02 | End: 2021-02-24 | Stop reason: DRUGHIGH

## 2019-03-10 RX ORDER — ATORVASTATIN CALCIUM 20 MG/1
TABLET, FILM COATED ORAL
COMMUNITY
Start: 2017-10-02 | End: 2022-05-16 | Stop reason: SDUPTHER

## 2019-03-10 RX ORDER — EPLERENONE 25 MG/1
1 TABLET, FILM COATED ORAL DAILY
COMMUNITY
Start: 2018-10-22 | End: 2020-01-20 | Stop reason: SDUPTHER

## 2019-03-10 RX ORDER — POTASSIUM CHLORIDE 20 MEQ/1
1 TABLET, EXTENDED RELEASE ORAL DAILY
COMMUNITY
End: 2019-05-13 | Stop reason: SDUPTHER

## 2019-03-10 RX ORDER — ALPRAZOLAM 0.25 MG/1
0.25 TABLET ORAL DAILY PRN
COMMUNITY
Start: 2017-10-02

## 2019-03-10 RX ORDER — ASPIRIN 81 MG/1
1 TABLET ORAL DAILY
COMMUNITY
Start: 2015-10-15 | End: 2019-03-11 | Stop reason: CLARIF

## 2019-03-15 ENCOUNTER — ANTI-COAG (OUTPATIENT)
Dept: CARDIOLOGY | Age: 69
End: 2019-03-15

## 2019-03-15 DIAGNOSIS — I48.0 PAROXYSMAL ATRIAL FIBRILLATION (CMD): ICD-10-CM

## 2019-03-15 LAB — INR PPP: 3.6

## 2019-03-15 PROCEDURE — 93264 REM MNTR WRLS P-ART PRS SNR: CPT | Performed by: INTERNAL MEDICINE

## 2019-03-20 ENCOUNTER — OFFICE VISIT (OUTPATIENT)
Dept: PULMONOLOGY | Facility: CLINIC | Age: 69
End: 2019-03-20
Payer: MEDICARE

## 2019-03-20 VITALS
HEART RATE: 65 BPM | SYSTOLIC BLOOD PRESSURE: 124 MMHG | TEMPERATURE: 99 F | DIASTOLIC BLOOD PRESSURE: 75 MMHG | HEIGHT: 63 IN | OXYGEN SATURATION: 98 % | BODY MASS INDEX: 52 KG/M2 | RESPIRATION RATE: 20 BRPM

## 2019-03-20 DIAGNOSIS — J45.20 MILD INTERMITTENT ASTHMA WITHOUT COMPLICATION: Primary | ICD-10-CM

## 2019-03-20 DIAGNOSIS — Z95.810 CARDIOMYOPATHY WITH IMPLANTABLE CARDIOVERTER-DEFIBRILLATOR (HCC): Chronic | ICD-10-CM

## 2019-03-20 DIAGNOSIS — I42.9 CARDIOMYOPATHY WITH IMPLANTABLE CARDIOVERTER-DEFIBRILLATOR (HCC): Chronic | ICD-10-CM

## 2019-03-20 DIAGNOSIS — G47.33 SLEEP APNEA, OBSTRUCTIVE: Chronic | ICD-10-CM

## 2019-03-20 PROCEDURE — 99214 OFFICE O/P EST MOD 30 MIN: CPT | Performed by: INTERNAL MEDICINE

## 2019-03-20 PROCEDURE — G0463 HOSPITAL OUTPT CLINIC VISIT: HCPCS | Performed by: INTERNAL MEDICINE

## 2019-03-20 PROCEDURE — 94010 BREATHING CAPACITY TEST: CPT | Performed by: INTERNAL MEDICINE

## 2019-03-20 NOTE — PROGRESS NOTES
Pulmonary Follow Up Note    HPI:   Cynthia Keen is a 77year old female with Patient presents with:  Asthma: Patient present for follow up for Asthma    KATHERINE TURNER MD    No sob  No wheeze   No cough    Nasal congestion  Denies allergies  X 2 w Disp: 60 tablet Rfl: 0   Tiotropium Bromide Monohydrate 2.5 MCG/ACT Inhalation Aero Soln Inhale 2 puffs into the lungs daily. Disp: 1 Inhaler Rfl: 6   Potassium Chloride ER 20 MEQ Oral Tab CR Take 1 tablet (20 mEq total) by mouth daily.  Disp: 90 tablet Rfl Smoker        Packs/day: 0.10        Types: Cigarettes      Smokeless tobacco: Never Used      Tobacco comment: social in high school    Substance and Sexual Activity      Alcohol use: Yes        Comment: socially      Drug use: No        Types: Cannabis

## 2019-03-22 ENCOUNTER — ANTI-COAG (OUTPATIENT)
Dept: CARDIOLOGY | Age: 69
End: 2019-03-22

## 2019-03-22 DIAGNOSIS — I48.0 PAROXYSMAL ATRIAL FIBRILLATION (CMD): ICD-10-CM

## 2019-03-22 LAB — INR PPP: 2.4

## 2019-03-25 ENCOUNTER — LAB ENCOUNTER (OUTPATIENT)
Dept: LAB | Age: 69
End: 2019-03-25
Attending: INTERNAL MEDICINE
Payer: MEDICARE

## 2019-03-25 DIAGNOSIS — E78.2 MIXED HYPERLIPIDEMIA: ICD-10-CM

## 2019-03-25 DIAGNOSIS — I10 HYPERTENSION: Primary | ICD-10-CM

## 2019-03-25 LAB
ALBUMIN SERPL-MCNC: 3.4 G/DL (ref 3.4–5)
ALBUMIN/GLOB SERPL: 0.8 {RATIO} (ref 1–2)
ALP LIVER SERPL-CCNC: 146 U/L (ref 55–142)
ALT SERPL-CCNC: 21 U/L (ref 13–56)
ANION GAP SERPL CALC-SCNC: 5 MMOL/L (ref 0–18)
AST SERPL-CCNC: 18 U/L (ref 15–37)
BASOPHILS # BLD AUTO: 0.03 X10(3) UL (ref 0–0.2)
BASOPHILS NFR BLD AUTO: 0.5 %
BILIRUB SERPL-MCNC: 0.6 MG/DL (ref 0.1–2)
BUN BLD-MCNC: 43 MG/DL (ref 7–18)
BUN/CREAT SERPL: 37.4 (ref 10–20)
CALCIUM BLD-MCNC: 8.4 MG/DL (ref 8.5–10.1)
CHLORIDE SERPL-SCNC: 110 MMOL/L (ref 98–107)
CHOLEST SMN-MCNC: 142 MG/DL (ref ?–200)
CO2 SERPL-SCNC: 28 MMOL/L (ref 21–32)
CREAT BLD-MCNC: 1.15 MG/DL (ref 0.55–1.02)
DEPRECATED RDW RBC AUTO: 53 FL (ref 35.1–46.3)
EOSINOPHIL # BLD AUTO: 0.26 X10(3) UL (ref 0–0.7)
EOSINOPHIL NFR BLD AUTO: 4.4 %
ERYTHROCYTE [DISTWIDTH] IN BLOOD BY AUTOMATED COUNT: 15.1 % (ref 11–15)
GLOBULIN PLAS-MCNC: 4.1 G/DL (ref 2.8–4.4)
GLUCOSE BLD-MCNC: 122 MG/DL (ref 70–99)
HCT VFR BLD AUTO: 41.3 % (ref 35–48)
HDLC SERPL-MCNC: 38 MG/DL (ref 40–59)
HGB BLD-MCNC: 13 G/DL (ref 12–16)
IMM GRANULOCYTES # BLD AUTO: 0.01 X10(3) UL (ref 0–1)
IMM GRANULOCYTES NFR BLD: 0.2 %
LDLC SERPL CALC-MCNC: 74 MG/DL (ref ?–100)
LYMPHOCYTES # BLD AUTO: 0.8 X10(3) UL (ref 1–4)
LYMPHOCYTES NFR BLD AUTO: 13.6 %
M PROTEIN MFR SERPL ELPH: 7.5 G/DL (ref 6.4–8.2)
MCH RBC QN AUTO: 29.9 PG (ref 26–34)
MCHC RBC AUTO-ENTMCNC: 31.5 G/DL (ref 31–37)
MCV RBC AUTO: 94.9 FL (ref 80–100)
MONOCYTES # BLD AUTO: 0.5 X10(3) UL (ref 0.1–1)
MONOCYTES NFR BLD AUTO: 8.5 %
NEUTROPHILS # BLD AUTO: 4.3 X10 (3) UL (ref 1.5–7.7)
NEUTROPHILS # BLD AUTO: 4.3 X10(3) UL (ref 1.5–7.7)
NEUTROPHILS NFR BLD AUTO: 72.8 %
NONHDLC SERPL-MCNC: 104 MG/DL (ref ?–130)
OSMOLALITY SERPL CALC.SUM OF ELEC: 308 MOSM/KG (ref 275–295)
PLATELET # BLD AUTO: 160 10(3)UL (ref 150–450)
POTASSIUM SERPL-SCNC: 4.4 MMOL/L (ref 3.5–5.1)
RBC # BLD AUTO: 4.35 X10(6)UL (ref 3.8–5.3)
SODIUM SERPL-SCNC: 143 MMOL/L (ref 136–145)
TRIGL SERPL-MCNC: 149 MG/DL (ref 30–149)
VLDLC SERPL CALC-MCNC: 30 MG/DL (ref 0–30)
WBC # BLD AUTO: 5.9 X10(3) UL (ref 4–11)

## 2019-03-25 PROCEDURE — 85025 COMPLETE CBC W/AUTO DIFF WBC: CPT

## 2019-03-25 PROCEDURE — 36415 COLL VENOUS BLD VENIPUNCTURE: CPT

## 2019-03-25 PROCEDURE — 80053 COMPREHEN METABOLIC PANEL: CPT

## 2019-03-25 PROCEDURE — 80061 LIPID PANEL: CPT

## 2019-03-29 ENCOUNTER — TELEPHONE (OUTPATIENT)
Dept: CARDIOLOGY | Age: 69
End: 2019-03-29

## 2019-03-29 ENCOUNTER — ANTI-COAG (OUTPATIENT)
Dept: CARDIOLOGY | Age: 69
End: 2019-03-29

## 2019-03-29 DIAGNOSIS — I48.0 PAROXYSMAL ATRIAL FIBRILLATION (CMD): ICD-10-CM

## 2019-03-29 LAB — INR PPP: 2.2

## 2019-04-03 ENCOUNTER — ANCILLARY ORDERS (OUTPATIENT)
Dept: CARDIOLOGY | Age: 69
End: 2019-04-03

## 2019-04-03 ENCOUNTER — ANCILLARY PROCEDURE (OUTPATIENT)
Dept: CARDIOLOGY | Age: 69
End: 2019-04-03
Attending: INTERNAL MEDICINE

## 2019-04-03 DIAGNOSIS — Z45.02 IMPLANTABLE DEFIBRILLATOR REPROGRAMMING/CHECK: ICD-10-CM

## 2019-04-03 PROCEDURE — 93295 DEV INTERROG REMOTE 1/2/MLT: CPT | Performed by: INTERNAL MEDICINE

## 2019-04-05 ENCOUNTER — ANTI-COAG (OUTPATIENT)
Dept: CARDIOLOGY | Age: 69
End: 2019-04-05

## 2019-04-05 DIAGNOSIS — I48.0 PAROXYSMAL ATRIAL FIBRILLATION (CMD): ICD-10-CM

## 2019-04-05 LAB — INR PPP: 2.4

## 2019-04-15 ENCOUNTER — ANTI-COAG (OUTPATIENT)
Dept: CARDIOLOGY | Age: 69
End: 2019-04-15

## 2019-04-15 DIAGNOSIS — I48.0 PAROXYSMAL ATRIAL FIBRILLATION (CMD): ICD-10-CM

## 2019-04-15 LAB — INR PPP: 1.8

## 2019-04-16 PROCEDURE — 93264 REM MNTR WRLS P-ART PRS SNR: CPT | Performed by: INTERNAL MEDICINE

## 2019-04-19 ENCOUNTER — ANTI-COAG (OUTPATIENT)
Dept: CARDIOLOGY | Age: 69
End: 2019-04-19

## 2019-04-19 DIAGNOSIS — I48.0 PAROXYSMAL ATRIAL FIBRILLATION (CMD): ICD-10-CM

## 2019-04-19 LAB — INR PPP: 2.9

## 2019-04-26 LAB — INR PPP: 3.5

## 2019-04-29 ENCOUNTER — ANTI-COAG (OUTPATIENT)
Dept: CARDIOLOGY | Age: 69
End: 2019-04-29

## 2019-04-29 DIAGNOSIS — I48.0 PAROXYSMAL ATRIAL FIBRILLATION (CMD): ICD-10-CM

## 2019-05-03 ENCOUNTER — ANTI-COAG (OUTPATIENT)
Dept: CARDIOLOGY | Age: 69
End: 2019-05-03

## 2019-05-03 DIAGNOSIS — I48.0 PAROXYSMAL ATRIAL FIBRILLATION (CMD): ICD-10-CM

## 2019-05-03 LAB — INR PPP: 3.1

## 2019-05-08 PROBLEM — E66.9 OBESITY: Status: ACTIVE | Noted: 2019-05-08

## 2019-05-08 PROBLEM — I50.9 CONGESTIVE HEART FAILURE (CHF) (CMD): Status: ACTIVE | Noted: 2019-05-08

## 2019-05-08 PROBLEM — I47.29 NSVT (NONSUSTAINED VENTRICULAR TACHYCARDIA) (CMD): Status: ACTIVE | Noted: 2019-05-08

## 2019-05-08 PROBLEM — I25.10 CAD (CORONARY ARTERY DISEASE): Status: ACTIVE | Noted: 2019-05-08

## 2019-05-08 PROBLEM — E78.5 DYSLIPIDEMIA: Status: ACTIVE | Noted: 2019-05-08

## 2019-05-08 PROBLEM — I42.0 DILATED CARDIOMYOPATHY (CMD): Status: ACTIVE | Noted: 2019-05-08

## 2019-05-08 PROBLEM — I31.39 PERICARDIAL EFFUSION: Status: ACTIVE | Noted: 2019-05-08

## 2019-05-08 PROBLEM — R06.00 DYSPNEA: Status: ACTIVE | Noted: 2019-05-08

## 2019-05-08 PROBLEM — Z95.810 ICD (IMPLANTABLE CARDIOVERTER-DEFIBRILLATOR) IN PLACE: Status: ACTIVE | Noted: 2019-05-08

## 2019-05-08 PROBLEM — I10 HTN (HYPERTENSION), BENIGN: Status: ACTIVE | Noted: 2019-05-08

## 2019-05-10 ENCOUNTER — ANTI-COAG (OUTPATIENT)
Dept: CARDIOLOGY | Age: 69
End: 2019-05-10

## 2019-05-10 DIAGNOSIS — I48.0 PAROXYSMAL ATRIAL FIBRILLATION (CMD): ICD-10-CM

## 2019-05-10 LAB — INR PPP: 2.6

## 2019-05-13 ENCOUNTER — OFFICE VISIT (OUTPATIENT)
Dept: CARDIOLOGY | Age: 69
End: 2019-05-13

## 2019-05-13 ENCOUNTER — ANCILLARY PROCEDURE (OUTPATIENT)
Dept: CARDIOLOGY | Age: 69
End: 2019-05-13
Attending: INTERNAL MEDICINE

## 2019-05-13 VITALS
RESPIRATION RATE: 16 BRPM | OXYGEN SATURATION: 98 % | WEIGHT: 293 LBS | HEART RATE: 65 BPM | HEIGHT: 64 IN | SYSTOLIC BLOOD PRESSURE: 100 MMHG | BODY MASS INDEX: 50.02 KG/M2 | DIASTOLIC BLOOD PRESSURE: 58 MMHG

## 2019-05-13 VITALS
WEIGHT: 293 LBS | DIASTOLIC BLOOD PRESSURE: 58 MMHG | HEART RATE: 65 BPM | BODY MASS INDEX: 51.32 KG/M2 | RESPIRATION RATE: 16 BRPM | SYSTOLIC BLOOD PRESSURE: 100 MMHG

## 2019-05-13 DIAGNOSIS — I42.0 DILATED CARDIOMYOPATHY (CMD): ICD-10-CM

## 2019-05-13 DIAGNOSIS — Z45.018 PACEMAKER REPROGRAMMING/CHECK: Primary | ICD-10-CM

## 2019-05-13 DIAGNOSIS — I25.10 CORONARY ARTERY DISEASE INVOLVING NATIVE CORONARY ARTERY OF NATIVE HEART WITHOUT ANGINA PECTORIS: ICD-10-CM

## 2019-05-13 DIAGNOSIS — I48.0 PAROXYSMAL ATRIAL FIBRILLATION (CMD): Primary | ICD-10-CM

## 2019-05-13 DIAGNOSIS — I10 HTN (HYPERTENSION), BENIGN: ICD-10-CM

## 2019-05-13 PROCEDURE — 93283 PRGRMG EVAL IMPLANTABLE DFB: CPT | Performed by: INTERNAL MEDICINE

## 2019-05-13 PROCEDURE — 99214 OFFICE O/P EST MOD 30 MIN: CPT | Performed by: INTERNAL MEDICINE

## 2019-05-13 RX ORDER — CARVEDILOL 25 MG/1
25 TABLET ORAL 2 TIMES DAILY
Qty: 180 TABLET | Refills: 3 | Status: SHIPPED | OUTPATIENT
Start: 2019-05-13 | End: 2020-05-18

## 2019-05-13 RX ORDER — POTASSIUM CHLORIDE 20 MEQ/1
20 TABLET, EXTENDED RELEASE ORAL DAILY
Qty: 90 TABLET | Refills: 3 | Status: SHIPPED | OUTPATIENT
Start: 2019-05-13 | End: 2020-05-18

## 2019-05-17 ENCOUNTER — ANTI-COAG (OUTPATIENT)
Dept: CARDIOLOGY | Age: 69
End: 2019-05-17

## 2019-05-17 DIAGNOSIS — I48.0 PAROXYSMAL ATRIAL FIBRILLATION (CMD): ICD-10-CM

## 2019-05-17 LAB — INR PPP: 2.2

## 2019-05-17 PROCEDURE — 93264 REM MNTR WRLS P-ART PRS SNR: CPT | Performed by: INTERNAL MEDICINE

## 2019-05-24 ENCOUNTER — ANTI-COAG (OUTPATIENT)
Dept: CARDIOLOGY | Age: 69
End: 2019-05-24

## 2019-05-24 DIAGNOSIS — I48.0 PAROXYSMAL ATRIAL FIBRILLATION (CMD): ICD-10-CM

## 2019-05-24 LAB — INR PPP: 2.9

## 2019-05-31 ENCOUNTER — TELEPHONE (OUTPATIENT)
Dept: CARDIOLOGY | Age: 69
End: 2019-05-31

## 2019-05-31 ENCOUNTER — ANTI-COAG (OUTPATIENT)
Dept: CARDIOLOGY | Age: 69
End: 2019-05-31

## 2019-05-31 DIAGNOSIS — I48.0 PAROXYSMAL ATRIAL FIBRILLATION (CMD): ICD-10-CM

## 2019-05-31 LAB — INR PPP: 2

## 2019-06-04 ENCOUNTER — ANTI-COAG (OUTPATIENT)
Dept: CARDIOLOGY | Age: 69
End: 2019-06-04

## 2019-06-04 DIAGNOSIS — I48.0 PAROXYSMAL ATRIAL FIBRILLATION (CMD): ICD-10-CM

## 2019-06-07 ENCOUNTER — ANTI-COAG (OUTPATIENT)
Dept: CARDIOLOGY | Age: 69
End: 2019-06-07

## 2019-06-07 DIAGNOSIS — I48.0 PAROXYSMAL ATRIAL FIBRILLATION (CMD): ICD-10-CM

## 2019-06-07 LAB — INR PPP: 1.3

## 2019-06-11 ENCOUNTER — ANTI-COAG (OUTPATIENT)
Dept: CARDIOLOGY | Age: 69
End: 2019-06-11

## 2019-06-11 DIAGNOSIS — I48.0 PAROXYSMAL ATRIAL FIBRILLATION (CMD): ICD-10-CM

## 2019-06-11 LAB — INR PPP: 1.4

## 2019-06-14 ENCOUNTER — ANTI-COAG (OUTPATIENT)
Dept: CARDIOLOGY | Age: 69
End: 2019-06-14

## 2019-06-14 DIAGNOSIS — I48.0 PAROXYSMAL ATRIAL FIBRILLATION (CMD): ICD-10-CM

## 2019-06-14 LAB — INR PPP: 1.5

## 2019-06-18 PROCEDURE — 93264 REM MNTR WRLS P-ART PRS SNR: CPT | Performed by: INTERNAL MEDICINE

## 2019-06-20 ENCOUNTER — TELEPHONE (OUTPATIENT)
Dept: CARDIOLOGY | Age: 69
End: 2019-06-20

## 2019-06-21 ENCOUNTER — ANTI-COAG (OUTPATIENT)
Dept: CARDIOLOGY | Age: 69
End: 2019-06-21

## 2019-06-21 DIAGNOSIS — I48.0 PAROXYSMAL ATRIAL FIBRILLATION (CMD): ICD-10-CM

## 2019-06-21 LAB — INR PPP: 1.4

## 2019-06-28 ENCOUNTER — ANTI-COAG (OUTPATIENT)
Dept: CARDIOLOGY | Age: 69
End: 2019-06-28

## 2019-06-28 DIAGNOSIS — I48.0 PAROXYSMAL ATRIAL FIBRILLATION (CMD): ICD-10-CM

## 2019-06-28 LAB — INR PPP: 1.7

## 2019-07-03 ENCOUNTER — TELEPHONE (OUTPATIENT)
Dept: CARDIOLOGY | Age: 69
End: 2019-07-03

## 2019-07-08 ENCOUNTER — ANTI-COAG (OUTPATIENT)
Dept: CARDIOLOGY | Age: 69
End: 2019-07-08

## 2019-07-08 DIAGNOSIS — I48.0 PAROXYSMAL ATRIAL FIBRILLATION (CMD): ICD-10-CM

## 2019-07-08 LAB — INR PPP: 2

## 2019-07-10 ENCOUNTER — ANCILLARY PROCEDURE (OUTPATIENT)
Dept: CARDIOLOGY | Age: 69
End: 2019-07-10
Attending: INTERNAL MEDICINE

## 2019-07-10 DIAGNOSIS — Z95.810 ICD (IMPLANTABLE CARDIOVERTER-DEFIBRILLATOR) IN PLACE: ICD-10-CM

## 2019-07-11 ENCOUNTER — TELEPHONE (OUTPATIENT)
Dept: CARDIOLOGY | Age: 69
End: 2019-07-11

## 2019-07-12 ENCOUNTER — ANTI-COAG (OUTPATIENT)
Dept: CARDIOLOGY | Age: 69
End: 2019-07-12

## 2019-07-12 DIAGNOSIS — I48.0 PAROXYSMAL ATRIAL FIBRILLATION (CMD): ICD-10-CM

## 2019-07-12 LAB — INR PPP: 1.9

## 2019-07-18 PROCEDURE — 93264 REM MNTR WRLS P-ART PRS SNR: CPT | Performed by: INTERNAL MEDICINE

## 2019-07-19 ENCOUNTER — ANTI-COAG (OUTPATIENT)
Dept: CARDIOLOGY | Age: 69
End: 2019-07-19

## 2019-07-19 DIAGNOSIS — I48.0 PAROXYSMAL ATRIAL FIBRILLATION (CMD): ICD-10-CM

## 2019-07-19 LAB — INR PPP: 1.5

## 2019-07-25 ENCOUNTER — TELEPHONE (OUTPATIENT)
Dept: CARDIOLOGY | Age: 69
End: 2019-07-25

## 2019-07-26 ENCOUNTER — ANTI-COAG (OUTPATIENT)
Dept: CARDIOLOGY | Age: 69
End: 2019-07-26

## 2019-07-26 DIAGNOSIS — I48.0 PAROXYSMAL ATRIAL FIBRILLATION (CMD): ICD-10-CM

## 2019-07-26 LAB — INR PPP: 1.6

## 2019-08-02 ENCOUNTER — ANTI-COAG (OUTPATIENT)
Dept: CARDIOLOGY | Age: 69
End: 2019-08-02

## 2019-08-02 DIAGNOSIS — I48.0 PAROXYSMAL ATRIAL FIBRILLATION (CMD): ICD-10-CM

## 2019-08-02 LAB — INR PPP: 2

## 2019-08-09 ENCOUNTER — ANTI-COAG (OUTPATIENT)
Dept: CARDIOLOGY | Age: 69
End: 2019-08-09

## 2019-08-09 DIAGNOSIS — I48.0 PAROXYSMAL ATRIAL FIBRILLATION (CMD): ICD-10-CM

## 2019-08-09 LAB — INR PPP: 1.7

## 2019-08-16 ENCOUNTER — ANTI-COAG (OUTPATIENT)
Dept: CARDIOLOGY | Age: 69
End: 2019-08-16

## 2019-08-16 DIAGNOSIS — I48.0 PAROXYSMAL ATRIAL FIBRILLATION (CMD): ICD-10-CM

## 2019-08-16 LAB — INR PPP: 2.4

## 2019-08-19 ENCOUNTER — APPOINTMENT (OUTPATIENT)
Dept: CARDIOLOGY | Age: 69
End: 2019-08-19

## 2019-08-23 ENCOUNTER — ANTI-COAG (OUTPATIENT)
Dept: CARDIOLOGY | Age: 69
End: 2019-08-23

## 2019-08-23 DIAGNOSIS — I48.0 PAROXYSMAL ATRIAL FIBRILLATION (CMD): ICD-10-CM

## 2019-08-23 LAB — INR PPP: 2

## 2019-08-28 ENCOUNTER — EXTERNAL RECORD (OUTPATIENT)
Dept: CARDIOLOGY | Age: 69
End: 2019-08-28

## 2019-08-28 ENCOUNTER — TELEPHONE (OUTPATIENT)
Dept: CARDIOLOGY | Age: 69
End: 2019-08-28

## 2019-08-30 ENCOUNTER — ANTI-COAG (OUTPATIENT)
Dept: CARDIOLOGY | Age: 69
End: 2019-08-30

## 2019-08-30 DIAGNOSIS — I48.0 PAROXYSMAL ATRIAL FIBRILLATION (CMD): ICD-10-CM

## 2019-08-30 LAB — INR PPP: 2.3

## 2019-09-06 ENCOUNTER — ANTI-COAG (OUTPATIENT)
Dept: CARDIOLOGY | Age: 69
End: 2019-09-06

## 2019-09-06 DIAGNOSIS — I48.0 PAROXYSMAL ATRIAL FIBRILLATION (CMD): ICD-10-CM

## 2019-09-06 LAB — INR PPP: 3

## 2019-09-09 ENCOUNTER — TELEPHONE (OUTPATIENT)
Dept: CARDIOLOGY | Age: 69
End: 2019-09-09

## 2019-09-13 ENCOUNTER — ANTI-COAG (OUTPATIENT)
Dept: CARDIOLOGY | Age: 69
End: 2019-09-13

## 2019-09-13 DIAGNOSIS — I48.0 PAROXYSMAL ATRIAL FIBRILLATION (CMD): ICD-10-CM

## 2019-09-13 LAB — INR PPP: 2.3

## 2019-09-17 ENCOUNTER — TELEPHONE (OUTPATIENT)
Dept: CARDIOLOGY | Age: 69
End: 2019-09-17

## 2019-09-17 RX ORDER — SACUBITRIL AND VALSARTAN 97; 103 MG/1; MG/1
TABLET, FILM COATED ORAL
Qty: 60 TABLET | Refills: 3 | Status: SHIPPED | OUTPATIENT
Start: 2019-09-17 | End: 2020-01-20 | Stop reason: SDUPTHER

## 2019-09-20 ENCOUNTER — ANTI-COAG (OUTPATIENT)
Dept: CARDIOLOGY | Age: 69
End: 2019-09-20

## 2019-09-20 DIAGNOSIS — I48.0 PAROXYSMAL ATRIAL FIBRILLATION (CMD): ICD-10-CM

## 2019-09-20 LAB — INR PPP: 2.6

## 2019-09-27 ENCOUNTER — ANTI-COAG (OUTPATIENT)
Dept: CARDIOLOGY | Age: 69
End: 2019-09-27

## 2019-09-27 DIAGNOSIS — I48.0 PAROXYSMAL ATRIAL FIBRILLATION (CMD): ICD-10-CM

## 2019-09-27 LAB — INR PPP: 3.3

## 2019-10-04 ENCOUNTER — ANTI-COAG (OUTPATIENT)
Dept: CARDIOLOGY | Age: 69
End: 2019-10-04

## 2019-10-04 DIAGNOSIS — I48.0 PAROXYSMAL ATRIAL FIBRILLATION (CMD): ICD-10-CM

## 2019-10-04 LAB — INR PPP: 3.2

## 2019-10-11 ENCOUNTER — ANTI-COAG (OUTPATIENT)
Dept: CARDIOLOGY | Age: 69
End: 2019-10-11

## 2019-10-11 DIAGNOSIS — I48.0 PAROXYSMAL ATRIAL FIBRILLATION (CMD): ICD-10-CM

## 2019-10-11 LAB — INR PPP: 3.1

## 2019-10-14 ENCOUNTER — TELEPHONE (OUTPATIENT)
Dept: CARDIOLOGY | Age: 69
End: 2019-10-14

## 2019-10-14 ENCOUNTER — EXTERNAL RECORD (OUTPATIENT)
Dept: CARDIOLOGY | Age: 69
End: 2019-10-14

## 2019-10-18 ENCOUNTER — ANTI-COAG (OUTPATIENT)
Dept: CARDIOLOGY | Age: 69
End: 2019-10-18

## 2019-10-18 DIAGNOSIS — I48.0 PAROXYSMAL ATRIAL FIBRILLATION (CMD): ICD-10-CM

## 2019-10-18 LAB — INR PPP: 2.4

## 2019-10-25 ENCOUNTER — ANTI-COAG (OUTPATIENT)
Dept: CARDIOLOGY | Age: 69
End: 2019-10-25

## 2019-10-25 DIAGNOSIS — I48.0 PAROXYSMAL ATRIAL FIBRILLATION (CMD): ICD-10-CM

## 2019-10-25 LAB — INR PPP: 2.8

## 2019-10-29 ENCOUNTER — TELEPHONE (OUTPATIENT)
Dept: CARDIOLOGY | Age: 69
End: 2019-10-29

## 2019-11-01 ENCOUNTER — ANTI-COAG (OUTPATIENT)
Dept: CARDIOLOGY | Age: 69
End: 2019-11-01

## 2019-11-01 DIAGNOSIS — I48.0 PAROXYSMAL ATRIAL FIBRILLATION (CMD): ICD-10-CM

## 2019-11-01 LAB — INR PPP: 2.6

## 2019-11-05 ENCOUNTER — ANCILLARY PROCEDURE (OUTPATIENT)
Dept: CARDIOLOGY | Age: 69
End: 2019-11-05
Attending: INTERNAL MEDICINE

## 2019-11-05 DIAGNOSIS — Z95.810 ICD (IMPLANTABLE CARDIOVERTER-DEFIBRILLATOR) IN PLACE: ICD-10-CM

## 2019-11-08 ENCOUNTER — ANTI-COAG (OUTPATIENT)
Dept: CARDIOLOGY | Age: 69
End: 2019-11-08

## 2019-11-08 DIAGNOSIS — I48.0 PAROXYSMAL ATRIAL FIBRILLATION (CMD): ICD-10-CM

## 2019-11-08 LAB — INR PPP: 2.6

## 2019-11-11 ENCOUNTER — TELEPHONE (OUTPATIENT)
Dept: CARDIOLOGY | Age: 69
End: 2019-11-11

## 2019-11-14 ENCOUNTER — DOCUMENTATION (OUTPATIENT)
Dept: CARDIOLOGY | Age: 69
End: 2019-11-14

## 2019-11-15 ENCOUNTER — ANTI-COAG (OUTPATIENT)
Dept: CARDIOLOGY | Age: 69
End: 2019-11-15

## 2019-11-15 DIAGNOSIS — I48.0 PAROXYSMAL ATRIAL FIBRILLATION (CMD): ICD-10-CM

## 2019-11-15 LAB — INR PPP: 2

## 2019-11-22 ENCOUNTER — TELEPHONE (OUTPATIENT)
Dept: CARDIOLOGY | Age: 69
End: 2019-11-22

## 2019-11-22 ENCOUNTER — ANTI-COAG (OUTPATIENT)
Dept: CARDIOLOGY | Age: 69
End: 2019-11-22

## 2019-11-22 DIAGNOSIS — I48.0 PAROXYSMAL ATRIAL FIBRILLATION (CMD): ICD-10-CM

## 2019-11-22 LAB — INR PPP: 2.5

## 2019-11-26 ENCOUNTER — ANCILLARY PROCEDURE (OUTPATIENT)
Dept: CARDIOLOGY | Age: 69
End: 2019-11-26
Attending: INTERNAL MEDICINE

## 2019-11-26 ENCOUNTER — OFFICE VISIT (OUTPATIENT)
Dept: CARDIOLOGY | Age: 69
End: 2019-11-26

## 2019-11-26 VITALS — SYSTOLIC BLOOD PRESSURE: 102 MMHG | DIASTOLIC BLOOD PRESSURE: 64 MMHG | HEART RATE: 64 BPM

## 2019-11-26 VITALS — DIASTOLIC BLOOD PRESSURE: 64 MMHG | SYSTOLIC BLOOD PRESSURE: 102 MMHG | HEART RATE: 64 BPM

## 2019-11-26 DIAGNOSIS — I31.39 PERICARDIAL EFFUSION: ICD-10-CM

## 2019-11-26 DIAGNOSIS — Z95.5 STENTED CORONARY ARTERY: ICD-10-CM

## 2019-11-26 DIAGNOSIS — I50.22 CHRONIC SYSTOLIC CONGESTIVE HEART FAILURE (CMD): ICD-10-CM

## 2019-11-26 DIAGNOSIS — I48.0 PAROXYSMAL ATRIAL FIBRILLATION (CMD): ICD-10-CM

## 2019-11-26 DIAGNOSIS — Z79.899 ENCOUNTER FOR MONITORING SOTALOL THERAPY: ICD-10-CM

## 2019-11-26 DIAGNOSIS — Z79.01 ANTICOAGULATED ON COUMADIN: ICD-10-CM

## 2019-11-26 DIAGNOSIS — I42.0 DILATED CARDIOMYOPATHY (CMD): ICD-10-CM

## 2019-11-26 DIAGNOSIS — Z95.810 ICD (IMPLANTABLE CARDIOVERTER-DEFIBRILLATOR) IN PLACE: Primary | ICD-10-CM

## 2019-11-26 DIAGNOSIS — Z51.81 ENCOUNTER FOR MONITORING SOTALOL THERAPY: ICD-10-CM

## 2019-11-26 DIAGNOSIS — Z45.02 IMPLANTABLE DEFIBRILLATOR REPROGRAMMING/CHECK: ICD-10-CM

## 2019-11-26 PROCEDURE — 93283 PRGRMG EVAL IMPLANTABLE DFB: CPT | Performed by: INTERNAL MEDICINE

## 2019-11-26 PROCEDURE — 99213 OFFICE O/P EST LOW 20 MIN: CPT | Performed by: INTERNAL MEDICINE

## 2019-11-26 ASSESSMENT — ENCOUNTER SYMPTOMS
CHILLS: 0
SUSPICIOUS LESIONS: 0
FEVER: 0
HEMOPTYSIS: 0
COUGH: 0
HEMATOCHEZIA: 0
ALLERGIC/IMMUNOLOGIC COMMENTS: NO NEW FOOD ALLERGIES
BRUISES/BLEEDS EASILY: 0
WEIGHT GAIN: 0
WEIGHT LOSS: 0

## 2019-11-26 ASSESSMENT — PATIENT HEALTH QUESTIONNAIRE - PHQ9
1. LITTLE INTEREST OR PLEASURE IN DOING THINGS: NOT AT ALL
SUM OF ALL RESPONSES TO PHQ9 QUESTIONS 1 AND 2: 0
SUM OF ALL RESPONSES TO PHQ9 QUESTIONS 1 AND 2: 0
2. FEELING DOWN, DEPRESSED OR HOPELESS: NOT AT ALL

## 2019-11-27 ENCOUNTER — TELEPHONE (OUTPATIENT)
Dept: CARDIOLOGY | Age: 69
End: 2019-11-27

## 2019-11-29 ENCOUNTER — ANTI-COAG (OUTPATIENT)
Dept: CARDIOLOGY | Age: 69
End: 2019-11-29

## 2019-11-29 DIAGNOSIS — I48.0 PAROXYSMAL ATRIAL FIBRILLATION (CMD): ICD-10-CM

## 2019-11-29 LAB — INR PPP: 2.2

## 2019-12-04 ENCOUNTER — TELEPHONE (OUTPATIENT)
Dept: CARDIOLOGY | Age: 69
End: 2019-12-04

## 2019-12-06 ENCOUNTER — ANTI-COAG (OUTPATIENT)
Dept: CARDIOLOGY | Age: 69
End: 2019-12-06

## 2019-12-06 DIAGNOSIS — I48.0 PAROXYSMAL ATRIAL FIBRILLATION (CMD): ICD-10-CM

## 2019-12-06 LAB — INR PPP: 2.5

## 2019-12-09 ENCOUNTER — TELEPHONE (OUTPATIENT)
Dept: CARDIOLOGY | Age: 69
End: 2019-12-09

## 2019-12-13 ENCOUNTER — TELEPHONE (OUTPATIENT)
Dept: CARDIOLOGY | Age: 69
End: 2019-12-13

## 2019-12-13 ENCOUNTER — ANTI-COAG (OUTPATIENT)
Dept: CARDIOLOGY | Age: 69
End: 2019-12-13

## 2019-12-13 DIAGNOSIS — I48.0 PAROXYSMAL ATRIAL FIBRILLATION (CMD): ICD-10-CM

## 2019-12-13 LAB — INR PPP: 1.9

## 2019-12-20 ENCOUNTER — ANTI-COAG (OUTPATIENT)
Dept: CARDIOLOGY | Age: 69
End: 2019-12-20

## 2019-12-20 ENCOUNTER — TELEPHONE (OUTPATIENT)
Dept: CARDIOLOGY | Age: 69
End: 2019-12-20

## 2019-12-20 DIAGNOSIS — I48.0 PAROXYSMAL ATRIAL FIBRILLATION (CMD): ICD-10-CM

## 2019-12-20 LAB — INR PPP: 2.1

## 2019-12-24 ENCOUNTER — TELEPHONE (OUTPATIENT)
Dept: CARDIOLOGY | Age: 69
End: 2019-12-24

## 2019-12-27 ENCOUNTER — TELEPHONE (OUTPATIENT)
Dept: CARDIOLOGY | Age: 69
End: 2019-12-27

## 2019-12-27 ENCOUNTER — ANTI-COAG (OUTPATIENT)
Dept: CARDIOLOGY | Age: 69
End: 2019-12-27

## 2019-12-27 DIAGNOSIS — I48.0 PAROXYSMAL ATRIAL FIBRILLATION (CMD): ICD-10-CM

## 2019-12-27 LAB — INR PPP: 2.1

## 2020-01-01 ENCOUNTER — EXTERNAL RECORD (OUTPATIENT)
Dept: HEALTH INFORMATION MANAGEMENT | Facility: OTHER | Age: 70
End: 2020-01-01

## 2020-01-03 ENCOUNTER — ANTI-COAG (OUTPATIENT)
Dept: CARDIOLOGY | Age: 70
End: 2020-01-03

## 2020-01-03 DIAGNOSIS — I48.0 PAROXYSMAL ATRIAL FIBRILLATION (CMD): ICD-10-CM

## 2020-01-03 LAB — INR PPP: 3

## 2020-01-07 ENCOUNTER — TELEPHONE (OUTPATIENT)
Dept: CARDIOLOGY | Age: 70
End: 2020-01-07

## 2020-01-10 ENCOUNTER — ANTI-COAG (OUTPATIENT)
Dept: CARDIOLOGY | Age: 70
End: 2020-01-10

## 2020-01-10 DIAGNOSIS — I48.0 PAROXYSMAL ATRIAL FIBRILLATION (CMD): ICD-10-CM

## 2020-01-10 LAB — INR PPP: 3.6

## 2020-01-13 ENCOUNTER — TELEPHONE (OUTPATIENT)
Dept: CARDIOLOGY | Age: 70
End: 2020-01-13

## 2020-01-17 ENCOUNTER — ANTI-COAG (OUTPATIENT)
Dept: CARDIOLOGY | Age: 70
End: 2020-01-17

## 2020-01-17 DIAGNOSIS — I48.0 PAROXYSMAL ATRIAL FIBRILLATION (CMD): ICD-10-CM

## 2020-01-17 LAB — INR PPP: 1.8

## 2020-01-20 ENCOUNTER — PATIENT OUTREACH (OUTPATIENT)
Dept: CARDIOLOGY | Age: 70
End: 2020-01-20

## 2020-01-20 RX ORDER — EPLERENONE 25 MG/1
25 TABLET, FILM COATED ORAL DAILY
Qty: 30 TABLET | Refills: 11 | Status: SHIPPED | OUTPATIENT
Start: 2020-01-20 | End: 2021-01-11 | Stop reason: SDUPTHER

## 2020-01-21 ENCOUNTER — TELEPHONE (OUTPATIENT)
Dept: CARDIOLOGY | Age: 70
End: 2020-01-21

## 2020-01-21 ENCOUNTER — ANCILLARY PROCEDURE (OUTPATIENT)
Dept: CARDIOLOGY | Age: 70
End: 2020-01-21
Attending: INTERNAL MEDICINE

## 2020-01-21 DIAGNOSIS — Z95.810 ICD (IMPLANTABLE CARDIOVERTER-DEFIBRILLATOR) IN PLACE: ICD-10-CM

## 2020-01-23 ENCOUNTER — DOCUMENTATION (OUTPATIENT)
Dept: CARDIOLOGY | Age: 70
End: 2020-01-23

## 2020-01-24 ENCOUNTER — ANTI-COAG (OUTPATIENT)
Dept: CARDIOLOGY | Age: 70
End: 2020-01-24

## 2020-01-24 ENCOUNTER — TELEPHONE (OUTPATIENT)
Dept: CARDIOLOGY | Age: 70
End: 2020-01-24

## 2020-01-24 DIAGNOSIS — I48.0 PAROXYSMAL ATRIAL FIBRILLATION (CMD): ICD-10-CM

## 2020-01-24 LAB — INR PPP: 2.4

## 2020-01-28 ENCOUNTER — TELEPHONE (OUTPATIENT)
Dept: CARDIOLOGY | Age: 70
End: 2020-01-28

## 2020-01-30 ENCOUNTER — TELEPHONE (OUTPATIENT)
Dept: CARDIOLOGY | Age: 70
End: 2020-01-30

## 2020-01-31 ENCOUNTER — ANTI-COAG (OUTPATIENT)
Dept: CARDIOLOGY | Age: 70
End: 2020-01-31

## 2020-01-31 DIAGNOSIS — I48.0 PAROXYSMAL ATRIAL FIBRILLATION (CMD): ICD-10-CM

## 2020-01-31 LAB — INR PPP: 3.1

## 2020-02-03 ENCOUNTER — TELEPHONE (OUTPATIENT)
Dept: CARDIOLOGY | Age: 70
End: 2020-02-03

## 2020-02-04 ENCOUNTER — ANCILLARY PROCEDURE (OUTPATIENT)
Dept: CARDIOLOGY | Age: 70
End: 2020-02-04
Attending: INTERNAL MEDICINE

## 2020-02-04 DIAGNOSIS — I50.32 CHRONIC DIASTOLIC HEART FAILURE (CMD): ICD-10-CM

## 2020-02-04 PROCEDURE — X1094 NO CHARGE VISIT: HCPCS | Performed by: INTERNAL MEDICINE

## 2020-02-07 ENCOUNTER — ANTI-COAG (OUTPATIENT)
Dept: CARDIOLOGY | Age: 70
End: 2020-02-07

## 2020-02-07 DIAGNOSIS — I48.0 PAROXYSMAL ATRIAL FIBRILLATION (CMD): ICD-10-CM

## 2020-02-07 LAB — INR PPP: 3.5 (ref 2–3)

## 2020-02-14 ENCOUNTER — ANTI-COAG (OUTPATIENT)
Dept: CARDIOLOGY | Age: 70
End: 2020-02-14

## 2020-02-14 DIAGNOSIS — I48.0 PAROXYSMAL ATRIAL FIBRILLATION (CMD): ICD-10-CM

## 2020-02-14 LAB — INR PPP: 3.4 (ref 2–3)

## 2020-02-21 ENCOUNTER — ANTI-COAG (OUTPATIENT)
Dept: CARDIOLOGY | Age: 70
End: 2020-02-21

## 2020-02-21 ENCOUNTER — TELEPHONE (OUTPATIENT)
Dept: CARDIOLOGY | Age: 70
End: 2020-02-21

## 2020-02-21 DIAGNOSIS — I48.0 PAROXYSMAL ATRIAL FIBRILLATION (CMD): ICD-10-CM

## 2020-02-21 LAB — INR PPP: 4.1 (ref 2–3)

## 2020-02-27 ENCOUNTER — TELEPHONE (OUTPATIENT)
Dept: CARDIOLOGY | Age: 70
End: 2020-02-27

## 2020-02-28 ENCOUNTER — ANTI-COAG (OUTPATIENT)
Dept: CARDIOLOGY | Age: 70
End: 2020-02-28

## 2020-02-28 DIAGNOSIS — I48.0 PAROXYSMAL ATRIAL FIBRILLATION (CMD): ICD-10-CM

## 2020-02-28 LAB — INR PPP: 2.4 (ref 2–3)

## 2020-03-06 ENCOUNTER — ANTI-COAG (OUTPATIENT)
Dept: CARDIOLOGY | Age: 70
End: 2020-03-06

## 2020-03-06 ENCOUNTER — TELEPHONE (OUTPATIENT)
Dept: CARDIOLOGY | Age: 70
End: 2020-03-06

## 2020-03-06 DIAGNOSIS — I48.0 PAROXYSMAL ATRIAL FIBRILLATION (CMD): ICD-10-CM

## 2020-03-06 LAB — INR PPP: 3.3

## 2020-03-07 PROCEDURE — 93264 REM MNTR WRLS P-ART PRS SNR: CPT | Performed by: INTERNAL MEDICINE

## 2020-03-10 ENCOUNTER — TELEPHONE (OUTPATIENT)
Dept: CARDIOLOGY | Age: 70
End: 2020-03-10

## 2020-03-13 ENCOUNTER — ANTI-COAG (OUTPATIENT)
Dept: CARDIOLOGY | Age: 70
End: 2020-03-13

## 2020-03-13 DIAGNOSIS — I48.0 PAROXYSMAL ATRIAL FIBRILLATION (CMD): ICD-10-CM

## 2020-03-13 LAB — INR PPP: 2.2

## 2020-03-17 ENCOUNTER — TELEPHONE (OUTPATIENT)
Dept: CARDIOLOGY | Age: 70
End: 2020-03-17

## 2020-03-20 ENCOUNTER — ANTI-COAG (OUTPATIENT)
Dept: CARDIOLOGY | Age: 70
End: 2020-03-20

## 2020-03-20 DIAGNOSIS — I48.0 PAROXYSMAL ATRIAL FIBRILLATION (CMD): ICD-10-CM

## 2020-03-20 LAB — INR PPP: 1.1

## 2020-03-25 ENCOUNTER — TELEPHONE (OUTPATIENT)
Dept: CARDIOLOGY | Age: 70
End: 2020-03-25

## 2020-04-03 ENCOUNTER — TELEPHONE (OUTPATIENT)
Dept: CARDIOLOGY | Age: 70
End: 2020-04-03

## 2020-04-07 ENCOUNTER — ANTI-COAG (OUTPATIENT)
Dept: CARDIOLOGY | Age: 70
End: 2020-04-07

## 2020-04-07 DIAGNOSIS — I48.0 PAROXYSMAL ATRIAL FIBRILLATION (CMD): ICD-10-CM

## 2020-04-08 ENCOUNTER — ANCILLARY PROCEDURE (OUTPATIENT)
Dept: CARDIOLOGY | Age: 70
End: 2020-04-08
Attending: INTERNAL MEDICINE

## 2020-04-08 DIAGNOSIS — I50.22 CHRONIC SYSTOLIC HEART FAILURE (CMD): ICD-10-CM

## 2020-04-09 ENCOUNTER — TELEPHONE (OUTPATIENT)
Dept: CARDIOLOGY | Age: 70
End: 2020-04-09

## 2020-04-09 PROCEDURE — 93264 REM MNTR WRLS P-ART PRS SNR: CPT | Performed by: INTERNAL MEDICINE

## 2020-04-14 ENCOUNTER — ANTI-COAG (OUTPATIENT)
Dept: CARDIOLOGY | Age: 70
End: 2020-04-14

## 2020-04-14 DIAGNOSIS — I48.0 PAROXYSMAL ATRIAL FIBRILLATION (CMD): ICD-10-CM

## 2020-04-14 LAB — INR PPP: 1.7

## 2020-04-20 ENCOUNTER — TELEPHONE (OUTPATIENT)
Dept: CARDIOLOGY | Age: 70
End: 2020-04-20

## 2020-04-21 ENCOUNTER — ANTI-COAG (OUTPATIENT)
Dept: CARDIOLOGY | Age: 70
End: 2020-04-21

## 2020-04-21 DIAGNOSIS — I48.0 PAROXYSMAL ATRIAL FIBRILLATION (CMD): ICD-10-CM

## 2020-04-21 LAB — INR PPP: 2.4

## 2020-04-24 ENCOUNTER — TELEPHONE (OUTPATIENT)
Dept: CARDIOLOGY | Age: 70
End: 2020-04-24

## 2020-04-28 ENCOUNTER — ANCILLARY PROCEDURE (OUTPATIENT)
Dept: CARDIOLOGY | Age: 70
End: 2020-04-28
Attending: INTERNAL MEDICINE

## 2020-04-28 ENCOUNTER — ANTI-COAG (OUTPATIENT)
Dept: CARDIOLOGY | Age: 70
End: 2020-04-28

## 2020-04-28 ENCOUNTER — ANCILLARY ORDERS (OUTPATIENT)
Dept: CARDIOLOGY | Age: 70
End: 2020-04-28

## 2020-04-28 DIAGNOSIS — Z95.810 ICD (IMPLANTABLE CARDIOVERTER-DEFIBRILLATOR) IN PLACE: ICD-10-CM

## 2020-04-28 DIAGNOSIS — I48.0 PAROXYSMAL ATRIAL FIBRILLATION (CMD): ICD-10-CM

## 2020-04-28 LAB — INR PPP: 2.5

## 2020-04-28 PROCEDURE — X1114 CARDIAC DEVICE HOME CHECK - REMOTE UNSCHEDULED: HCPCS | Performed by: INTERNAL MEDICINE

## 2020-05-04 ENCOUNTER — TELEPHONE (OUTPATIENT)
Dept: CARDIOLOGY | Age: 70
End: 2020-05-04

## 2020-05-06 ENCOUNTER — ANTI-COAG (OUTPATIENT)
Dept: CARDIOLOGY | Age: 70
End: 2020-05-06

## 2020-05-06 DIAGNOSIS — I48.0 PAROXYSMAL ATRIAL FIBRILLATION (CMD): ICD-10-CM

## 2020-05-06 LAB — INR PPP: 2.3

## 2020-05-12 ENCOUNTER — ANTI-COAG (OUTPATIENT)
Dept: CARDIOLOGY | Age: 70
End: 2020-05-12

## 2020-05-12 ENCOUNTER — TELEPHONE (OUTPATIENT)
Dept: CARDIOLOGY | Age: 70
End: 2020-05-12

## 2020-05-12 DIAGNOSIS — I48.0 PAROXYSMAL ATRIAL FIBRILLATION (CMD): ICD-10-CM

## 2020-05-12 LAB — INR PPP: 2.9

## 2020-05-18 ENCOUNTER — TELEPHONE (OUTPATIENT)
Dept: CARDIOLOGY | Age: 70
End: 2020-05-18

## 2020-05-18 RX ORDER — POTASSIUM CHLORIDE 20 MEQ/1
TABLET, EXTENDED RELEASE ORAL
Qty: 90 TABLET | Refills: 0 | Status: SHIPPED | OUTPATIENT
Start: 2020-05-18 | End: 2020-08-13

## 2020-05-18 RX ORDER — CARVEDILOL 25 MG/1
TABLET ORAL
Qty: 180 TABLET | Refills: 0 | Status: SHIPPED | OUTPATIENT
Start: 2020-05-18 | End: 2020-08-13

## 2020-05-20 LAB — INR PPP: 3.4

## 2020-05-21 ENCOUNTER — TELEPHONE (OUTPATIENT)
Dept: CARDIOLOGY | Age: 70
End: 2020-05-21

## 2020-05-21 ENCOUNTER — ANTI-COAG (OUTPATIENT)
Dept: CARDIOLOGY | Age: 70
End: 2020-05-21

## 2020-05-21 DIAGNOSIS — I48.0 PAROXYSMAL ATRIAL FIBRILLATION (CMD): ICD-10-CM

## 2020-05-27 ENCOUNTER — ANTI-COAG (OUTPATIENT)
Dept: CARDIOLOGY | Age: 70
End: 2020-05-27

## 2020-05-27 DIAGNOSIS — I48.0 PAROXYSMAL ATRIAL FIBRILLATION (CMD): ICD-10-CM

## 2020-05-27 LAB — INR PPP: 2

## 2020-05-29 ENCOUNTER — ANTI-COAG (OUTPATIENT)
Dept: CARDIOLOGY | Age: 70
End: 2020-05-29

## 2020-05-29 ENCOUNTER — TELEPHONE (OUTPATIENT)
Dept: CARDIOLOGY | Age: 70
End: 2020-05-29

## 2020-05-29 DIAGNOSIS — I48.0 PAROXYSMAL ATRIAL FIBRILLATION (CMD): ICD-10-CM

## 2020-06-03 ENCOUNTER — ANTI-COAG (OUTPATIENT)
Dept: CARDIOLOGY | Age: 70
End: 2020-06-03

## 2020-06-03 DIAGNOSIS — I48.0 PAROXYSMAL ATRIAL FIBRILLATION (CMD): ICD-10-CM

## 2020-06-03 LAB — INR PPP: 2.3

## 2020-06-09 ENCOUNTER — TELEPHONE (OUTPATIENT)
Dept: CARDIOLOGY | Age: 70
End: 2020-06-09

## 2020-06-09 PROCEDURE — 93264 REM MNTR WRLS P-ART PRS SNR: CPT | Performed by: INTERNAL MEDICINE

## 2020-06-11 ENCOUNTER — ANTI-COAG (OUTPATIENT)
Dept: CARDIOLOGY | Age: 70
End: 2020-06-11

## 2020-06-11 DIAGNOSIS — I48.0 PAROXYSMAL ATRIAL FIBRILLATION (CMD): ICD-10-CM

## 2020-06-11 LAB — INR PPP: 2.6

## 2020-06-12 ENCOUNTER — TELEPHONE (OUTPATIENT)
Dept: CARDIOLOGY | Age: 70
End: 2020-06-12

## 2020-06-12 ENCOUNTER — ANCILLARY PROCEDURE (OUTPATIENT)
Dept: CARDIOLOGY | Age: 70
End: 2020-06-12
Attending: INTERNAL MEDICINE

## 2020-06-12 DIAGNOSIS — I50.32 CHRONIC DIASTOLIC CONGESTIVE HEART FAILURE (CMD): ICD-10-CM

## 2020-06-16 ENCOUNTER — TELEPHONE (OUTPATIENT)
Dept: CARDIOLOGY | Age: 70
End: 2020-06-16

## 2020-06-17 ENCOUNTER — ANCILLARY ORDERS (OUTPATIENT)
Dept: CARDIOLOGY | Age: 70
End: 2020-06-17

## 2020-06-17 ENCOUNTER — ANCILLARY PROCEDURE (OUTPATIENT)
Dept: CARDIOLOGY | Age: 70
End: 2020-06-17
Attending: NURSE PRACTITIONER

## 2020-06-17 DIAGNOSIS — I50.9 HEART FAILURE, UNSPECIFIED HF CHRONICITY, UNSPECIFIED HEART FAILURE TYPE (CMD): ICD-10-CM

## 2020-06-18 ENCOUNTER — TELEPHONE (OUTPATIENT)
Dept: CARDIOLOGY | Age: 70
End: 2020-06-18

## 2020-06-19 ENCOUNTER — ANTI-COAG (OUTPATIENT)
Dept: CARDIOLOGY | Age: 70
End: 2020-06-19

## 2020-06-19 DIAGNOSIS — I48.0 PAROXYSMAL ATRIAL FIBRILLATION (CMD): ICD-10-CM

## 2020-06-19 LAB — INR PPP: 2

## 2020-06-24 ENCOUNTER — TELEPHONE (OUTPATIENT)
Dept: CARDIOLOGY | Age: 70
End: 2020-06-24

## 2020-06-24 ENCOUNTER — OFFICE VISIT (OUTPATIENT)
Dept: CARDIOLOGY | Age: 70
End: 2020-06-24

## 2020-06-24 VITALS
SYSTOLIC BLOOD PRESSURE: 102 MMHG | HEIGHT: 64 IN | RESPIRATION RATE: 18 BRPM | DIASTOLIC BLOOD PRESSURE: 62 MMHG | BODY MASS INDEX: 49.68 KG/M2 | OXYGEN SATURATION: 95 % | WEIGHT: 291 LBS | HEART RATE: 70 BPM

## 2020-06-24 DIAGNOSIS — I50.22 CHRONIC SYSTOLIC CONGESTIVE HEART FAILURE (CMD): Primary | ICD-10-CM

## 2020-06-24 PROCEDURE — 93000 ELECTROCARDIOGRAM COMPLETE: CPT | Performed by: INTERNAL MEDICINE

## 2020-06-24 PROCEDURE — 99214 OFFICE O/P EST MOD 30 MIN: CPT | Performed by: INTERNAL MEDICINE

## 2020-06-24 ASSESSMENT — ENCOUNTER SYMPTOMS
ALLERGIC/IMMUNOLOGIC COMMENTS: NO NEW FOOD ALLERGIES
HEMOPTYSIS: 0
WEIGHT LOSS: 0
WEIGHT GAIN: 1
COUGH: 0
CHILLS: 0
HEMATOCHEZIA: 0
SUSPICIOUS LESIONS: 0
ABDOMINAL PAIN: 1
SHORTNESS OF BREATH: 1
FEVER: 0
BRUISES/BLEEDS EASILY: 0

## 2020-06-24 ASSESSMENT — PATIENT HEALTH QUESTIONNAIRE - PHQ9
SUM OF ALL RESPONSES TO PHQ9 QUESTIONS 1 AND 2: 0
CLINICAL INTERPRETATION OF PHQ9 SCORE: NO FURTHER SCREENING NEEDED
1. LITTLE INTEREST OR PLEASURE IN DOING THINGS: NOT AT ALL
CLINICAL INTERPRETATION OF PHQ2 SCORE: NO FURTHER SCREENING NEEDED
SUM OF ALL RESPONSES TO PHQ9 QUESTIONS 1 AND 2: 0
2. FEELING DOWN, DEPRESSED OR HOPELESS: NOT AT ALL

## 2020-06-24 ASSESSMENT — NEW YORK HEART ASSOCIATION (NYHA) CLASSIFICATION: NYHA FUNCTIONAL CLASS: II

## 2020-06-25 DIAGNOSIS — I48.0 PAROXYSMAL ATRIAL FIBRILLATION (CMD): Primary | ICD-10-CM

## 2020-06-26 ENCOUNTER — TELEPHONE (OUTPATIENT)
Dept: CARDIOLOGY | Age: 70
End: 2020-06-26

## 2020-06-26 ENCOUNTER — ANTI-COAG (OUTPATIENT)
Dept: CARDIOLOGY | Age: 70
End: 2020-06-26

## 2020-06-26 DIAGNOSIS — I48.0 PAROXYSMAL ATRIAL FIBRILLATION (CMD): ICD-10-CM

## 2020-06-26 LAB — INR PPP: 2.9

## 2020-07-01 ENCOUNTER — TELEPHONE (OUTPATIENT)
Dept: CARDIOLOGY | Age: 70
End: 2020-07-01

## 2020-07-01 ENCOUNTER — ANTI-COAG (OUTPATIENT)
Dept: CARDIOLOGY | Age: 70
End: 2020-07-01

## 2020-07-01 DIAGNOSIS — I48.0 PAROXYSMAL ATRIAL FIBRILLATION (CMD): ICD-10-CM

## 2020-07-02 ENCOUNTER — TELEPHONE (OUTPATIENT)
Dept: CARDIOLOGY | Age: 70
End: 2020-07-02

## 2020-07-02 ENCOUNTER — ANTI-COAG (OUTPATIENT)
Dept: CARDIOLOGY | Age: 70
End: 2020-07-02

## 2020-07-02 ENCOUNTER — DOCUMENTATION (OUTPATIENT)
Dept: CARDIOLOGY | Age: 70
End: 2020-07-02

## 2020-07-02 DIAGNOSIS — I48.0 PAROXYSMAL ATRIAL FIBRILLATION (CMD): ICD-10-CM

## 2020-07-02 LAB — INR PPP: 2.1

## 2020-07-07 NOTE — WOUND PROGRESS NOTE
Wound Care Services  Spoke with the pt. regarding her ultrasound gel allergy, she states it was a while ago and she has had it since with no problems, she states she is NOT allergic to Sulfa, I educated the pt. on the Silvadene cream and dressings. No

## 2020-07-09 ENCOUNTER — TELEPHONE (OUTPATIENT)
Dept: CARDIOLOGY | Age: 70
End: 2020-07-09

## 2020-07-17 ENCOUNTER — ANTI-COAG (OUTPATIENT)
Dept: CARDIOLOGY | Age: 70
End: 2020-07-17

## 2020-07-17 ENCOUNTER — TELEPHONE (OUTPATIENT)
Dept: CARDIOLOGY | Age: 70
End: 2020-07-17

## 2020-07-17 DIAGNOSIS — I48.0 PAROXYSMAL ATRIAL FIBRILLATION (CMD): ICD-10-CM

## 2020-07-17 LAB — INR PPP: 1.4

## 2020-07-24 ENCOUNTER — ANTI-COAG (OUTPATIENT)
Dept: CARDIOLOGY | Age: 70
End: 2020-07-24

## 2020-07-24 DIAGNOSIS — I48.0 PAROXYSMAL ATRIAL FIBRILLATION (CMD): ICD-10-CM

## 2020-07-24 LAB — INR PPP: 2

## 2020-07-27 ENCOUNTER — TELEPHONE (OUTPATIENT)
Dept: CARDIOLOGY | Age: 70
End: 2020-07-27

## 2020-07-30 ENCOUNTER — DOCUMENTATION (OUTPATIENT)
Dept: CARDIOLOGY | Age: 70
End: 2020-07-30

## 2020-07-31 ENCOUNTER — ANTI-COAG (OUTPATIENT)
Dept: CARDIOLOGY | Age: 70
End: 2020-07-31

## 2020-07-31 DIAGNOSIS — I48.0 PAROXYSMAL ATRIAL FIBRILLATION (CMD): ICD-10-CM

## 2020-07-31 LAB — INR PPP: 2.4

## 2020-08-05 ENCOUNTER — ANCILLARY PROCEDURE (OUTPATIENT)
Dept: CARDIOLOGY | Age: 70
End: 2020-08-05
Attending: INTERNAL MEDICINE

## 2020-08-05 ENCOUNTER — ANCILLARY ORDERS (OUTPATIENT)
Dept: CARDIOLOGY | Age: 70
End: 2020-08-05

## 2020-08-05 DIAGNOSIS — Z95.810 ICD (IMPLANTABLE CARDIOVERTER-DEFIBRILLATOR) IN PLACE: ICD-10-CM

## 2020-08-05 PROCEDURE — X1114 CARDIAC DEVICE HOME CHECK - REMOTE UNSCHEDULED: HCPCS | Performed by: INTERNAL MEDICINE

## 2020-08-06 ENCOUNTER — TELEPHONE (OUTPATIENT)
Dept: CARDIOLOGY | Age: 70
End: 2020-08-06

## 2020-08-07 ENCOUNTER — ANTI-COAG (OUTPATIENT)
Dept: CARDIOLOGY | Age: 70
End: 2020-08-07

## 2020-08-07 DIAGNOSIS — I48.0 PAROXYSMAL ATRIAL FIBRILLATION (CMD): ICD-10-CM

## 2020-08-07 LAB — INR PPP: 1.7

## 2020-08-13 RX ORDER — POTASSIUM CHLORIDE 20 MEQ/1
TABLET, EXTENDED RELEASE ORAL
Qty: 90 TABLET | Refills: 0 | Status: SHIPPED | OUTPATIENT
Start: 2020-08-13 | End: 2020-11-11

## 2020-08-13 RX ORDER — CARVEDILOL 25 MG/1
TABLET ORAL
Qty: 180 TABLET | Refills: 0 | Status: SHIPPED | OUTPATIENT
Start: 2020-08-13 | End: 2020-11-11

## 2020-08-14 ENCOUNTER — DOCUMENTATION (OUTPATIENT)
Dept: CARDIOLOGY | Age: 70
End: 2020-08-14

## 2020-08-14 ENCOUNTER — TELEPHONE (OUTPATIENT)
Dept: CARDIOLOGY | Age: 70
End: 2020-08-14

## 2020-08-14 ENCOUNTER — ANTI-COAG (OUTPATIENT)
Dept: CARDIOLOGY | Age: 70
End: 2020-08-14

## 2020-08-14 DIAGNOSIS — I50.22 CHRONIC SYSTOLIC CONGESTIVE HEART FAILURE (CMD): Primary | ICD-10-CM

## 2020-08-14 DIAGNOSIS — I48.0 PAROXYSMAL ATRIAL FIBRILLATION (CMD): ICD-10-CM

## 2020-08-14 LAB — INR PPP: 2.5

## 2020-08-14 RX ORDER — TORSEMIDE 20 MG/1
TABLET ORAL
Qty: 90 TABLET | Refills: 11 | Status: SHIPPED | OUTPATIENT
Start: 2020-08-14 | End: 2021-01-25 | Stop reason: DRUGHIGH

## 2020-08-21 ENCOUNTER — TELEPHONE (OUTPATIENT)
Dept: CARDIOLOGY | Age: 70
End: 2020-08-21

## 2020-08-21 ENCOUNTER — ANTI-COAG (OUTPATIENT)
Dept: CARDIOLOGY | Age: 70
End: 2020-08-21

## 2020-08-21 DIAGNOSIS — I48.0 PAROXYSMAL ATRIAL FIBRILLATION (CMD): ICD-10-CM

## 2020-08-21 LAB — INR PPP: 3.1

## 2020-08-28 ENCOUNTER — TELEPHONE (OUTPATIENT)
Dept: CARDIOLOGY | Age: 70
End: 2020-08-28

## 2020-08-28 ENCOUNTER — ANTI-COAG (OUTPATIENT)
Dept: CARDIOLOGY | Age: 70
End: 2020-08-28

## 2020-08-28 DIAGNOSIS — I48.0 PAROXYSMAL ATRIAL FIBRILLATION (CMD): ICD-10-CM

## 2020-08-28 LAB — INR PPP: 2.5

## 2020-09-04 ENCOUNTER — ANTI-COAG (OUTPATIENT)
Dept: CARDIOLOGY | Age: 70
End: 2020-09-04

## 2020-09-04 DIAGNOSIS — I48.0 PAROXYSMAL ATRIAL FIBRILLATION (CMD): ICD-10-CM

## 2020-09-04 LAB — INR PPP: 2.9

## 2020-09-11 ENCOUNTER — ANTI-COAG (OUTPATIENT)
Dept: CARDIOLOGY | Age: 70
End: 2020-09-11

## 2020-09-11 DIAGNOSIS — I48.0 PAROXYSMAL ATRIAL FIBRILLATION (CMD): ICD-10-CM

## 2020-09-11 LAB — INR PPP: 2.6

## 2020-09-18 ENCOUNTER — ANTI-COAG (OUTPATIENT)
Dept: CARDIOLOGY | Age: 70
End: 2020-09-18

## 2020-09-18 ENCOUNTER — TELEPHONE (OUTPATIENT)
Dept: CARDIOLOGY | Age: 70
End: 2020-09-18

## 2020-09-18 DIAGNOSIS — I48.0 PAROXYSMAL ATRIAL FIBRILLATION (CMD): ICD-10-CM

## 2020-09-18 LAB — INR PPP: 2.9

## 2020-09-23 ENCOUNTER — APPOINTMENT (OUTPATIENT)
Dept: CARDIOLOGY | Age: 70
End: 2020-09-23

## 2020-09-23 ENCOUNTER — ANCILLARY PROCEDURE (OUTPATIENT)
Dept: CARDIOLOGY | Age: 70
End: 2020-09-23
Attending: INTERNAL MEDICINE

## 2020-09-23 ENCOUNTER — APPOINTMENT (OUTPATIENT)
Dept: CARDIOLOGY | Age: 70
End: 2020-09-23
Attending: INTERNAL MEDICINE

## 2020-09-23 DIAGNOSIS — I50.22 CHRONIC SYSTOLIC CONGESTIVE HEART FAILURE (CMD): ICD-10-CM

## 2020-09-23 PROCEDURE — 93306 TTE W/DOPPLER COMPLETE: CPT | Performed by: INTERNAL MEDICINE

## 2020-09-25 ENCOUNTER — ANTI-COAG (OUTPATIENT)
Dept: CARDIOLOGY | Age: 70
End: 2020-09-25

## 2020-09-25 DIAGNOSIS — I48.0 PAROXYSMAL ATRIAL FIBRILLATION (CMD): ICD-10-CM

## 2020-09-25 LAB — INR PPP: 2.7

## 2020-10-02 ENCOUNTER — ANTI-COAG (OUTPATIENT)
Dept: CARDIOLOGY | Age: 70
End: 2020-10-02

## 2020-10-02 DIAGNOSIS — I48.0 PAROXYSMAL ATRIAL FIBRILLATION (CMD): ICD-10-CM

## 2020-10-02 LAB — INR PPP: 2.2

## 2020-10-05 ENCOUNTER — TELEPHONE (OUTPATIENT)
Dept: CARDIOLOGY | Age: 70
End: 2020-10-05

## 2020-10-06 ENCOUNTER — TELEPHONE (OUTPATIENT)
Dept: CARDIOLOGY | Age: 70
End: 2020-10-06

## 2020-10-09 ENCOUNTER — ANTI-COAG (OUTPATIENT)
Dept: CARDIOLOGY | Age: 70
End: 2020-10-09

## 2020-10-09 DIAGNOSIS — I48.0 PAROXYSMAL ATRIAL FIBRILLATION (CMD): ICD-10-CM

## 2020-10-09 LAB — INR PPP: 3.3

## 2020-10-12 ENCOUNTER — V-VISIT (OUTPATIENT)
Dept: CARDIOLOGY | Age: 70
End: 2020-10-12

## 2020-10-12 DIAGNOSIS — I50.22 CHRONIC SYSTOLIC CONGESTIVE HEART FAILURE (CMD): Primary | ICD-10-CM

## 2020-10-12 PROCEDURE — 99214 OFFICE O/P EST MOD 30 MIN: CPT | Performed by: INTERNAL MEDICINE

## 2020-10-12 ASSESSMENT — PATIENT HEALTH QUESTIONNAIRE - PHQ9
CLINICAL INTERPRETATION OF PHQ9 SCORE: NO FURTHER SCREENING NEEDED
1. LITTLE INTEREST OR PLEASURE IN DOING THINGS: NOT AT ALL
2. FEELING DOWN, DEPRESSED OR HOPELESS: NOT AT ALL
SUM OF ALL RESPONSES TO PHQ9 QUESTIONS 1 AND 2: 0
CLINICAL INTERPRETATION OF PHQ2 SCORE: NO FURTHER SCREENING NEEDED
SUM OF ALL RESPONSES TO PHQ9 QUESTIONS 1 AND 2: 0

## 2020-10-12 ASSESSMENT — ENCOUNTER SYMPTOMS
HEMATOCHEZIA: 0
CHILLS: 0
ALLERGIC/IMMUNOLOGIC COMMENTS: NO NEW FOOD ALLERGIES
BRUISES/BLEEDS EASILY: 0
HEMOPTYSIS: 0
SUSPICIOUS LESIONS: 0
SHORTNESS OF BREATH: 1
FEVER: 0
WEIGHT GAIN: 1
COUGH: 0
WEIGHT LOSS: 0

## 2020-10-16 ENCOUNTER — ANTI-COAG (OUTPATIENT)
Dept: CARDIOLOGY | Age: 70
End: 2020-10-16

## 2020-10-16 DIAGNOSIS — I48.0 PAROXYSMAL ATRIAL FIBRILLATION (CMD): ICD-10-CM

## 2020-10-16 LAB — INR PPP: 2.1

## 2020-10-23 ENCOUNTER — ANTI-COAG (OUTPATIENT)
Dept: CARDIOLOGY | Age: 70
End: 2020-10-23

## 2020-10-23 DIAGNOSIS — I48.0 PAROXYSMAL ATRIAL FIBRILLATION (CMD): ICD-10-CM

## 2020-10-23 LAB — INR PPP: 3.2

## 2020-10-26 ENCOUNTER — TELEPHONE (OUTPATIENT)
Dept: CARDIOLOGY | Age: 70
End: 2020-10-26

## 2020-10-26 DIAGNOSIS — I50.22 CHRONIC SYSTOLIC CONGESTIVE HEART FAILURE (CMD): Primary | ICD-10-CM

## 2020-10-29 ENCOUNTER — TELEPHONE (OUTPATIENT)
Dept: CARDIOLOGY | Age: 70
End: 2020-10-29

## 2020-10-30 LAB
ANION GAP SERPL CALC-SCNC: 9 MMOL/L (ref 10–20)
BASOPHILS # BLD AUTO: 0 K/MCL (ref 0–0.3)
BASOPHILS NFR BLD AUTO: 1 %
BUN SERPL-MCNC: 71 MG/DL
BUN SERPL-MCNC: 71 MG/DL (ref 6–20)
BUN/CREAT SERPL: 49 (ref 7–25)
CALCIUM SERPL-MCNC: 8 MG/DL
CALCIUM SERPL-MCNC: 8 MG/DL (ref 8.4–10.2)
CHLORIDE SERPL-SCNC: 106 MMOL/L
CHLORIDE SERPL-SCNC: 106 MMOL/L (ref 98–107)
CO2 SERPL-SCNC: 30 MMOL/L (ref 21–32)
CREAT SERPL-MCNC: 1.44 MG/DL
CREAT SERPL-MCNC: 1.44 MG/DL (ref 0.51–0.95)
DIFFERENTIAL METHOD BLD: ABNORMAL
EOSINOPHIL # BLD AUTO: 0.2 K/MCL (ref 0.1–0.5)
EOSINOPHIL NFR SPEC: 4 %
ERYTHROCYTE [DISTWIDTH] IN BLOOD: 16 % (ref 11–15)
FASTING STATUS PATIENT QL REPORTED: ABNORMAL HRS
GLUCOSE SERPL-MCNC: 104 MG/DL
GLUCOSE SERPL-MCNC: 104 MG/DL (ref 65–99)
HCT VFR BLD CALC: 42.2 %
HCT VFR BLD CALC: 42.2 % (ref 36–46.5)
HGB BLD-MCNC: 13 G/DL
HGB BLD-MCNC: 13 G/DL (ref 12–15.5)
IMM GRANULOCYTES # BLD AUTO: 0 K/MCL (ref 0–0.2)
IMM GRANULOCYTES NFR BLD: 0 %
LYMPHOCYTES # BLD MANUAL: 0.9 K/MCL (ref 1–4)
LYMPHOCYTES NFR BLD MANUAL: 16 %
MCH RBC QN AUTO: 29.5 PG (ref 26–34)
MCHC RBC AUTO-ENTMCNC: 30.8 G/DL (ref 32–36.5)
MCV RBC AUTO: 95.7 FL (ref 78–100)
MONOCYTES # BLD MANUAL: 0.5 K/MCL (ref 0.3–0.9)
MONOCYTES NFR BLD MANUAL: 10 %
NEUTROPHILS # BLD: 3.8 K/MCL (ref 1.8–7.7)
NEUTROPHILS NFR BLD AUTO: 69 %
NRBC BLD MANUAL-RTO: 0 /100 WBC
NT-PROBNP SERPL-MCNC: 492 PG/ML
NT-PROBNP SERPL-MCNC: 492 PG/ML
PLATELET # BLD: 150 K/MCL
PLATELET # BLD: 150 K/MCL (ref 140–450)
POTASSIUM SERPL-SCNC: 4 MMOL/L
POTASSIUM SERPL-SCNC: 4 MMOL/L (ref 3.4–5.1)
RBC # BLD: 4.41 10*6/UL
RBC # BLD: 4.41 MIL/MCL (ref 4–5.2)
SODIUM SERPL-SCNC: 141 MMOL/L
SODIUM SERPL-SCNC: 141 MMOL/L (ref 135–145)
WBC # BLD: 5.5 K/MCL
WBC # BLD: 5.5 K/MCL (ref 4.2–11)

## 2020-11-02 ENCOUNTER — CLINICAL ABSTRACT (OUTPATIENT)
Dept: CARDIOLOGY | Age: 70
End: 2020-11-02

## 2020-11-03 ENCOUNTER — TELEPHONE (OUTPATIENT)
Dept: CARDIOLOGY | Age: 70
End: 2020-11-03

## 2020-11-03 DIAGNOSIS — I50.22 CHRONIC SYSTOLIC CONGESTIVE HEART FAILURE (CMD): Primary | ICD-10-CM

## 2020-11-05 ENCOUNTER — TELEPHONE (OUTPATIENT)
Dept: CARDIOLOGY | Age: 70
End: 2020-11-05

## 2020-11-06 ENCOUNTER — ANTI-COAG (OUTPATIENT)
Dept: CARDIOLOGY | Age: 70
End: 2020-11-06

## 2020-11-06 DIAGNOSIS — I48.0 PAROXYSMAL ATRIAL FIBRILLATION (CMD): ICD-10-CM

## 2020-11-06 LAB — INR PPP: 4.2

## 2020-11-07 ENCOUNTER — TELEPHONE (OUTPATIENT)
Dept: CARDIOLOGY | Age: 70
End: 2020-11-07

## 2020-11-10 RX ORDER — SACUBITRIL AND VALSARTAN 97; 103 MG/1; MG/1
1 TABLET, FILM COATED ORAL 2 TIMES DAILY
Qty: 180 TABLET | Refills: 3 | Status: SHIPPED | OUTPATIENT
Start: 2020-11-10 | End: 2022-02-04

## 2020-11-11 RX ORDER — CARVEDILOL 25 MG/1
TABLET ORAL
Qty: 180 TABLET | Refills: 0 | Status: SHIPPED | OUTPATIENT
Start: 2020-11-11 | End: 2021-02-08

## 2020-11-11 RX ORDER — POTASSIUM CHLORIDE 20 MEQ/1
TABLET, EXTENDED RELEASE ORAL
Qty: 90 TABLET | Refills: 0 | Status: SHIPPED | OUTPATIENT
Start: 2020-11-11 | End: 2021-01-25 | Stop reason: DRUGHIGH

## 2020-11-13 ENCOUNTER — ANTI-COAG (OUTPATIENT)
Dept: CARDIOLOGY | Age: 70
End: 2020-11-13

## 2020-11-13 DIAGNOSIS — I48.0 PAROXYSMAL ATRIAL FIBRILLATION (CMD): ICD-10-CM

## 2020-11-13 LAB — INR PPP: 1.9

## 2020-11-20 ENCOUNTER — ANTI-COAG (OUTPATIENT)
Dept: CARDIOLOGY | Age: 70
End: 2020-11-20

## 2020-11-20 DIAGNOSIS — I48.0 PAROXYSMAL ATRIAL FIBRILLATION (CMD): ICD-10-CM

## 2020-11-20 LAB — INR PPP: 2.1

## 2020-11-27 ENCOUNTER — ANTI-COAG (OUTPATIENT)
Dept: CARDIOLOGY | Age: 70
End: 2020-11-27

## 2020-11-27 DIAGNOSIS — I48.0 PAROXYSMAL ATRIAL FIBRILLATION (CMD): ICD-10-CM

## 2020-11-27 LAB — INR PPP: 2.6

## 2020-12-02 ENCOUNTER — ANCILLARY ORDERS (OUTPATIENT)
Dept: CARDIOLOGY | Age: 70
End: 2020-12-02

## 2020-12-02 ENCOUNTER — TELEPHONE (OUTPATIENT)
Dept: CARDIOLOGY | Age: 70
End: 2020-12-02

## 2020-12-02 ENCOUNTER — ANCILLARY PROCEDURE (OUTPATIENT)
Dept: CARDIOLOGY | Age: 70
End: 2020-12-02
Attending: INTERNAL MEDICINE

## 2020-12-02 DIAGNOSIS — Z95.810 ICD (IMPLANTABLE CARDIOVERTER-DEFIBRILLATOR) IN PLACE: ICD-10-CM

## 2020-12-02 PROCEDURE — X1114 CARDIAC DEVICE HOME CHECK - REMOTE UNSCHEDULED: HCPCS | Performed by: INTERNAL MEDICINE

## 2020-12-02 PROCEDURE — 93295 DEV INTERROG REMOTE 1/2/MLT: CPT | Performed by: INTERNAL MEDICINE

## 2020-12-04 ENCOUNTER — ANTI-COAG (OUTPATIENT)
Dept: CARDIOLOGY | Age: 70
End: 2020-12-04

## 2020-12-04 DIAGNOSIS — I48.0 PAROXYSMAL ATRIAL FIBRILLATION (CMD): ICD-10-CM

## 2020-12-04 LAB — INR PPP: 5.2

## 2020-12-07 ENCOUNTER — ANTI-COAG (OUTPATIENT)
Dept: CARDIOLOGY | Age: 70
End: 2020-12-07

## 2020-12-07 DIAGNOSIS — I48.0 PAROXYSMAL ATRIAL FIBRILLATION (CMD): ICD-10-CM

## 2020-12-07 LAB — INR PPP: 2.2

## 2020-12-08 ENCOUNTER — APPOINTMENT (OUTPATIENT)
Dept: CARDIOLOGY | Age: 70
End: 2020-12-08
Attending: INTERNAL MEDICINE

## 2020-12-11 ENCOUNTER — TELEPHONE (OUTPATIENT)
Dept: CARDIOLOGY | Age: 70
End: 2020-12-11

## 2020-12-11 ENCOUNTER — ANTI-COAG (OUTPATIENT)
Dept: CARDIOLOGY | Age: 70
End: 2020-12-11

## 2020-12-11 DIAGNOSIS — I48.0 PAROXYSMAL ATRIAL FIBRILLATION (CMD): ICD-10-CM

## 2020-12-11 LAB — INR PPP: 1.5

## 2020-12-14 ENCOUNTER — ANTI-COAG (OUTPATIENT)
Dept: CARDIOLOGY | Age: 70
End: 2020-12-14

## 2020-12-14 DIAGNOSIS — I48.0 PAROXYSMAL ATRIAL FIBRILLATION (CMD): ICD-10-CM

## 2020-12-14 LAB — INR PPP: 2.2

## 2020-12-18 ENCOUNTER — ANTI-COAG (OUTPATIENT)
Dept: CARDIOLOGY | Age: 70
End: 2020-12-18

## 2020-12-18 DIAGNOSIS — I48.0 PAROXYSMAL ATRIAL FIBRILLATION (CMD): ICD-10-CM

## 2020-12-18 LAB — INR PPP: 1.9

## 2020-12-28 ENCOUNTER — ANTI-COAG (OUTPATIENT)
Dept: CARDIOLOGY | Age: 70
End: 2020-12-28

## 2020-12-28 DIAGNOSIS — I48.0 PAROXYSMAL ATRIAL FIBRILLATION (CMD): ICD-10-CM

## 2020-12-28 LAB — INR PPP: 2.7

## 2020-12-29 ENCOUNTER — TELEPHONE (OUTPATIENT)
Dept: CARDIOLOGY | Age: 70
End: 2020-12-29

## 2021-01-01 ENCOUNTER — EXTERNAL RECORD (OUTPATIENT)
Dept: OTHER | Age: 71
End: 2021-01-01

## 2021-01-01 ENCOUNTER — EXTERNAL RECORD (OUTPATIENT)
Dept: HEALTH INFORMATION MANAGEMENT | Facility: OTHER | Age: 71
End: 2021-01-01

## 2021-01-05 ENCOUNTER — TELEPHONE (OUTPATIENT)
Dept: CARDIOLOGY | Age: 71
End: 2021-01-05

## 2021-01-05 ENCOUNTER — ANTI-COAG (OUTPATIENT)
Dept: CARDIOLOGY | Age: 71
End: 2021-01-05

## 2021-01-05 DIAGNOSIS — I48.0 PAROXYSMAL ATRIAL FIBRILLATION (CMD): ICD-10-CM

## 2021-01-05 LAB — INR PPP: 3.4

## 2021-01-07 ENCOUNTER — ANTI-COAG (OUTPATIENT)
Dept: CARDIOLOGY | Age: 71
End: 2021-01-07

## 2021-01-07 DIAGNOSIS — I48.0 PAROXYSMAL ATRIAL FIBRILLATION (CMD): ICD-10-CM

## 2021-01-07 LAB — INR PPP: 3.1

## 2021-01-11 RX ORDER — EPLERENONE 25 MG/1
25 TABLET, FILM COATED ORAL DAILY
Qty: 30 TABLET | Refills: 11 | Status: SHIPPED | OUTPATIENT
Start: 2021-01-11 | End: 2022-01-04

## 2021-01-12 ENCOUNTER — ANTI-COAG (OUTPATIENT)
Dept: CARDIOLOGY | Age: 71
End: 2021-01-12

## 2021-01-12 DIAGNOSIS — I48.0 PAROXYSMAL ATRIAL FIBRILLATION (CMD): ICD-10-CM

## 2021-01-12 LAB — INR PPP: 1.2

## 2021-01-15 ENCOUNTER — ANTI-COAG (OUTPATIENT)
Dept: CARDIOLOGY | Age: 71
End: 2021-01-15

## 2021-01-15 DIAGNOSIS — I48.0 PAROXYSMAL ATRIAL FIBRILLATION (CMD): ICD-10-CM

## 2021-01-15 LAB — INR PPP: 1.1

## 2021-01-17 LAB
25(OH)D3+25(OH)D2 SERPL-MCNC: 24.3 NG/ML
ABSOLUTE IMMATURE GRANULOCYTES (OFFPRE24): NORMAL
ABSOLUTE NRBC (AUTO): NORMAL
ALBUMIN SERPL-MCNC: 3.8 G/DL
ALBUMIN/GLOB SERPL: NORMAL {RATIO}
ALP SERPL-CCNC: NORMAL U/L
ALT SERPL-CCNC: 19 UNITS/L
ANION GAP SERPL CALC-SCNC: NORMAL MMOL/L
AST SERPL-CCNC: 22 UNITS/L
BASO+EOS+MONOS # BLD: NORMAL 10*3/UL
BASO+EOS+MONOS NFR BLD: NORMAL %
BASOPHILS # BLD: NORMAL 10*3/UL
BASOPHILS NFR BLD: NORMAL %
BILIRUB SERPL-MCNC: 8.3 MG/DL
BUN SERPL-MCNC: 34 MG/DL
BUN/CREAT SERPL: NORMAL
CALCIUM SERPL-MCNC: 6.9 MG/DL
CALCIUM, CORRECTED: NORMAL
CHLORIDE SERPL-SCNC: 101 MMOL/L
CHOLEST SERPL-MCNC: 146 MG/DL
CHOLEST/HDLC SERPL: NORMAL {RATIO}
CO2 SERPL-SCNC: NORMAL MMOL/L
CREAT SERPL-MCNC: 1.3 MG/DL
DIFFERENTIAL METHOD BLD: NORMAL
EOSINOPHIL # BLD: NORMAL 10*3/UL
EOSINOPHIL NFR BLD: NORMAL %
ERYTHROCYTE [DISTWIDTH] IN BLOOD BY AUTOMATED COUNT: NORMAL %
ERYTHROCYTE [DISTWIDTH] IN BLOOD: NORMAL %
GLOBULIN SER-MCNC: NORMAL G/DL
GLUCOSE SERPL-MCNC: 111 MG/DL
HCT CALC (HGB X3) (OFFPRE23): NORMAL
HCT VFR BLD CALC: 42.5 %
HDLC SERPL-MCNC: 43 MG/DL
HGB BLD-MCNC: 12.8 G/DL
IMMATURE GRANULOCYTES (OFFPRE25): NORMAL
LDL/HDL (OFFPRE3): NORMAL
LDLC SERPL CALC-MCNC: 68 MG/DL
LENGTH OF FAST TIME PATIENT: NORMAL H
LENGTH OF FAST TIME PATIENT: NORMAL H
LYMPHOCYTES # BLD: NORMAL 10*3/UL
LYMPHOCYTES NFR BLD: NORMAL %
MCH RBC QN AUTO: 29 PG
MCHC RBC AUTO-ENTMCNC: 30 G/DL
MCV RBC AUTO: 95 FL
MONOCYTES # BLD: NORMAL 10*3/UL
MONOCYTES NFR BLD: NORMAL %
MPV (OFFPRE2): NORMAL
NEUTROPHILS # BLD: NORMAL 10*3/UL
NEUTROPHILS NFR BLD: NORMAL %
NONHDLC SERPL-MCNC: NORMAL MG/DL
NRBC BLD MANUAL-RTO: NORMAL %
PLAT MORPH BLD: NORMAL
PLATELET # BLD: 152 K/MCL
POTASSIUM SERPL-SCNC: 4.2 MMOL/L
PROT SERPL-MCNC: 6.6 G/DL
RBC # BLD: 4.5 10*6/UL
RBC MORPH BLD: NORMAL
SODIUM SERPL-SCNC: 141 MMOL/L
TRIG/HDL: NORMAL
TRIGL SERPL-MCNC: 143 MG/DL
TSH SERPL-ACNC: 17 MCUNITS/ML
TSH SERPL-ACNC: 3.14 MCUNITS/ML
VLDLC SERPL CALC-MCNC: NORMAL MG/DL
WBC # BLD: 6.5 K/MCL
WBC MORPH BLD: NORMAL

## 2021-01-18 LAB
25(OH)D3+25(OH)D2 SERPL-MCNC: 24.3 NG/ML
ALBUMIN SERPL-MCNC: 3.8 G/DL
ALP SERPL-CCNC: 128 U/L
ALT SERPL-CCNC: 19 UNITS/L
AST SERPL-CCNC: 22 UNITS/L
BILIRUB SERPL-MCNC: 0.3 MG/DL
BUN SERPL-MCNC: 34 MG/DL
CALCIUM SERPL-MCNC: 8.9 MG/DL
CHLORIDE SERPL-SCNC: 11 MMOL/L
CHOLEST SERPL-MCNC: 145 MG/DL
CREAT SERPL-MCNC: 1.3 MG/DL
GLUCOSE SERPL-MCNC: 111 MG/DL
HCT VFR BLD CALC: 42.5 %
HDLC SERPL-MCNC: 49 MG/DL
HGB BLD-MCNC: 12.8 G/DL
LDLC SERPL CALC-MCNC: 63 MG/DL
NONHDLC SERPL-MCNC: 97 MG/DL
PLATELET # BLD: 152 K/MCL
POTASSIUM SERPL-SCNC: 4.2 MMOL/L
PROT SERPL-MCNC: 6.5 G/DL
RBC # BLD: 4.5 10*6/UL
SODIUM SERPL-SCNC: 141 MMOL/L
TRIGL SERPL-MCNC: 143 MG/DL
TSH: 3.14 MCUNITS/ML
URATE SERPL-MCNC: 7 MG/DL
WBC # BLD: 6.5 K/MCL

## 2021-01-19 ENCOUNTER — ANTI-COAG (OUTPATIENT)
Dept: CARDIOLOGY | Age: 71
End: 2021-01-19

## 2021-01-19 DIAGNOSIS — I48.0 PAROXYSMAL ATRIAL FIBRILLATION (CMD): ICD-10-CM

## 2021-01-19 LAB — INR PPP: 1

## 2021-01-22 ENCOUNTER — ANTI-COAG (OUTPATIENT)
Dept: CARDIOLOGY | Age: 71
End: 2021-01-22

## 2021-01-22 DIAGNOSIS — I48.0 PAROXYSMAL ATRIAL FIBRILLATION (CMD): ICD-10-CM

## 2021-01-22 LAB — INR PPP: 1

## 2021-01-25 ENCOUNTER — TELEPHONE (OUTPATIENT)
Dept: CARDIOLOGY | Age: 71
End: 2021-01-25

## 2021-01-25 ENCOUNTER — OFFICE VISIT (OUTPATIENT)
Dept: CARDIOLOGY | Age: 71
End: 2021-01-25

## 2021-01-25 VITALS
SYSTOLIC BLOOD PRESSURE: 130 MMHG | HEART RATE: 72 BPM | RESPIRATION RATE: 18 BRPM | HEIGHT: 64 IN | DIASTOLIC BLOOD PRESSURE: 70 MMHG | BODY MASS INDEX: 50.02 KG/M2 | WEIGHT: 293 LBS

## 2021-01-25 DIAGNOSIS — I50.22 CHRONIC SYSTOLIC CONGESTIVE HEART FAILURE (CMD): Primary | ICD-10-CM

## 2021-01-25 DIAGNOSIS — E66.01 CLASS 3 SEVERE OBESITY WITH SERIOUS COMORBIDITY AND BODY MASS INDEX (BMI) OF 50.0 TO 59.9 IN ADULT, UNSPECIFIED OBESITY TYPE (CMD): ICD-10-CM

## 2021-01-25 PROCEDURE — 99215 OFFICE O/P EST HI 40 MIN: CPT | Performed by: INTERNAL MEDICINE

## 2021-01-25 RX ORDER — POTASSIUM CHLORIDE 20 MEQ/1
20 TABLET, EXTENDED RELEASE ORAL EVERY OTHER DAY
Status: SHIPPED | COMMUNITY
Start: 2021-01-25 | End: 2021-02-08

## 2021-01-25 RX ORDER — TORSEMIDE 20 MG/1
TABLET ORAL
Qty: 450 TABLET | Refills: 3 | Status: SHIPPED | OUTPATIENT
Start: 2021-01-25 | End: 2021-06-25 | Stop reason: SDUPTHER

## 2021-01-25 RX ORDER — TIOTROPIUM BROMIDE INHALATION SPRAY 3.12 UG/1
2 SPRAY, METERED RESPIRATORY (INHALATION) DAILY
COMMUNITY

## 2021-01-25 RX ORDER — ALBUTEROL SULFATE 2.5 MG/3ML
2.5 SOLUTION RESPIRATORY (INHALATION) 2 TIMES DAILY
COMMUNITY
Start: 2020-11-30 | End: 2022-10-05 | Stop reason: SDUPTHER

## 2021-01-25 SDOH — HEALTH STABILITY: MENTAL HEALTH: HOW OFTEN DO YOU HAVE A DRINK CONTAINING ALCOHOL?: MONTHLY OR LESS

## 2021-01-25 SDOH — HEALTH STABILITY: PHYSICAL HEALTH: ON AVERAGE, HOW MANY DAYS PER WEEK DO YOU ENGAGE IN MODERATE TO STRENUOUS EXERCISE (LIKE A BRISK WALK)?: 0 DAYS

## 2021-01-25 SDOH — HEALTH STABILITY: PHYSICAL HEALTH: ON AVERAGE, HOW MANY MINUTES DO YOU ENGAGE IN EXERCISE AT THIS LEVEL?: 0 MIN

## 2021-01-25 ASSESSMENT — ENCOUNTER SYMPTOMS
SUSPICIOUS LESIONS: 0
HEMATOCHEZIA: 0
WEIGHT GAIN: 1
HEMOPTYSIS: 0
BRUISES/BLEEDS EASILY: 0
CHILLS: 0
FEVER: 0
ALLERGIC/IMMUNOLOGIC COMMENTS: NO NEW FOOD ALLERGIES
COUGH: 0
NERVOUS/ANXIOUS: 1
WEIGHT LOSS: 0

## 2021-01-25 ASSESSMENT — PATIENT HEALTH QUESTIONNAIRE - PHQ9
1. LITTLE INTEREST OR PLEASURE IN DOING THINGS: NOT AT ALL
SUM OF ALL RESPONSES TO PHQ9 QUESTIONS 1 AND 2: 0
CLINICAL INTERPRETATION OF PHQ9 SCORE: NO FURTHER SCREENING NEEDED
SUM OF ALL RESPONSES TO PHQ9 QUESTIONS 1 AND 2: 0
CLINICAL INTERPRETATION OF PHQ2 SCORE: NO FURTHER SCREENING NEEDED
2. FEELING DOWN, DEPRESSED OR HOPELESS: NOT AT ALL

## 2021-01-26 ENCOUNTER — CLINICAL ABSTRACT (OUTPATIENT)
Dept: CARDIOLOGY | Age: 71
End: 2021-01-26

## 2021-01-26 ENCOUNTER — ANTI-COAG (OUTPATIENT)
Dept: CARDIOLOGY | Age: 71
End: 2021-01-26

## 2021-01-26 DIAGNOSIS — I48.0 PAROXYSMAL ATRIAL FIBRILLATION (CMD): ICD-10-CM

## 2021-01-26 LAB — INR PPP: 1.3

## 2021-01-27 DIAGNOSIS — Z23 NEED FOR VACCINATION: ICD-10-CM

## 2021-01-28 ENCOUNTER — TELEPHONE (OUTPATIENT)
Dept: CARDIOLOGY | Age: 71
End: 2021-01-28

## 2021-01-28 DIAGNOSIS — I50.22 CHRONIC SYSTOLIC CONGESTIVE HEART FAILURE (CMD): ICD-10-CM

## 2021-01-28 PROCEDURE — 93000 ELECTROCARDIOGRAM COMPLETE: CPT | Performed by: INTERNAL MEDICINE

## 2021-01-29 ENCOUNTER — ANTI-COAG (OUTPATIENT)
Dept: CARDIOLOGY | Age: 71
End: 2021-01-29

## 2021-01-29 DIAGNOSIS — I48.0 PAROXYSMAL ATRIAL FIBRILLATION (CMD): ICD-10-CM

## 2021-01-29 LAB — INR PPP: 1.6

## 2021-02-02 ENCOUNTER — DOCUMENTATION (OUTPATIENT)
Dept: CARDIOLOGY | Age: 71
End: 2021-02-02

## 2021-02-02 ENCOUNTER — TELEPHONE (OUTPATIENT)
Dept: CARDIOLOGY | Age: 71
End: 2021-02-02

## 2021-02-03 ENCOUNTER — ANTI-COAG (OUTPATIENT)
Dept: CARDIOLOGY | Age: 71
End: 2021-02-03

## 2021-02-03 DIAGNOSIS — I48.0 PAROXYSMAL ATRIAL FIBRILLATION (CMD): ICD-10-CM

## 2021-02-03 LAB — INR PPP: 2.2

## 2021-02-05 ENCOUNTER — CLINICAL ABSTRACT (OUTPATIENT)
Dept: CARDIOLOGY | Age: 71
End: 2021-02-05

## 2021-02-08 RX ORDER — CARVEDILOL 25 MG/1
TABLET ORAL
Qty: 180 TABLET | Refills: 0 | Status: SHIPPED | OUTPATIENT
Start: 2021-02-08 | End: 2021-05-10

## 2021-02-08 RX ORDER — POTASSIUM CHLORIDE 20 MEQ/1
TABLET, EXTENDED RELEASE ORAL
Qty: 90 TABLET | Refills: 0 | Status: SHIPPED | OUTPATIENT
Start: 2021-02-08 | End: 2021-02-24 | Stop reason: DRUGHIGH

## 2021-02-10 ENCOUNTER — LAB REQUISITION (OUTPATIENT)
Dept: LAB | Age: 71
End: 2021-02-10

## 2021-02-10 DIAGNOSIS — I50.40 UNSPECIFIED COMBINED SYSTOLIC (CONGESTIVE) AND DIASTOLIC (CONGESTIVE) HEART FAILURE (CMD): ICD-10-CM

## 2021-02-10 LAB
ANION GAP SERPL CALC-SCNC: 7 MMOL/L (ref 10–20)
BASOPHILS # BLD: 0 K/MCL (ref 0–0.3)
BASOPHILS NFR BLD: 1 %
BUN SERPL-MCNC: 50 MG/DL (ref 6–20)
BUN/CREAT SERPL: 37 (ref 7–25)
CALCIUM SERPL-MCNC: 8 MG/DL (ref 8.4–10.2)
CHLORIDE SERPL-SCNC: 103 MMOL/L (ref 98–107)
CO2 SERPL-SCNC: 34 MMOL/L (ref 21–32)
CREAT SERPL-MCNC: 1.35 MG/DL (ref 0.51–0.95)
DEPRECATED RDW RBC: 53.6 FL (ref 39–50)
EOSINOPHIL # BLD: 0.2 K/MCL (ref 0–0.5)
EOSINOPHIL NFR BLD: 3 %
ERYTHROCYTE [DISTWIDTH] IN BLOOD: 15.1 % (ref 11–15)
FASTING DURATION TIME PATIENT: ABNORMAL H
GFR SERPLBLD BASED ON 1.73 SQ M-ARVRAT: 40 ML/MIN/1.73M2
GLUCOSE SERPL-MCNC: 95 MG/DL (ref 65–99)
HCT VFR BLD CALC: 42 % (ref 36–46.5)
HGB BLD-MCNC: 12.9 G/DL (ref 12–15.5)
IMM GRANULOCYTES # BLD AUTO: 0 K/MCL (ref 0–0.2)
IMM GRANULOCYTES # BLD: 0 %
LYMPHOCYTES # BLD: 1.1 K/MCL (ref 1–4)
LYMPHOCYTES NFR BLD: 19 %
MAGNESIUM SERPL-MCNC: 2.4 MG/DL (ref 1.7–2.4)
MCH RBC QN AUTO: 29.4 PG (ref 26–34)
MCHC RBC AUTO-ENTMCNC: 30.7 G/DL (ref 32–36.5)
MCV RBC AUTO: 95.7 FL (ref 78–100)
MONOCYTES # BLD: 0.6 K/MCL (ref 0.3–0.9)
MONOCYTES NFR BLD: 10 %
NEUTROPHILS # BLD: 3.7 K/MCL (ref 1.8–7.7)
NEUTROPHILS NFR BLD: 67 %
NRBC BLD MANUAL-RTO: 0 /100 WBC
PLATELET # BLD AUTO: 137 K/MCL (ref 140–450)
POTASSIUM SERPL-SCNC: 4.4 MMOL/L (ref 3.4–5.1)
RBC # BLD: 4.39 MIL/MCL (ref 4–5.2)
SODIUM SERPL-SCNC: 140 MMOL/L (ref 135–145)
WBC # BLD: 5.6 K/MCL (ref 4.2–11)

## 2021-02-10 PROCEDURE — 80048 BASIC METABOLIC PNL TOTAL CA: CPT | Performed by: CLINICAL MEDICAL LABORATORY

## 2021-02-10 PROCEDURE — 85025 COMPLETE CBC W/AUTO DIFF WBC: CPT | Performed by: CLINICAL MEDICAL LABORATORY

## 2021-02-10 PROCEDURE — 83735 ASSAY OF MAGNESIUM: CPT | Performed by: CLINICAL MEDICAL LABORATORY

## 2021-02-11 ENCOUNTER — TELEPHONE (OUTPATIENT)
Dept: CARDIOLOGY | Age: 71
End: 2021-02-11

## 2021-02-11 DIAGNOSIS — E66.01 CLASS 3 SEVERE OBESITY WITH SERIOUS COMORBIDITY AND BODY MASS INDEX (BMI) OF 50.0 TO 59.9 IN ADULT, UNSPECIFIED OBESITY TYPE (CMD): ICD-10-CM

## 2021-02-11 DIAGNOSIS — R60.0 BILATERAL LEG EDEMA: ICD-10-CM

## 2021-02-11 DIAGNOSIS — L97.919 ULCERS OF BOTH LOWER LEGS (CMD): ICD-10-CM

## 2021-02-11 DIAGNOSIS — I50.22 CHRONIC SYSTOLIC CONGESTIVE HEART FAILURE (CMD): ICD-10-CM

## 2021-02-11 DIAGNOSIS — L97.929 ULCERS OF BOTH LOWER LEGS (CMD): ICD-10-CM

## 2021-02-11 DIAGNOSIS — L03.119 CELLULITIS OF LOWER EXTREMITY, UNSPECIFIED LATERALITY: Primary | ICD-10-CM

## 2021-02-12 ENCOUNTER — ANTI-COAG (OUTPATIENT)
Dept: CARDIOLOGY | Age: 71
End: 2021-02-12

## 2021-02-12 DIAGNOSIS — I48.0 PAROXYSMAL ATRIAL FIBRILLATION (CMD): ICD-10-CM

## 2021-02-12 LAB — INR PPP: 6

## 2021-02-15 ENCOUNTER — ANTI-COAG (OUTPATIENT)
Dept: CARDIOLOGY | Age: 71
End: 2021-02-15

## 2021-02-15 DIAGNOSIS — I48.0 PAROXYSMAL ATRIAL FIBRILLATION (CMD): ICD-10-CM

## 2021-02-15 LAB — INR PPP: 2.3

## 2021-02-18 ENCOUNTER — TELEPHONE (OUTPATIENT)
Dept: CARDIOLOGY | Age: 71
End: 2021-02-18

## 2021-02-18 ENCOUNTER — ANCILLARY PROCEDURE (OUTPATIENT)
Dept: CARDIOLOGY | Age: 71
End: 2021-02-18
Attending: INTERNAL MEDICINE

## 2021-02-18 ENCOUNTER — ANCILLARY ORDERS (OUTPATIENT)
Dept: CARDIOLOGY | Age: 71
End: 2021-02-18

## 2021-02-18 DIAGNOSIS — Z95.810 ICD (IMPLANTABLE CARDIOVERTER-DEFIBRILLATOR) IN PLACE: ICD-10-CM

## 2021-02-18 PROCEDURE — X1114 CARDIAC DEVICE HOME CHECK - REMOTE UNSCHEDULED: HCPCS | Performed by: INTERNAL MEDICINE

## 2021-02-19 ENCOUNTER — ANTI-COAG (OUTPATIENT)
Dept: CARDIOLOGY | Age: 71
End: 2021-02-19

## 2021-02-19 DIAGNOSIS — I48.0 PAROXYSMAL ATRIAL FIBRILLATION (CMD): ICD-10-CM

## 2021-02-19 LAB — INR PPP: 2.4

## 2021-02-19 PROCEDURE — X1094 NO CHARGE VISIT: HCPCS | Performed by: INTERNAL MEDICINE

## 2021-02-23 ENCOUNTER — ANTI-COAG (OUTPATIENT)
Dept: CARDIOLOGY | Age: 71
End: 2021-02-23

## 2021-02-23 DIAGNOSIS — I48.0 PAROXYSMAL ATRIAL FIBRILLATION (CMD): ICD-10-CM

## 2021-02-23 LAB — INR PPP: 3.2

## 2021-02-24 ENCOUNTER — OFFICE VISIT (OUTPATIENT)
Dept: CARDIOLOGY | Age: 71
End: 2021-02-24

## 2021-02-24 VITALS
BODY MASS INDEX: 50.02 KG/M2 | HEART RATE: 63 BPM | DIASTOLIC BLOOD PRESSURE: 60 MMHG | WEIGHT: 293 LBS | RESPIRATION RATE: 18 BRPM | HEIGHT: 64 IN | SYSTOLIC BLOOD PRESSURE: 92 MMHG

## 2021-02-24 DIAGNOSIS — E55.9 VITAMIN D DEFICIENCY: ICD-10-CM

## 2021-02-24 DIAGNOSIS — I50.22 CHRONIC SYSTOLIC CONGESTIVE HEART FAILURE (CMD): Primary | ICD-10-CM

## 2021-02-24 DIAGNOSIS — E78.5 DYSLIPIDEMIA: ICD-10-CM

## 2021-02-24 PROCEDURE — 99214 OFFICE O/P EST MOD 30 MIN: CPT | Performed by: INTERNAL MEDICINE

## 2021-02-24 RX ORDER — SOTALOL HYDROCHLORIDE 80 MG/1
80 TABLET ORAL 2 TIMES DAILY
COMMUNITY
Start: 2021-02-09 | End: 2021-05-18

## 2021-02-24 RX ORDER — POTASSIUM CHLORIDE 20 MEQ/1
20 TABLET, EXTENDED RELEASE ORAL EVERY OTHER DAY
Status: SHIPPED | COMMUNITY
Start: 2021-02-24 | End: 2022-02-21

## 2021-02-24 SDOH — HEALTH STABILITY: PHYSICAL HEALTH: ON AVERAGE, HOW MANY DAYS PER WEEK DO YOU ENGAGE IN MODERATE TO STRENUOUS EXERCISE (LIKE A BRISK WALK)?: 0 DAYS

## 2021-02-24 SDOH — HEALTH STABILITY: MENTAL HEALTH: HOW OFTEN DO YOU HAVE A DRINK CONTAINING ALCOHOL?: MONTHLY OR LESS

## 2021-02-24 SDOH — HEALTH STABILITY: PHYSICAL HEALTH: ON AVERAGE, HOW MANY MINUTES DO YOU ENGAGE IN EXERCISE AT THIS LEVEL?: 0 MIN

## 2021-02-24 ASSESSMENT — ENCOUNTER SYMPTOMS
BRUISES/BLEEDS EASILY: 0
SUSPICIOUS LESIONS: 0
CHILLS: 0
HEMOPTYSIS: 0
FEVER: 0
COUGH: 0
WEIGHT LOSS: 1
ALLERGIC/IMMUNOLOGIC COMMENTS: NO NEW FOOD ALLERGIES
NERVOUS/ANXIOUS: 1
HEMATOCHEZIA: 0

## 2021-02-24 ASSESSMENT — PATIENT HEALTH QUESTIONNAIRE - PHQ9
1. LITTLE INTEREST OR PLEASURE IN DOING THINGS: NOT AT ALL
CLINICAL INTERPRETATION OF PHQ2 SCORE: NO FURTHER SCREENING NEEDED
CLINICAL INTERPRETATION OF PHQ9 SCORE: NO FURTHER SCREENING NEEDED
SUM OF ALL RESPONSES TO PHQ9 QUESTIONS 1 AND 2: 0
SUM OF ALL RESPONSES TO PHQ9 QUESTIONS 1 AND 2: 0
2. FEELING DOWN, DEPRESSED OR HOPELESS: NOT AT ALL

## 2021-03-03 ENCOUNTER — ANTI-COAG (OUTPATIENT)
Dept: CARDIOLOGY | Age: 71
End: 2021-03-03

## 2021-03-03 DIAGNOSIS — I48.0 PAROXYSMAL ATRIAL FIBRILLATION (CMD): ICD-10-CM

## 2021-03-03 DIAGNOSIS — Z79.01 LONG TERM (CURRENT) USE OF ANTICOAGULANTS: Primary | ICD-10-CM

## 2021-03-03 LAB — INR PPP: 2.8

## 2021-03-11 ENCOUNTER — ANTI-COAG (OUTPATIENT)
Dept: CARDIOLOGY | Age: 71
End: 2021-03-11

## 2021-03-11 DIAGNOSIS — Z79.01 LONG TERM (CURRENT) USE OF ANTICOAGULANTS: ICD-10-CM

## 2021-03-11 DIAGNOSIS — I48.0 PAROXYSMAL ATRIAL FIBRILLATION (CMD): ICD-10-CM

## 2021-03-11 LAB — INR PPP: 5.4

## 2021-03-11 PROCEDURE — 93793 ANTICOAG MGMT PT WARFARIN: CPT

## 2021-03-18 ENCOUNTER — ANTI-COAG (OUTPATIENT)
Dept: CARDIOLOGY | Age: 71
End: 2021-03-18

## 2021-03-18 DIAGNOSIS — I48.0 PAROXYSMAL ATRIAL FIBRILLATION (CMD): ICD-10-CM

## 2021-03-18 LAB — INR PPP: 5.9

## 2021-03-19 ENCOUNTER — ANTI-COAG (OUTPATIENT)
Dept: CARDIOLOGY | Age: 71
End: 2021-03-19

## 2021-03-19 DIAGNOSIS — I48.0 PAROXYSMAL ATRIAL FIBRILLATION (CMD): ICD-10-CM

## 2021-03-19 DIAGNOSIS — Z79.01 LONG TERM (CURRENT) USE OF ANTICOAGULANTS: ICD-10-CM

## 2021-03-19 LAB — INR PPP: 4.3

## 2021-03-19 PROCEDURE — 93793 ANTICOAG MGMT PT WARFARIN: CPT

## 2021-03-26 ENCOUNTER — ANTI-COAG (OUTPATIENT)
Dept: CARDIOLOGY | Age: 71
End: 2021-03-26

## 2021-03-26 DIAGNOSIS — I48.0 PAROXYSMAL ATRIAL FIBRILLATION (CMD): ICD-10-CM

## 2021-03-26 DIAGNOSIS — Z79.01 LONG TERM (CURRENT) USE OF ANTICOAGULANTS: ICD-10-CM

## 2021-03-26 LAB — INR PPP: 1.5

## 2021-03-26 PROCEDURE — 93793 ANTICOAG MGMT PT WARFARIN: CPT

## 2021-04-02 ENCOUNTER — ANTI-COAG (OUTPATIENT)
Dept: CARDIOLOGY | Age: 71
End: 2021-04-02

## 2021-04-02 DIAGNOSIS — I48.0 PAROXYSMAL ATRIAL FIBRILLATION (CMD): ICD-10-CM

## 2021-04-02 DIAGNOSIS — Z79.01 ANTICOAGULATED ON COUMADIN: ICD-10-CM

## 2021-04-02 LAB — INR PPP: 2.5

## 2021-04-02 PROCEDURE — 93793 ANTICOAG MGMT PT WARFARIN: CPT

## 2021-04-09 ENCOUNTER — ANTI-COAG (OUTPATIENT)
Dept: CARDIOLOGY | Age: 71
End: 2021-04-09

## 2021-04-09 DIAGNOSIS — I48.0 PAROXYSMAL ATRIAL FIBRILLATION (CMD): ICD-10-CM

## 2021-04-09 DIAGNOSIS — Z79.01 LONG TERM (CURRENT) USE OF ANTICOAGULANTS: ICD-10-CM

## 2021-04-09 LAB — INR PPP: 2.4

## 2021-04-09 PROCEDURE — 93793 ANTICOAG MGMT PT WARFARIN: CPT

## 2021-04-19 ENCOUNTER — ANTI-COAG (OUTPATIENT)
Dept: CARDIOLOGY | Age: 71
End: 2021-04-19

## 2021-04-19 DIAGNOSIS — Z79.01 LONG TERM (CURRENT) USE OF ANTICOAGULANTS: ICD-10-CM

## 2021-04-19 DIAGNOSIS — I48.0 PAROXYSMAL ATRIAL FIBRILLATION (CMD): ICD-10-CM

## 2021-04-19 LAB — INR PPP: 2.9

## 2021-04-19 PROCEDURE — 93793 ANTICOAG MGMT PT WARFARIN: CPT

## 2021-04-23 ENCOUNTER — ANTI-COAG (OUTPATIENT)
Dept: CARDIOLOGY | Age: 71
End: 2021-04-23

## 2021-04-23 ENCOUNTER — TELEPHONE (OUTPATIENT)
Dept: CARDIOLOGY | Age: 71
End: 2021-04-23

## 2021-04-23 DIAGNOSIS — I48.0 PAROXYSMAL ATRIAL FIBRILLATION (CMD): ICD-10-CM

## 2021-04-23 DIAGNOSIS — Z79.01 LONG TERM (CURRENT) USE OF ANTICOAGULANTS: ICD-10-CM

## 2021-04-23 LAB — INR PPP: 2.7

## 2021-04-23 PROCEDURE — 93793 ANTICOAG MGMT PT WARFARIN: CPT

## 2021-04-30 ENCOUNTER — ANTI-COAG (OUTPATIENT)
Dept: CARDIOLOGY | Age: 71
End: 2021-04-30

## 2021-04-30 DIAGNOSIS — Z79.01 LONG TERM (CURRENT) USE OF ANTICOAGULANTS: ICD-10-CM

## 2021-04-30 DIAGNOSIS — I48.0 PAROXYSMAL ATRIAL FIBRILLATION (CMD): ICD-10-CM

## 2021-04-30 LAB — INR PPP: 2.9

## 2021-04-30 PROCEDURE — 93793 ANTICOAG MGMT PT WARFARIN: CPT

## 2021-05-07 ENCOUNTER — ANTI-COAG (OUTPATIENT)
Dept: CARDIOLOGY | Age: 71
End: 2021-05-07

## 2021-05-07 DIAGNOSIS — I48.0 PAROXYSMAL ATRIAL FIBRILLATION (CMD): ICD-10-CM

## 2021-05-07 DIAGNOSIS — Z79.01 LONG TERM (CURRENT) USE OF ANTICOAGULANTS: ICD-10-CM

## 2021-05-07 LAB — INR PPP: 3.7

## 2021-05-07 PROCEDURE — 93793 ANTICOAG MGMT PT WARFARIN: CPT

## 2021-05-10 RX ORDER — CARVEDILOL 25 MG/1
TABLET ORAL
Qty: 180 TABLET | Refills: 0 | Status: SHIPPED | OUTPATIENT
Start: 2021-05-10 | End: 2021-08-09

## 2021-05-11 ENCOUNTER — APPOINTMENT (OUTPATIENT)
Dept: CARDIOLOGY | Age: 71
End: 2021-05-11

## 2021-05-11 ENCOUNTER — APPOINTMENT (OUTPATIENT)
Dept: CARDIOLOGY | Age: 71
End: 2021-05-11
Attending: INTERNAL MEDICINE

## 2021-05-14 ENCOUNTER — ANTI-COAG (OUTPATIENT)
Dept: CARDIOLOGY | Age: 71
End: 2021-05-14

## 2021-05-14 DIAGNOSIS — Z79.01 LONG TERM (CURRENT) USE OF ANTICOAGULANTS: ICD-10-CM

## 2021-05-14 DIAGNOSIS — I48.0 PAROXYSMAL ATRIAL FIBRILLATION (CMD): ICD-10-CM

## 2021-05-14 LAB — INR PPP: 2

## 2021-05-14 PROCEDURE — 93793 ANTICOAG MGMT PT WARFARIN: CPT

## 2021-05-18 ENCOUNTER — TELEPHONE (OUTPATIENT)
Dept: CARDIOLOGY | Age: 71
End: 2021-05-18

## 2021-05-18 ENCOUNTER — ANCILLARY PROCEDURE (OUTPATIENT)
Dept: CARDIOLOGY | Age: 71
End: 2021-05-18
Attending: INTERNAL MEDICINE

## 2021-05-18 ENCOUNTER — APPOINTMENT (OUTPATIENT)
Dept: CARDIOLOGY | Age: 71
End: 2021-05-18
Attending: INTERNAL MEDICINE

## 2021-05-18 ENCOUNTER — OFFICE VISIT (OUTPATIENT)
Dept: CARDIOLOGY | Age: 71
End: 2021-05-18

## 2021-05-18 VITALS — DIASTOLIC BLOOD PRESSURE: 80 MMHG | SYSTOLIC BLOOD PRESSURE: 110 MMHG | HEART RATE: 80 BPM

## 2021-05-18 VITALS — DIASTOLIC BLOOD PRESSURE: 66 MMHG | HEART RATE: 80 BPM | SYSTOLIC BLOOD PRESSURE: 110 MMHG

## 2021-05-18 DIAGNOSIS — I48.0 PAROXYSMAL ATRIAL FIBRILLATION (CMD): Primary | ICD-10-CM

## 2021-05-18 DIAGNOSIS — Z79.01 ANTICOAGULATED ON COUMADIN: ICD-10-CM

## 2021-05-18 DIAGNOSIS — Z95.810 ICD (IMPLANTABLE CARDIOVERTER-DEFIBRILLATOR) IN PLACE: ICD-10-CM

## 2021-05-18 DIAGNOSIS — I48.0 PAROXYSMAL ATRIAL FIBRILLATION (CMD): ICD-10-CM

## 2021-05-18 DIAGNOSIS — I50.32 CHRONIC HEART FAILURE WITH PRESERVED EJECTION FRACTION (CMD): ICD-10-CM

## 2021-05-18 DIAGNOSIS — I42.0 DILATED CARDIOMYOPATHY (CMD): Primary | ICD-10-CM

## 2021-05-18 DIAGNOSIS — Z51.81 ENCOUNTER FOR MONITORING SOTALOL THERAPY: ICD-10-CM

## 2021-05-18 DIAGNOSIS — I50.22 CHRONIC SYSTOLIC CONGESTIVE HEART FAILURE (CMD): ICD-10-CM

## 2021-05-18 DIAGNOSIS — Z45.02 IMPLANTABLE DEFIBRILLATOR REPROGRAMMING/CHECK: ICD-10-CM

## 2021-05-18 DIAGNOSIS — I10 HTN (HYPERTENSION), BENIGN: ICD-10-CM

## 2021-05-18 DIAGNOSIS — E78.5 DYSLIPIDEMIA: ICD-10-CM

## 2021-05-18 DIAGNOSIS — Z95.5 STENTED CORONARY ARTERY: ICD-10-CM

## 2021-05-18 DIAGNOSIS — I50.22 CHRONIC SYSTOLIC CONGESTIVE HEART FAILURE (CMD): Primary | ICD-10-CM

## 2021-05-18 DIAGNOSIS — Z79.899 ENCOUNTER FOR MONITORING SOTALOL THERAPY: ICD-10-CM

## 2021-05-18 PROCEDURE — 93283 PRGRMG EVAL IMPLANTABLE DFB: CPT | Performed by: INTERNAL MEDICINE

## 2021-05-18 PROCEDURE — 99214 OFFICE O/P EST MOD 30 MIN: CPT | Performed by: INTERNAL MEDICINE

## 2021-05-18 RX ORDER — WARFARIN SODIUM 5 MG/1
TABLET ORAL
Qty: 65 TABLET | Refills: 3 | Status: SHIPPED | OUTPATIENT
Start: 2021-05-18 | End: 2022-06-30

## 2021-05-18 ASSESSMENT — ENCOUNTER SYMPTOMS
ALLERGIC/IMMUNOLOGIC COMMENTS: NO NEW FOOD ALLERGIES
WEIGHT GAIN: 0
LIGHT-HEADEDNESS: 0
HEMOPTYSIS: 0
WEIGHT LOSS: 0
COUGH: 0
SUSPICIOUS LESIONS: 0
SYNCOPE: 0
CHILLS: 0
FOCAL WEAKNESS: 0
HEMATOCHEZIA: 0
BRUISES/BLEEDS EASILY: 0
SHORTNESS OF BREATH: 1
DEPRESSION: 0
NEAR-SYNCOPE: 0
LOSS OF BALANCE: 1
FEVER: 0

## 2021-05-18 ASSESSMENT — PATIENT HEALTH QUESTIONNAIRE - PHQ9
2. FEELING DOWN, DEPRESSED OR HOPELESS: NOT AT ALL
1. LITTLE INTEREST OR PLEASURE IN DOING THINGS: NOT AT ALL
SUM OF ALL RESPONSES TO PHQ9 QUESTIONS 1 AND 2: 0
SUM OF ALL RESPONSES TO PHQ9 QUESTIONS 1 AND 2: 0
CLINICAL INTERPRETATION OF PHQ2 SCORE: NO FURTHER SCREENING NEEDED
CLINICAL INTERPRETATION OF PHQ9 SCORE: NO FURTHER SCREENING NEEDED

## 2021-05-19 ENCOUNTER — ANTI-COAG (OUTPATIENT)
Dept: CARDIOLOGY | Age: 71
End: 2021-05-19

## 2021-05-19 DIAGNOSIS — Z79.01 LONG TERM (CURRENT) USE OF ANTICOAGULANTS: ICD-10-CM

## 2021-05-19 DIAGNOSIS — I48.0 PAROXYSMAL ATRIAL FIBRILLATION (CMD): ICD-10-CM

## 2021-05-19 LAB — INR PPP: 1.4

## 2021-05-19 PROCEDURE — 93793 ANTICOAG MGMT PT WARFARIN: CPT

## 2021-05-21 ENCOUNTER — TELEPHONE (OUTPATIENT)
Dept: CARDIOLOGY | Age: 71
End: 2021-05-21

## 2021-05-21 ENCOUNTER — ANTI-COAG (OUTPATIENT)
Dept: CARDIOLOGY | Age: 71
End: 2021-05-21

## 2021-05-21 DIAGNOSIS — Z79.01 LONG TERM (CURRENT) USE OF ANTICOAGULANTS: ICD-10-CM

## 2021-05-21 DIAGNOSIS — I48.0 PAROXYSMAL ATRIAL FIBRILLATION (CMD): ICD-10-CM

## 2021-05-21 LAB — INR PPP: 1.3

## 2021-05-21 PROCEDURE — 93793 ANTICOAG MGMT PT WARFARIN: CPT

## 2021-05-26 ENCOUNTER — ANTI-COAG (OUTPATIENT)
Dept: CARDIOLOGY | Age: 71
End: 2021-05-26

## 2021-05-26 DIAGNOSIS — Z79.01 LONG TERM (CURRENT) USE OF ANTICOAGULANTS: ICD-10-CM

## 2021-05-26 DIAGNOSIS — I48.0 PAROXYSMAL ATRIAL FIBRILLATION (CMD): ICD-10-CM

## 2021-05-26 LAB — INR PPP: 1.6

## 2021-05-26 PROCEDURE — 93793 ANTICOAG MGMT PT WARFARIN: CPT

## 2021-06-02 ENCOUNTER — ANTI-COAG (OUTPATIENT)
Dept: CARDIOLOGY | Age: 71
End: 2021-06-02

## 2021-06-02 ENCOUNTER — ANTICOAGULATION - OTHER VISIT (DOAC) (OUTPATIENT)
Dept: CARDIOLOGY | Age: 71
End: 2021-06-02

## 2021-06-02 DIAGNOSIS — I48.0 PAROXYSMAL ATRIAL FIBRILLATION (CMD): ICD-10-CM

## 2021-06-02 DIAGNOSIS — Z79.01 LONG TERM (CURRENT) USE OF ANTICOAGULANTS: ICD-10-CM

## 2021-06-02 LAB — INR PPP: 2.3

## 2021-06-02 PROCEDURE — 93793 ANTICOAG MGMT PT WARFARIN: CPT

## 2021-06-07 ENCOUNTER — TELEPHONE (OUTPATIENT)
Dept: CARDIOLOGY | Age: 71
End: 2021-06-07

## 2021-06-09 ENCOUNTER — ANTI-COAG (OUTPATIENT)
Dept: CARDIOLOGY | Age: 71
End: 2021-06-09

## 2021-06-09 DIAGNOSIS — Z79.01 LONG TERM (CURRENT) USE OF ANTICOAGULANTS: ICD-10-CM

## 2021-06-09 DIAGNOSIS — I48.0 PAROXYSMAL ATRIAL FIBRILLATION (CMD): ICD-10-CM

## 2021-06-09 LAB — INR PPP: 3.5

## 2021-06-09 PROCEDURE — 93793 ANTICOAG MGMT PT WARFARIN: CPT

## 2021-06-10 ENCOUNTER — TELEPHONE (OUTPATIENT)
Dept: CARDIOLOGY | Age: 71
End: 2021-06-10

## 2021-06-16 LAB — INR PPP: 2.4

## 2021-06-17 ENCOUNTER — ANTI-COAG (OUTPATIENT)
Dept: CARDIOLOGY | Age: 71
End: 2021-06-17

## 2021-06-17 DIAGNOSIS — I48.0 PAROXYSMAL ATRIAL FIBRILLATION (CMD): Primary | ICD-10-CM

## 2021-06-17 DIAGNOSIS — Z79.01 LONG TERM (CURRENT) USE OF ANTICOAGULANTS: ICD-10-CM

## 2021-06-17 PROCEDURE — 93793 ANTICOAG MGMT PT WARFARIN: CPT

## 2021-06-23 ENCOUNTER — ANTI-COAG (OUTPATIENT)
Dept: CARDIOLOGY | Age: 71
End: 2021-06-23

## 2021-06-23 DIAGNOSIS — I48.0 PAROXYSMAL ATRIAL FIBRILLATION (CMD): Primary | ICD-10-CM

## 2021-06-23 DIAGNOSIS — Z79.01 LONG TERM (CURRENT) USE OF ANTICOAGULANTS: ICD-10-CM

## 2021-06-23 LAB — INR PPP: 3.4

## 2021-06-23 PROCEDURE — 93793 ANTICOAG MGMT PT WARFARIN: CPT

## 2021-06-25 ENCOUNTER — TELEPHONE (OUTPATIENT)
Dept: CARDIOLOGY | Age: 71
End: 2021-06-25

## 2021-06-25 ENCOUNTER — ANCILLARY PROCEDURE (OUTPATIENT)
Dept: CARDIOLOGY | Age: 71
End: 2021-06-25
Attending: INTERNAL MEDICINE

## 2021-06-25 DIAGNOSIS — I50.9 CHRONIC CONGESTIVE HEART FAILURE, UNSPECIFIED HEART FAILURE TYPE (CMD): ICD-10-CM

## 2021-06-25 DIAGNOSIS — I50.22 CHRONIC SYSTOLIC CONGESTIVE HEART FAILURE (CMD): ICD-10-CM

## 2021-06-25 RX ORDER — TORSEMIDE 20 MG/1
TABLET ORAL
Qty: 450 TABLET | Refills: 3 | Status: SHIPPED | OUTPATIENT
Start: 2021-06-25 | End: 2022-07-27 | Stop reason: SDUPTHER

## 2021-06-30 ENCOUNTER — ANTI-COAG (OUTPATIENT)
Dept: CARDIOLOGY | Age: 71
End: 2021-06-30

## 2021-06-30 ENCOUNTER — TELEPHONE (OUTPATIENT)
Dept: CARDIOLOGY | Age: 71
End: 2021-06-30

## 2021-06-30 DIAGNOSIS — Z79.01 LONG TERM (CURRENT) USE OF ANTICOAGULANTS: ICD-10-CM

## 2021-06-30 DIAGNOSIS — I48.0 PAROXYSMAL ATRIAL FIBRILLATION (CMD): Primary | ICD-10-CM

## 2021-06-30 LAB — INR PPP: 3

## 2021-06-30 PROCEDURE — 93793 ANTICOAG MGMT PT WARFARIN: CPT

## 2021-06-30 RX ORDER — DAPAGLIFLOZIN 5 MG/1
TABLET, FILM COATED ORAL
Qty: 30 TABLET | Refills: 11 | Status: SHIPPED | OUTPATIENT
Start: 2021-06-30 | End: 2021-10-18

## 2021-07-06 ENCOUNTER — TELEPHONE (OUTPATIENT)
Dept: CARDIOLOGY | Age: 71
End: 2021-07-06

## 2021-07-08 ENCOUNTER — TELEPHONE (OUTPATIENT)
Dept: CARDIOLOGY | Age: 71
End: 2021-07-08

## 2021-07-08 DIAGNOSIS — I48.0 PAROXYSMAL ATRIAL FIBRILLATION (CMD): Primary | ICD-10-CM

## 2021-07-08 LAB — INR PPP: 3.8

## 2021-07-13 NOTE — PATIENT INSTRUCTIONS
Continue present medications  Monitor yourself for signs and symptoms of fluid overload, increased shortness of breath, difficulty breathing when laying down etc. Call the clinic if any of these signs develop at ph: [phone]  Call if having any dizzine 13-Jul-2021 02:43:26

## 2021-07-15 ENCOUNTER — ANTI-COAG (OUTPATIENT)
Dept: CARDIOLOGY | Age: 71
End: 2021-07-15

## 2021-07-15 ENCOUNTER — TELEPHONE (OUTPATIENT)
Dept: CARDIOLOGY | Age: 71
End: 2021-07-15

## 2021-07-15 DIAGNOSIS — I48.0 PAROXYSMAL ATRIAL FIBRILLATION (CMD): Primary | ICD-10-CM

## 2021-07-15 DIAGNOSIS — Z79.01 LONG TERM (CURRENT) USE OF ANTICOAGULANTS: ICD-10-CM

## 2021-07-15 LAB — INR PPP: 4.2

## 2021-07-15 PROCEDURE — 93793 ANTICOAG MGMT PT WARFARIN: CPT

## 2021-07-22 ENCOUNTER — TELEPHONE (OUTPATIENT)
Dept: CARDIOLOGY | Age: 71
End: 2021-07-22

## 2021-07-22 DIAGNOSIS — I48.0 PAROXYSMAL ATRIAL FIBRILLATION (CMD): Primary | ICD-10-CM

## 2021-07-22 LAB — INR PPP: 2.3

## 2021-07-29 ENCOUNTER — TELEPHONE (OUTPATIENT)
Dept: CARDIOLOGY | Age: 71
End: 2021-07-29

## 2021-07-29 DIAGNOSIS — I48.0 PAROXYSMAL ATRIAL FIBRILLATION (CMD): Primary | ICD-10-CM

## 2021-07-29 LAB — INR PPP: 3.9

## 2021-08-02 ENCOUNTER — ANCILLARY PROCEDURE (OUTPATIENT)
Dept: CARDIOLOGY | Age: 71
End: 2021-08-02
Attending: INTERNAL MEDICINE

## 2021-08-02 DIAGNOSIS — I50.9 CHRONIC CONGESTIVE HEART FAILURE, UNSPECIFIED HEART FAILURE TYPE (CMD): ICD-10-CM

## 2021-08-05 ENCOUNTER — ANTI-COAG (OUTPATIENT)
Dept: CARDIOLOGY | Age: 71
End: 2021-08-05

## 2021-08-05 DIAGNOSIS — I48.0 PAROXYSMAL ATRIAL FIBRILLATION (CMD): Primary | ICD-10-CM

## 2021-08-05 DIAGNOSIS — Z79.01 LONG TERM (CURRENT) USE OF ANTICOAGULANTS: ICD-10-CM

## 2021-08-05 LAB — INR PPP: 4

## 2021-08-05 PROCEDURE — 93793 ANTICOAG MGMT PT WARFARIN: CPT

## 2021-08-06 ENCOUNTER — TELEPHONE (OUTPATIENT)
Dept: CARDIOLOGY | Age: 71
End: 2021-08-06

## 2021-08-09 RX ORDER — CARVEDILOL 25 MG/1
TABLET ORAL
Qty: 180 TABLET | Refills: 0 | Status: SHIPPED | OUTPATIENT
Start: 2021-08-09 | End: 2021-11-05

## 2021-08-10 ENCOUNTER — TELEPHONE (OUTPATIENT)
Dept: CARDIOLOGY | Age: 71
End: 2021-08-10

## 2021-08-12 ENCOUNTER — ANTI-COAG (OUTPATIENT)
Dept: CARDIOLOGY | Age: 71
End: 2021-08-12

## 2021-08-12 DIAGNOSIS — I48.0 PAROXYSMAL ATRIAL FIBRILLATION (CMD): Primary | ICD-10-CM

## 2021-08-12 LAB — INR PPP: 2.3

## 2021-08-19 ENCOUNTER — ANTI-COAG (OUTPATIENT)
Dept: CARDIOLOGY | Age: 71
End: 2021-08-19

## 2021-08-19 DIAGNOSIS — I48.0 PAROXYSMAL ATRIAL FIBRILLATION (CMD): Primary | ICD-10-CM

## 2021-08-19 LAB — INR PPP: 2.3

## 2021-08-27 ENCOUNTER — ANTI-COAG (OUTPATIENT)
Dept: CARDIOLOGY | Age: 71
End: 2021-08-27

## 2021-08-27 DIAGNOSIS — I48.0 PAROXYSMAL ATRIAL FIBRILLATION (CMD): Primary | ICD-10-CM

## 2021-08-27 LAB — INR PPP: 2.6

## 2021-09-03 ENCOUNTER — ANCILLARY PROCEDURE (OUTPATIENT)
Dept: CARDIOLOGY | Age: 71
End: 2021-09-03
Attending: INTERNAL MEDICINE

## 2021-09-03 ENCOUNTER — TELEPHONE (OUTPATIENT)
Dept: CARDIOLOGY | Age: 71
End: 2021-09-03

## 2021-09-03 ENCOUNTER — OFFICE VISIT (OUTPATIENT)
Dept: CARDIOLOGY | Age: 71
End: 2021-09-03

## 2021-09-03 VITALS
SYSTOLIC BLOOD PRESSURE: 110 MMHG | RESPIRATION RATE: 18 BRPM | HEART RATE: 72 BPM | BODY MASS INDEX: 50.02 KG/M2 | WEIGHT: 293 LBS | HEIGHT: 64 IN | DIASTOLIC BLOOD PRESSURE: 70 MMHG

## 2021-09-03 DIAGNOSIS — I50.40 COMBINED SYSTOLIC AND DIASTOLIC CONGESTIVE HEART FAILURE, UNSPECIFIED HF CHRONICITY (CMD): Primary | ICD-10-CM

## 2021-09-03 DIAGNOSIS — E55.9 VITAMIN D DEFICIENCY: ICD-10-CM

## 2021-09-03 DIAGNOSIS — I48.0 PAROXYSMAL ATRIAL FIBRILLATION (CMD): Primary | ICD-10-CM

## 2021-09-03 DIAGNOSIS — Z95.810 ICD (IMPLANTABLE CARDIOVERTER-DEFIBRILLATOR) IN PLACE: ICD-10-CM

## 2021-09-03 DIAGNOSIS — E78.00 HYPERCHOLESTEREMIA: ICD-10-CM

## 2021-09-03 PROCEDURE — 99214 OFFICE O/P EST MOD 30 MIN: CPT | Performed by: INTERNAL MEDICINE

## 2021-09-03 SDOH — HEALTH STABILITY: PHYSICAL HEALTH: ON AVERAGE, HOW MANY MINUTES DO YOU ENGAGE IN EXERCISE AT THIS LEVEL?: 0 MIN

## 2021-09-03 SDOH — HEALTH STABILITY: PHYSICAL HEALTH: ON AVERAGE, HOW MANY DAYS PER WEEK DO YOU ENGAGE IN MODERATE TO STRENUOUS EXERCISE (LIKE A BRISK WALK)?: 0 DAYS

## 2021-09-03 ASSESSMENT — ENCOUNTER SYMPTOMS
LIGHT-HEADEDNESS: 0
DIZZINESS: 0
LOSS OF BALANCE: 1
HEMATOCHEZIA: 0
HEMOPTYSIS: 0
CHILLS: 0
DEPRESSION: 0
COUGH: 0
CONSTIPATION: 0
SUSPICIOUS LESIONS: 0
BLOATING: 0
WEIGHT LOSS: 1
NERVOUS/ANXIOUS: 1
SHORTNESS OF BREATH: 0
BACK PAIN: 0
ALLERGIC/IMMUNOLOGIC COMMENTS: NO NEW FOOD ALLERGIES
ABDOMINAL PAIN: 0
DIARRHEA: 0
FEVER: 0
HEADACHES: 0
BRUISES/BLEEDS EASILY: 0
SLEEP DISTURBANCES DUE TO BREATHING: 0
INSOMNIA: 0
WEAKNESS: 0

## 2021-09-03 ASSESSMENT — PATIENT HEALTH QUESTIONNAIRE - PHQ9
SUM OF ALL RESPONSES TO PHQ9 QUESTIONS 1 AND 2: 0
2. FEELING DOWN, DEPRESSED OR HOPELESS: NOT AT ALL
CLINICAL INTERPRETATION OF PHQ9 SCORE: NO FURTHER SCREENING NEEDED
SUM OF ALL RESPONSES TO PHQ9 QUESTIONS 1 AND 2: 0
1. LITTLE INTEREST OR PLEASURE IN DOING THINGS: NOT AT ALL
CLINICAL INTERPRETATION OF PHQ2 SCORE: NO FURTHER SCREENING NEEDED

## 2021-09-07 ENCOUNTER — ANCILLARY PROCEDURE (OUTPATIENT)
Dept: CARDIOLOGY | Age: 71
End: 2021-09-07
Attending: INTERNAL MEDICINE

## 2021-09-07 DIAGNOSIS — I50.20 HFREF (HEART FAILURE WITH REDUCED EJECTION FRACTION) (CMD): ICD-10-CM

## 2021-09-10 ENCOUNTER — ANTI-COAG (OUTPATIENT)
Dept: CARDIOLOGY | Age: 71
End: 2021-09-10

## 2021-09-10 DIAGNOSIS — Z79.01 LONG TERM (CURRENT) USE OF ANTICOAGULANTS: ICD-10-CM

## 2021-09-10 DIAGNOSIS — I48.0 PAROXYSMAL ATRIAL FIBRILLATION (CMD): Primary | ICD-10-CM

## 2021-09-10 LAB — INR PPP: 2.3

## 2021-09-10 PROCEDURE — 93793 ANTICOAG MGMT PT WARFARIN: CPT

## 2021-09-17 ENCOUNTER — ANTI-COAG (OUTPATIENT)
Dept: CARDIOLOGY | Age: 71
End: 2021-09-17

## 2021-09-17 DIAGNOSIS — Z79.01 LONG TERM (CURRENT) USE OF ANTICOAGULANTS: ICD-10-CM

## 2021-09-17 DIAGNOSIS — I48.0 PAROXYSMAL ATRIAL FIBRILLATION (CMD): Primary | ICD-10-CM

## 2021-09-17 LAB — INR PPP: 2.4

## 2021-09-17 PROCEDURE — 93793 ANTICOAG MGMT PT WARFARIN: CPT

## 2021-09-24 ENCOUNTER — ANTI-COAG (OUTPATIENT)
Dept: CARDIOLOGY | Age: 71
End: 2021-09-24

## 2021-09-24 ENCOUNTER — TELEPHONE (OUTPATIENT)
Dept: CARDIOLOGY | Age: 71
End: 2021-09-24

## 2021-09-24 DIAGNOSIS — Z79.01 LONG TERM (CURRENT) USE OF ANTICOAGULANTS: ICD-10-CM

## 2021-09-24 DIAGNOSIS — I48.0 PAROXYSMAL ATRIAL FIBRILLATION (CMD): Primary | ICD-10-CM

## 2021-09-24 DIAGNOSIS — I42.0 DILATED CARDIOMYOPATHY (CMD): ICD-10-CM

## 2021-09-24 LAB — INR PPP: 2.2

## 2021-09-24 PROCEDURE — 93793 ANTICOAG MGMT PT WARFARIN: CPT

## 2021-09-29 ENCOUNTER — ANTI-COAG (OUTPATIENT)
Dept: CARDIOLOGY | Age: 71
End: 2021-09-29

## 2021-09-29 DIAGNOSIS — I48.0 PAROXYSMAL ATRIAL FIBRILLATION (CMD): Primary | ICD-10-CM

## 2021-09-29 DIAGNOSIS — I42.0 DILATED CARDIOMYOPATHY (CMD): ICD-10-CM

## 2021-09-29 DIAGNOSIS — Z79.01 LONG TERM (CURRENT) USE OF ANTICOAGULANTS: ICD-10-CM

## 2021-10-01 ENCOUNTER — ANTI-COAG (OUTPATIENT)
Dept: CARDIOLOGY | Age: 71
End: 2021-10-01

## 2021-10-01 DIAGNOSIS — I48.0 PAROXYSMAL ATRIAL FIBRILLATION (CMD): Primary | ICD-10-CM

## 2021-10-01 DIAGNOSIS — Z79.01 LONG TERM (CURRENT) USE OF ANTICOAGULANTS: ICD-10-CM

## 2021-10-01 DIAGNOSIS — I42.0 DILATED CARDIOMYOPATHY (CMD): ICD-10-CM

## 2021-10-01 LAB — INR PPP: 2

## 2021-10-01 PROCEDURE — 93793 ANTICOAG MGMT PT WARFARIN: CPT

## 2021-10-08 ENCOUNTER — ANTI-COAG (OUTPATIENT)
Dept: CARDIOLOGY | Age: 71
End: 2021-10-08

## 2021-10-08 DIAGNOSIS — Z79.01 LONG TERM (CURRENT) USE OF ANTICOAGULANTS: ICD-10-CM

## 2021-10-08 DIAGNOSIS — I48.0 PAROXYSMAL ATRIAL FIBRILLATION (CMD): Primary | ICD-10-CM

## 2021-10-08 DIAGNOSIS — I42.0 DILATED CARDIOMYOPATHY (CMD): ICD-10-CM

## 2021-10-08 LAB — INR PPP: 1.9

## 2021-10-12 ENCOUNTER — ANCILLARY PROCEDURE (OUTPATIENT)
Dept: CARDIOLOGY | Age: 71
End: 2021-10-12
Attending: INTERNAL MEDICINE

## 2021-10-12 DIAGNOSIS — I50.20 HFREF (HEART FAILURE WITH REDUCED EJECTION FRACTION) (CMD): ICD-10-CM

## 2021-10-12 PROCEDURE — 93264 REM MNTR WRLS P-ART PRS SNR: CPT | Performed by: INTERNAL MEDICINE

## 2021-10-13 ENCOUNTER — HOSPITAL ENCOUNTER (OUTPATIENT)
Age: 71
Discharge: HOME | End: 2021-10-13
Payer: MEDICARE

## 2021-10-13 VITALS
BODY MASS INDEX: 51.91 KG/M2 | HEIGHT: 63 IN | TEMPERATURE: 98 F | SYSTOLIC BLOOD PRESSURE: 126 MMHG | HEART RATE: 82 BPM | WEIGHT: 293 LBS | RESPIRATION RATE: 26 BRPM | OXYGEN SATURATION: 93 % | DIASTOLIC BLOOD PRESSURE: 69 MMHG

## 2021-10-13 DIAGNOSIS — L08.9 SKIN INFECTION: Primary | ICD-10-CM

## 2021-10-13 PROCEDURE — 99213 OFFICE O/P EST LOW 20 MIN: CPT | Performed by: NURSE PRACTITIONER

## 2021-10-13 RX ORDER — CEPHALEXIN 500 MG/1
500 CAPSULE ORAL 4 TIMES DAILY
Qty: 40 CAPSULE | Refills: 0 | Status: SHIPPED | OUTPATIENT
Start: 2021-10-13 | End: 2021-10-23

## 2021-10-13 NOTE — ED INITIAL ASSESSMENT (HPI)
Pt here to IC with c/o red and swollen area to right forearm that started last Tuesday, on Thursday started mupirocin 2% topical ointment BID. Pt states it is getting worse and not better.

## 2021-10-13 NOTE — ED PROVIDER NOTES
No chief complaint on file. HPI:     Alexandra Rice is a 80year old female who presents for a skin infection to the right posterior forearm. A couple days ago she noticed a red bump. She states she has been squeezing it.   She is also been applying w Insecurity:       Worried About Running Out of Food in the Last Year: Not on file      Ran Out of Food in the Last Year: Not on file  Transportation Needs:       Lack of Transportation (Medical): Not on file      Lack of Transportation (Non-Medical):  Not o streaking from the area. MDM/Assessment/Plan:     Procedure Note:  No I&D is warranted.     Orders for this encounter:    Orders Placed This Encounter      Ear wax removal      cephalexin 500 MG Oral Cap          Sig: Take 1 capsule (500 mg total) by renny

## 2021-10-15 ENCOUNTER — ANTI-COAG (OUTPATIENT)
Dept: CARDIOLOGY | Age: 71
End: 2021-10-15

## 2021-10-15 DIAGNOSIS — I42.0 DILATED CARDIOMYOPATHY (CMD): ICD-10-CM

## 2021-10-15 DIAGNOSIS — Z79.01 LONG TERM (CURRENT) USE OF ANTICOAGULANTS: ICD-10-CM

## 2021-10-15 DIAGNOSIS — I48.0 PAROXYSMAL ATRIAL FIBRILLATION (CMD): Primary | ICD-10-CM

## 2021-10-15 LAB — INR PPP: 2.5

## 2021-10-15 PROCEDURE — 93793 ANTICOAG MGMT PT WARFARIN: CPT

## 2021-10-18 RX ORDER — DAPAGLIFLOZIN 5 MG/1
TABLET, FILM COATED ORAL
Qty: 30 TABLET | Refills: 11 | Status: SHIPPED | OUTPATIENT
Start: 2021-10-18 | End: 2022-01-11 | Stop reason: DRUGHIGH

## 2021-10-19 RX ORDER — DAPAGLIFLOZIN 5 MG/1
TABLET, FILM COATED ORAL
Qty: 30 TABLET | Refills: 11 | OUTPATIENT
Start: 2021-10-19

## 2021-10-22 LAB — INR PPP: 1.9

## 2021-10-25 ENCOUNTER — ANTI-COAG (OUTPATIENT)
Dept: CARDIOLOGY | Age: 71
End: 2021-10-25

## 2021-10-25 DIAGNOSIS — Z79.01 LONG TERM (CURRENT) USE OF ANTICOAGULANTS: ICD-10-CM

## 2021-10-25 DIAGNOSIS — I48.0 PAROXYSMAL ATRIAL FIBRILLATION (CMD): Primary | ICD-10-CM

## 2021-10-25 DIAGNOSIS — I42.0 DILATED CARDIOMYOPATHY (CMD): ICD-10-CM

## 2021-10-25 PROCEDURE — 93793 ANTICOAG MGMT PT WARFARIN: CPT

## 2021-10-29 ENCOUNTER — ANTI-COAG (OUTPATIENT)
Dept: CARDIOLOGY | Age: 71
End: 2021-10-29

## 2021-10-29 DIAGNOSIS — I42.0 DILATED CARDIOMYOPATHY (CMD): ICD-10-CM

## 2021-10-29 DIAGNOSIS — I48.0 PAROXYSMAL ATRIAL FIBRILLATION (CMD): Primary | ICD-10-CM

## 2021-10-29 DIAGNOSIS — Z79.01 LONG TERM (CURRENT) USE OF ANTICOAGULANTS: ICD-10-CM

## 2021-10-29 LAB — INR PPP: 2.1

## 2021-10-29 PROCEDURE — 93793 ANTICOAG MGMT PT WARFARIN: CPT

## 2021-11-03 PROBLEM — Z79.01 LONG TERM (CURRENT) USE OF ANTICOAGULANTS: Status: RESOLVED | Noted: 2021-09-24 | Resolved: 2021-11-03

## 2021-11-05 ENCOUNTER — ANTI-COAG (OUTPATIENT)
Dept: CARDIOLOGY | Age: 71
End: 2021-11-05

## 2021-11-05 DIAGNOSIS — I42.0 DILATED CARDIOMYOPATHY (CMD): ICD-10-CM

## 2021-11-05 DIAGNOSIS — I48.0 PAROXYSMAL ATRIAL FIBRILLATION (CMD): Primary | ICD-10-CM

## 2021-11-05 LAB — INR PPP: 2.6

## 2021-11-05 RX ORDER — CARVEDILOL 25 MG/1
TABLET ORAL
Qty: 180 TABLET | Refills: 0 | Status: SHIPPED | OUTPATIENT
Start: 2021-11-05 | End: 2022-02-03

## 2021-11-12 ENCOUNTER — ANTI-COAG (OUTPATIENT)
Dept: CARDIOLOGY | Age: 71
End: 2021-11-12

## 2021-11-12 DIAGNOSIS — I48.0 PAROXYSMAL ATRIAL FIBRILLATION (CMD): Primary | ICD-10-CM

## 2021-11-12 DIAGNOSIS — Z79.01 LONG TERM (CURRENT) USE OF ANTICOAGULANTS: ICD-10-CM

## 2021-11-12 DIAGNOSIS — I42.0 DILATED CARDIOMYOPATHY (CMD): ICD-10-CM

## 2021-11-12 LAB — INR PPP: 2.6

## 2021-11-12 PROCEDURE — 93793 ANTICOAG MGMT PT WARFARIN: CPT

## 2021-11-15 ENCOUNTER — DOCUMENTATION (OUTPATIENT)
Dept: CARDIOLOGY | Age: 71
End: 2021-11-15

## 2021-11-16 ENCOUNTER — OFFICE VISIT (OUTPATIENT)
Dept: CARDIOLOGY | Age: 71
End: 2021-11-16

## 2021-11-16 ENCOUNTER — ANCILLARY PROCEDURE (OUTPATIENT)
Dept: CARDIOLOGY | Age: 71
End: 2021-11-16
Attending: INTERNAL MEDICINE

## 2021-11-16 VITALS
BODY MASS INDEX: 50.91 KG/M2 | SYSTOLIC BLOOD PRESSURE: 111 MMHG | HEART RATE: 68 BPM | DIASTOLIC BLOOD PRESSURE: 60 MMHG | HEIGHT: 64 IN | RESPIRATION RATE: 16 BRPM

## 2021-11-16 DIAGNOSIS — I42.0 DILATED CARDIOMYOPATHY (CMD): Primary | ICD-10-CM

## 2021-11-16 DIAGNOSIS — I10 HTN (HYPERTENSION), BENIGN: ICD-10-CM

## 2021-11-16 DIAGNOSIS — Z95.5 STENTED CORONARY ARTERY: ICD-10-CM

## 2021-11-16 DIAGNOSIS — E78.5 DYSLIPIDEMIA: ICD-10-CM

## 2021-11-16 DIAGNOSIS — I50.22 CHRONIC SYSTOLIC CONGESTIVE HEART FAILURE (CMD): ICD-10-CM

## 2021-11-16 DIAGNOSIS — I48.0 PAROXYSMAL ATRIAL FIBRILLATION (CMD): ICD-10-CM

## 2021-11-16 DIAGNOSIS — Z79.01 ANTICOAGULATED ON COUMADIN: ICD-10-CM

## 2021-11-16 DIAGNOSIS — Z79.899 ENCOUNTER FOR MONITORING SOTALOL THERAPY: ICD-10-CM

## 2021-11-16 DIAGNOSIS — I50.40 COMBINED SYSTOLIC AND DIASTOLIC CONGESTIVE HEART FAILURE, UNSPECIFIED HF CHRONICITY (CMD): ICD-10-CM

## 2021-11-16 DIAGNOSIS — Z95.810 ICD (IMPLANTABLE CARDIOVERTER-DEFIBRILLATOR) IN PLACE: ICD-10-CM

## 2021-11-16 DIAGNOSIS — Z51.81 ENCOUNTER FOR MONITORING SOTALOL THERAPY: ICD-10-CM

## 2021-11-16 PROCEDURE — 99214 OFFICE O/P EST MOD 30 MIN: CPT | Performed by: INTERNAL MEDICINE

## 2021-11-16 PROCEDURE — 93306 TTE W/DOPPLER COMPLETE: CPT | Performed by: INTERNAL MEDICINE

## 2021-11-16 SDOH — HEALTH STABILITY: PHYSICAL HEALTH: ON AVERAGE, HOW MANY DAYS PER WEEK DO YOU ENGAGE IN MODERATE TO STRENUOUS EXERCISE (LIKE A BRISK WALK)?: 0 DAYS

## 2021-11-16 ASSESSMENT — ENCOUNTER SYMPTOMS
SYNCOPE: 0
WEIGHT LOSS: 0
LIGHT-HEADEDNESS: 0
NEAR-SYNCOPE: 0
WEAKNESS: 1
LOSS OF BALANCE: 0
WEIGHT GAIN: 0
SUSPICIOUS LESIONS: 0
FEVER: 0
COUGH: 0
BRUISES/BLEEDS EASILY: 0
DEPRESSION: 0
ALLERGIC/IMMUNOLOGIC COMMENTS: NO NEW FOOD ALLERGIES
CHILLS: 0
SHORTNESS OF BREATH: 1
HEMOPTYSIS: 0
HEMATOCHEZIA: 0

## 2021-11-16 ASSESSMENT — PATIENT HEALTH QUESTIONNAIRE - PHQ9
1. LITTLE INTEREST OR PLEASURE IN DOING THINGS: NOT AT ALL
SUM OF ALL RESPONSES TO PHQ9 QUESTIONS 1 AND 2: 0
2. FEELING DOWN, DEPRESSED OR HOPELESS: NOT AT ALL
CLINICAL INTERPRETATION OF PHQ2 SCORE: NO FURTHER SCREENING NEEDED
SUM OF ALL RESPONSES TO PHQ9 QUESTIONS 1 AND 2: 0
SUM OF ALL RESPONSES TO PHQ9 QUESTIONS 1 AND 2: 0
CLINICAL INTERPRETATION OF PHQ9 SCORE: NO FURTHER SCREENING NEEDED

## 2021-11-18 ENCOUNTER — TELEPHONE (OUTPATIENT)
Dept: CARDIOLOGY | Age: 71
End: 2021-11-18

## 2021-11-22 ENCOUNTER — ANTI-COAG (OUTPATIENT)
Dept: CARDIOLOGY | Age: 71
End: 2021-11-22

## 2021-11-22 DIAGNOSIS — I48.0 PAROXYSMAL ATRIAL FIBRILLATION (CMD): Primary | ICD-10-CM

## 2021-11-22 DIAGNOSIS — Z79.01 LONG TERM (CURRENT) USE OF ANTICOAGULANTS: ICD-10-CM

## 2021-11-22 DIAGNOSIS — I42.0 DILATED CARDIOMYOPATHY (CMD): ICD-10-CM

## 2021-11-22 LAB — INR PPP: 2.6

## 2021-11-22 PROCEDURE — 93793 ANTICOAG MGMT PT WARFARIN: CPT

## 2021-11-23 ENCOUNTER — TELEPHONE (OUTPATIENT)
Dept: CARDIOLOGY | Age: 71
End: 2021-11-23

## 2021-11-23 ENCOUNTER — ANCILLARY PROCEDURE (OUTPATIENT)
Dept: CARDIOLOGY | Age: 71
End: 2021-11-23
Attending: INTERNAL MEDICINE

## 2021-11-23 DIAGNOSIS — I50.9 CHRONIC CONGESTIVE HEART FAILURE, UNSPECIFIED HEART FAILURE TYPE (CMD): ICD-10-CM

## 2021-11-23 PROCEDURE — 93264 REM MNTR WRLS P-ART PRS SNR: CPT | Performed by: INTERNAL MEDICINE

## 2021-11-24 ENCOUNTER — TELEPHONE (OUTPATIENT)
Dept: CARDIOLOGY | Age: 71
End: 2021-11-24

## 2021-11-29 ENCOUNTER — ANTI-COAG (OUTPATIENT)
Dept: CARDIOLOGY | Age: 71
End: 2021-11-29

## 2021-11-29 DIAGNOSIS — I48.0 PAROXYSMAL ATRIAL FIBRILLATION (CMD): Primary | ICD-10-CM

## 2021-11-29 DIAGNOSIS — Z79.01 LONG TERM (CURRENT) USE OF ANTICOAGULANTS: ICD-10-CM

## 2021-11-29 DIAGNOSIS — I42.0 DILATED CARDIOMYOPATHY (CMD): ICD-10-CM

## 2021-11-29 LAB — INR PPP: 4.1

## 2021-11-29 PROCEDURE — 93793 ANTICOAG MGMT PT WARFARIN: CPT

## 2021-12-01 ENCOUNTER — TELEPHONE (OUTPATIENT)
Dept: CARDIOLOGY | Age: 71
End: 2021-12-01

## 2021-12-06 ENCOUNTER — ANTI-COAG (OUTPATIENT)
Dept: CARDIOLOGY | Age: 71
End: 2021-12-06

## 2021-12-06 DIAGNOSIS — I48.0 PAROXYSMAL ATRIAL FIBRILLATION (CMD): Primary | ICD-10-CM

## 2021-12-06 DIAGNOSIS — Z79.01 LONG TERM (CURRENT) USE OF ANTICOAGULANTS: ICD-10-CM

## 2021-12-06 DIAGNOSIS — I42.0 DILATED CARDIOMYOPATHY (CMD): ICD-10-CM

## 2021-12-06 LAB — INR PPP: 3.2

## 2021-12-06 PROCEDURE — 93793 ANTICOAG MGMT PT WARFARIN: CPT

## 2021-12-07 ENCOUNTER — TELEPHONE (OUTPATIENT)
Dept: CARDIOLOGY | Age: 71
End: 2021-12-07

## 2021-12-07 DIAGNOSIS — I42.0 DILATED CARDIOMYOPATHY (CMD): ICD-10-CM

## 2021-12-07 DIAGNOSIS — I48.0 PAROXYSMAL ATRIAL FIBRILLATION (CMD): Primary | ICD-10-CM

## 2021-12-08 ENCOUNTER — ANCILLARY PROCEDURE (OUTPATIENT)
Dept: CARDIOLOGY | Age: 71
End: 2021-12-08
Attending: INTERNAL MEDICINE

## 2021-12-08 DIAGNOSIS — Z95.810 ICD (IMPLANTABLE CARDIOVERTER-DEFIBRILLATOR) IN PLACE: ICD-10-CM

## 2021-12-10 NOTE — TELEPHONE ENCOUNTER
Refill request    Not on medication list    Med: Spiriva Respimat 2.5 mcg Inh 4 gm 60 D  -  Qty: 12    SIg:  Inhakle 2 puffs into the lungs daily

## 2021-12-13 ENCOUNTER — ANTI-COAG (OUTPATIENT)
Dept: CARDIOLOGY | Age: 71
End: 2021-12-13

## 2021-12-13 DIAGNOSIS — I48.0 PAROXYSMAL ATRIAL FIBRILLATION (CMD): Primary | ICD-10-CM

## 2021-12-13 DIAGNOSIS — Z79.01 LONG TERM (CURRENT) USE OF ANTICOAGULANTS: ICD-10-CM

## 2021-12-13 DIAGNOSIS — I42.0 DILATED CARDIOMYOPATHY (CMD): ICD-10-CM

## 2021-12-13 LAB — INR PPP: 3

## 2021-12-13 PROCEDURE — 93793 ANTICOAG MGMT PT WARFARIN: CPT | Performed by: INTERNAL MEDICINE

## 2021-12-13 RX ORDER — BUDESONIDE AND FORMOTEROL FUMARATE DIHYDRATE 160; 4.5 UG/1; UG/1
2 AEROSOL RESPIRATORY (INHALATION) 2 TIMES DAILY
Qty: 3 EACH | Refills: 3 | Status: SHIPPED | OUTPATIENT
Start: 2021-12-13 | End: 2022-12-13

## 2021-12-13 NOTE — TELEPHONE ENCOUNTER
Telephone appointment scheduled with Dr. Joceline Kong on 3/16/22 at 10am.  Verified date, time and telephone number to be contacted for appointment. Patient verbalized understanding.

## 2021-12-15 DIAGNOSIS — I48.0 PAROXYSMAL ATRIAL FIBRILLATION (CMD): Primary | ICD-10-CM

## 2021-12-15 RX ORDER — WARFARIN SODIUM 5 MG/1
5 TABLET ORAL DAILY
Qty: 65 TABLET | Refills: 3 | Status: SHIPPED | OUTPATIENT
Start: 2021-12-15 | End: 2022-10-05 | Stop reason: SDUPTHER

## 2021-12-20 ENCOUNTER — ANTI-COAG (OUTPATIENT)
Dept: CARDIOLOGY | Age: 71
End: 2021-12-20

## 2021-12-20 DIAGNOSIS — I42.0 DILATED CARDIOMYOPATHY (CMD): ICD-10-CM

## 2021-12-20 DIAGNOSIS — I48.0 PAROXYSMAL ATRIAL FIBRILLATION (CMD): Primary | ICD-10-CM

## 2021-12-20 LAB — INR PPP: 3

## 2021-12-24 NOTE — TELEPHONE ENCOUNTER
Fax refill request for Spiriva Respimat received from [address]. Patient has phone visit scheduled for 3/16/22. Dr. Cayetano Rashid- please review and sign pended order if agreeable.

## 2021-12-26 RX ORDER — TIOTROPIUM BROMIDE INHALATION SPRAY 3.12 UG/1
2 SPRAY, METERED RESPIRATORY (INHALATION) DAILY
Qty: 3 EACH | Refills: 0 | Status: SHIPPED | OUTPATIENT
Start: 2021-12-26

## 2021-12-27 ENCOUNTER — ANTI-COAG (OUTPATIENT)
Dept: CARDIOLOGY | Age: 71
End: 2021-12-27

## 2021-12-27 DIAGNOSIS — I42.0 DILATED CARDIOMYOPATHY (CMD): ICD-10-CM

## 2021-12-27 DIAGNOSIS — I48.0 PAROXYSMAL ATRIAL FIBRILLATION (CMD): Primary | ICD-10-CM

## 2021-12-27 LAB — INR PPP: 3.4

## 2021-12-28 ENCOUNTER — ANCILLARY PROCEDURE (OUTPATIENT)
Dept: CARDIOLOGY | Age: 71
End: 2021-12-28
Attending: INTERNAL MEDICINE

## 2021-12-28 DIAGNOSIS — I50.20 HFREF (HEART FAILURE WITH REDUCED EJECTION FRACTION) (CMD): ICD-10-CM

## 2021-12-28 PROCEDURE — 93264 REM MNTR WRLS P-ART PRS SNR: CPT | Performed by: INTERNAL MEDICINE

## 2022-01-01 ENCOUNTER — EXTERNAL RECORD (OUTPATIENT)
Dept: OTHER | Age: 72
End: 2022-01-01

## 2022-01-03 ENCOUNTER — ANTI-COAG (OUTPATIENT)
Dept: CARDIOLOGY | Age: 72
End: 2022-01-03

## 2022-01-03 DIAGNOSIS — I42.0 DILATED CARDIOMYOPATHY (CMD): ICD-10-CM

## 2022-01-03 DIAGNOSIS — I48.0 PAROXYSMAL ATRIAL FIBRILLATION (CMD): Primary | ICD-10-CM

## 2022-01-03 LAB — INR PPP: 3.1

## 2022-01-04 RX ORDER — EPLERENONE 25 MG/1
25 TABLET, FILM COATED ORAL DAILY
Qty: 30 TABLET | Refills: 11 | Status: SHIPPED | OUTPATIENT
Start: 2022-01-04 | End: 2023-01-23

## 2022-01-10 ENCOUNTER — ANTI-COAG (OUTPATIENT)
Dept: CARDIOLOGY | Age: 72
End: 2022-01-10

## 2022-01-10 DIAGNOSIS — I48.0 PAROXYSMAL ATRIAL FIBRILLATION (CMD): Primary | ICD-10-CM

## 2022-01-10 DIAGNOSIS — I42.0 DILATED CARDIOMYOPATHY (CMD): ICD-10-CM

## 2022-01-10 DIAGNOSIS — Z79.01 LONG TERM (CURRENT) USE OF ANTICOAGULANTS: ICD-10-CM

## 2022-01-10 LAB — INR PPP: 3.3

## 2022-01-10 PROCEDURE — 93793 ANTICOAG MGMT PT WARFARIN: CPT | Performed by: INTERNAL MEDICINE

## 2022-01-11 ENCOUNTER — TELEPHONE (OUTPATIENT)
Dept: CARDIOLOGY | Age: 72
End: 2022-01-11

## 2022-01-11 DIAGNOSIS — I50.22 CHRONIC SYSTOLIC CONGESTIVE HEART FAILURE (CMD): Primary | ICD-10-CM

## 2022-01-13 ENCOUNTER — DOCUMENTATION (OUTPATIENT)
Dept: CARDIOLOGY | Age: 72
End: 2022-01-13

## 2022-01-14 ENCOUNTER — TELEPHONE (OUTPATIENT)
Dept: CARDIOLOGY | Age: 72
End: 2022-01-14

## 2022-01-17 LAB — INR PPP: 3.1

## 2022-01-18 ENCOUNTER — ANTI-COAG (OUTPATIENT)
Dept: CARDIOLOGY | Age: 72
End: 2022-01-18

## 2022-01-18 DIAGNOSIS — Z79.01 LONG TERM (CURRENT) USE OF ANTICOAGULANTS: ICD-10-CM

## 2022-01-18 DIAGNOSIS — I42.0 DILATED CARDIOMYOPATHY (CMD): ICD-10-CM

## 2022-01-18 DIAGNOSIS — I48.0 PAROXYSMAL ATRIAL FIBRILLATION (CMD): Primary | ICD-10-CM

## 2022-01-18 PROCEDURE — 93793 ANTICOAG MGMT PT WARFARIN: CPT | Performed by: INTERNAL MEDICINE

## 2022-01-24 ENCOUNTER — TELEPHONE (OUTPATIENT)
Dept: CARDIOLOGY | Age: 72
End: 2022-01-24

## 2022-01-24 ENCOUNTER — ANTI-COAG (OUTPATIENT)
Dept: CARDIOLOGY | Age: 72
End: 2022-01-24

## 2022-01-24 DIAGNOSIS — I42.0 DILATED CARDIOMYOPATHY (CMD): ICD-10-CM

## 2022-01-24 DIAGNOSIS — Z79.01 LONG TERM (CURRENT) USE OF ANTICOAGULANTS: ICD-10-CM

## 2022-01-24 DIAGNOSIS — I48.0 PAROXYSMAL ATRIAL FIBRILLATION (CMD): Primary | ICD-10-CM

## 2022-01-24 LAB — INR PPP: 3.1

## 2022-01-24 PROCEDURE — 93793 ANTICOAG MGMT PT WARFARIN: CPT | Performed by: INTERNAL MEDICINE

## 2022-01-31 ENCOUNTER — ANTI-COAG (OUTPATIENT)
Dept: CARDIOLOGY | Age: 72
End: 2022-01-31

## 2022-01-31 DIAGNOSIS — Z79.01 LONG TERM (CURRENT) USE OF ANTICOAGULANTS: ICD-10-CM

## 2022-01-31 DIAGNOSIS — I42.0 DILATED CARDIOMYOPATHY (CMD): ICD-10-CM

## 2022-01-31 DIAGNOSIS — I48.0 PAROXYSMAL ATRIAL FIBRILLATION (CMD): Primary | ICD-10-CM

## 2022-01-31 LAB — INR PPP: 3.7

## 2022-01-31 PROCEDURE — 93793 ANTICOAG MGMT PT WARFARIN: CPT | Performed by: INTERNAL MEDICINE

## 2022-02-01 ENCOUNTER — ANCILLARY PROCEDURE (OUTPATIENT)
Dept: CARDIOLOGY | Age: 72
End: 2022-02-01
Attending: INTERNAL MEDICINE

## 2022-02-01 DIAGNOSIS — I50.22 CHRONIC SYSTOLIC HEART FAILURE (CMD): ICD-10-CM

## 2022-02-01 PROCEDURE — 93264 REM MNTR WRLS P-ART PRS SNR: CPT | Performed by: INTERNAL MEDICINE

## 2022-02-03 RX ORDER — CARVEDILOL 25 MG/1
TABLET ORAL
Qty: 180 TABLET | Refills: 0 | Status: SHIPPED | OUTPATIENT
Start: 2022-02-03 | End: 2022-05-04

## 2022-02-04 RX ORDER — SACUBITRIL AND VALSARTAN 97; 103 MG/1; MG/1
TABLET, FILM COATED ORAL
Qty: 180 TABLET | Refills: 3 | Status: SHIPPED | OUTPATIENT
Start: 2022-02-04 | End: 2023-03-21 | Stop reason: SDUPTHER

## 2022-02-07 ENCOUNTER — ANTI-COAG (OUTPATIENT)
Dept: CARDIOLOGY | Age: 72
End: 2022-02-07

## 2022-02-07 DIAGNOSIS — I42.0 DILATED CARDIOMYOPATHY (CMD): ICD-10-CM

## 2022-02-07 DIAGNOSIS — I48.0 PAROXYSMAL ATRIAL FIBRILLATION (CMD): Primary | ICD-10-CM

## 2022-02-07 LAB — INR PPP: 2.4

## 2022-02-14 ENCOUNTER — ANTI-COAG (OUTPATIENT)
Dept: CARDIOLOGY | Age: 72
End: 2022-02-14

## 2022-02-14 DIAGNOSIS — I48.0 PAROXYSMAL ATRIAL FIBRILLATION (CMD): Primary | ICD-10-CM

## 2022-02-14 DIAGNOSIS — I42.0 DILATED CARDIOMYOPATHY (CMD): ICD-10-CM

## 2022-02-14 DIAGNOSIS — Z79.01 LONG TERM (CURRENT) USE OF ANTICOAGULANTS: ICD-10-CM

## 2022-02-14 LAB — INR PPP: 3.3

## 2022-02-14 PROCEDURE — 93793 ANTICOAG MGMT PT WARFARIN: CPT | Performed by: INTERNAL MEDICINE

## 2022-02-21 ENCOUNTER — ANTI-COAG (OUTPATIENT)
Dept: CARDIOLOGY | Age: 72
End: 2022-02-21

## 2022-02-21 DIAGNOSIS — I42.0 DILATED CARDIOMYOPATHY (CMD): ICD-10-CM

## 2022-02-21 DIAGNOSIS — I48.0 PAROXYSMAL ATRIAL FIBRILLATION (CMD): Primary | ICD-10-CM

## 2022-02-21 LAB — INR PPP: 3.5

## 2022-02-21 RX ORDER — POTASSIUM CHLORIDE 20 MEQ/1
TABLET, EXTENDED RELEASE ORAL
Qty: 90 TABLET | Refills: 3 | Status: SHIPPED | OUTPATIENT
Start: 2022-02-21 | End: 2022-05-10

## 2022-02-28 ENCOUNTER — ANTI-COAG (OUTPATIENT)
Dept: CARDIOLOGY | Age: 72
End: 2022-02-28

## 2022-02-28 DIAGNOSIS — I42.0 DILATED CARDIOMYOPATHY (CMD): ICD-10-CM

## 2022-02-28 DIAGNOSIS — I48.0 PAROXYSMAL ATRIAL FIBRILLATION (CMD): Primary | ICD-10-CM

## 2022-02-28 LAB — INR PPP: 3.7

## 2022-03-03 ENCOUNTER — ANCILLARY PROCEDURE (OUTPATIENT)
Dept: CARDIOLOGY | Age: 72
End: 2022-03-03
Attending: INTERNAL MEDICINE

## 2022-03-03 DIAGNOSIS — I50.22 CHRONIC SYSTOLIC CONGESTIVE HEART FAILURE (CMD): ICD-10-CM

## 2022-03-03 PROCEDURE — 93264 REM MNTR WRLS P-ART PRS SNR: CPT | Performed by: INTERNAL MEDICINE

## 2022-03-07 ENCOUNTER — ANTI-COAG (OUTPATIENT)
Dept: CARDIOLOGY | Age: 72
End: 2022-03-07

## 2022-03-07 DIAGNOSIS — Z79.01 LONG TERM (CURRENT) USE OF ANTICOAGULANTS: ICD-10-CM

## 2022-03-07 DIAGNOSIS — I48.0 PAROXYSMAL ATRIAL FIBRILLATION (CMD): Primary | ICD-10-CM

## 2022-03-07 DIAGNOSIS — I42.0 DILATED CARDIOMYOPATHY (CMD): ICD-10-CM

## 2022-03-07 LAB
ALBUMIN SERPL-MCNC: 3.5 G/DL (ref 3.4–5)
ALP SERPL-CCNC: 105 U/L (ref 44–147)
ALT SERPL-CCNC: 10 U/L (ref 5–30)
AST SERPL-CCNC: 13 U/L (ref 7–31)
BILIRUB SERPL-MCNC: 0.7 MG/DL (ref 0–1)
BNP SERPL-MCNC: 82.3 PG/ML (ref 0–100)
BUN SERPL-MCNC: 61 MG/DL (ref 6–20)
CALCIUM SERPL-MCNC: 8.3 MG/DL (ref 8.6–10.5)
CHLORIDE SERPL-SCNC: 103.4 MMOL/DL (ref 96–109)
CHOLEST SERPL-MCNC: 142 MG/DL (ref 120–200)
CO2 SERPL-SCNC: 27.5 MMOL/DL (ref 21–33)
CREAT SERPL-MCNC: 1.8 MG/DL (ref 0.5–1.3)
GLUCOSE SERPL-MCNC: 96 MG/DL (ref 70–110)
HBA1C MFR BLD: 5.8 % (ref 4.3–5.7)
HDLC SERPL-MCNC: 32 MG/DL (ref 35–60)
INR PPP: 3.3
LDLC SERPL CALC-MCNC: 65 MG/DL (ref 35–130)
LENGTH OF FAST TIME PATIENT: ABNORMAL H
LENGTH OF FAST TIME PATIENT: ABNORMAL H
POTASSIUM SERPL-SCNC: 5.5 MMOL/DL (ref 3.5–5.5)
PROT SERPL-MCNC: 6.1 G/DL (ref 6–8.3)
SODIUM SERPL-SCNC: 137.7 MMOL/DL (ref 135–145)
TRIGL SERPL-MCNC: 225 MG/DL (ref 30–150)

## 2022-03-07 PROCEDURE — 93793 ANTICOAG MGMT PT WARFARIN: CPT | Performed by: INTERNAL MEDICINE

## 2022-03-14 ENCOUNTER — ANTI-COAG (OUTPATIENT)
Dept: CARDIOLOGY | Age: 72
End: 2022-03-14

## 2022-03-14 DIAGNOSIS — I42.0 DILATED CARDIOMYOPATHY (CMD): ICD-10-CM

## 2022-03-14 DIAGNOSIS — I48.0 PAROXYSMAL ATRIAL FIBRILLATION (CMD): Primary | ICD-10-CM

## 2022-03-14 LAB — INR PPP: 2.7

## 2022-03-16 ENCOUNTER — VIRTUAL PHONE E/M (OUTPATIENT)
Dept: PULMONOLOGY | Facility: CLINIC | Age: 72
End: 2022-03-16
Payer: MEDICARE

## 2022-03-16 DIAGNOSIS — J45.20 MILD INTERMITTENT ASTHMA WITHOUT COMPLICATION: Primary | ICD-10-CM

## 2022-03-16 DIAGNOSIS — G47.33 OSA (OBSTRUCTIVE SLEEP APNEA): ICD-10-CM

## 2022-03-16 PROCEDURE — 99442 PHONE E/M BY PHYS 11-20 MIN: CPT | Performed by: INTERNAL MEDICINE

## 2022-03-16 NOTE — PATIENT INSTRUCTIONS
For your Asthma    1. Keep taking the Symbicort inhaler  It is also known as Budesonide-Fomoterol  Take 2 puffs TWICE per day    2. Stop using the Spiriva otherwise known as Tiotropium    3. If you feel short of breath or chest tightness or cough, you can use your albuterol inhaler 4 times per day As Needed. I recommend you make an appointment to see me in the clinic in the near future.

## 2022-03-16 NOTE — PROGRESS NOTES
Virtual Telephone Check-In    Pinky Martinez verbally consents to a Telephone Check-In visit on 22. Pt does not have ability to do video visit    Patient understands and accepts financial responsibility for any deductible, co-insurance and/or co-pays associated with this service.     Subjective:    Recent cold  Rx Z pac  Typical sx    Using inhalers \"doing it wrong\"    Back on Symbicort  Using same one for months    Anup using twice per day    No used albuterol      No sob  Wheeze with cold and resolved  Cough resolved almost      S/p flu  S/p COVID and booster  States prior pneumovax    Old pcp rehired   Now has MD at home    Objective:    Exam  No distress detected over phone  Speaks in full clear sentences  A/O x 3  Mood normal      Assessment/Plan:      (J45.20) Mild intermittent asthma without complication  (primary encounter diagnosis)  Poor insight  Not seen since 2019 and now with tele visit only  Current probably URI and getting better  Not sure what inhalers she is using  Plan: Extensive counseling provided   Will give written instructions and mail to her home   Cont Symbicort for now   Alb MDI prn   Stop anup   Rec OV in near future    (G47.33) MAURICIO (obstructive sleep apnea)  Plan: encourage to use every night      PCP now \"MD at Home\"      Duration of the service: 15 minutes        Britton Parents, MD

## 2022-03-21 ENCOUNTER — ANTI-COAG (OUTPATIENT)
Dept: CARDIOLOGY | Age: 72
End: 2022-03-21

## 2022-03-21 DIAGNOSIS — I42.0 DILATED CARDIOMYOPATHY (CMD): ICD-10-CM

## 2022-03-21 DIAGNOSIS — I48.0 PAROXYSMAL ATRIAL FIBRILLATION (CMD): Primary | ICD-10-CM

## 2022-03-21 LAB — INR PPP: 2.5

## 2022-03-23 ENCOUNTER — ANCILLARY ORDERS (OUTPATIENT)
Dept: CARDIOLOGY | Age: 72
End: 2022-03-23

## 2022-03-23 ENCOUNTER — ANCILLARY PROCEDURE (OUTPATIENT)
Dept: CARDIOLOGY | Age: 72
End: 2022-03-23
Attending: INTERNAL MEDICINE

## 2022-03-23 DIAGNOSIS — Z95.810 ICD (IMPLANTABLE CARDIOVERTER-DEFIBRILLATOR) IN PLACE: ICD-10-CM

## 2022-03-23 PROCEDURE — X1114 CARDIAC DEVICE HOME CHECK - REMOTE UNSCHEDULED: HCPCS | Performed by: INTERNAL MEDICINE

## 2022-03-23 PROCEDURE — 93295 DEV INTERROG REMOTE 1/2/MLT: CPT | Performed by: INTERNAL MEDICINE

## 2022-03-28 ENCOUNTER — ANTI-COAG (OUTPATIENT)
Dept: CARDIOLOGY | Age: 72
End: 2022-03-28

## 2022-03-28 ENCOUNTER — TELEPHONE (OUTPATIENT)
Dept: CARDIOLOGY | Age: 72
End: 2022-03-28

## 2022-03-28 DIAGNOSIS — I48.0 PAROXYSMAL ATRIAL FIBRILLATION (CMD): Primary | ICD-10-CM

## 2022-03-28 DIAGNOSIS — I42.0 DILATED CARDIOMYOPATHY (CMD): ICD-10-CM

## 2022-03-28 LAB — INR PPP: 3.2

## 2022-04-04 ENCOUNTER — ANTI-COAG (OUTPATIENT)
Dept: CARDIOLOGY | Age: 72
End: 2022-04-04

## 2022-04-04 DIAGNOSIS — I42.0 DILATED CARDIOMYOPATHY (CMD): ICD-10-CM

## 2022-04-04 DIAGNOSIS — I48.0 PAROXYSMAL ATRIAL FIBRILLATION (CMD): Primary | ICD-10-CM

## 2022-04-04 DIAGNOSIS — Z79.01 LONG TERM (CURRENT) USE OF ANTICOAGULANTS: ICD-10-CM

## 2022-04-04 LAB — INR PPP: 2.1

## 2022-04-04 PROCEDURE — 93793 ANTICOAG MGMT PT WARFARIN: CPT | Performed by: INTERNAL MEDICINE

## 2022-04-07 ENCOUNTER — ANCILLARY PROCEDURE (OUTPATIENT)
Dept: CARDIOLOGY | Age: 72
End: 2022-04-07
Attending: INTERNAL MEDICINE

## 2022-04-07 ENCOUNTER — TELEPHONE (OUTPATIENT)
Dept: CARDIOLOGY | Age: 72
End: 2022-04-07

## 2022-04-07 DIAGNOSIS — I50.22 CHRONIC SYSTOLIC CONGESTIVE HEART FAILURE (CMD): ICD-10-CM

## 2022-04-07 PROCEDURE — 93264 REM MNTR WRLS P-ART PRS SNR: CPT | Performed by: INTERNAL MEDICINE

## 2022-04-11 ENCOUNTER — TELEPHONE (OUTPATIENT)
Dept: CARDIOLOGY | Age: 72
End: 2022-04-11

## 2022-04-11 ENCOUNTER — ANTI-COAG (OUTPATIENT)
Dept: CARDIOLOGY | Age: 72
End: 2022-04-11

## 2022-04-11 DIAGNOSIS — I42.0 DILATED CARDIOMYOPATHY (CMD): ICD-10-CM

## 2022-04-11 DIAGNOSIS — I48.0 PAROXYSMAL ATRIAL FIBRILLATION (CMD): Primary | ICD-10-CM

## 2022-04-11 LAB — INR PPP: 3.1

## 2022-04-18 ENCOUNTER — ANTI-COAG (OUTPATIENT)
Dept: CARDIOLOGY | Age: 72
End: 2022-04-18

## 2022-04-18 DIAGNOSIS — I42.0 DILATED CARDIOMYOPATHY (CMD): ICD-10-CM

## 2022-04-18 DIAGNOSIS — I48.0 PAROXYSMAL ATRIAL FIBRILLATION (CMD): Primary | ICD-10-CM

## 2022-04-18 LAB — INR PPP: 2.3

## 2022-04-21 ENCOUNTER — TELEPHONE (OUTPATIENT)
Dept: CARDIOLOGY | Age: 72
End: 2022-04-21

## 2022-04-25 ENCOUNTER — DOCUMENTATION (OUTPATIENT)
Dept: CARDIOLOGY | Age: 72
End: 2022-04-25

## 2022-04-25 ENCOUNTER — ANTI-COAG (OUTPATIENT)
Dept: CARDIOLOGY | Age: 72
End: 2022-04-25

## 2022-04-25 DIAGNOSIS — E55.9 VITAMIN D DEFICIENCY: ICD-10-CM

## 2022-04-25 DIAGNOSIS — I48.0 PAROXYSMAL ATRIAL FIBRILLATION (CMD): Primary | ICD-10-CM

## 2022-04-25 DIAGNOSIS — I50.22 CHRONIC SYSTOLIC CONGESTIVE HEART FAILURE (CMD): Primary | ICD-10-CM

## 2022-04-25 DIAGNOSIS — E78.5 DYSLIPIDEMIA: ICD-10-CM

## 2022-04-25 DIAGNOSIS — I42.0 DILATED CARDIOMYOPATHY (CMD): ICD-10-CM

## 2022-04-25 LAB — INR PPP: 2.3

## 2022-05-02 ENCOUNTER — ANTI-COAG (OUTPATIENT)
Dept: CARDIOLOGY | Age: 72
End: 2022-05-02

## 2022-05-02 DIAGNOSIS — I48.0 PAROXYSMAL ATRIAL FIBRILLATION (CMD): Primary | ICD-10-CM

## 2022-05-02 DIAGNOSIS — I42.0 DILATED CARDIOMYOPATHY (CMD): ICD-10-CM

## 2022-05-02 LAB — INR PPP: 3.5

## 2022-05-04 RX ORDER — CARVEDILOL 25 MG/1
TABLET ORAL
Qty: 180 TABLET | Refills: 0 | Status: SHIPPED | OUTPATIENT
Start: 2022-05-04 | End: 2022-09-12

## 2022-05-09 ENCOUNTER — ANTI-COAG (OUTPATIENT)
Dept: CARDIOLOGY | Age: 72
End: 2022-05-09

## 2022-05-09 DIAGNOSIS — I42.0 DILATED CARDIOMYOPATHY (CMD): ICD-10-CM

## 2022-05-09 DIAGNOSIS — I48.0 PAROXYSMAL ATRIAL FIBRILLATION (CMD): Primary | ICD-10-CM

## 2022-05-09 LAB — INR PPP: 3

## 2022-05-10 ENCOUNTER — OFFICE VISIT (OUTPATIENT)
Dept: CARDIOLOGY | Age: 72
End: 2022-05-10

## 2022-05-10 ENCOUNTER — ANCILLARY PROCEDURE (OUTPATIENT)
Dept: CARDIOLOGY | Age: 72
End: 2022-05-10
Attending: INTERNAL MEDICINE

## 2022-05-10 VITALS — DIASTOLIC BLOOD PRESSURE: 62 MMHG | HEART RATE: 75 BPM | SYSTOLIC BLOOD PRESSURE: 122 MMHG

## 2022-05-10 VITALS — HEART RATE: 75 BPM | DIASTOLIC BLOOD PRESSURE: 62 MMHG | SYSTOLIC BLOOD PRESSURE: 122 MMHG

## 2022-05-10 DIAGNOSIS — Z95.810 ICD (IMPLANTABLE CARDIOVERTER-DEFIBRILLATOR) IN PLACE: Primary | ICD-10-CM

## 2022-05-10 DIAGNOSIS — I10 HTN (HYPERTENSION), BENIGN: ICD-10-CM

## 2022-05-10 DIAGNOSIS — E78.5 DYSLIPIDEMIA: ICD-10-CM

## 2022-05-10 DIAGNOSIS — I50.22 CHRONIC SYSTOLIC CONGESTIVE HEART FAILURE (CMD): ICD-10-CM

## 2022-05-10 DIAGNOSIS — Z95.5 STENTED CORONARY ARTERY: ICD-10-CM

## 2022-05-10 DIAGNOSIS — I42.0 DILATED CARDIOMYOPATHY (CMD): ICD-10-CM

## 2022-05-10 DIAGNOSIS — I48.0 PAROXYSMAL ATRIAL FIBRILLATION (CMD): ICD-10-CM

## 2022-05-10 DIAGNOSIS — Z45.02 IMPLANTABLE DEFIBRILLATOR REPROGRAMMING/CHECK: ICD-10-CM

## 2022-05-10 DIAGNOSIS — Z79.01 ANTICOAGULATED ON COUMADIN: ICD-10-CM

## 2022-05-10 PROCEDURE — 93264 REM MNTR WRLS P-ART PRS SNR: CPT | Performed by: INTERNAL MEDICINE

## 2022-05-10 PROCEDURE — 99213 OFFICE O/P EST LOW 20 MIN: CPT | Performed by: INTERNAL MEDICINE

## 2022-05-10 PROCEDURE — 93283 PRGRMG EVAL IMPLANTABLE DFB: CPT | Performed by: INTERNAL MEDICINE

## 2022-05-10 RX ORDER — POTASSIUM CHLORIDE 1500 MG/1
20 TABLET, EXTENDED RELEASE ORAL DAILY
COMMUNITY
End: 2023-10-13 | Stop reason: SDUPTHER

## 2022-05-10 ASSESSMENT — ENCOUNTER SYMPTOMS
HEMATOCHEZIA: 0
NEAR-SYNCOPE: 0
DIZZINESS: 0
SUSPICIOUS LESIONS: 0
HALLUCINATIONS: 0
LIGHT-HEADEDNESS: 0
SHORTNESS OF BREATH: 1
HEMOPTYSIS: 0
DEPRESSION: 0
WEIGHT GAIN: 0
SYNCOPE: 0
ALLERGIC/IMMUNOLOGIC COMMENTS: NO NEW FOOD ALLERGIES
BRUISES/BLEEDS EASILY: 0
COUGH: 0
CHILLS: 0
WEIGHT LOSS: 0
FEVER: 0

## 2022-05-16 ENCOUNTER — TELEPHONE (OUTPATIENT)
Dept: CARDIOLOGY | Age: 72
End: 2022-05-16

## 2022-05-16 ENCOUNTER — ANTI-COAG (OUTPATIENT)
Dept: CARDIOLOGY | Age: 72
End: 2022-05-16

## 2022-05-16 DIAGNOSIS — I42.0 DILATED CARDIOMYOPATHY (CMD): ICD-10-CM

## 2022-05-16 DIAGNOSIS — I50.22 CHRONIC SYSTOLIC CONGESTIVE HEART FAILURE (CMD): Primary | ICD-10-CM

## 2022-05-16 DIAGNOSIS — Z79.01 LONG TERM (CURRENT) USE OF ANTICOAGULANTS: ICD-10-CM

## 2022-05-16 DIAGNOSIS — E78.5 DYSLIPIDEMIA: ICD-10-CM

## 2022-05-16 DIAGNOSIS — I48.0 PAROXYSMAL ATRIAL FIBRILLATION (CMD): Primary | ICD-10-CM

## 2022-05-16 LAB — INR PPP: 3.4

## 2022-05-16 RX ORDER — ATORVASTATIN CALCIUM 20 MG/1
20 TABLET, FILM COATED ORAL DAILY
Qty: 90 TABLET | Refills: 3 | Status: SHIPPED | OUTPATIENT
Start: 2022-05-16 | End: 2022-10-05 | Stop reason: SDUPTHER

## 2022-05-23 ENCOUNTER — ANTI-COAG (OUTPATIENT)
Dept: CARDIOLOGY | Age: 72
End: 2022-05-23

## 2022-05-23 DIAGNOSIS — I42.0 DILATED CARDIOMYOPATHY (CMD): ICD-10-CM

## 2022-05-23 DIAGNOSIS — I48.0 PAROXYSMAL ATRIAL FIBRILLATION (CMD): Primary | ICD-10-CM

## 2022-05-23 LAB — INR PPP: 2.8

## 2022-05-31 ENCOUNTER — ANTI-COAG (OUTPATIENT)
Dept: CARDIOLOGY | Age: 72
End: 2022-05-31

## 2022-05-31 ENCOUNTER — TELEPHONE (OUTPATIENT)
Dept: CARDIOLOGY | Age: 72
End: 2022-05-31

## 2022-05-31 DIAGNOSIS — Z79.01 LONG TERM (CURRENT) USE OF ANTICOAGULANTS: ICD-10-CM

## 2022-05-31 DIAGNOSIS — I42.0 DILATED CARDIOMYOPATHY (CMD): ICD-10-CM

## 2022-05-31 DIAGNOSIS — I48.0 PAROXYSMAL ATRIAL FIBRILLATION (CMD): Primary | ICD-10-CM

## 2022-05-31 LAB — INR PPP: 2.1

## 2022-06-07 ENCOUNTER — ANTI-COAG (OUTPATIENT)
Dept: CARDIOLOGY | Age: 72
End: 2022-06-07

## 2022-06-07 DIAGNOSIS — I42.0 DILATED CARDIOMYOPATHY (CMD): ICD-10-CM

## 2022-06-07 DIAGNOSIS — I48.0 PAROXYSMAL ATRIAL FIBRILLATION (CMD): Primary | ICD-10-CM

## 2022-06-07 LAB — INR PPP: 2.2

## 2022-06-13 ENCOUNTER — ANCILLARY PROCEDURE (OUTPATIENT)
Dept: CARDIOLOGY | Age: 72
End: 2022-06-13
Attending: INTERNAL MEDICINE

## 2022-06-13 ENCOUNTER — ANTI-COAG (OUTPATIENT)
Dept: CARDIOLOGY | Age: 72
End: 2022-06-13

## 2022-06-13 DIAGNOSIS — I50.22 CHRONIC SYSTOLIC CONGESTIVE HEART FAILURE (CMD): ICD-10-CM

## 2022-06-13 DIAGNOSIS — I42.0 DILATED CARDIOMYOPATHY (CMD): ICD-10-CM

## 2022-06-13 DIAGNOSIS — I48.0 PAROXYSMAL ATRIAL FIBRILLATION (CMD): Primary | ICD-10-CM

## 2022-06-13 LAB — INR PPP: 3.9

## 2022-06-13 PROCEDURE — 93264 REM MNTR WRLS P-ART PRS SNR: CPT | Performed by: INTERNAL MEDICINE

## 2022-06-20 ENCOUNTER — ANTI-COAG (OUTPATIENT)
Dept: CARDIOLOGY | Age: 72
End: 2022-06-20

## 2022-06-20 DIAGNOSIS — I48.0 PAROXYSMAL ATRIAL FIBRILLATION (CMD): Primary | ICD-10-CM

## 2022-06-20 DIAGNOSIS — I42.0 DILATED CARDIOMYOPATHY (CMD): ICD-10-CM

## 2022-06-20 LAB — INR PPP: 3.1

## 2022-06-27 ENCOUNTER — ANTI-COAG (OUTPATIENT)
Dept: CARDIOLOGY | Age: 72
End: 2022-06-27

## 2022-06-27 DIAGNOSIS — I48.0 PAROXYSMAL ATRIAL FIBRILLATION (CMD): Primary | ICD-10-CM

## 2022-06-27 DIAGNOSIS — I42.0 DILATED CARDIOMYOPATHY (CMD): ICD-10-CM

## 2022-06-27 LAB — INR PPP: 2.3

## 2022-06-29 ENCOUNTER — ANCILLARY PROCEDURE (OUTPATIENT)
Dept: CARDIOLOGY | Age: 72
End: 2022-06-29
Attending: INTERNAL MEDICINE

## 2022-06-29 DIAGNOSIS — Z95.810 ICD (IMPLANTABLE CARDIOVERTER-DEFIBRILLATOR) IN PLACE: ICD-10-CM

## 2022-06-30 DIAGNOSIS — I48.0 PAROXYSMAL ATRIAL FIBRILLATION (CMD): ICD-10-CM

## 2022-06-30 RX ORDER — WARFARIN SODIUM 5 MG/1
TABLET ORAL
Qty: 65 TABLET | Refills: 3 | Status: SHIPPED | OUTPATIENT
Start: 2022-06-30 | End: 2022-10-05 | Stop reason: SDUPTHER

## 2022-07-05 ENCOUNTER — ANTI-COAG (OUTPATIENT)
Dept: CARDIOLOGY | Age: 72
End: 2022-07-05

## 2022-07-05 DIAGNOSIS — I48.0 PAROXYSMAL ATRIAL FIBRILLATION (CMD): Primary | ICD-10-CM

## 2022-07-05 DIAGNOSIS — I42.0 DILATED CARDIOMYOPATHY (CMD): ICD-10-CM

## 2022-07-05 LAB — INR PPP: 4.1

## 2022-07-11 ENCOUNTER — ANTI-COAG (OUTPATIENT)
Dept: CARDIOLOGY | Age: 72
End: 2022-07-11

## 2022-07-11 DIAGNOSIS — I42.0 DILATED CARDIOMYOPATHY (CMD): ICD-10-CM

## 2022-07-11 DIAGNOSIS — I48.0 PAROXYSMAL ATRIAL FIBRILLATION (CMD): Primary | ICD-10-CM

## 2022-07-11 LAB — INR PPP: 2.7

## 2022-07-12 NOTE — PROGRESS NOTES
Dr. Anaya,    Called pt's daughter.      States she did not go to ER yesterday.    States she is not concerned with pt's symptoms.   States her symptoms have been going on for a \"long time\".   States her mom did not want to go to ER.     Has audiology appt today.    Daughter is Looking for palliative care for pt.   Please advise.   [facility]    Cardiology Progress Note  [facility]    Jayla Westbrook Patient Status:  Observation    1950 MRN [id number]   Location [address] [place] [address] [facility] AN bronchitis , chronic (HCC)  Antibiotics per primary care          Acute systolic congestive heart failure ([facility] [address]  See above. We will also order physical therapy.         Results:     Lab Results  Component Value Date   WBC 3.9* 04/10/2017   HGB 11.8* 04/1

## 2022-07-14 ENCOUNTER — ANCILLARY PROCEDURE (OUTPATIENT)
Dept: CARDIOLOGY | Age: 72
End: 2022-07-14
Attending: INTERNAL MEDICINE

## 2022-07-14 DIAGNOSIS — I50.22 CHRONIC SYSTOLIC CONGESTIVE HEART FAILURE (CMD): ICD-10-CM

## 2022-07-14 PROCEDURE — 93264 REM MNTR WRLS P-ART PRS SNR: CPT | Performed by: INTERNAL MEDICINE

## 2022-07-18 ENCOUNTER — ANTI-COAG (OUTPATIENT)
Dept: CARDIOLOGY | Age: 72
End: 2022-07-18

## 2022-07-18 DIAGNOSIS — I42.0 DILATED CARDIOMYOPATHY (CMD): ICD-10-CM

## 2022-07-18 DIAGNOSIS — I48.0 PAROXYSMAL ATRIAL FIBRILLATION (CMD): Primary | ICD-10-CM

## 2022-07-18 LAB — INR PPP: 3.2

## 2022-07-19 ENCOUNTER — APPOINTMENT (OUTPATIENT)
Dept: CARDIOLOGY | Age: 72
End: 2022-07-19
Attending: INTERNAL MEDICINE

## 2022-07-25 ENCOUNTER — ANTI-COAG (OUTPATIENT)
Dept: CARDIOLOGY | Age: 72
End: 2022-07-25

## 2022-07-25 DIAGNOSIS — I42.0 DILATED CARDIOMYOPATHY (CMD): ICD-10-CM

## 2022-07-25 DIAGNOSIS — I48.0 PAROXYSMAL ATRIAL FIBRILLATION (CMD): Primary | ICD-10-CM

## 2022-07-25 LAB
INR PPP: 2.4
INR PPP: 2.5

## 2022-07-27 DIAGNOSIS — I50.22 CHRONIC SYSTOLIC CONGESTIVE HEART FAILURE (CMD): ICD-10-CM

## 2022-07-27 RX ORDER — TORSEMIDE 20 MG/1
20 TABLET ORAL DAILY
Qty: 30 TABLET | OUTPATIENT
Start: 2022-07-27

## 2022-07-27 RX ORDER — TORSEMIDE 20 MG/1
TABLET ORAL
Qty: 450 TABLET | Refills: 3 | Status: SHIPPED | OUTPATIENT
Start: 2022-07-27

## 2022-08-01 ENCOUNTER — ANTI-COAG (OUTPATIENT)
Dept: CARDIOLOGY | Age: 72
End: 2022-08-01

## 2022-08-01 DIAGNOSIS — I48.0 PAROXYSMAL ATRIAL FIBRILLATION (CMD): Primary | ICD-10-CM

## 2022-08-01 DIAGNOSIS — I42.0 DILATED CARDIOMYOPATHY (CMD): ICD-10-CM

## 2022-08-01 LAB — INR PPP: 2.3

## 2022-08-08 ENCOUNTER — ANTI-COAG (OUTPATIENT)
Dept: CARDIOLOGY | Age: 72
End: 2022-08-08

## 2022-08-08 DIAGNOSIS — I42.0 DILATED CARDIOMYOPATHY (CMD): ICD-10-CM

## 2022-08-08 DIAGNOSIS — I48.0 PAROXYSMAL ATRIAL FIBRILLATION (CMD): Primary | ICD-10-CM

## 2022-08-08 LAB — INR PPP: 2.3

## 2022-08-10 LAB
ALBUMIN SERPL-MCNC: 3.4 G/DL (ref 3.4–5)
ALP SERPL-CCNC: 108 U/L (ref 44–147)
ALT SERPL-CCNC: 9 U/L (ref 5–30)
AST SERPL-CCNC: 13 U/L (ref 7–31)
BASOPHILS # BLD: 0 10*3/UL (ref 0–0.1)
BASOPHILS NFR BLD: 0.5 % (ref 0–1)
BILIRUB SERPL-MCNC: 0.6 MG/DL (ref 0–1)
BNP SERPL-MCNC: 79.3 PG/ML (ref 0–100)
BUN SERPL-MCNC: 51 MG/DL (ref 6–20)
CALCIUM SERPL-MCNC: 8.6 MG/DL (ref 8.6–10.5)
CHLORIDE SERPL-SCNC: 103.4 MMOL/DL (ref 96–109)
CHOLEST SERPL-MCNC: 137 MG/DL (ref 120–200)
CO2 SERPL-SCNC: 28.2 MMOL/DL (ref 21–33)
CREAT SERPL-MCNC: 1.6 MG/DL (ref 0.5–1.3)
EOSINOPHIL # BLD: 0.2 10*3/UL (ref 0–0.3)
EOSINOPHIL NFR BLD: 3.5 % (ref 0–3)
ERYTHROCYTE [DISTWIDTH] IN BLOOD: 16.1 % (ref 11.6–14.8)
GFR SERPLBLD SCHWARTZ-ARVRAT: 34 ML/MIN/{1.73_M2}
GLUCOSE SERPL-MCNC: 103 MG/DL (ref 70–110)
HCT VFR BLD CALC: 39.9 %
HDLC SERPL-MCNC: 33 MG/DL (ref 35–60)
HGB BLD-MCNC: 13 GM/DL (ref 12–18)
LDLC SERPL CALC-MCNC: 65 MG/DL (ref 35–130)
LENGTH OF FAST TIME PATIENT: ABNORMAL H
LENGTH OF FAST TIME PATIENT: ABNORMAL H
LYMPHOCYTES # BLD: 1.2 10*3/UL (ref 1–4)
LYMPHOCYTES NFR BLD: 23.5 % (ref 20–40)
MCH RBC QN AUTO: 31 PG (ref 27–32)
MCHC RBC AUTO-ENTMCNC: 32.6 GM/DL (ref 32.5–36.5)
MCV RBC AUTO: 95 FL (ref 79–97)
MONOCYTES # BLD: 0.3 X10E3/UL (ref 0.1–0.7)
MONOCYTES NFR BLD: 6.5 % (ref 4–12)
MPV (OFFPRE2): 9.6 (ref 6–11)
NEUTROPHILS # BLD: 3.3 10*3/UL (ref 2–7.5)
NEUTROPHILS NFR BLD: 66 % (ref 40–75)
PLATELET # BLD: 125 X10E3/UL (ref 150–400)
POTASSIUM SERPL-SCNC: 4.2 MMOL/DL (ref 3.5–5.5)
PROT SERPL-MCNC: 5.6 G/DL (ref 6–8.3)
RBC # BLD: 4.2 X10E6/UL (ref 4.2–5.4)
SODIUM SERPL-SCNC: 138.9 MMOL/DL (ref 135–145)
TRIGL SERPL-MCNC: 197 MG/DL (ref 30–150)
WBC # BLD: 4.9 X10E3/UL (ref 4–10)

## 2022-08-12 LAB — TSH SERPL-ACNC: 2.66 UIU/ML (ref 0.5–6)

## 2022-08-15 ENCOUNTER — ANTI-COAG (OUTPATIENT)
Dept: CARDIOLOGY | Age: 72
End: 2022-08-15

## 2022-08-15 DIAGNOSIS — I48.0 PAROXYSMAL ATRIAL FIBRILLATION (CMD): Primary | ICD-10-CM

## 2022-08-15 DIAGNOSIS — I42.0 DILATED CARDIOMYOPATHY (CMD): ICD-10-CM

## 2022-08-15 LAB — INR PPP: 3

## 2022-08-16 ENCOUNTER — ANCILLARY PROCEDURE (OUTPATIENT)
Dept: CARDIOLOGY | Age: 72
End: 2022-08-16
Attending: INTERNAL MEDICINE

## 2022-08-16 DIAGNOSIS — I50.22 CHRONIC SYSTOLIC CONGESTIVE HEART FAILURE (CMD): ICD-10-CM

## 2022-08-16 PROCEDURE — 93264 REM MNTR WRLS P-ART PRS SNR: CPT | Performed by: INTERNAL MEDICINE

## 2022-08-22 ENCOUNTER — ANTI-COAG (OUTPATIENT)
Dept: CARDIOLOGY | Age: 72
End: 2022-08-22

## 2022-08-22 DIAGNOSIS — I42.0 DILATED CARDIOMYOPATHY (CMD): ICD-10-CM

## 2022-08-22 DIAGNOSIS — I48.0 PAROXYSMAL ATRIAL FIBRILLATION (CMD): Primary | ICD-10-CM

## 2022-08-22 LAB — INR PPP: 2.6

## 2022-08-26 DIAGNOSIS — I48.0 PAROXYSMAL ATRIAL FIBRILLATION (CMD): Primary | ICD-10-CM

## 2022-08-26 DIAGNOSIS — Z79.01 LONG TERM (CURRENT) USE OF ANTICOAGULANTS: ICD-10-CM

## 2022-08-28 ENCOUNTER — HOSPITAL ENCOUNTER (OUTPATIENT)
Age: 72
Discharge: HOME | End: 2022-08-28
Payer: MEDICARE

## 2022-08-28 ENCOUNTER — APPOINTMENT (OUTPATIENT)
Dept: GENERAL RADIOLOGY | Age: 72
End: 2022-08-28
Payer: MEDICARE

## 2022-08-28 VITALS
TEMPERATURE: 98 F | HEART RATE: 96 BPM | OXYGEN SATURATION: 98 % | DIASTOLIC BLOOD PRESSURE: 84 MMHG | SYSTOLIC BLOOD PRESSURE: 134 MMHG | RESPIRATION RATE: 18 BRPM

## 2022-08-28 DIAGNOSIS — J18.9 COMMUNITY ACQUIRED PNEUMONIA OF RIGHT LOWER LOBE OF LUNG: ICD-10-CM

## 2022-08-28 DIAGNOSIS — R05.9 COUGH: Primary | ICD-10-CM

## 2022-08-28 LAB — SARS-COV-2 RNA RESP QL NAA+PROBE: NOT DETECTED

## 2022-08-28 PROCEDURE — 99213 OFFICE O/P EST LOW 20 MIN: CPT

## 2022-08-28 PROCEDURE — 94640 AIRWAY INHALATION TREATMENT: CPT

## 2022-08-28 PROCEDURE — 71046 X-RAY EXAM CHEST 2 VIEWS: CPT

## 2022-08-28 PROCEDURE — U0002 COVID-19 LAB TEST NON-CDC: HCPCS

## 2022-08-28 RX ORDER — ALBUTEROL SULFATE 2.5 MG/3ML
2.5 SOLUTION RESPIRATORY (INHALATION) ONCE
Status: COMPLETED | OUTPATIENT
Start: 2022-08-28 | End: 2022-08-28

## 2022-08-28 RX ORDER — CEFDINIR 300 MG/1
300 CAPSULE ORAL 2 TIMES DAILY
Qty: 14 CAPSULE | Refills: 0 | Status: SHIPPED | OUTPATIENT
Start: 2022-08-28 | End: 2022-09-04

## 2022-08-28 RX ORDER — DOXYCYCLINE HYCLATE 100 MG/1
100 CAPSULE ORAL 2 TIMES DAILY
Qty: 10 CAPSULE | Refills: 0 | Status: SHIPPED | OUTPATIENT
Start: 2022-08-28 | End: 2022-09-02

## 2022-08-28 RX ORDER — IPRATROPIUM BROMIDE AND ALBUTEROL SULFATE 2.5; .5 MG/3ML; MG/3ML
3 SOLUTION RESPIRATORY (INHALATION) ONCE
Status: COMPLETED | OUTPATIENT
Start: 2022-08-28 | End: 2022-08-28

## 2022-08-28 RX ORDER — ALBUTEROL SULFATE 2.5 MG/3ML
2.5 SOLUTION RESPIRATORY (INHALATION) EVERY 4 HOURS PRN
Qty: 30 EACH | Refills: 0 | Status: SHIPPED | OUTPATIENT
Start: 2022-08-28 | End: 2022-09-27

## 2022-08-28 NOTE — ED INITIAL ASSESSMENT (HPI)
Patient presents with complaints of cough, sob and wheezing since Thursday, denies fever or sore throat. Home covid test negative on Thursday. Use of otc cough medication.

## 2022-08-29 ENCOUNTER — ANTI-COAG (OUTPATIENT)
Dept: CARDIOLOGY | Age: 72
End: 2022-08-29

## 2022-08-29 DIAGNOSIS — I42.0 DILATED CARDIOMYOPATHY (CMD): ICD-10-CM

## 2022-08-29 DIAGNOSIS — I48.0 PAROXYSMAL ATRIAL FIBRILLATION (CMD): Primary | ICD-10-CM

## 2022-08-29 DIAGNOSIS — Z79.01 LONG TERM (CURRENT) USE OF ANTICOAGULANTS: ICD-10-CM

## 2022-08-29 LAB — INR PPP: 2.8

## 2022-08-30 ENCOUNTER — TELEPHONE (OUTPATIENT)
Dept: CARDIOLOGY | Age: 72
End: 2022-08-30

## 2022-08-31 ENCOUNTER — ANTI-COAG (OUTPATIENT)
Dept: CARDIOLOGY | Age: 72
End: 2022-08-31

## 2022-08-31 DIAGNOSIS — I42.0 DILATED CARDIOMYOPATHY (CMD): ICD-10-CM

## 2022-08-31 DIAGNOSIS — I48.0 PAROXYSMAL ATRIAL FIBRILLATION (CMD): Primary | ICD-10-CM

## 2022-08-31 DIAGNOSIS — Z79.01 LONG TERM (CURRENT) USE OF ANTICOAGULANTS: ICD-10-CM

## 2022-08-31 LAB — INR PPP: 2.5

## 2022-09-06 ENCOUNTER — ANTI-COAG (OUTPATIENT)
Dept: CARDIOLOGY | Age: 72
End: 2022-09-06

## 2022-09-06 DIAGNOSIS — I42.0 DILATED CARDIOMYOPATHY (CMD): ICD-10-CM

## 2022-09-06 DIAGNOSIS — Z79.01 LONG TERM (CURRENT) USE OF ANTICOAGULANTS: ICD-10-CM

## 2022-09-06 DIAGNOSIS — I48.0 PAROXYSMAL ATRIAL FIBRILLATION (CMD): Primary | ICD-10-CM

## 2022-09-06 LAB — INR PPP: 2.4

## 2022-09-12 ENCOUNTER — TELEPHONE (OUTPATIENT)
Dept: CARDIOLOGY | Age: 72
End: 2022-09-12

## 2022-09-12 ENCOUNTER — ANTI-COAG (OUTPATIENT)
Dept: CARDIOLOGY | Age: 72
End: 2022-09-12

## 2022-09-12 DIAGNOSIS — Z79.01 LONG TERM (CURRENT) USE OF ANTICOAGULANTS: ICD-10-CM

## 2022-09-12 DIAGNOSIS — I48.0 PAROXYSMAL ATRIAL FIBRILLATION (CMD): Primary | ICD-10-CM

## 2022-09-12 DIAGNOSIS — I42.0 DILATED CARDIOMYOPATHY (CMD): ICD-10-CM

## 2022-09-12 LAB — INR PPP: 2.2

## 2022-09-12 RX ORDER — CARVEDILOL 25 MG/1
25 TABLET ORAL 2 TIMES DAILY
Qty: 180 TABLET | Refills: 3 | Status: SHIPPED | OUTPATIENT
Start: 2022-09-12 | End: 2022-09-13 | Stop reason: SDUPTHER

## 2022-09-12 RX ORDER — CARVEDILOL 25 MG/1
TABLET ORAL
Qty: 60 TABLET | Refills: 0 | Status: SHIPPED | OUTPATIENT
Start: 2022-09-12 | End: 2022-09-12 | Stop reason: SDUPTHER

## 2022-09-13 ENCOUNTER — TELEPHONE (OUTPATIENT)
Dept: CARDIOLOGY | Age: 72
End: 2022-09-13

## 2022-09-13 RX ORDER — CARVEDILOL 25 MG/1
25 TABLET ORAL 2 TIMES DAILY
Qty: 60 TABLET | Refills: 0 | Status: SHIPPED | OUTPATIENT
Start: 2022-09-13 | End: 2022-09-15 | Stop reason: SDUPTHER

## 2022-09-15 RX ORDER — CARVEDILOL 25 MG/1
25 TABLET ORAL 2 TIMES DAILY
Qty: 180 TABLET | Refills: 0 | Status: SHIPPED | OUTPATIENT
Start: 2022-09-15

## 2022-09-19 ENCOUNTER — ANTI-COAG (OUTPATIENT)
Dept: CARDIOLOGY | Age: 72
End: 2022-09-19

## 2022-09-19 DIAGNOSIS — I48.0 PAROXYSMAL ATRIAL FIBRILLATION (CMD): Primary | ICD-10-CM

## 2022-09-19 DIAGNOSIS — I42.0 DILATED CARDIOMYOPATHY (CMD): ICD-10-CM

## 2022-09-19 DIAGNOSIS — Z79.01 LONG TERM (CURRENT) USE OF ANTICOAGULANTS: ICD-10-CM

## 2022-09-19 LAB — INR PPP: 2.5

## 2022-09-26 ENCOUNTER — ANTI-COAG (OUTPATIENT)
Dept: CARDIOLOGY | Age: 72
End: 2022-09-26

## 2022-09-26 DIAGNOSIS — I48.0 PAROXYSMAL ATRIAL FIBRILLATION (CMD): Primary | ICD-10-CM

## 2022-09-26 DIAGNOSIS — Z79.01 LONG TERM (CURRENT) USE OF ANTICOAGULANTS: ICD-10-CM

## 2022-09-26 DIAGNOSIS — I42.0 DILATED CARDIOMYOPATHY (CMD): ICD-10-CM

## 2022-09-26 LAB — INR PPP: 3

## 2022-09-27 ENCOUNTER — ANCILLARY PROCEDURE (OUTPATIENT)
Dept: CARDIOLOGY | Age: 72
End: 2022-09-27
Attending: INTERNAL MEDICINE

## 2022-09-27 DIAGNOSIS — I50.22 CHRONIC SYSTOLIC CONGESTIVE HEART FAILURE (CMD): ICD-10-CM

## 2022-09-27 PROCEDURE — 93264 REM MNTR WRLS P-ART PRS SNR: CPT | Performed by: INTERNAL MEDICINE

## 2022-10-03 ENCOUNTER — ANTI-COAG (OUTPATIENT)
Dept: CARDIOLOGY | Age: 72
End: 2022-10-03

## 2022-10-03 DIAGNOSIS — I48.0 PAROXYSMAL ATRIAL FIBRILLATION (CMD): Primary | ICD-10-CM

## 2022-10-03 DIAGNOSIS — I42.0 DILATED CARDIOMYOPATHY (CMD): ICD-10-CM

## 2022-10-03 DIAGNOSIS — Z79.01 LONG TERM (CURRENT) USE OF ANTICOAGULANTS: ICD-10-CM

## 2022-10-03 LAB — INR PPP: 3

## 2022-10-05 ENCOUNTER — LAB SERVICES (OUTPATIENT)
Dept: LAB | Age: 72
End: 2022-10-05

## 2022-10-05 ENCOUNTER — ANCILLARY PROCEDURE (OUTPATIENT)
Dept: CARDIOLOGY | Age: 72
End: 2022-10-05
Attending: INTERNAL MEDICINE

## 2022-10-05 ENCOUNTER — OFFICE VISIT (OUTPATIENT)
Dept: CARDIOLOGY | Age: 72
End: 2022-10-05

## 2022-10-05 VITALS
RESPIRATION RATE: 18 BRPM | WEIGHT: 293 LBS | HEART RATE: 72 BPM | HEIGHT: 64 IN | BODY MASS INDEX: 50.02 KG/M2 | SYSTOLIC BLOOD PRESSURE: 113 MMHG | DIASTOLIC BLOOD PRESSURE: 66 MMHG

## 2022-10-05 DIAGNOSIS — I50.22 CHRONIC SYSTOLIC CONGESTIVE HEART FAILURE (CMD): ICD-10-CM

## 2022-10-05 DIAGNOSIS — Z45.09 ENCOUNTER FOR ADJUSTMENT OR MANAGEMENT OF CARDIAC DEVICE: ICD-10-CM

## 2022-10-05 DIAGNOSIS — I48.0 PAROXYSMAL ATRIAL FIBRILLATION (CMD): ICD-10-CM

## 2022-10-05 DIAGNOSIS — E78.5 DYSLIPIDEMIA: ICD-10-CM

## 2022-10-05 DIAGNOSIS — E55.9 VITAMIN D DEFICIENCY: Primary | ICD-10-CM

## 2022-10-05 DIAGNOSIS — E55.9 VITAMIN D DEFICIENCY: ICD-10-CM

## 2022-10-05 LAB
25(OH)D3+25(OH)D2 SERPL-MCNC: 52.2 NG/ML (ref 30–100)
ALBUMIN SERPL-MCNC: 3.6 G/DL (ref 3.6–5.1)
ALBUMIN/GLOB SERPL: 0.9 {RATIO} (ref 1–2.4)
ALP SERPL-CCNC: 139 UNITS/L (ref 45–117)
ALT SERPL-CCNC: 19 UNITS/L
ANION GAP SERPL CALC-SCNC: 10 MMOL/L (ref 7–19)
AST SERPL-CCNC: 20 UNITS/L
BASOPHILS # BLD: 0 K/MCL (ref 0–0.3)
BASOPHILS NFR BLD: 1 %
BILIRUB SERPL-MCNC: 0.6 MG/DL (ref 0.2–1)
BUN SERPL-MCNC: 61 MG/DL (ref 6–20)
BUN/CREAT SERPL: 35 (ref 7–25)
CALCIUM SERPL-MCNC: 9 MG/DL (ref 8.4–10.2)
CHLORIDE SERPL-SCNC: 105 MMOL/L (ref 97–110)
CHOLEST SERPL-MCNC: 143 MG/DL
CHOLEST/HDLC SERPL: 3.8 {RATIO}
CO2 SERPL-SCNC: 28 MMOL/L (ref 21–32)
CREAT SERPL-MCNC: 1.72 MG/DL (ref 0.51–0.95)
DEPRECATED RDW RBC: 53.4 FL (ref 39–50)
EOSINOPHIL # BLD: 0.1 K/MCL (ref 0–0.5)
EOSINOPHIL NFR BLD: 2 %
ERYTHROCYTE [DISTWIDTH] IN BLOOD: 14.8 % (ref 11–15)
FASTING DURATION TIME PATIENT: 12 HOURS (ref 0–999)
FASTING DURATION TIME PATIENT: ABNORMAL H
GFR SERPLBLD BASED ON 1.73 SQ M-ARVRAT: 31 ML/MIN
GLOBULIN SER-MCNC: 3.9 G/DL (ref 2–4)
GLUCOSE SERPL-MCNC: 97 MG/DL (ref 70–99)
HCT VFR BLD CALC: 47.9 % (ref 36–46.5)
HDLC SERPL-MCNC: 38 MG/DL
HGB BLD-MCNC: 14.6 G/DL (ref 12–15.5)
IMM GRANULOCYTES # BLD AUTO: 0 K/MCL (ref 0–0.2)
IMM GRANULOCYTES # BLD: 0 %
LDLC SERPL CALC-MCNC: 76 MG/DL
LYMPHOCYTES # BLD: 1 K/MCL (ref 1–4)
LYMPHOCYTES NFR BLD: 17 %
MCH RBC QN AUTO: 29.6 PG (ref 26–34)
MCHC RBC AUTO-ENTMCNC: 30.5 G/DL (ref 32–36.5)
MCV RBC AUTO: 97.2 FL (ref 78–100)
MONOCYTES # BLD: 0.5 K/MCL (ref 0.3–0.9)
MONOCYTES NFR BLD: 9 %
NEUTROPHILS # BLD: 4.1 K/MCL (ref 1.8–7.7)
NEUTROPHILS NFR BLD: 71 %
NONHDLC SERPL-MCNC: 105 MG/DL
NRBC BLD MANUAL-RTO: 0 /100 WBC
NT-PROBNP SERPL-MCNC: 928 PG/ML
PLATELET # BLD AUTO: 141 K/MCL (ref 140–450)
POTASSIUM SERPL-SCNC: 4 MMOL/L (ref 3.4–5.1)
PROT SERPL-MCNC: 7.5 G/DL (ref 6.4–8.2)
RBC # BLD: 4.93 MIL/MCL (ref 4–5.2)
SODIUM SERPL-SCNC: 139 MMOL/L (ref 135–145)
TRIGL SERPL-MCNC: 147 MG/DL
TSH SERPL-ACNC: 1.92 MCUNITS/ML (ref 0.35–5)
WBC # BLD: 5.7 K/MCL (ref 4.2–11)

## 2022-10-05 PROCEDURE — 82306 VITAMIN D 25 HYDROXY: CPT | Performed by: CLINICAL MEDICAL LABORATORY

## 2022-10-05 PROCEDURE — 84443 ASSAY THYROID STIM HORMONE: CPT | Performed by: CLINICAL MEDICAL LABORATORY

## 2022-10-05 PROCEDURE — 85025 COMPLETE CBC W/AUTO DIFF WBC: CPT | Performed by: CLINICAL MEDICAL LABORATORY

## 2022-10-05 PROCEDURE — 36415 COLL VENOUS BLD VENIPUNCTURE: CPT | Performed by: CLINICAL MEDICAL LABORATORY

## 2022-10-05 PROCEDURE — 99215 OFFICE O/P EST HI 40 MIN: CPT | Performed by: INTERNAL MEDICINE

## 2022-10-05 PROCEDURE — 80053 COMPREHEN METABOLIC PANEL: CPT | Performed by: CLINICAL MEDICAL LABORATORY

## 2022-10-05 PROCEDURE — 80061 LIPID PANEL: CPT | Performed by: CLINICAL MEDICAL LABORATORY

## 2022-10-05 PROCEDURE — 83880 ASSAY OF NATRIURETIC PEPTIDE: CPT | Performed by: CLINICAL MEDICAL LABORATORY

## 2022-10-05 RX ORDER — ALBUTEROL SULFATE 2.5 MG/3ML
2.5 SOLUTION RESPIRATORY (INHALATION) 2 TIMES DAILY
Qty: 30 ML | Refills: 0 | Status: SHIPPED | OUTPATIENT
Start: 2022-10-05

## 2022-10-05 RX ORDER — ATORVASTATIN CALCIUM 20 MG/1
20 TABLET, FILM COATED ORAL DAILY
Qty: 90 TABLET | Refills: 3 | Status: SHIPPED | OUTPATIENT
Start: 2022-10-05 | End: 2023-10-13

## 2022-10-05 RX ORDER — ALLOPURINOL 100 MG/1
100 TABLET ORAL DAILY
Qty: 90 TABLET | Refills: 0 | Status: SHIPPED | OUTPATIENT
Start: 2022-10-05 | End: 2022-10-24

## 2022-10-05 RX ORDER — WARFARIN SODIUM 5 MG/1
5 TABLET ORAL DAILY
Qty: 65 TABLET | Refills: 3 | Status: SHIPPED | OUTPATIENT
Start: 2022-10-05 | End: 2022-10-24

## 2022-10-05 SDOH — HEALTH STABILITY: PHYSICAL HEALTH: ON AVERAGE, HOW MANY DAYS PER WEEK DO YOU ENGAGE IN MODERATE TO STRENUOUS EXERCISE (LIKE A BRISK WALK)?: 0 DAYS

## 2022-10-05 SDOH — HEALTH STABILITY: PHYSICAL HEALTH: ON AVERAGE, HOW MANY MINUTES DO YOU ENGAGE IN EXERCISE AT THIS LEVEL?: 0 MIN

## 2022-10-05 ASSESSMENT — PATIENT HEALTH QUESTIONNAIRE - PHQ9
SUM OF ALL RESPONSES TO PHQ9 QUESTIONS 1 AND 2: 0
1. LITTLE INTEREST OR PLEASURE IN DOING THINGS: NOT AT ALL
CLINICAL INTERPRETATION OF PHQ2 SCORE: NO FURTHER SCREENING NEEDED
SUM OF ALL RESPONSES TO PHQ9 QUESTIONS 1 AND 2: 0
2. FEELING DOWN, DEPRESSED OR HOPELESS: NOT AT ALL

## 2022-10-05 ASSESSMENT — ENCOUNTER SYMPTOMS
FEVER: 0
SUSPICIOUS LESIONS: 0
ABDOMINAL PAIN: 0
INSOMNIA: 0
NERVOUS/ANXIOUS: 1
SLEEP DISTURBANCES DUE TO BREATHING: 0
DIARRHEA: 0
HEADACHES: 0
CONSTIPATION: 0
DEPRESSION: 0
HEMOPTYSIS: 0
LIGHT-HEADEDNESS: 0
DIZZINESS: 0
WEAKNESS: 0
HEMATOCHEZIA: 0
LOSS OF BALANCE: 1
BLOATING: 0
ALLERGIC/IMMUNOLOGIC COMMENTS: NO NEW FOOD ALLERGIES
COUGH: 0
CHILLS: 0
BACK PAIN: 0
SHORTNESS OF BREATH: 0
WEIGHT GAIN: 1
BRUISES/BLEEDS EASILY: 0
WEIGHT LOSS: 0

## 2022-10-10 ENCOUNTER — ANTI-COAG (OUTPATIENT)
Dept: CARDIOLOGY | Age: 72
End: 2022-10-10

## 2022-10-10 DIAGNOSIS — I48.0 PAROXYSMAL ATRIAL FIBRILLATION (CMD): Primary | ICD-10-CM

## 2022-10-10 DIAGNOSIS — Z79.01 LONG TERM (CURRENT) USE OF ANTICOAGULANTS: ICD-10-CM

## 2022-10-10 DIAGNOSIS — I42.0 DILATED CARDIOMYOPATHY (CMD): ICD-10-CM

## 2022-10-10 LAB — INR PPP: 4

## 2022-10-17 ENCOUNTER — ANTI-COAG (OUTPATIENT)
Dept: CARDIOLOGY | Age: 72
End: 2022-10-17

## 2022-10-17 DIAGNOSIS — I48.0 PAROXYSMAL ATRIAL FIBRILLATION (CMD): Primary | ICD-10-CM

## 2022-10-17 DIAGNOSIS — Z79.01 LONG TERM (CURRENT) USE OF ANTICOAGULANTS: ICD-10-CM

## 2022-10-17 DIAGNOSIS — I42.0 DILATED CARDIOMYOPATHY (CMD): ICD-10-CM

## 2022-10-17 LAB — INR PPP: 2.6

## 2022-10-20 ENCOUNTER — ANTI-COAG (OUTPATIENT)
Dept: CARDIOLOGY | Age: 72
End: 2022-10-20

## 2022-10-20 ENCOUNTER — TELEPHONE (OUTPATIENT)
Dept: CARDIOLOGY | Age: 72
End: 2022-10-20

## 2022-10-20 DIAGNOSIS — I48.0 PAROXYSMAL ATRIAL FIBRILLATION (CMD): Primary | ICD-10-CM

## 2022-10-20 DIAGNOSIS — Z79.01 LONG TERM (CURRENT) USE OF ANTICOAGULANTS: ICD-10-CM

## 2022-10-20 DIAGNOSIS — I42.0 DILATED CARDIOMYOPATHY (CMD): ICD-10-CM

## 2022-10-24 ENCOUNTER — ANTI-COAG (OUTPATIENT)
Dept: CARDIOLOGY | Age: 72
End: 2022-10-24

## 2022-10-24 DIAGNOSIS — Z79.01 LONG TERM (CURRENT) USE OF ANTICOAGULANTS: ICD-10-CM

## 2022-10-24 DIAGNOSIS — I50.22 CHRONIC SYSTOLIC CONGESTIVE HEART FAILURE (CMD): ICD-10-CM

## 2022-10-24 DIAGNOSIS — I48.0 PAROXYSMAL ATRIAL FIBRILLATION (CMD): ICD-10-CM

## 2022-10-24 DIAGNOSIS — I42.0 DILATED CARDIOMYOPATHY (CMD): ICD-10-CM

## 2022-10-24 DIAGNOSIS — I48.0 PAROXYSMAL ATRIAL FIBRILLATION (CMD): Primary | ICD-10-CM

## 2022-10-24 LAB — INR PPP: 3.2

## 2022-10-24 RX ORDER — ALLOPURINOL 100 MG/1
100 TABLET ORAL DAILY
Qty: 90 TABLET | Refills: 0 | Status: SHIPPED | OUTPATIENT
Start: 2022-10-24 | End: 2023-07-03

## 2022-10-24 RX ORDER — WARFARIN SODIUM 5 MG/1
TABLET ORAL
Qty: 108 TABLET | Refills: 2 | Status: SHIPPED | OUTPATIENT
Start: 2022-10-24

## 2022-10-27 DIAGNOSIS — I48.0 PAROXYSMAL ATRIAL FIBRILLATION (CMD): Primary | ICD-10-CM

## 2022-10-28 DIAGNOSIS — I48.0 PAROXYSMAL ATRIAL FIBRILLATION (CMD): ICD-10-CM

## 2022-10-28 PROCEDURE — 93000 ELECTROCARDIOGRAM COMPLETE: CPT | Performed by: INTERNAL MEDICINE

## 2022-10-31 ENCOUNTER — ANTI-COAG (OUTPATIENT)
Dept: CARDIOLOGY | Age: 72
End: 2022-10-31

## 2022-10-31 DIAGNOSIS — I42.0 DILATED CARDIOMYOPATHY (CMD): ICD-10-CM

## 2022-10-31 DIAGNOSIS — I48.0 PAROXYSMAL ATRIAL FIBRILLATION (CMD): Primary | ICD-10-CM

## 2022-10-31 DIAGNOSIS — Z79.01 LONG TERM (CURRENT) USE OF ANTICOAGULANTS: ICD-10-CM

## 2022-10-31 LAB — INR PPP: 2.7

## 2022-11-01 ENCOUNTER — ANCILLARY PROCEDURE (OUTPATIENT)
Dept: CARDIOLOGY | Age: 72
End: 2022-11-01
Attending: INTERNAL MEDICINE

## 2022-11-01 DIAGNOSIS — I50.22 CHRONIC SYSTOLIC CONGESTIVE HEART FAILURE (CMD): ICD-10-CM

## 2022-11-01 PROCEDURE — 93264 REM MNTR WRLS P-ART PRS SNR: CPT | Performed by: INTERNAL MEDICINE

## 2022-11-07 ENCOUNTER — ANTI-COAG (OUTPATIENT)
Dept: CARDIOLOGY | Age: 72
End: 2022-11-07

## 2022-11-07 DIAGNOSIS — I42.0 DILATED CARDIOMYOPATHY (CMD): ICD-10-CM

## 2022-11-07 DIAGNOSIS — I48.0 PAROXYSMAL ATRIAL FIBRILLATION (CMD): Primary | ICD-10-CM

## 2022-11-07 DIAGNOSIS — Z79.01 LONG TERM (CURRENT) USE OF ANTICOAGULANTS: ICD-10-CM

## 2022-11-07 LAB — INR PPP: 3

## 2022-11-14 ENCOUNTER — ANTI-COAG (OUTPATIENT)
Dept: CARDIOLOGY | Age: 72
End: 2022-11-14

## 2022-11-14 DIAGNOSIS — Z79.01 LONG TERM (CURRENT) USE OF ANTICOAGULANTS: ICD-10-CM

## 2022-11-14 DIAGNOSIS — I48.0 PAROXYSMAL ATRIAL FIBRILLATION (CMD): Primary | ICD-10-CM

## 2022-11-14 DIAGNOSIS — I42.0 DILATED CARDIOMYOPATHY (CMD): ICD-10-CM

## 2022-11-14 LAB — INR PPP: 2.3

## 2022-11-16 ENCOUNTER — TELEPHONE (OUTPATIENT)
Dept: CARDIOLOGY | Age: 72
End: 2022-11-16

## 2022-11-21 ENCOUNTER — ANTI-COAG (OUTPATIENT)
Dept: CARDIOLOGY | Age: 72
End: 2022-11-21

## 2022-11-21 DIAGNOSIS — Z79.01 LONG TERM (CURRENT) USE OF ANTICOAGULANTS: ICD-10-CM

## 2022-11-21 DIAGNOSIS — I48.0 PAROXYSMAL ATRIAL FIBRILLATION (CMD): Primary | ICD-10-CM

## 2022-11-21 DIAGNOSIS — I42.0 DILATED CARDIOMYOPATHY (CMD): ICD-10-CM

## 2022-11-21 LAB — INR PPP: 1.9

## 2022-11-28 ENCOUNTER — ANTI-COAG (OUTPATIENT)
Dept: CARDIOLOGY | Age: 72
End: 2022-11-28

## 2022-11-28 DIAGNOSIS — I42.0 DILATED CARDIOMYOPATHY (CMD): ICD-10-CM

## 2022-11-28 DIAGNOSIS — I48.0 PAROXYSMAL ATRIAL FIBRILLATION (CMD): Primary | ICD-10-CM

## 2022-11-28 DIAGNOSIS — Z79.01 LONG TERM (CURRENT) USE OF ANTICOAGULANTS: ICD-10-CM

## 2022-11-28 LAB — INR PPP: 2.8

## 2022-11-29 ENCOUNTER — TELEPHONE (OUTPATIENT)
Dept: CARDIOLOGY | Age: 72
End: 2022-11-29

## 2022-11-29 ENCOUNTER — OFF PREMISE (OUTPATIENT)
Dept: CARDIOLOGY | Age: 72
End: 2022-11-29

## 2022-12-05 ENCOUNTER — ANTI-COAG (OUTPATIENT)
Dept: CARDIOLOGY | Age: 72
End: 2022-12-05

## 2022-12-05 DIAGNOSIS — I42.0 DILATED CARDIOMYOPATHY (CMD): ICD-10-CM

## 2022-12-05 DIAGNOSIS — I48.0 PAROXYSMAL ATRIAL FIBRILLATION (CMD): Primary | ICD-10-CM

## 2022-12-05 DIAGNOSIS — Z79.01 LONG TERM (CURRENT) USE OF ANTICOAGULANTS: ICD-10-CM

## 2022-12-05 LAB — INR PPP: 2.6

## 2022-12-06 ENCOUNTER — ANCILLARY PROCEDURE (OUTPATIENT)
Dept: CARDIOLOGY | Age: 72
End: 2022-12-06
Attending: INTERNAL MEDICINE

## 2022-12-06 DIAGNOSIS — I50.22 CHRONIC SYSTOLIC CONGESTIVE HEART FAILURE (CMD): ICD-10-CM

## 2022-12-06 PROCEDURE — 93264 REM MNTR WRLS P-ART PRS SNR: CPT | Performed by: INTERNAL MEDICINE

## 2022-12-12 ENCOUNTER — ANTI-COAG (OUTPATIENT)
Dept: CARDIOLOGY | Age: 72
End: 2022-12-12

## 2022-12-12 DIAGNOSIS — I48.0 PAROXYSMAL ATRIAL FIBRILLATION (CMD): Primary | ICD-10-CM

## 2022-12-12 DIAGNOSIS — Z79.01 LONG TERM (CURRENT) USE OF ANTICOAGULANTS: ICD-10-CM

## 2022-12-12 DIAGNOSIS — I42.0 DILATED CARDIOMYOPATHY (CMD): ICD-10-CM

## 2022-12-12 LAB — INR PPP: 3.7

## 2022-12-19 ENCOUNTER — ANTI-COAG (OUTPATIENT)
Dept: CARDIOLOGY | Age: 72
End: 2022-12-19

## 2022-12-19 DIAGNOSIS — I48.0 PAROXYSMAL ATRIAL FIBRILLATION (CMD): Primary | ICD-10-CM

## 2022-12-19 DIAGNOSIS — Z79.01 LONG TERM (CURRENT) USE OF ANTICOAGULANTS: ICD-10-CM

## 2022-12-19 DIAGNOSIS — I42.0 DILATED CARDIOMYOPATHY (CMD): ICD-10-CM

## 2022-12-19 LAB — INR PPP: 3.5

## 2022-12-26 LAB — INR PPP: 2.8

## 2022-12-27 ENCOUNTER — ANTI-COAG (OUTPATIENT)
Dept: CARDIOLOGY | Age: 72
End: 2022-12-27

## 2022-12-27 ENCOUNTER — TELEPHONE (OUTPATIENT)
Dept: CARDIOLOGY | Age: 72
End: 2022-12-27

## 2022-12-27 DIAGNOSIS — Z79.01 LONG TERM (CURRENT) USE OF ANTICOAGULANTS: ICD-10-CM

## 2022-12-27 DIAGNOSIS — I48.0 PAROXYSMAL ATRIAL FIBRILLATION (CMD): Primary | ICD-10-CM

## 2022-12-27 DIAGNOSIS — I42.0 DILATED CARDIOMYOPATHY (CMD): ICD-10-CM

## 2023-01-02 LAB — INR PPP: 2.8

## 2023-01-03 ENCOUNTER — ANTI-COAG (OUTPATIENT)
Dept: CARDIOLOGY | Age: 73
End: 2023-01-03

## 2023-01-03 DIAGNOSIS — I48.0 PAROXYSMAL ATRIAL FIBRILLATION (CMD): Primary | ICD-10-CM

## 2023-01-03 DIAGNOSIS — Z79.01 LONG TERM (CURRENT) USE OF ANTICOAGULANTS: ICD-10-CM

## 2023-01-03 DIAGNOSIS — I42.0 DILATED CARDIOMYOPATHY (CMD): ICD-10-CM

## 2023-01-05 ENCOUNTER — TELEPHONE (OUTPATIENT)
Dept: CARDIOLOGY | Age: 73
End: 2023-01-05

## 2023-01-09 ENCOUNTER — ANTI-COAG (OUTPATIENT)
Dept: CARDIOLOGY | Age: 73
End: 2023-01-09

## 2023-01-09 DIAGNOSIS — I48.0 PAROXYSMAL ATRIAL FIBRILLATION (CMD): Primary | ICD-10-CM

## 2023-01-09 DIAGNOSIS — I42.0 DILATED CARDIOMYOPATHY (CMD): ICD-10-CM

## 2023-01-09 DIAGNOSIS — Z79.01 LONG TERM (CURRENT) USE OF ANTICOAGULANTS: ICD-10-CM

## 2023-01-09 LAB — INR PPP: 3

## 2023-01-10 ENCOUNTER — ANCILLARY PROCEDURE (OUTPATIENT)
Dept: CARDIOLOGY | Age: 73
End: 2023-01-10
Attending: INTERNAL MEDICINE

## 2023-01-10 DIAGNOSIS — I50.22 CHRONIC SYSTOLIC CONGESTIVE HEART FAILURE (CMD): ICD-10-CM

## 2023-01-10 PROCEDURE — 93264 REM MNTR WRLS P-ART PRS SNR: CPT | Performed by: INTERNAL MEDICINE

## 2023-01-16 ENCOUNTER — ANTI-COAG (OUTPATIENT)
Dept: CARDIOLOGY | Age: 73
End: 2023-01-16

## 2023-01-16 DIAGNOSIS — I48.0 PAROXYSMAL ATRIAL FIBRILLATION (CMD): Primary | ICD-10-CM

## 2023-01-16 DIAGNOSIS — Z79.01 LONG TERM (CURRENT) USE OF ANTICOAGULANTS: ICD-10-CM

## 2023-01-16 DIAGNOSIS — I42.0 DILATED CARDIOMYOPATHY (CMD): ICD-10-CM

## 2023-01-16 LAB — INR PPP: 2.6

## 2023-01-19 ENCOUNTER — ANCILLARY PROCEDURE (OUTPATIENT)
Dept: CARDIOLOGY | Age: 73
End: 2023-01-19
Attending: INTERNAL MEDICINE

## 2023-01-19 ENCOUNTER — TELEPHONE (OUTPATIENT)
Dept: CARDIOLOGY | Age: 73
End: 2023-01-19

## 2023-01-19 DIAGNOSIS — Z95.810 ICD (IMPLANTABLE CARDIOVERTER-DEFIBRILLATOR) IN PLACE: ICD-10-CM

## 2023-01-19 LAB
MDC_IDC_LEAD_IMPLANT_DT: NORMAL
MDC_IDC_LEAD_IMPLANT_DT: NORMAL
MDC_IDC_LEAD_LOCATION: NORMAL
MDC_IDC_LEAD_LOCATION: NORMAL
MDC_IDC_LEAD_MFG: NORMAL
MDC_IDC_LEAD_MFG: NORMAL
MDC_IDC_LEAD_MODEL: 157
MDC_IDC_LEAD_MODEL: 4470
MDC_IDC_LEAD_SERIAL: NORMAL
MDC_IDC_LEAD_SERIAL: NORMAL
MDC_IDC_PG_IMPLANT_DTM: NORMAL
MDC_IDC_PG_MFG: NORMAL
MDC_IDC_PG_MODEL: NORMAL
MDC_IDC_PG_SERIAL: NORMAL
MDC_IDC_PG_TYPE: NORMAL
MDC_IDC_SESS_CLINIC_NAME: NORMAL
MDC_IDC_SESS_TYPE: NORMAL

## 2023-01-22 DIAGNOSIS — I50.22 CHRONIC SYSTOLIC CONGESTIVE HEART FAILURE (CMD): ICD-10-CM

## 2023-01-23 ENCOUNTER — ANTI-COAG (OUTPATIENT)
Dept: CARDIOLOGY | Age: 73
End: 2023-01-23

## 2023-01-23 DIAGNOSIS — I42.0 DILATED CARDIOMYOPATHY (CMD): ICD-10-CM

## 2023-01-23 DIAGNOSIS — I48.0 PAROXYSMAL ATRIAL FIBRILLATION (CMD): Primary | ICD-10-CM

## 2023-01-23 DIAGNOSIS — Z79.01 LONG TERM (CURRENT) USE OF ANTICOAGULANTS: ICD-10-CM

## 2023-01-23 LAB — INR PPP: 2.9

## 2023-01-23 RX ORDER — EPLERENONE 25 MG/1
25 TABLET, FILM COATED ORAL DAILY
Qty: 30 TABLET | Refills: 11 | Status: SHIPPED | OUTPATIENT
Start: 2023-01-23

## 2023-01-23 RX ORDER — DAPAGLIFLOZIN 10 MG/1
TABLET, FILM COATED ORAL
Qty: 30 TABLET | Refills: 11 | Status: SHIPPED | OUTPATIENT
Start: 2023-01-23

## 2023-01-30 ENCOUNTER — ANTI-COAG (OUTPATIENT)
Dept: CARDIOLOGY | Age: 73
End: 2023-01-30

## 2023-01-30 DIAGNOSIS — I42.0 DILATED CARDIOMYOPATHY (CMD): ICD-10-CM

## 2023-01-30 DIAGNOSIS — Z79.01 LONG TERM (CURRENT) USE OF ANTICOAGULANTS: ICD-10-CM

## 2023-01-30 DIAGNOSIS — I48.0 PAROXYSMAL ATRIAL FIBRILLATION (CMD): Primary | ICD-10-CM

## 2023-01-30 LAB — INR PPP: 3

## 2023-02-06 ENCOUNTER — ANTI-COAG (OUTPATIENT)
Dept: CARDIOLOGY | Age: 73
End: 2023-02-06

## 2023-02-06 DIAGNOSIS — I48.0 PAROXYSMAL ATRIAL FIBRILLATION (CMD): Primary | ICD-10-CM

## 2023-02-06 DIAGNOSIS — I42.0 DILATED CARDIOMYOPATHY (CMD): ICD-10-CM

## 2023-02-06 DIAGNOSIS — Z79.01 LONG TERM (CURRENT) USE OF ANTICOAGULANTS: ICD-10-CM

## 2023-02-06 LAB — INR PPP: 3.8

## 2023-02-13 ENCOUNTER — ANTI-COAG (OUTPATIENT)
Dept: CARDIOLOGY | Age: 73
End: 2023-02-13

## 2023-02-13 DIAGNOSIS — I42.0 DILATED CARDIOMYOPATHY (CMD): ICD-10-CM

## 2023-02-13 DIAGNOSIS — Z79.01 LONG TERM (CURRENT) USE OF ANTICOAGULANTS: ICD-10-CM

## 2023-02-13 DIAGNOSIS — I48.0 PAROXYSMAL ATRIAL FIBRILLATION (CMD): Primary | ICD-10-CM

## 2023-02-13 LAB — INR PPP: 3.9

## 2023-02-14 ENCOUNTER — ANCILLARY PROCEDURE (OUTPATIENT)
Dept: CARDIOLOGY | Age: 73
End: 2023-02-14
Attending: INTERNAL MEDICINE

## 2023-02-14 DIAGNOSIS — I50.22 CHRONIC SYSTOLIC CONGESTIVE HEART FAILURE (CMD): ICD-10-CM

## 2023-02-14 PROCEDURE — 93264 REM MNTR WRLS P-ART PRS SNR: CPT | Performed by: INTERNAL MEDICINE

## 2023-02-20 ENCOUNTER — ANTI-COAG (OUTPATIENT)
Dept: CARDIOLOGY | Age: 73
End: 2023-02-20

## 2023-02-20 DIAGNOSIS — I48.0 PAROXYSMAL ATRIAL FIBRILLATION (CMD): Primary | ICD-10-CM

## 2023-02-20 DIAGNOSIS — Z79.01 LONG TERM (CURRENT) USE OF ANTICOAGULANTS: ICD-10-CM

## 2023-02-20 DIAGNOSIS — I42.0 DILATED CARDIOMYOPATHY (CMD): ICD-10-CM

## 2023-02-20 LAB — INR PPP: 2.6

## 2023-02-27 ENCOUNTER — ANTI-COAG (OUTPATIENT)
Dept: CARDIOLOGY | Age: 73
End: 2023-02-27

## 2023-02-27 DIAGNOSIS — Z79.01 LONG TERM (CURRENT) USE OF ANTICOAGULANTS: ICD-10-CM

## 2023-02-27 DIAGNOSIS — I42.0 DILATED CARDIOMYOPATHY (CMD): ICD-10-CM

## 2023-02-27 DIAGNOSIS — I48.0 PAROXYSMAL ATRIAL FIBRILLATION (CMD): Primary | ICD-10-CM

## 2023-02-27 LAB — INR PPP: 3

## 2023-03-06 ENCOUNTER — TELEPHONE (OUTPATIENT)
Dept: CARDIOLOGY | Age: 73
End: 2023-03-06

## 2023-03-06 ENCOUNTER — ANTI-COAG (OUTPATIENT)
Dept: CARDIOLOGY | Age: 73
End: 2023-03-06

## 2023-03-06 DIAGNOSIS — I48.0 PAROXYSMAL ATRIAL FIBRILLATION (CMD): Primary | ICD-10-CM

## 2023-03-06 DIAGNOSIS — Z79.01 LONG TERM (CURRENT) USE OF ANTICOAGULANTS: ICD-10-CM

## 2023-03-06 DIAGNOSIS — I42.0 DILATED CARDIOMYOPATHY (CMD): ICD-10-CM

## 2023-03-06 LAB — INR PPP: 5.6

## 2023-03-08 ENCOUNTER — ANTI-COAG (OUTPATIENT)
Dept: CARDIOLOGY | Age: 73
End: 2023-03-08

## 2023-03-08 DIAGNOSIS — I48.0 PAROXYSMAL ATRIAL FIBRILLATION (CMD): Primary | ICD-10-CM

## 2023-03-08 DIAGNOSIS — I42.0 DILATED CARDIOMYOPATHY (CMD): ICD-10-CM

## 2023-03-08 DIAGNOSIS — Z79.01 LONG TERM (CURRENT) USE OF ANTICOAGULANTS: ICD-10-CM

## 2023-03-08 LAB — INR PPP: 2.5

## 2023-03-13 ENCOUNTER — ANTI-COAG (OUTPATIENT)
Dept: CARDIOLOGY | Age: 73
End: 2023-03-13

## 2023-03-13 DIAGNOSIS — I42.0 DILATED CARDIOMYOPATHY (CMD): ICD-10-CM

## 2023-03-13 DIAGNOSIS — I48.0 PAROXYSMAL ATRIAL FIBRILLATION (CMD): Primary | ICD-10-CM

## 2023-03-13 DIAGNOSIS — Z79.01 LONG TERM (CURRENT) USE OF ANTICOAGULANTS: ICD-10-CM

## 2023-03-13 LAB — INR PPP: 2.2

## 2023-03-16 ENCOUNTER — TELEPHONE (OUTPATIENT)
Dept: CARDIOLOGY | Age: 73
End: 2023-03-16

## 2023-03-16 ENCOUNTER — ANCILLARY PROCEDURE (OUTPATIENT)
Dept: CARDIOLOGY | Age: 73
End: 2023-03-16
Attending: INTERNAL MEDICINE

## 2023-03-16 DIAGNOSIS — I50.22 CHRONIC SYSTOLIC HEART FAILURE (CMD): ICD-10-CM

## 2023-03-16 PROCEDURE — 93264 REM MNTR WRLS P-ART PRS SNR: CPT | Performed by: INTERNAL MEDICINE

## 2023-03-20 NOTE — TELEPHONE ENCOUNTER
LOV: 3/16/2022 virtual   Last refill: 12/13/2021    Dr. Kanchan Melo review and sign pended prescription if agreeable.

## 2023-03-21 ENCOUNTER — ANTI-COAG (OUTPATIENT)
Dept: CARDIOLOGY | Age: 73
End: 2023-03-21

## 2023-03-21 DIAGNOSIS — Z79.01 LONG TERM (CURRENT) USE OF ANTICOAGULANTS: ICD-10-CM

## 2023-03-21 DIAGNOSIS — I48.0 PAROXYSMAL ATRIAL FIBRILLATION (CMD): Primary | ICD-10-CM

## 2023-03-21 DIAGNOSIS — I42.0 DILATED CARDIOMYOPATHY (CMD): ICD-10-CM

## 2023-03-21 RX ORDER — SACUBITRIL AND VALSARTAN 97; 103 MG/1; MG/1
1 TABLET, FILM COATED ORAL 2 TIMES DAILY
Qty: 180 TABLET | Refills: 3 | Status: SHIPPED | OUTPATIENT
Start: 2023-03-21

## 2023-03-24 RX ORDER — BUDESONIDE AND FORMOTEROL FUMARATE DIHYDRATE 160; 4.5 UG/1; UG/1
AEROSOL RESPIRATORY (INHALATION)
Qty: 30.6 G | Refills: 2 | Status: SHIPPED | OUTPATIENT
Start: 2023-03-24

## 2023-03-29 ENCOUNTER — ANTI-COAG (OUTPATIENT)
Dept: CARDIOLOGY | Age: 73
End: 2023-03-29

## 2023-03-29 DIAGNOSIS — I42.0 DILATED CARDIOMYOPATHY (CMD): ICD-10-CM

## 2023-03-29 DIAGNOSIS — I48.0 PAROXYSMAL ATRIAL FIBRILLATION (CMD): Primary | ICD-10-CM

## 2023-03-29 DIAGNOSIS — Z79.01 LONG TERM (CURRENT) USE OF ANTICOAGULANTS: ICD-10-CM

## 2023-03-31 ENCOUNTER — TELEPHONE (OUTPATIENT)
Dept: CARDIOLOGY | Age: 73
End: 2023-03-31

## 2023-04-03 ENCOUNTER — TELEPHONE (OUTPATIENT)
Dept: CARDIOLOGY | Age: 73
End: 2023-04-03

## 2023-04-06 ENCOUNTER — ANTI-COAG (OUTPATIENT)
Dept: CARDIOLOGY | Age: 73
End: 2023-04-06

## 2023-04-06 DIAGNOSIS — I42.0 DILATED CARDIOMYOPATHY (CMD): ICD-10-CM

## 2023-04-06 DIAGNOSIS — I48.0 PAROXYSMAL ATRIAL FIBRILLATION (CMD): Primary | ICD-10-CM

## 2023-04-06 DIAGNOSIS — Z79.01 LONG TERM (CURRENT) USE OF ANTICOAGULANTS: ICD-10-CM

## 2023-04-13 ENCOUNTER — ANTI-COAG (OUTPATIENT)
Dept: CARDIOLOGY | Age: 73
End: 2023-04-13

## 2023-04-13 DIAGNOSIS — I48.0 PAROXYSMAL ATRIAL FIBRILLATION (CMD): Primary | ICD-10-CM

## 2023-04-13 DIAGNOSIS — I42.0 DILATED CARDIOMYOPATHY (CMD): ICD-10-CM

## 2023-04-13 DIAGNOSIS — Z79.01 LONG TERM (CURRENT) USE OF ANTICOAGULANTS: ICD-10-CM

## 2023-04-18 ENCOUNTER — ANCILLARY PROCEDURE (OUTPATIENT)
Dept: CARDIOLOGY | Age: 73
End: 2023-04-18
Attending: INTERNAL MEDICINE

## 2023-04-18 ENCOUNTER — TELEPHONE (OUTPATIENT)
Dept: CARDIOLOGY | Age: 73
End: 2023-04-18

## 2023-04-18 DIAGNOSIS — I50.22 CHRONIC SYSTOLIC HEART FAILURE (CMD): ICD-10-CM

## 2023-04-20 ENCOUNTER — ANTI-COAG (OUTPATIENT)
Dept: CARDIOLOGY | Age: 73
End: 2023-04-20

## 2023-04-20 DIAGNOSIS — Z79.01 LONG TERM (CURRENT) USE OF ANTICOAGULANTS: ICD-10-CM

## 2023-04-20 DIAGNOSIS — I48.0 PAROXYSMAL ATRIAL FIBRILLATION (CMD): Primary | ICD-10-CM

## 2023-04-20 DIAGNOSIS — I42.0 DILATED CARDIOMYOPATHY (CMD): ICD-10-CM

## 2023-04-20 LAB — INR PPP: 4.6

## 2023-04-22 ENCOUNTER — TELEPHONE (OUTPATIENT)
Dept: CARDIOLOGY | Age: 73
End: 2023-04-22

## 2023-04-22 LAB — INR PPP: 4.8

## 2023-04-24 ENCOUNTER — ANTI-COAG (OUTPATIENT)
Dept: CARDIOLOGY | Age: 73
End: 2023-04-24

## 2023-04-24 DIAGNOSIS — I42.0 DILATED CARDIOMYOPATHY (CMD): ICD-10-CM

## 2023-04-24 DIAGNOSIS — I48.0 PAROXYSMAL ATRIAL FIBRILLATION (CMD): Primary | ICD-10-CM

## 2023-04-24 DIAGNOSIS — Z79.01 LONG TERM (CURRENT) USE OF ANTICOAGULANTS: ICD-10-CM

## 2023-04-24 LAB — INR PPP: 3.1

## 2023-04-26 ENCOUNTER — ANCILLARY PROCEDURE (OUTPATIENT)
Dept: CARDIOLOGY | Age: 73
End: 2023-04-26
Attending: INTERNAL MEDICINE

## 2023-04-26 DIAGNOSIS — Z95.810 ICD (IMPLANTABLE CARDIOVERTER-DEFIBRILLATOR) IN PLACE: ICD-10-CM

## 2023-04-26 PROCEDURE — 93295 DEV INTERROG REMOTE 1/2/MLT: CPT | Performed by: INTERNAL MEDICINE

## 2023-04-27 ENCOUNTER — HOSPITAL ENCOUNTER (INPATIENT)
Facility: HOSPITAL | Age: 73
LOS: 25 days | Discharge: HOME HEALTH | End: 2023-05-22
Attending: EMERGENCY MEDICINE | Admitting: INTERNAL MEDICINE
Payer: MEDICARE

## 2023-04-27 ENCOUNTER — ANTI-COAG (OUTPATIENT)
Dept: CARDIOLOGY | Age: 73
End: 2023-04-27

## 2023-04-27 DIAGNOSIS — I42.0 DILATED CARDIOMYOPATHY (CMD): ICD-10-CM

## 2023-04-27 DIAGNOSIS — N17.9 AKI (ACUTE KIDNEY INJURY) (HCC): ICD-10-CM

## 2023-04-27 DIAGNOSIS — L03.116 CELLULITIS OF LEFT LOWER EXTREMITY: Primary | ICD-10-CM

## 2023-04-27 DIAGNOSIS — I48.0 PAROXYSMAL ATRIAL FIBRILLATION (CMD): Primary | ICD-10-CM

## 2023-04-27 DIAGNOSIS — Z79.01 LONG TERM (CURRENT) USE OF ANTICOAGULANTS: ICD-10-CM

## 2023-04-27 DIAGNOSIS — B37.2 CANDIDIASIS OF SKIN: ICD-10-CM

## 2023-04-27 PROBLEM — N28.9 RENAL INSUFFICIENCY: Status: ACTIVE | Noted: 2023-04-27

## 2023-04-27 PROBLEM — Z51.81 ENCOUNTER FOR MONITORING SOTALOL THERAPY: Status: RESOLVED | Noted: 2019-11-26 | Resolved: 2023-04-27

## 2023-04-27 PROBLEM — Z79.899 ENCOUNTER FOR MONITORING SOTALOL THERAPY: Status: RESOLVED | Noted: 2019-11-26 | Resolved: 2023-04-27

## 2023-04-27 PROBLEM — Z95.818 PRESENCE OF IMPLANTABLE PULMONARY ARTERY PRESSURE AND HEART RATE MONITORING SYSTEM: Status: ACTIVE | Noted: 2023-04-27

## 2023-04-27 LAB
ANION GAP SERPL CALC-SCNC: 3 MMOL/L (ref 0–18)
BASOPHILS # BLD AUTO: 0.05 X10(3) UL (ref 0–0.2)
BASOPHILS NFR BLD AUTO: 0.7 %
BUN BLD-MCNC: 53 MG/DL (ref 7–18)
BUN/CREAT SERPL: 33.8 (ref 10–20)
CALCIUM BLD-MCNC: 8.9 MG/DL (ref 8.5–10.1)
CHLORIDE SERPL-SCNC: 107 MMOL/L (ref 98–112)
CO2 SERPL-SCNC: 31 MMOL/L (ref 21–32)
CREAT BLD-MCNC: 1.57 MG/DL
DEPRECATED RDW RBC AUTO: 58.7 FL (ref 35.1–46.3)
EOSINOPHIL # BLD AUTO: 0.37 X10(3) UL (ref 0–0.7)
EOSINOPHIL NFR BLD AUTO: 5.5 %
ERYTHROCYTE [DISTWIDTH] IN BLOOD BY AUTOMATED COUNT: 16.2 % (ref 11–15)
GFR SERPLBLD BASED ON 1.73 SQ M-ARVRAT: 35 ML/MIN/1.73M2 (ref 60–?)
GLUCOSE BLD-MCNC: 96 MG/DL (ref 70–99)
HCT VFR BLD AUTO: 44.6 %
HGB BLD-MCNC: 13.5 G/DL
IMM GRANULOCYTES # BLD AUTO: 0.02 X10(3) UL (ref 0–1)
IMM GRANULOCYTES NFR BLD: 0.3 %
INR PPP: 1.7
LACTATE SERPL-SCNC: 1.1 MMOL/L (ref 0.4–2)
LYMPHOCYTES # BLD AUTO: 1.12 X10(3) UL (ref 1–4)
LYMPHOCYTES NFR BLD AUTO: 16.7 %
MCH RBC QN AUTO: 29.5 PG (ref 26–34)
MCHC RBC AUTO-ENTMCNC: 30.3 G/DL (ref 31–37)
MCV RBC AUTO: 97.4 FL
MONOCYTES # BLD AUTO: 0.8 X10(3) UL (ref 0.1–1)
MONOCYTES NFR BLD AUTO: 11.9 %
NEUTROPHILS # BLD AUTO: 4.34 X10 (3) UL (ref 1.5–7.7)
NEUTROPHILS # BLD AUTO: 4.34 X10(3) UL (ref 1.5–7.7)
NEUTROPHILS NFR BLD AUTO: 64.9 %
OSMOLALITY SERPL CALC.SUM OF ELEC: 306 MOSM/KG (ref 275–295)
PLATELET # BLD AUTO: 165 10(3)UL (ref 150–450)
POTASSIUM SERPL-SCNC: 4.7 MMOL/L (ref 3.5–5.1)
RBC # BLD AUTO: 4.58 X10(6)UL
SODIUM SERPL-SCNC: 141 MMOL/L (ref 136–145)
WBC # BLD AUTO: 6.7 X10(3) UL (ref 4–11)

## 2023-04-27 RX ORDER — ACETAMINOPHEN 325 MG/1
650 TABLET ORAL EVERY 6 HOURS PRN
Status: DISCONTINUED | OUTPATIENT
Start: 2023-04-27 | End: 2023-05-22

## 2023-04-27 RX ORDER — ALLOPURINOL 100 MG/1
100 TABLET ORAL DAILY
Status: DISCONTINUED | OUTPATIENT
Start: 2023-04-28 | End: 2023-05-22

## 2023-04-27 RX ORDER — POTASSIUM CHLORIDE 20 MEQ/1
20 TABLET, EXTENDED RELEASE ORAL DAILY
Status: DISCONTINUED | OUTPATIENT
Start: 2023-04-28 | End: 2023-05-22

## 2023-04-27 RX ORDER — HYDROXYZINE HYDROCHLORIDE 25 MG/1
25 TABLET, FILM COATED ORAL 3 TIMES DAILY PRN
Status: DISCONTINUED | OUTPATIENT
Start: 2023-04-27 | End: 2023-05-22

## 2023-04-27 RX ORDER — TORSEMIDE 20 MG/1
60 TABLET ORAL DAILY
Status: DISCONTINUED | OUTPATIENT
Start: 2023-04-28 | End: 2023-05-11

## 2023-04-27 RX ORDER — POLYETHYLENE GLYCOL 3350 17 G/17G
17 POWDER, FOR SOLUTION ORAL DAILY PRN
Status: DISCONTINUED | OUTPATIENT
Start: 2023-04-27 | End: 2023-05-05

## 2023-04-27 RX ORDER — DIPHENHYDRAMINE HCL 25 MG
25 TABLET ORAL EVERY 8 HOURS PRN
COMMUNITY

## 2023-04-27 RX ORDER — MONTELUKAST SODIUM 10 MG/1
10 TABLET ORAL NIGHTLY
Status: DISCONTINUED | OUTPATIENT
Start: 2023-04-27 | End: 2023-05-22

## 2023-04-27 RX ORDER — CEFAZOLIN SODIUM/WATER 2 G/20 ML
2 SYRINGE (ML) INTRAVENOUS EVERY 12 HOURS
Status: DISCONTINUED | OUTPATIENT
Start: 2023-04-28 | End: 2023-05-01

## 2023-04-27 RX ORDER — DOCUSATE SODIUM 100 MG/1
100 CAPSULE, LIQUID FILLED ORAL 2 TIMES DAILY
Status: DISCONTINUED | OUTPATIENT
Start: 2023-04-28 | End: 2023-05-22

## 2023-04-27 RX ORDER — NYSTATIN 100000 [USP'U]/G
1 POWDER TOPICAL 2 TIMES DAILY
COMMUNITY
Start: 2023-03-08

## 2023-04-27 RX ORDER — WARFARIN SODIUM 2.5 MG/1
7.5 TABLET ORAL SEE ADMIN INSTRUCTIONS
Status: DISCONTINUED | OUTPATIENT
Start: 2023-04-27 | End: 2023-04-28

## 2023-04-27 RX ORDER — PANTOPRAZOLE SODIUM 40 MG/1
40 TABLET, DELAYED RELEASE ORAL
Status: DISCONTINUED | OUTPATIENT
Start: 2023-04-28 | End: 2023-05-22

## 2023-04-27 RX ORDER — BISACODYL 10 MG
10 SUPPOSITORY, RECTAL RECTAL
Status: DISCONTINUED | OUTPATIENT
Start: 2023-04-27 | End: 2023-05-22

## 2023-04-27 RX ORDER — CARVEDILOL 25 MG/1
25 TABLET ORAL 2 TIMES DAILY
Status: DISCONTINUED | OUTPATIENT
Start: 2023-04-27 | End: 2023-05-22

## 2023-04-27 RX ORDER — TORSEMIDE 20 MG/1
60 TABLET ORAL DAILY
COMMUNITY
Start: 2023-01-26

## 2023-04-27 RX ORDER — CEFAZOLIN SODIUM IN 0.9 % NACL 3 G/100 ML
3 INTRAVENOUS SOLUTION, PIGGYBACK (ML) INTRAVENOUS ONCE
Status: COMPLETED | OUTPATIENT
Start: 2023-04-27 | End: 2023-04-27

## 2023-04-27 RX ORDER — EPLERENONE 25 MG/1
25 TABLET, FILM COATED ORAL DAILY
Status: DISCONTINUED | OUTPATIENT
Start: 2023-04-28 | End: 2023-05-22

## 2023-04-27 RX ORDER — ASPIRIN 81 MG/1
81 TABLET, CHEWABLE ORAL DAILY
Status: DISCONTINUED | OUTPATIENT
Start: 2023-04-28 | End: 2023-05-22

## 2023-04-27 RX ORDER — ALPRAZOLAM 0.25 MG/1
0.25 TABLET ORAL 2 TIMES DAILY PRN
Status: DISCONTINUED | OUTPATIENT
Start: 2023-04-27 | End: 2023-05-04

## 2023-04-27 RX ORDER — ATORVASTATIN CALCIUM 20 MG/1
20 TABLET, FILM COATED ORAL NIGHTLY
Status: DISCONTINUED | OUTPATIENT
Start: 2023-04-27 | End: 2023-05-22

## 2023-04-27 RX ORDER — IPRATROPIUM BROMIDE AND ALBUTEROL SULFATE 2.5; .5 MG/3ML; MG/3ML
3 SOLUTION RESPIRATORY (INHALATION) EVERY 6 HOURS PRN
Status: DISCONTINUED | OUTPATIENT
Start: 2023-04-27 | End: 2023-04-28

## 2023-04-27 NOTE — ED QUICK NOTES
Orders for admission, patient is aware of plan and ready to go upstairs. Any questions, please call ED RN Susy Lam at extension [phone]  .      Patient Covid vaccination status: Fully vaccinated     COVID Test Ordered in ED: None    COVID Suspicion at Admission: N/A    Running Infusions:  None    Mental Status/LOC at time of transport: A&O x4    Other pertinent information:   CIWA score: N/A   NIH score:  N/A

## 2023-04-27 NOTE — ED INITIAL ASSESSMENT (HPI)
Patient here with niece for suspected cellulitis on the left leg for a couple days. Redness and swelling noted to the left leg when compared to the right leg. Patient started with a fungal rash under her breast and progressed from there spreading to different parts of her body.

## 2023-04-28 ENCOUNTER — APPOINTMENT (OUTPATIENT)
Dept: ULTRASOUND IMAGING | Facility: HOSPITAL | Age: 73
End: 2023-04-28
Attending: INTERNAL MEDICINE
Payer: MEDICARE

## 2023-04-28 ENCOUNTER — APPOINTMENT (OUTPATIENT)
Dept: GENERAL RADIOLOGY | Facility: HOSPITAL | Age: 73
End: 2023-04-28
Attending: INTERNAL MEDICINE
Payer: MEDICARE

## 2023-04-28 LAB
ANION GAP SERPL CALC-SCNC: 5 MMOL/L (ref 0–18)
BASOPHILS # BLD AUTO: 0.03 X10(3) UL (ref 0–0.2)
BASOPHILS NFR BLD AUTO: 0.5 %
BUN BLD-MCNC: 52 MG/DL (ref 7–18)
BUN/CREAT SERPL: 34.7 (ref 10–20)
CALCIUM BLD-MCNC: 8.6 MG/DL (ref 8.5–10.1)
CHLORIDE SERPL-SCNC: 111 MMOL/L (ref 98–112)
CHLORIDE UR-SCNC: 99 MMOL/L
CHOLEST SERPL-MCNC: 99 MG/DL (ref ?–200)
CK SERPL-CCNC: 50 U/L
CO2 SERPL-SCNC: 28 MMOL/L (ref 21–32)
CREAT BLD-MCNC: 1.5 MG/DL
CREAT UR-SCNC: 46.3 MG/DL
DEPRECATED RDW RBC AUTO: 57.9 FL (ref 35.1–46.3)
EOSINOPHIL # BLD AUTO: 0.37 X10(3) UL (ref 0–0.7)
EOSINOPHIL NFR BLD AUTO: 6.6 %
ERYTHROCYTE [DISTWIDTH] IN BLOOD BY AUTOMATED COUNT: 16.1 % (ref 11–15)
EST. AVERAGE GLUCOSE BLD GHB EST-MCNC: 126 MG/DL (ref 68–126)
GFR SERPLBLD BASED ON 1.73 SQ M-ARVRAT: 37 ML/MIN/1.73M2 (ref 60–?)
GLUCOSE BLD-MCNC: 91 MG/DL (ref 70–99)
HBA1C MFR BLD: 6 % (ref ?–5.7)
HCT VFR BLD AUTO: 40.2 %
HDLC SERPL-MCNC: 37 MG/DL (ref 40–59)
HGB BLD-MCNC: 12.3 G/DL
IMM GRANULOCYTES # BLD AUTO: 0.02 X10(3) UL (ref 0–1)
IMM GRANULOCYTES NFR BLD: 0.4 %
INR BLD: 1.72 (ref 0.85–1.16)
LDLC SERPL CALC-MCNC: 44 MG/DL (ref ?–100)
LYMPHOCYTES # BLD AUTO: 0.78 X10(3) UL (ref 1–4)
LYMPHOCYTES NFR BLD AUTO: 13.9 %
MAGNESIUM SERPL-MCNC: 2.4 MG/DL (ref 1.6–2.6)
MCH RBC QN AUTO: 29.5 PG (ref 26–34)
MCHC RBC AUTO-ENTMCNC: 30.6 G/DL (ref 31–37)
MCV RBC AUTO: 96.4 FL
MONOCYTES # BLD AUTO: 0.62 X10(3) UL (ref 0.1–1)
MONOCYTES NFR BLD AUTO: 11 %
NEUTROPHILS # BLD AUTO: 3.8 X10 (3) UL (ref 1.5–7.7)
NEUTROPHILS # BLD AUTO: 3.8 X10(3) UL (ref 1.5–7.7)
NEUTROPHILS NFR BLD AUTO: 67.6 %
NONHDLC SERPL-MCNC: 62 MG/DL (ref ?–130)
OSMOLALITY SERPL CALC.SUM OF ELEC: 312 MOSM/KG (ref 275–295)
PHOSPHATE SERPL-MCNC: 2.8 MG/DL (ref 2.5–4.9)
PLATELET # BLD AUTO: 136 10(3)UL (ref 150–450)
POTASSIUM SERPL-SCNC: 4.3 MMOL/L (ref 3.5–5.1)
POTASSIUM UR-SCNC: 43.5 MMOL/L
PROTHROMBIN TIME: 19.9 SECONDS (ref 11.6–14.8)
RBC # BLD AUTO: 4.17 X10(6)UL
SODIUM SERPL-SCNC: 144 MMOL/L (ref 136–145)
SODIUM SERPL-SCNC: 68 MMOL/L
TRIGL SERPL-MCNC: 93 MG/DL (ref 30–149)
URATE SERPL-MCNC: 6.3 MG/DL
VLDLC SERPL CALC-MCNC: 13 MG/DL (ref 0–30)
WBC # BLD AUTO: 5.6 X10(3) UL (ref 4–11)

## 2023-04-28 PROCEDURE — 71045 X-RAY EXAM CHEST 1 VIEW: CPT | Performed by: INTERNAL MEDICINE

## 2023-04-28 PROCEDURE — 93971 EXTREMITY STUDY: CPT | Performed by: INTERNAL MEDICINE

## 2023-04-28 RX ORDER — IPRATROPIUM BROMIDE AND ALBUTEROL SULFATE 2.5; .5 MG/3ML; MG/3ML
3 SOLUTION RESPIRATORY (INHALATION) EVERY 6 HOURS PRN
Status: DISCONTINUED | OUTPATIENT
Start: 2023-04-28 | End: 2023-05-22

## 2023-04-28 RX ORDER — WARFARIN SODIUM 2.5 MG/1
7.5 TABLET ORAL NIGHTLY
Status: DISCONTINUED | OUTPATIENT
Start: 2023-04-28 | End: 2023-05-01

## 2023-04-28 RX ORDER — FLUTICASONE FUROATE AND VILANTEROL 100; 25 UG/1; UG/1
1 POWDER RESPIRATORY (INHALATION) DAILY
Status: DISCONTINUED | OUTPATIENT
Start: 2023-04-28 | End: 2023-05-22

## 2023-04-28 NOTE — H&P
[facility]    PATIENT'S NAME: Tia Regan   ATTENDING PHYSICIAN: Delon Jolley MD   PATIENT ACCOUNT#:   [de-identified]    LOCATION:  [facility] RECORD #:   [id number]       YOB: 1950  ADMISSION DATE:       04/27/2023    HISTORY AND PHYSICAL EXAMINATION    DATE OF SERVICE:  April 27, 2023    CHIEF COMPLAINT:  Cellulitis of the left lower extremity; acute kidney injury; deconditioning; skin rash, likely complicated fungal infection and intercrural candidiasis. HISTORY OF PRESENT ILLNESS:  The patient is a very pleasant 71-year-old lady who is well-known to me and follows up with me as outpatient, has significant comorbidities of morbid obesity, hypertension, poor mobility, and congestive heart failure. Patient had been out of the hospital for a month and a half and was at a long-term care nursing home, then underwent rehab briefly at that facility and then was discharged. For the past few weeks patient has been having a progressively worse rash, which was initially treated with topical antifungals, including nystatin powder followed by miconazole cream, and then subsequently upon discharge from the nursing facility patient ended up having oral terbinafine. Patient now presents with a new rash on her left shin and also with a beefy rash under her breasts. Patient denies any chest pain, denies any shortness of breath. In addition, patient also notes that her left leg is more swollen and red than the right leg. PAST MEDICAL HISTORY:  Relevant for history of congestive heart failure; history of hypertension; history of hyperlipidemia; history of morbid obesity; history of atrial fibrillation; history of anticoagulation; history of chronic systolic heart failure; history of chronic renal insufficiency stage 3; history of CAD, status post stent; history of dilated cardiomyopathy; history of implantable cardioverter-defibrillator.       MEDICATIONS:  Home medications: Torsemide 20 mg take 3 tablets by mouth daily, but sometimes patient takes only 2 tablets by mouth daily; nystatin applied topically to affected skin twice daily; diphenhydramine 25 mg orally every 8 hours half a tablet; budesonide/formoterol 160/4.5 two puffs into lungs twice daily; warfarin 5 mg orally daily; sacubitril/valsartan 97/103 one tablet orally twice daily; carvedilol 25 mg orally twice daily; potassium chloride 20 mEq orally daily; eplerenone 25 mg orally daily; ipratropium/albuterol 0.5/2.5, 3 mL nebulization every 6 hours as needed; atorvastatin 20 mg orally nightly; alprazolam 0.25 mg orally twice daily; allopurinol 100 mg orally daily; aspirin 81 mg orally daily; montelukast 10 mg orally daily; tiotropium bromide inhale 2 puffs into lungs daily. ALLERGIES:  Penicillin, shellfish, and ultrasound gel. FAMILY HISTORY:  Noncontributory. SOCIAL HISTORY:  No alcohol, no drugs, no tobacco.    REVIEW OF SYSTEMS:  A 13-point review of systems was performed and was negative except as per HPI. PHYSICAL EXAMINATION:    VITAL SIGNS:  Blood pressure 127/56, pulse rate 93, respiratory rate 20, temperature 97.5, pulse oximetry 92%. NECK:  No JVD. No bruits. No lymphadenopathy. LUNGS:  Decreased air entry bilaterally at the bases. HEART:  S1, S2 heard. No S3, no S4. No murmur, no rub, no gallop. ABDOMEN:  Soft. Morbidly obese. No hepatosplenomegaly. EXTREMITIES:  Left shin cellulitis. No calf tenderness. No tenderness on dorsiflexion at the ankle. No knee effusion. No ankle swelling. LABORATORY DATA:  Sodium 144, potassium 4.3, chloride 111, bicarb 28, BUN 52, creatinine 1.5, calcium 8.6. PT 19.9, INR 1.72. WBC 5.6, hemoglobin 12.3, hematocrit 40.2, platelet count 644. EKG was performed and did not show any ST-segment abnormalities. ASSESSMENT AND PLAN:    1. Left leg cellulitis. Patient has left leg cellulitis.   Interestingly, 6 weeks ago patient was discontinued from [facility] in light of patient having extensive fungal infection and rash. Patient has been improved, but the rash reoccurred once the patient went home and has been resistant to treatment with antifungals. The leg rash has been new and looks similar to cellulitis. Patient will get empiric antibiotic. Patient is allergic to penicillin, but has tolerated Ancef and will continue Ancef IV for now. Will also consult Infectious disease. 2.   Congestive heart failure, systolic, with depressed ejection fraction. Currently patient is not in exacerbation of CHF. For the time being, will continue Entresto. Will also continue her diuretic and will continue eplerenone. 3.   Atrial fibrillation. Patient is currently rate controlled and will continue monitoring. 4.   Anticoagulation. Patient is on warfarin. The recent drop in INR was secondary to oral antifungals. For the time being, will discontinue terbinafine if alternate choices are indicated and suggested per ID service. 5.   Chronic obstructive pulmonary disease. Patient is on long-acting beta-agonist and steroid inhaler, will continue the same. 6.   Hyperlipidemia. Patient is on a statin and will continue the same. 7.   Prediabetes. Patient has recently started having glucose intolerance. Will start her on Accu-Cheks and will also give her sliding scale insulin. 8.   Morbid obesity, possible sleep apnea. Will consult Pulmonology and will also check for pulmonary hypertension. 9.   Hyperuricemia. Patient had very elevated uric acid. Patient does not have any signs or symptoms of gout and will recheck uric acid level. 10.   DVT prophylaxis. Patient is on therapeutic anticoagulation. 11.   GI prophylaxis. Patient will be on Protonix. 12. Acute Kidney Injury on CKD-3 : Patient had prior creatinine of 1.1 and now 1.5. Will check for interstitial nephritis and currently no indication for hemodialysis.     Total time spent seeing patient was 79 minutes.      Dictated By Charo Wong MD  d: 04/28/2023 08:56:29  t: 04/28/2023 59:47:79  Malva Curling [id number]  IVY/

## 2023-04-28 NOTE — CM/SW NOTE
04/28/23 1200   CM/SW Screening   Referral [facility] staff; Chart review;Nursing rounds   Patient's Current Mental Status at Time of Assessment Alert;Oriented   Patient's [address]   Number of Levels in Home 1   Number of Stair in Home 1   Patient lives with Daughter   Patient Status Prior to Admission   Independent with ADLs and Mobility No   Pt. requires assistance with Ambulating   Services in place prior to admission [address] [facility] Provider [address]   Discharge Needs   Anticipated D/C needs Home health care       CM verified w/ [facility] for RN/PT/OT CM entered resumption of [facility] care pending MD sigs  Presents for lower extremities cellulitis, per nursing pt wheelchair bound. CM uploaded MICHAEL w/[facility] referral.    PLAN: dc plan home w/[facility]    . CM/SW to remain available for support and/or discharge planning.     Vannessa Rubio RN, [facility]    Ext.  [phone]

## 2023-04-28 NOTE — PLAN OF CARE
Problem: Patient Centered Care  Goal: Patient preferences are identified and integrated in the patient's plan of care  Description: Interventions:  - What would you like us to know as we care for you?   - Provide timely, complete, and accurate information to patient/family  - Incorporate patient and family knowledge, values, beliefs, and cultural backgrounds into the planning and delivery of care  - Encourage patient/family to participate in care and decision-making at the level they choose  - Honor patient and family perspectives and choices  Outcome: Progressing     Problem: Patient/Family Goals  Goal: Patient/Family Long Term Goal  Description: Patient's Long Term Goal: to return home    Interventions:  - antibiotics  -PT/OT  - See additional Care Plan goals for specific interventions  Outcome: Progressing  Goal: Patient/Family Short Term Goal  Description: Patient's Short Term Goal: to feel better    Interventions:   - follow MD recommendations  - See additional Care Plan goals for specific interventions  Outcome: Progressing     Problem: CARDIOVASCULAR - ADULT  Goal: Maintains optimal cardiac output and hemodynamic stability  Description: INTERVENTIONS:  - Monitor vital signs, rhythm, and trends  - Monitor for bleeding, hypotension and signs of decreased cardiac output  - Evaluate effectiveness of vasoactive medications to optimize hemodynamic stability  - Monitor arterial and/or venous puncture sites for bleeding and/or hematoma  - Assess quality of pulses, skin color and temperature  - Assess for signs of decreased coronary artery perfusion - ex.  Angina  - Evaluate fluid balance, assess for edema, trend weights  Outcome: Progressing  Goal: Absence of cardiac arrhythmias or at baseline  Description: INTERVENTIONS:  - Continuous cardiac monitoring, monitor vital signs, obtain 12 lead EKG if indicated  - Evaluate effectiveness of antiarrhythmic and heart rate control medications as ordered  - Initiate emergency measures for life threatening arrhythmias  - Monitor electrolytes and administer replacement therapy as ordered  Outcome: Progressing     Problem: RESPIRATORY - ADULT  Goal: Achieves optimal ventilation and oxygenation  Description: INTERVENTIONS:  - Assess for changes in respiratory status  - Assess for changes in mentation and behavior  - Position to facilitate oxygenation and minimize respiratory effort  - Oxygen supplementation based on oxygen saturation or ABGs  - Provide Smoking Cessation handout, if applicable  - Encourage broncho-pulmonary hygiene including cough, deep breathe, Incentive Spirometry  - Assess the need for suctioning and perform as needed  - Assess and instruct to report SOB or any respiratory difficulty  - Respiratory Therapy support as indicated  - Manage/alleviate anxiety  - Monitor for signs/symptoms of CO2 retention  Outcome: Progressing     Problem: SKIN/TISSUE INTEGRITY - ADULT  Goal: Skin integrity remains intact  Description: INTERVENTIONS  - Assess and document risk factors for pressure ulcer development  - Assess and document skin integrity  - Monitor for areas of redness and/or skin breakdown  - Initiate interventions, skin care algorithm/standards of care as needed  Outcome: Progressing  Goal: Incision(s), wounds(s) or drain site(s) healing without S/S of infection  Description: INTERVENTIONS:  - Assess and document risk factors for pressure ulcer development  - Assess and document skin integrity  - Assess and document dressing/incision, wound bed, drain sites and surrounding tissue  - Implement wound care per orders  - Initiate isolation precautions as appropriate  - Initiate Pressure Ulcer prevention bundle as indicated  Outcome: Progressing     Problem: HEMATOLOGIC - ADULT  Goal: Maintains hematologic stability  Description: INTERVENTIONS  - Assess for signs and symptoms of bleeding or hemorrhage  - Monitor labs and vital signs for trends  - Administer supportive blood products/factors, fluids and medications as ordered and appropriate  - Administer supportive blood products/factors as ordered and appropriate  Outcome: Progressing  Goal: Free from bleeding injury  Description: (Example usage: patient with low platelets)  INTERVENTIONS:  - Avoid intramuscular injections, enemas and rectal medication administration  - Ensure safe mobilization of patient  - Hold pressure on venipuncture sites to achieve adequate hemostasis  - Assess for signs and symptoms of internal bleeding  - Monitor lab trends  - Patient is to report abnormal signs of bleeding to staff  - Avoid use of toothpicks and dental floss  - Use electric shaver for shaving  - Use soft bristle tooth brush  - Limit straining and forceful nose blowing  Outcome: Progressing     Problem: MUSCULOSKELETAL - ADULT  Goal: Return mobility to safest level of function  Description: INTERVENTIONS:  - Assess patient stability and activity tolerance for standing, transferring and ambulating w/ or w/o assistive devices  - Assist with transfers and ambulation using safe patient handling equipment as needed  - Ensure adequate protection for wounds/incisions during mobilization  - Obtain PT/OT consults as needed  - Advance activity as appropriate  - Communicate ordered activity level and limitations with patient/family  Outcome: Progressing  Goal: Maintain proper alignment of affected body part  Description: INTERVENTIONS:  - Support and protect limb and body alignment per provider's orders  - Instruct and reinforce with patient and family use of appropriate assistive device and precautions (e.g. spinal or hip dislocation precautions)  Outcome: Progressing     Problem: Impaired Functional Mobility  Goal: Achieve highest/safest level of mobility/gait  Description: Interventions:  - Assess patient's functional ability and stability  - Promote increasing activity/tolerance for mobility and gait  - Educate and engage patient/family in tolerated activity level and precautions    Outcome: Progressing     Problem: Impaired Activities of Daily Living  Goal: Achieve highest/safest level of independence in self care  Description: Interventions:  - Assess ability and encourage patient to participate in ADLs to maximize function  - Promote sitting position while performing ADLs such as feeding, grooming, and bathing  - Educate and encourage patient/family in tolerated functional activity level and precautions during self-care    Outcome: Progressing     Problem: Impaired Activities of Daily Living  Goal: Achieve highest/safest level of independence in self care  Description: Interventions:  - Assess ability and encourage patient to participate in ADLs to maximize function  - Promote sitting position while performing ADLs such as feeding, grooming, and bathing  - Educate and encourage patient/family in tolerated functional activity level and precautions during self-care    Outcome: Progressing

## 2023-04-28 NOTE — PROGRESS NOTES
04/28/23 1400   Wound 04/27/23  Breast Lower;Right   Date First Assessed/Time First Assessed: 04/27/23 2300   Present on Original Admission: Yes  Primary Wound Type: (c)   Location: Breast  Wound Location Orientation: Lower;Right  Wound Description (Comments): open, raw, red   Site Assessment Red;Pink;Moist   Drainage Amount Scant   Drainage Description Yellow   Treatments Cleansed  (Nystatin powder per MD)   Dressing   (Inter dry)   Dressing Changed New   Dressing Status Clean;Dry; Intact   Non-staged Wound Description Partial thickness   [place]-wound Assessment Fragile;Pink;Red   Wound Odor Mild   Wound 04/27/23 Other (comment) Breast Left; Lower   Date First Assessed/Time First Assessed: 04/27/23 2300   Present on Original Admission: Yes  Primary Wound Type: (c) Other (comment)  Location: Breast  Wound Location Orientation: Left; Lower  Wound Description (Comments): open, raw, red   Site Assessment Pink;Red;Moist   Drainage Amount Scant   Drainage Description Yellow   Treatments Cleansed  (Nystatin powder per MD)   Dressing   (Inter dry dressing)   Dressing Changed New   Non-staged Wound Description Partial thickness   Wound Odor Mild   Wound Follow Up   Follow up needed Yes    MD order to assess the pt's skin folds for moisture protection, the pt. is up in the chair, she ambulates with the walker. The MD has Nystatin powder ordered. Bilateral breast, pannus, groin folds with moisture, fungal rash and redness, some pain per the pt. Spoke with the nurse. Educated the pt. on a dressing call Inter dry that is antimicrobial and shashank away moisture from these areas. I cut some pieces and placed them in the folds. All questions answered. Encouraged the pt. to sleep in bed, where she can turn in bed, elevate legs and feet. ID was here to see the pt.

## 2023-04-28 NOTE — PLAN OF CARE
Problem: Patient Centered Care  Goal: Patient preferences are identified and integrated in the patient's plan of care  Description: Interventions:  - What would you like us to know as we care for you? I left rehab 8 days ago  - Provide timely, complete, and accurate information to patient/family  - Incorporate patient and family knowledge, values, beliefs, and cultural backgrounds into the planning and delivery of care  - Encourage patient/family to participate in care and decision-making at the level they choose  - Honor patient and family perspectives and choices  4/28/2023 0349 by Armando Dickinson RN  Outcome: Progressing  Note: Patient's needs met during shift. 4/28/2023 0349 by Armando Dickinson RN  Note: Patient's needs met during shift. Problem: Patient/Family Goals  Goal: Patient/Family Long Term Goal  Description: Patient's Long Term Goal: skin infection improves    Interventions:  - antibiotics  - See additional Care Plan goals for specific interventions  4/28/2023 0349 by Armando Dickinson RN  Outcome: Progressing  Note: Patient's skin breakdown will improve after hospital stay. 4/28/2023 0349 by Armando Dickinson RN  Note: Patient's skin breakdown will improve after hospital stay. Goal: Patient/Family Short Term Goal  Description: Patient's Short Term Goal: skin remains clean    Interventions:   - clean skin with bath wipes during shift  - See additional Care Plan goals for specific interventions  4/28/2023 0349 by Armando Dickinson RN  Outcome: Progressing  Note: Patient's skin will remain clean and dry. 4/28/2023 0349 by Armando Dickinson RN  Note: Patient's skin will remain clean and dry.      Problem: HEMATOLOGIC - ADULT  Goal: Maintains hematologic stability  Description: INTERVENTIONS  - Assess for signs and symptoms of bleeding or hemorrhage  - Monitor labs and vital signs for trends  - Administer supportive blood products/factors, fluids and medications as ordered and appropriate  - Administer supportive blood products/factors as ordered and appropriate  4/28/2023 0349 by Kathy Duffy RN  Outcome: Progressing  Note: Patient's INR lab draw scheduled in morning. 4/28/2023 0349 by Kathy Duffy RN  Note: Patient's INR lab draw scheduled in morning. Problem: MUSCULOSKELETAL - ADULT  Goal: Return mobility to safest level of function  Description: INTERVENTIONS:  - Assess patient stability and activity tolerance for standing, transferring and ambulating w/ or w/o assistive devices  - Assist with transfers and ambulation using safe patient handling equipment as needed  - Ensure adequate protection for wounds/incisions during mobilization  - Obtain PT/OT consults as needed  - Advance activity as appropriate  - Communicate ordered activity level and limitations with patient/family  4/28/2023 0349 by Kathy Duffy RN  Outcome: Progressing  Note: Patient ambulates with walker safely in room. 4/28/2023 0349 by Kathy Duffy RN  Note: Patient ambulates with walker safely in room. Problem: SAFETY ADULT - FALL  Goal: Free from fall injury  Description: INTERVENTIONS:  - Assess pt frequently for physical needs  - Identify cognitive and physical deficits and behaviors that affect risk of falls. - [place] fall precautions as indicated by assessment.  - Educate pt/family on patient safety including physical limitations  - Instruct pt to call for assistance with activity based on assessment  - Modify environment to reduce risk of injury  - Provide assistive devices as appropriate  - Consider OT/PT consult to assist with strengthening/mobility  - Encourage toileting schedule  4/28/2023 0349 by Kathy Duffy RN  Outcome: Progressing  Note: Patient remains free from falls during shift. 4/28/2023 0349 by Kathy Duffy RN  Note: Patient remains free from falls during shift.      Problem: SKIN/TISSUE INTEGRITY - ADULT  Goal: Skin integrity remains intact  Description: INTERVENTIONS  - Assess and document risk factors for pressure ulcer development  - Assess and document skin integrity  - Monitor for areas of redness and/or skin breakdown  - Initiate interventions, skin care algorithm/standards of care as needed  4/28/2023 0349 by Henny Saucedo RN  Outcome: Not Progressing  Note: Patient's skin cleaned with bath wipes and fungal powder placed. 4/28/2023 0349 by Henny Saucedo RN  Note: Patient's skin cleaned with bath wipes and fungal powder placed. Problem: MUSCULOSKELETAL - ADULT  Goal: Maintain proper alignment of affected body part  Description: INTERVENTIONS:  - Support and protect limb and body alignment per provider's orders  - Instruct and reinforce with patient and family use of appropriate assistive device and precautions (e.g. spinal or hip dislocation precautions)  4/28/2023 0349 by Henny Saucedo RN  Outcome: Not Progressing  Note: Patient stated \"unable to stand straight using walker\" because of right shoulder injury. 4/28/2023 0349 by Henny Saucedo RN  Note: Patient stated \"unable to stand straight using walker\" because of right shoulder injury. No change in status during shift.

## 2023-04-28 NOTE — CONSULTS
[facility]    PATIENT'S NAME: Minoo Aquino   ATTENDING PHYSICIAN: Praveen Gomez MD   CONSULTING PHYSICIAN: Luis Crawley. Tanya Salinas MD   PATIENT ACCOUNT#:   [id number]    LOCATION:  [address] RECORD #:   [id number]       YOB: 1950  ADMISSION DATE:       2023      CONSULT DATE:  2023    REPORT OF CONSULTATION      HISTORY OF PRESENT ILLNESS:  Patient is a 27-year-old white female with a history of cardiomyopathy with ejection fraction of 10%, hypertension, recurrent cellulitis, asthma, COPD, obstructive sleep apnea syndrome, not on CPAP, who was recently discharged from [facility], presenting with increasing cellulitis and intertrigo involving groin and area under the breasts. PAST MEDICAL HISTORY:  Ischemic cardiomyopathy with ejection fraction of 10% and implantable defibrillator in 2016; cellulitis in 2016; cardiomyopathy; atrial fibrillation; obstructive sleep apnea syndrome; deep vein thrombosis; history of renal failure; peripheral occlusive vascular disease. MEDICATIONS:  Home medications: Torsemide 60 mg a day; Benadryl 25 mg every 8 hours p.r.n.; Pulmicort 160/4.5 two inhalations q.12; Spiriva 2.5 two puffs daily; Coumadin 5 mg a day; Coreg 25 mg twice a day; potassium chloride 20 mEq a day; Inspira 25 mg a day; DuoNeb p.r.n.; Lipitor 20 mg at bedtime; alprazolam 0.25 mg p.r.n.; allopurinol 100 mg a day; Mycostatin cream; Singulair 10 mg at bedtime. ALLERGIES:  Penicillin. SOCIAL HISTORY:  No history of cigarette smoking or alcohol abuse. FAMILY HISTORY:  Mother  from ovarian CA with metastasis. Father  from MI at the age of 71. Both brother and niece suffer from asthma. REVIEW OF SYSTEMS:  GENERAL:  No weight loss. HEAD:  No headache. EYES:  No diplopia, blurred vision. EARS:  No tinnitus, impaired hearing. NOSE:  No rhinorrhea, epistaxis. THROAT:  No sore throat. NECK:  No neck stiffness. RESPIRATORY:  Nonproductive cough, wheezing, shortness of breath. CARDIOVASCULAR:  No orthopnea, paroxysmal nocturnal dyspnea. GASTROINTESTINAL:  No nausea, vomiting, diarrhea. GENITOURINARY:  No dysuria or hematuria. MUSCULOSKELETAL:  Skin rashes under the breasts and in inguinal area, redness and swelling of left lower extremity. PHYSICAL EXAMINATION:    VITAL SIGNS:  Temperature 98.4; pulse 85, irregular; respirations 20; blood pressure 112/56. HEENT:  Head and face with no trauma, no injury. Pupils equal bilaterally. Conjunctivae not anemic. Sclerae nonicteric. Eyes:  Fundi were normal.  External ears:  No deformity. Ear canals were patent. Eardrums were intact. Nose:  No congestion, polyp. Mouth and throat free. NECK:  No stiffness. No palpable nodes. No carotid bruit. LUNGS:  Chest symmetrical.  Lungs reveal scattered rhonchi. HEART:  Irregular. No murmur. Cardiac dullness is normal.  ABDOMEN:  Soft, protuberant. No hepatosplenomegaly. SKIN:  Rashes under the breasts are noted. There are rashes along the inguinal area. EXTREMITIES:  Lower leg edema of 1+ with cellulitis of left lower extremity. LABORATORY DATA:  CBC showed WBC 5600, RBC 4.17, hemoglobin 12.3, hematocrit 40. Electrolytes showed sodium 144, potassium 4.3, chloride 111, BUN 52, creatinine 1.50. DIAGNOSTIC IMPRESSION:    1. Cellulitis of lower extremity. 2.   Intertrigo involving inguinal and area under the breast.  3.   Ischemic cardiomyopathy with ejection fraction of 10%. 4.   History of implantable defibrillator in year 2016.  5.   Asthma. 6.   Chronic renal failure. 7.   Obstructive sleep apnea syndrome. SUGGESTIONS AND RECOMMENDATIONS:    1. Continue Symbicort 160/4.5 two puffs q.12.  2.   Singulair 10 mg at bedtime. 3.   Obtain sputum C and S.  4.   DuoNeb p.r.n.  5.   Consider nocturnal polysomnography study as an outpatient. 6.   Weight reduction program.    Dictated By Luis Crawley.  Tanya Salinas MD  d: 04/28/2023 10:06:38  t: 04/28/2023 10:38:54  [facility] [id number]  [facility]/

## 2023-04-28 NOTE — PROGRESS NOTES
[facility] Pharmacy Note:  Renal Adjustment for cefazolin (ANCEF)    Raul Petty is a 68year old patient who has been prescribed cefazolin (ANCEF) 2 g every 8 hrs. The estimated creatinine clearance is 26.8 mL/min (A) (based on SCr of 1.57 mg/dL (H)). The dose has been adjusted to cefazolin (ANCEF) 2 g every 12 hrs per hospital renal dose adjustment protocol for treatment of cellulitis. Pharmacy will follow and adjust dose as warranted for additional renal function changes.     Thank you,    Mj Patiño, PharmD  4/27/2023  11:49 PM

## 2023-04-29 PROBLEM — J45.901 ASTHMA WITH EXACERBATION: Status: ACTIVE | Noted: 2023-04-29

## 2023-04-29 RX ORDER — PROMETHAZINE HYDROCHLORIDE AND CODEINE PHOSPHATE 6.25; 1 MG/5ML; MG/5ML
5 SYRUP ORAL EVERY 4 HOURS PRN
Status: DISCONTINUED | OUTPATIENT
Start: 2023-04-29 | End: 2023-05-22

## 2023-04-29 RX ORDER — METHYLPREDNISOLONE SODIUM SUCCINATE 40 MG/ML
40 INJECTION, POWDER, LYOPHILIZED, FOR SOLUTION INTRAMUSCULAR; INTRAVENOUS DAILY
Status: DISCONTINUED | OUTPATIENT
Start: 2023-04-29 | End: 2023-05-11

## 2023-04-29 RX ORDER — BENZONATATE 100 MG/1
200 CAPSULE ORAL 3 TIMES DAILY PRN
Status: DISCONTINUED | OUTPATIENT
Start: 2023-04-29 | End: 2023-05-22

## 2023-04-29 RX ORDER — CLOTRIMAZOLE AND BETAMETHASONE DIPROPIONATE 10; .64 MG/G; MG/G
CREAM TOPICAL 2 TIMES DAILY
Status: DISCONTINUED | OUTPATIENT
Start: 2023-04-29 | End: 2023-05-22

## 2023-04-29 NOTE — PLAN OF CARE
Problem: Patient Centered Care  Goal: Patient preferences are identified and integrated in the patient's plan of care  Description: Interventions:  - What would you like us to know as we care for you?   - Provide timely, complete, and accurate information to patient/family  - Incorporate patient and family knowledge, values, beliefs, and cultural backgrounds into the planning and delivery of care  - Encourage patient/family to participate in care and decision-making at the level they choose  - Honor patient and family perspectives and choices  Outcome: Progressing     Problem: Patient/Family Goals  Goal: Patient/Family Long Term Goal  Description: Patient's Long Term Goal: Safe & Free of Falls! Interventions:  - Fall risk precautions are followed to ensure safety.  - Non-skid socks are worn for safety.  - Bed and chair alarm in use at all times. - Call light within reach at all times. - See additional Care Plan goals for specific interventions  Outcome: Progressing  Goal: Patient/Family Short Term Goal  Description: Patient's Short Term Goal: Improvement in skin integrity! Interventions:   - Kept clean and dry. - Ointment applied to affected areas. - Patient encourage to turn and reposition self while in chair every 2 hours. - See additional Care Plan goals for specific interventions  Outcome: Progressing     Problem: CARDIOVASCULAR - ADULT  Goal: Maintains optimal cardiac output and hemodynamic stability  Description: INTERVENTIONS:  - Monitor vital signs, rhythm, and trends  - Monitor for bleeding, hypotension and signs of decreased cardiac output  - Evaluate effectiveness of vasoactive medications to optimize hemodynamic stability  - Monitor arterial and/or venous puncture sites for bleeding and/or hematoma  - Assess quality of pulses, skin color and temperature  - Assess for signs of decreased coronary artery perfusion - ex.  Angina  - Evaluate fluid balance, assess for edema, trend weights  Outcome: Progressing  Goal: Absence of cardiac arrhythmias or at baseline  Description: INTERVENTIONS:  - Continuous cardiac monitoring, monitor vital signs, obtain 12 lead EKG if indicated  - Evaluate effectiveness of antiarrhythmic and heart rate control medications as ordered  - Initiate emergency measures for life threatening arrhythmias  - Monitor electrolytes and administer replacement therapy as ordered  Outcome: Progressing     Problem: RESPIRATORY - ADULT  Goal: Achieves optimal ventilation and oxygenation  Description: INTERVENTIONS:  - Assess for changes in respiratory status  - Assess for changes in mentation and behavior  - Position to facilitate oxygenation and minimize respiratory effort  - Oxygen supplementation based on oxygen saturation or ABGs  - Provide Smoking Cessation handout, if applicable  - Encourage broncho-pulmonary hygiene including cough, deep breathe, Incentive Spirometry  - Assess the need for suctioning and perform as needed  - Assess and instruct to report SOB or any respiratory difficulty  - Respiratory Therapy support as indicated  - Manage/alleviate anxiety  - Monitor for signs/symptoms of CO2 retention  Outcome: Progressing     Problem: SKIN/TISSUE INTEGRITY - ADULT  Goal: Skin integrity remains intact  Description: INTERVENTIONS  - Assess and document risk factors for pressure ulcer development  - Assess and document skin integrity  - Monitor for areas of redness and/or skin breakdown  - Initiate interventions, skin care algorithm/standards of care as needed  Outcome: Progressing  Goal: Incision(s), wounds(s) or drain site(s) healing without S/S of infection  Description: INTERVENTIONS:  - Assess and document risk factors for pressure ulcer development  - Assess and document skin integrity  - Assess and document dressing/incision, wound bed, drain sites and surrounding tissue  - Implement wound care per orders  - Initiate isolation precautions as appropriate  - Initiate Pressure Ulcer prevention bundle as indicated  Outcome: Progressing     Problem: HEMATOLOGIC - ADULT  Goal: Maintains hematologic stability  Description: INTERVENTIONS  - Assess for signs and symptoms of bleeding or hemorrhage  - Monitor labs and vital signs for trends  - Administer supportive blood products/factors, fluids and medications as ordered and appropriate  - Administer supportive blood products/factors as ordered and appropriate  Outcome: Progressing  Goal: Free from bleeding injury  Description: (Example usage: patient with low platelets)  INTERVENTIONS:  - Avoid intramuscular injections, enemas and rectal medication administration  - Ensure safe mobilization of patient  - Hold pressure on venipuncture sites to achieve adequate hemostasis  - Assess for signs and symptoms of internal bleeding  - Monitor lab trends  - Patient is to report abnormal signs of bleeding to staff  - Avoid use of toothpicks and dental floss  - Use electric shaver for shaving  - Use soft bristle tooth brush  - Limit straining and forceful nose blowing  Outcome: Progressing     Problem: MUSCULOSKELETAL - ADULT  Goal: Return mobility to safest level of function  Description: INTERVENTIONS:  - Assess patient stability and activity tolerance for standing, transferring and ambulating w/ or w/o assistive devices  - Assist with transfers and ambulation using safe patient handling equipment as needed  - Ensure adequate protection for wounds/incisions during mobilization  - Obtain PT/OT consults as needed  - Advance activity as appropriate  - Communicate ordered activity level and limitations with patient/family  Outcome: Progressing  Goal: Maintain proper alignment of affected body part  Description: INTERVENTIONS:  - Support and protect limb and body alignment per provider's orders  - Instruct and reinforce with patient and family use of appropriate assistive device and precautions (e.g. spinal or hip dislocation precautions)  Outcome: Progressing     Problem: Impaired Functional Mobility  Goal: Achieve highest/safest level of mobility/gait  Description: Interventions:  - Assess patient's functional ability and stability  - Promote increasing activity/tolerance for mobility and gait  - Educate and engage patient/family in tolerated activity level and precautions  - Recommend use of chair position in bed 3 times per day  Outcome: Progressing     Problem: Impaired Activities of Daily Living  Goal: Achieve highest/safest level of independence in self care  Description: Interventions:  - Assess ability and encourage patient to participate in ADLs to maximize function  - Promote sitting position while performing ADLs such as feeding, grooming, and bathing  - Educate and encourage patient/family in tolerated functional activity level and precautions during self-care  - Encourage patient to incorporate impaired side during daily activities to promote function  Outcome: Progressing     Problem: SAFETY ADULT - FALL  Goal: Free from fall injury  Description: INTERVENTIONS:  - Assess pt frequently for physical needs  - Identify cognitive and physical deficits and behaviors that affect risk of falls.   - [place] fall precautions as indicated by assessment.  - Educate pt/family on patient safety including physical limitations  - Instruct pt to call for assistance with activity based on assessment  - Modify environment to reduce risk of injury  - Provide assistive devices as appropriate  - Consider OT/PT consult to assist with strengthening/mobility  - Encourage toileting schedule  Outcome: Progressing     Problem: SKIN INTEGRITY  Goal: Skin integrity remains intact  Description: Interventions:  - Assess for areas of redness and/or skin breakdown  - Assess vascular sites hourly  - Change oxygen saturation probe minimum once per shift  - Provide humidity as ordered and per policy  - If on oxygen support, assess nasal septum area for skin breakdown and replace protective barrier if needed  - Change diapers as needed  - Minimize the use of adhesives  Outcome: Progressing  Goal: Incision/wound heals without complications  Description: Interventions:  - Assess wound bed/incision and surrounding skin tissue  - Collaborate with physician/APN and implement wound/incision site care and dressing changes as ordered  - Position infant to avoid placing pressure on wound  - Enterostomal/Wound therapy consult as indicated for ostomies/wounds/G-tubes  Outcome: Progressing     Patient is presently sitting up in chair. Alert x 4. Vital signs taken and stable. Patient ambulates to restroom with walker with standby assist from staff at all times. Fall risk precautions are followed at all times. Tolerates diet well. Patient was seen by Pulmonary doctor, Dr. Barb Simmons on today. Solumedrol 40mg iv daily was ordered. Patient receives ancef iv every 12 hours per order. Lotrisone cream was ordered by I&D doctor to be applied to left lower extremity. Call light within reach at all times.

## 2023-04-29 NOTE — PLAN OF CARE
Problem: Patient Centered Care  Goal: Patient preferences are identified and integrated in the patient's plan of care  Description: Interventions:  - What would you like us to know as we care for you? I live at home with family  - Provide timely, complete, and accurate information to patient/family  - Incorporate patient and family knowledge, values, beliefs, and cultural backgrounds into the planning and delivery of care  - Encourage patient/family to participate in care and decision-making at the level they choose  - Honor patient and family perspectives and choices  Outcome: Progressing     Problem: CARDIOVASCULAR - ADULT  Goal: Maintains optimal cardiac output and hemodynamic stability  Description: INTERVENTIONS:  - Monitor vital signs, rhythm, and trends  - Monitor for bleeding, hypotension and signs of decreased cardiac output  - Evaluate effectiveness of vasoactive medications to optimize hemodynamic stability  - Monitor arterial and/or venous puncture sites for bleeding and/or hematoma  - Assess quality of pulses, skin color and temperature  - Assess for signs of decreased coronary artery perfusion - ex.  Angina  - Evaluate fluid balance, assess for edema, trend weights  Outcome: Progressing  Goal: Absence of cardiac arrhythmias or at baseline  Description: INTERVENTIONS:  - Continuous cardiac monitoring, monitor vital signs, obtain 12 lead EKG if indicated  - Evaluate effectiveness of antiarrhythmic and heart rate control medications as ordered  - Initiate emergency measures for life threatening arrhythmias  - Monitor electrolytes and administer replacement therapy as ordered  Outcome: Progressing     Problem: RESPIRATORY - ADULT  Goal: Achieves optimal ventilation and oxygenation  Description: INTERVENTIONS:  - Assess for changes in respiratory status  - Assess for changes in mentation and behavior  - Position to facilitate oxygenation and minimize respiratory effort  - Oxygen supplementation based on oxygen saturation or ABGs  - Provide Smoking Cessation handout, if applicable  - Encourage broncho-pulmonary hygiene including cough, deep breathe, Incentive Spirometry  - Assess the need for suctioning and perform as needed  - Assess and instruct to report SOB or any respiratory difficulty  - Respiratory Therapy support as indicated  - Manage/alleviate anxiety  - Monitor for signs/symptoms of CO2 retention  Outcome: Progressing     Problem: SKIN/TISSUE INTEGRITY - ADULT  Goal: Skin integrity remains intact  Description: INTERVENTIONS  - Assess and document risk factors for pressure ulcer development  - Assess and document skin integrity  - Monitor for areas of redness and/or skin breakdown  - Initiate interventions, skin care algorithm/standards of care as needed  Outcome: Progressing  Goal: Incision(s), wounds(s) or drain site(s) healing without S/S of infection  Description: INTERVENTIONS:  - Assess and document risk factors for pressure ulcer development  - Assess and document skin integrity  - Assess and document dressing/incision, wound bed, drain sites and surrounding tissue  - Implement wound care per orders  - Initiate isolation precautions as appropriate  - Initiate Pressure Ulcer prevention bundle as indicated  Outcome: Progressing     Problem: HEMATOLOGIC - ADULT  Goal: Maintains hematologic stability  Description: INTERVENTIONS  - Assess for signs and symptoms of bleeding or hemorrhage  - Monitor labs and vital signs for trends  - Administer supportive blood products/factors, fluids and medications as ordered and appropriate  - Administer supportive blood products/factors as ordered and appropriate  Outcome: Progressing  Goal: Free from bleeding injury  Description: (Example usage: patient with low platelets)  INTERVENTIONS:  - Avoid intramuscular injections, enemas and rectal medication administration  - Ensure safe mobilization of patient  - Hold pressure on venipuncture sites to achieve adequate hemostasis  - Assess for signs and symptoms of internal bleeding  - Monitor lab trends  - Patient is to report abnormal signs of bleeding to staff  - Avoid use of toothpicks and dental floss  - Use electric shaver for shaving  - Use soft bristle tooth brush  - Limit straining and forceful nose blowing  Outcome: Progressing     Problem: MUSCULOSKELETAL - ADULT  Goal: Return mobility to safest level of function  Description: INTERVENTIONS:  - Assess patient stability and activity tolerance for standing, transferring and ambulating w/ or w/o assistive devices  - Assist with transfers and ambulation using safe patient handling equipment as needed  - Ensure adequate protection for wounds/incisions during mobilization  - Obtain PT/OT consults as needed  - Advance activity as appropriate  - Communicate ordered activity level and limitations with patient/family  Outcome: Progressing  Goal: Maintain proper alignment of affected body part  Description: INTERVENTIONS:  - Support and protect limb and body alignment per provider's orders  - Instruct and reinforce with patient and family use of appropriate assistive device and precautions (e.g. spinal or hip dislocation precautions)  Outcome: Progressing     Problem: Impaired Functional Mobility  Goal: Achieve highest/safest level of mobility/gait  Description: Interventions:  - Assess patient's functional ability and stability  - Promote increasing activity/tolerance for mobility and gait  - Educate and engage patient/family in tolerated activity level and precautions  Outcome: Progressing   Patient a/ox4, in no acute distress, denies pain, left leg pain, swelling improving per patient, vital signs stable, plan of care reviewed, precautions in place

## 2023-04-30 RX ORDER — CODEINE PHOSPHATE AND GUAIFENESIN 10; 100 MG/5ML; MG/5ML
5 SOLUTION ORAL EVERY 6 HOURS PRN
Status: DISCONTINUED | OUTPATIENT
Start: 2023-04-30 | End: 2023-05-22

## 2023-04-30 NOTE — PLAN OF CARE
Ambulates to bathroom with walker with steady gait. Difficulty getting in and out of bed due to height. Lower bed obtained. Continued dry cough. Meds given for cough. No SOB. RA. Continued redness to LLE. Lotrimen applied.      Problem: RESPIRATORY - ADULT  Goal: Achieves optimal ventilation and oxygenation  Description: INTERVENTIONS:  - Assess for changes in respiratory status  - Assess for changes in mentation and behavior  - Position to facilitate oxygenation and minimize respiratory effort  - Oxygen supplementation based on oxygen saturation or ABGs  - Provide Smoking Cessation handout, if applicable  - Encourage broncho-pulmonary hygiene including cough, deep breathe, Incentive Spirometry  - Assess the need for suctioning and perform as needed  - Assess and instruct to report SOB or any respiratory difficulty  - Respiratory Therapy support as indicated  - Manage/alleviate anxiety  - Monitor for signs/symptoms of CO2 retention  Outcome: Progressing     Problem: SKIN/TISSUE INTEGRITY - ADULT  Goal: Incision(s), wounds(s) or drain site(s) healing without S/S of infection  Description: INTERVENTIONS:  - Assess and document risk factors for pressure ulcer development  - Assess and document skin integrity  - Assess and document dressing/incision, wound bed, drain sites and surrounding tissue  - Implement wound care per orders  - Initiate isolation precautions as appropriate  - Initiate Pressure Ulcer prevention bundle as indicated  Outcome: Progressing     Problem: MUSCULOSKELETAL - ADULT  Goal: Return mobility to safest level of function  Description: INTERVENTIONS:  - Assess patient stability and activity tolerance for standing, transferring and ambulating w/ or w/o assistive devices  - Assist with transfers and ambulation using safe patient handling equipment as needed  - Ensure adequate protection for wounds/incisions during mobilization  - Obtain PT/OT consults as needed  - Advance activity as appropriate  - Communicate ordered activity level and limitations with patient/family  Outcome: Progressing  Goal: Maintain proper alignment of affected body part  Description: INTERVENTIONS:  - Support and protect limb and body alignment per provider's orders  - Instruct and reinforce with patient and family use of appropriate assistive device and precautions (e.g. spinal or hip dislocation precautions)  Outcome: Completed     Problem: Impaired Functional Mobility  Goal: Achieve highest/safest level of mobility/gait  Description: Interventions:  - Assess patient's functional ability and stability  - Promote increasing activity/tolerance for mobility and gait  - Educate and engage patient/family in tolerated activity level and precautions    Outcome: Progressing     Problem: Impaired Activities of Daily Living  Goal: Achieve highest/safest level of independence in self care  Description: Interventions:  - Assess ability and encourage patient to participate in ADLs to maximize function  - Promote sitting position while performing ADLs such as feeding, grooming, and bathing  - Educate and encourage patient/family in tolerated functional activity level and precautions during self-care    Outcome: Progressing

## 2023-04-30 NOTE — CM/SW NOTE
[facility] Reserved earlier in [id number]. / to remain available for support and/or discharge planning.      Jan COHENA BSN RN [address]  RN Case Manager  [phone]

## 2023-04-30 NOTE — PLAN OF CARE
Problem: Patient Centered Care  Goal: Patient preferences are identified and integrated in the patient's plan of care  Description: Interventions:  - What would you like us to know as we care for you? From home with family  - Provide timely, complete, and accurate information to patient/family  - Incorporate patient and family knowledge, values, beliefs, and cultural backgrounds into the planning and delivery of care  - Encourage patient/family to participate in care and decision-making at the level they choose  - Honor patient and family perspectives and choices  Outcome: Progressing     Problem: CARDIOVASCULAR - ADULT  Goal: Maintains optimal cardiac output and hemodynamic stability  Description: INTERVENTIONS:  - Monitor vital signs, rhythm, and trends  - Monitor for bleeding, hypotension and signs of decreased cardiac output  - Evaluate effectiveness of vasoactive medications to optimize hemodynamic stability  - Monitor arterial and/or venous puncture sites for bleeding and/or hematoma  - Assess quality of pulses, skin color and temperature  - Assess for signs of decreased coronary artery perfusion - ex.  Angina  - Evaluate fluid balance, assess for edema, trend weights  Outcome: Progressing  Goal: Absence of cardiac arrhythmias or at baseline  Description: INTERVENTIONS:  - Continuous cardiac monitoring, monitor vital signs, obtain 12 lead EKG if indicated  - Evaluate effectiveness of antiarrhythmic and heart rate control medications as ordered  - Initiate emergency measures for life threatening arrhythmias  - Monitor electrolytes and administer replacement therapy as ordered  Outcome: Progressing     Problem: RESPIRATORY - ADULT  Goal: Achieves optimal ventilation and oxygenation  Description: INTERVENTIONS:  - Assess for changes in respiratory status  - Assess for changes in mentation and behavior  - Position to facilitate oxygenation and minimize respiratory effort  - Oxygen supplementation based on oxygen saturation or ABGs  - Provide Smoking Cessation handout, if applicable  - Encourage broncho-pulmonary hygiene including cough, deep breathe, Incentive Spirometry  - Assess the need for suctioning and perform as needed  - Assess and instruct to report SOB or any respiratory difficulty  - Respiratory Therapy support as indicated  - Manage/alleviate anxiety  - Monitor for signs/symptoms of CO2 retention  Outcome: Progressing     Problem: SKIN/TISSUE INTEGRITY - ADULT  Goal: Skin integrity remains intact  Description: INTERVENTIONS  - Assess and document risk factors for pressure ulcer development  - Assess and document skin integrity  - Monitor for areas of redness and/or skin breakdown  - Initiate interventions, skin care algorithm/standards of care as needed  Outcome: Progressing  Goal: Incision(s), wounds(s) or drain site(s) healing without S/S of infection  Description: INTERVENTIONS:  - Assess and document risk factors for pressure ulcer development  - Assess and document skin integrity  - Assess and document dressing/incision, wound bed, drain sites and surrounding tissue  - Implement wound care per orders  - Initiate isolation precautions as appropriate  - Initiate Pressure Ulcer prevention bundle as indicated  Outcome: Progressing   Patient a/ox4, in no acute distress, denies acute pain, left leg improving per patient, denies shortness of breath, has chronic dry cough, on room air.  Vital signs stable, plan of care reviewed, precautions in place

## 2023-05-01 LAB
ALBUMIN SERPL-MCNC: 2.6 G/DL (ref 3.4–5)
ALBUMIN/GLOB SERPL: 0.7 {RATIO} (ref 1–2)
ALP LIVER SERPL-CCNC: 88 U/L
ALT SERPL-CCNC: 6 U/L
ANION GAP SERPL CALC-SCNC: 3 MMOL/L (ref 0–18)
AST SERPL-CCNC: 16 U/L (ref 15–37)
BASOPHILS # BLD AUTO: 0.02 X10(3) UL (ref 0–0.2)
BASOPHILS NFR BLD AUTO: 0.3 %
BILIRUB SERPL-MCNC: 0.2 MG/DL (ref 0.1–2)
BUN BLD-MCNC: 56 MG/DL (ref 7–18)
BUN/CREAT SERPL: 38.6 (ref 10–20)
CALCIUM BLD-MCNC: 8.2 MG/DL (ref 8.5–10.1)
CHLORIDE SERPL-SCNC: 109 MMOL/L (ref 98–112)
CO2 SERPL-SCNC: 31 MMOL/L (ref 21–32)
CREAT BLD-MCNC: 1.45 MG/DL
DEPRECATED RDW RBC AUTO: 55.9 FL (ref 35.1–46.3)
EOSINOPHIL # BLD AUTO: 0.01 X10(3) UL (ref 0–0.7)
EOSINOPHIL NFR BLD AUTO: 0.2 %
ERYTHROCYTE [DISTWIDTH] IN BLOOD BY AUTOMATED COUNT: 15.7 % (ref 11–15)
GFR SERPLBLD BASED ON 1.73 SQ M-ARVRAT: 38 ML/MIN/1.73M2 (ref 60–?)
GLOBULIN PLAS-MCNC: 4 G/DL (ref 2.8–4.4)
GLUCOSE BLD-MCNC: 106 MG/DL (ref 70–99)
HCT VFR BLD AUTO: 39.2 %
HGB BLD-MCNC: 11.8 G/DL
IMM GRANULOCYTES # BLD AUTO: 0.02 X10(3) UL (ref 0–1)
IMM GRANULOCYTES NFR BLD: 0.3 %
INR BLD: 3.1 (ref 0.85–1.16)
LYMPHOCYTES # BLD AUTO: 0.71 X10(3) UL (ref 1–4)
LYMPHOCYTES NFR BLD AUTO: 11.4 %
MAGNESIUM SERPL-MCNC: 2.3 MG/DL (ref 1.6–2.6)
MCH RBC QN AUTO: 29.5 PG (ref 26–34)
MCHC RBC AUTO-ENTMCNC: 30.1 G/DL (ref 31–37)
MCV RBC AUTO: 98 FL
MONOCYTES # BLD AUTO: 0.63 X10(3) UL (ref 0.1–1)
MONOCYTES NFR BLD AUTO: 10.1 %
NEUTROPHILS # BLD AUTO: 4.86 X10 (3) UL (ref 1.5–7.7)
NEUTROPHILS # BLD AUTO: 4.86 X10(3) UL (ref 1.5–7.7)
NEUTROPHILS NFR BLD AUTO: 77.7 %
OSMOLALITY SERPL CALC.SUM OF ELEC: 312 MOSM/KG (ref 275–295)
PHOSPHATE SERPL-MCNC: 3.5 MG/DL (ref 2.5–4.9)
PLATELET # BLD AUTO: 129 10(3)UL (ref 150–450)
POTASSIUM SERPL-SCNC: 4.3 MMOL/L (ref 3.5–5.1)
PROT SERPL-MCNC: 6.6 G/DL (ref 6.4–8.2)
PROTHROMBIN TIME: 31.3 SECONDS (ref 11.6–14.8)
RBC # BLD AUTO: 4 X10(6)UL
SODIUM SERPL-SCNC: 143 MMOL/L (ref 136–145)
WBC # BLD AUTO: 6.3 X10(3) UL (ref 4–11)

## 2023-05-01 PROCEDURE — 99222 1ST HOSP IP/OBS MODERATE 55: CPT | Performed by: DERMATOLOGY

## 2023-05-01 RX ORDER — WARFARIN SODIUM 5 MG/1
5 TABLET ORAL NIGHTLY
Status: DISCONTINUED | OUTPATIENT
Start: 2023-05-01 | End: 2023-05-10

## 2023-05-01 NOTE — PLAN OF CARE
Problem: Patient Centered Care  Goal: Patient preferences are identified and integrated in the patient's plan of care  Description: Interventions:  - What would you like us to know as we care for you? I live at home with family  - Provide timely, complete, and accurate information to patient/family  - Incorporate patient and family knowledge, values, beliefs, and cultural backgrounds into the planning and delivery of care  - Encourage patient/family to participate in care and decision-making at the level they choose  - Honor patient and family perspectives and choices  Outcome: Progressing     Problem: CARDIOVASCULAR - ADULT  Goal: Maintains optimal cardiac output and hemodynamic stability  Description: INTERVENTIONS:  - Monitor vital signs, rhythm, and trends  - Monitor for bleeding, hypotension and signs of decreased cardiac output  - Evaluate effectiveness of vasoactive medications to optimize hemodynamic stability  - Monitor arterial and/or venous puncture sites for bleeding and/or hematoma  - Assess quality of pulses, skin color and temperature  - Assess for signs of decreased coronary artery perfusion - ex.  Angina  - Evaluate fluid balance, assess for edema, trend weights  Outcome: Progressing  Goal: Absence of cardiac arrhythmias or at baseline  Description: INTERVENTIONS:  - Continuous cardiac monitoring, monitor vital signs, obtain 12 lead EKG if indicated  - Evaluate effectiveness of antiarrhythmic and heart rate control medications as ordered  - Initiate emergency measures for life threatening arrhythmias  - Monitor electrolytes and administer replacement therapy as ordered  Outcome: Progressing     Problem: RESPIRATORY - ADULT  Goal: Achieves optimal ventilation and oxygenation  Description: INTERVENTIONS:  - Assess for changes in respiratory status  - Assess for changes in mentation and behavior  - Position to facilitate oxygenation and minimize respiratory effort  - Oxygen supplementation based on oxygen saturation or ABGs  - Provide Smoking Cessation handout, if applicable  - Encourage broncho-pulmonary hygiene including cough, deep breathe, Incentive Spirometry  - Assess the need for suctioning and perform as needed  - Assess and instruct to report SOB or any respiratory difficulty  - Respiratory Therapy support as indicated  - Manage/alleviate anxiety  - Monitor for signs/symptoms of CO2 retention  Outcome: Progressing     Problem: SKIN/TISSUE INTEGRITY - ADULT  Goal: Skin integrity remains intact  Description: INTERVENTIONS  - Assess and document risk factors for pressure ulcer development  - Assess and document skin integrity  - Monitor for areas of redness and/or skin breakdown  - Initiate interventions, skin care algorithm/standards of care as needed  Outcome: Progressing  Goal: Incision(s), wounds(s) or drain site(s) healing without S/S of infection  Description: INTERVENTIONS:  - Assess and document risk factors for pressure ulcer development  - Assess and document skin integrity  - Assess and document dressing/incision, wound bed, drain sites and surrounding tissue  - Implement wound care per orders  - Initiate isolation precautions as appropriate  - Initiate Pressure Ulcer prevention bundle as indicated  Outcome: Progressing     Problem: HEMATOLOGIC - ADULT  Goal: Maintains hematologic stability  Description: INTERVENTIONS  - Assess for signs and symptoms of bleeding or hemorrhage  - Monitor labs and vital signs for trends  - Administer supportive blood products/factors, fluids and medications as ordered and appropriate  - Administer supportive blood products/factors as ordered and appropriate  Outcome: Progressing  Goal: Free from bleeding injury  Description: (Example usage: patient with low platelets)  INTERVENTIONS:  - Avoid intramuscular injections, enemas and rectal medication administration  - Ensure safe mobilization of patient  - Hold pressure on venipuncture sites to achieve adequate hemostasis  - Assess for signs and symptoms of internal bleeding  - Monitor lab trends  - Patient is to report abnormal signs of bleeding to staff  - Avoid use of toothpicks and dental floss  - Use electric shaver for shaving  - Use soft bristle tooth brush  - Limit straining and forceful nose blowing  Outcome: Progressing     Problem: MUSCULOSKELETAL - ADULT  Goal: Return mobility to safest level of function  Description: INTERVENTIONS:  - Assess patient stability and activity tolerance for standing, transferring and ambulating w/ or w/o assistive devices  - Assist with transfers and ambulation using safe patient handling equipment as needed  - Ensure adequate protection for wounds/incisions during mobilization  - Obtain PT/OT consults as needed  - Advance activity as appropriate  - Communicate ordered activity level and limitations with patient/family  Outcome: Progressing     Problem: Impaired Functional Mobility  Goal: Achieve highest/safest level of mobility/gait  Description: Interventions:  - Assess patient's functional ability and stability  - Promote increasing activity/tolerance for mobility and gait  - Educate and engage patient/family in tolerated activity level and precautions  Outcome: Progressing     Problem: SAFETY ADULT - FALL  Goal: Free from fall injury  Description: INTERVENTIONS:  - Assess pt frequently for physical needs  - Identify cognitive and physical deficits and behaviors that affect risk of falls.   - [place] fall precautions as indicated by assessment.  - Educate pt/family on patient safety including physical limitations  - Instruct pt to call for assistance with activity based on assessment  - Modify environment to reduce risk of injury  - Provide assistive devices as appropriate  - Consider OT/PT consult to assist with strengthening/mobility  - Encourage toileting schedule  Outcome: Progressing   Patient in no acute distress, cough improving, left lower extremity redness is improving, denies pain, vital signs stable, plan of care discussed, precautions in place

## 2023-05-01 NOTE — PLAN OF CARE
Problem: Patient Centered Care  Goal: Patient preferences are identified and integrated in the patient's plan of care  Description: Interventions:  - What would you like us to know as we care for you?   - Provide timely, complete, and accurate information to patient/family  - Incorporate patient and family knowledge, values, beliefs, and cultural backgrounds into the planning and delivery of care  - Encourage patient/family to participate in care and decision-making at the level they choose  - Honor patient and family perspectives and choices  Outcome: Progressing     Problem: Patient/Family Goals  Goal: Patient/Family Long Term Goal  Description: Patient's Long Term Goal: to return home    Interventions:  - PT/OT  -derm consult  -pulm consult  - See additional Care Plan goals for specific interventions  Outcome: Progressing  Goal: Patient/Family Short Term Goal  Description: Patient's Short Term Goal: to feel better    Interventions:   - follow MD recommendations  - See additional Care Plan goals for specific interventions  Outcome: Progressing     Problem: CARDIOVASCULAR - ADULT  Goal: Maintains optimal cardiac output and hemodynamic stability  Description: INTERVENTIONS:  - Monitor vital signs, rhythm, and trends  - Monitor for bleeding, hypotension and signs of decreased cardiac output  - Evaluate effectiveness of vasoactive medications to optimize hemodynamic stability  - Monitor arterial and/or venous puncture sites for bleeding and/or hematoma  - Assess quality of pulses, skin color and temperature  - Assess for signs of decreased coronary artery perfusion - ex.  Angina  - Evaluate fluid balance, assess for edema, trend weights  Outcome: Progressing  Goal: Absence of cardiac arrhythmias or at baseline  Description: INTERVENTIONS:  - Continuous cardiac monitoring, monitor vital signs, obtain 12 lead EKG if indicated  - Evaluate effectiveness of antiarrhythmic and heart rate control medications as ordered  - Initiate emergency measures for life threatening arrhythmias  - Monitor electrolytes and administer replacement therapy as ordered  Outcome: Progressing     Problem: RESPIRATORY - ADULT  Goal: Achieves optimal ventilation and oxygenation  Description: INTERVENTIONS:  - Assess for changes in respiratory status  - Assess for changes in mentation and behavior  - Position to facilitate oxygenation and minimize respiratory effort  - Oxygen supplementation based on oxygen saturation or ABGs  - Provide Smoking Cessation handout, if applicable  - Encourage broncho-pulmonary hygiene including cough, deep breathe, Incentive Spirometry  - Assess the need for suctioning and perform as needed  - Assess and instruct to report SOB or any respiratory difficulty  - Respiratory Therapy support as indicated  - Manage/alleviate anxiety  - Monitor for signs/symptoms of CO2 retention  Outcome: Progressing     Problem: SKIN/TISSUE INTEGRITY - ADULT  Goal: Skin integrity remains intact  Description: INTERVENTIONS  - Assess and document risk factors for pressure ulcer development  - Assess and document skin integrity  - Monitor for areas of redness and/or skin breakdown  - Initiate interventions, skin care algorithm/standards of care as needed  Outcome: Progressing  Goal: Incision(s), wounds(s) or drain site(s) healing without S/S of infection  Description: INTERVENTIONS:  - Assess and document risk factors for pressure ulcer development  - Assess and document skin integrity  - Assess and document dressing/incision, wound bed, drain sites and surrounding tissue  - Implement wound care per orders  - Initiate isolation precautions as appropriate  - Initiate Pressure Ulcer prevention bundle as indicated  Outcome: Progressing     Problem: HEMATOLOGIC - ADULT  Goal: Maintains hematologic stability  Description: INTERVENTIONS  - Assess for signs and symptoms of bleeding or hemorrhage  - Monitor labs and vital signs for trends  - Administer supportive blood products/factors, fluids and medications as ordered and appropriate  - Administer supportive blood products/factors as ordered and appropriate  Outcome: Progressing  Goal: Free from bleeding injury  Description: (Example usage: patient with low platelets)  INTERVENTIONS:  - Avoid intramuscular injections, enemas and rectal medication administration  - Ensure safe mobilization of patient  - Hold pressure on venipuncture sites to achieve adequate hemostasis  - Assess for signs and symptoms of internal bleeding  - Monitor lab trends  - Patient is to report abnormal signs of bleeding to staff  - Avoid use of toothpicks and dental floss  - Use electric shaver for shaving  - Use soft bristle tooth brush  - Limit straining and forceful nose blowing  Outcome: Progressing     Problem: MUSCULOSKELETAL - ADULT  Goal: Return mobility to safest level of function  Description: INTERVENTIONS:  - Assess patient stability and activity tolerance for standing, transferring and ambulating w/ or w/o assistive devices  - Assist with transfers and ambulation using safe patient handling equipment as needed  - Ensure adequate protection for wounds/incisions during mobilization  - Obtain PT/OT consults as needed  - Advance activity as appropriate  - Communicate ordered activity level and limitations with patient/family  Outcome: Progressing     Problem: SAFETY ADULT - FALL  Goal: Free from fall injury  Description: INTERVENTIONS:  - Assess pt frequently for physical needs  - Identify cognitive and physical deficits and behaviors that affect risk of falls.   - [place] fall precautions as indicated by assessment.  - Educate pt/family on patient safety including physical limitations  - Instruct pt to call for assistance with activity based on assessment  - Modify environment to reduce risk of injury  - Provide assistive devices as appropriate  - Consider OT/PT consult to assist with strengthening/mobility  - Encourage toileting schedule  Outcome: Progressing     Problem: SKIN INTEGRITY  Goal: Skin integrity remains intact  Description: Interventions:  - Assess for areas of redness and/or skin breakdown  - Assess vascular sites hourly  - Change oxygen saturation probe minimum once per shift  - Provide humidity as ordered and per policy  - If on oxygen support, assess nasal septum area for skin breakdown and replace protective barrier if needed  - Change diapers as needed  - Minimize the use of adhesives  Outcome: Progressing  Goal: Incision/wound heals without complications  Description: Interventions:  - Assess wound bed/incision and surrounding skin tissue  - Collaborate with physician/APN and implement wound/incision site care and dressing changes as ordered  - Position infant to avoid placing pressure on wound  - Enterostomal/Wound therapy consult as indicated for ostomies/wounds/G-tubes  Outcome: Progressing

## 2023-05-02 ENCOUNTER — ANTI-COAG (OUTPATIENT)
Dept: CARDIOLOGY | Age: 73
End: 2023-05-02

## 2023-05-02 DIAGNOSIS — I42.0 DILATED CARDIOMYOPATHY (CMD): ICD-10-CM

## 2023-05-02 DIAGNOSIS — I48.0 PAROXYSMAL ATRIAL FIBRILLATION (CMD): Primary | ICD-10-CM

## 2023-05-02 DIAGNOSIS — Z79.01 LONG TERM (CURRENT) USE OF ANTICOAGULANTS: ICD-10-CM

## 2023-05-02 LAB
ALBUMIN SERPL-MCNC: 2.9 G/DL (ref 3.4–5)
ALBUMIN/GLOB SERPL: 0.7 {RATIO} (ref 1–2)
ALP LIVER SERPL-CCNC: 100 U/L
ALT SERPL-CCNC: <6 U/L
ANION GAP SERPL CALC-SCNC: 7 MMOL/L (ref 0–18)
AST SERPL-CCNC: 15 U/L (ref 15–37)
BILIRUB SERPL-MCNC: 0.2 MG/DL (ref 0.1–2)
BUN BLD-MCNC: 68 MG/DL (ref 7–18)
BUN/CREAT SERPL: 45.3 (ref 10–20)
CALCIUM BLD-MCNC: 8.5 MG/DL (ref 8.5–10.1)
CHLORIDE SERPL-SCNC: 107 MMOL/L (ref 98–112)
CO2 SERPL-SCNC: 27 MMOL/L (ref 21–32)
CREAT BLD-MCNC: 1.5 MG/DL
DEPRECATED RDW RBC AUTO: 55 FL (ref 35.1–46.3)
ERYTHROCYTE [DISTWIDTH] IN BLOOD BY AUTOMATED COUNT: 15.8 % (ref 11–15)
GFR SERPLBLD BASED ON 1.73 SQ M-ARVRAT: 37 ML/MIN/1.73M2 (ref 60–?)
GLOBULIN PLAS-MCNC: 4.3 G/DL (ref 2.8–4.4)
GLUCOSE BLD-MCNC: 177 MG/DL (ref 70–99)
HCT VFR BLD AUTO: 42.6 %
HGB BLD-MCNC: 13.3 G/DL
MAGNESIUM SERPL-MCNC: 2.1 MG/DL (ref 1.6–2.6)
MCH RBC QN AUTO: 29.6 PG (ref 26–34)
MCHC RBC AUTO-ENTMCNC: 31.2 G/DL (ref 31–37)
MCV RBC AUTO: 94.7 FL
OSMOLALITY SERPL CALC.SUM OF ELEC: 316 MOSM/KG (ref 275–295)
PHOSPHATE SERPL-MCNC: 2.8 MG/DL (ref 2.5–4.9)
PLATELET # BLD AUTO: 150 10(3)UL (ref 150–450)
POTASSIUM SERPL-SCNC: 4.9 MMOL/L (ref 3.5–5.1)
PROT SERPL-MCNC: 7.2 G/DL (ref 6.4–8.2)
RBC # BLD AUTO: 4.5 X10(6)UL
SODIUM SERPL-SCNC: 141 MMOL/L (ref 136–145)
WBC # BLD AUTO: 5.8 X10(3) UL (ref 4–11)

## 2023-05-02 RX ORDER — FLUCONAZOLE 2 MG/ML
200 INJECTION, SOLUTION INTRAVENOUS EVERY 24 HOURS
Status: DISCONTINUED | OUTPATIENT
Start: 2023-05-02 | End: 2023-05-04

## 2023-05-02 NOTE — PLAN OF CARE
Problem: Patient Centered Care  Goal: Patient preferences are identified and integrated in the patient's plan of care  Description: Interventions:  - What would you like us to know as we care for you?   - Provide timely, complete, and accurate information to patient/family  - Incorporate patient and family knowledge, values, beliefs, and cultural backgrounds into the planning and delivery of care  - Encourage patient/family to participate in care and decision-making at the level they choose  - Honor patient and family perspectives and choices  Outcome: Progressing     Problem: Patient/Family Goals  Goal: Patient/Family Long Term Goal  Description: Patient's Long Term Goal: to return home    Interventions:  - IV antibiotics  -wound care  - See additional Care Plan goals for specific interventions  Outcome: Progressing  Goal: Patient/Family Short Term Goal  Description: Patient's Short Term Goal: to feel better    Interventions:   - follow MD recommendations  - See additional Care Plan goals for specific interventions  Outcome: Progressing     Problem: CARDIOVASCULAR - ADULT  Goal: Maintains optimal cardiac output and hemodynamic stability  Description: INTERVENTIONS:  - Monitor vital signs, rhythm, and trends  - Monitor for bleeding, hypotension and signs of decreased cardiac output  - Evaluate effectiveness of vasoactive medications to optimize hemodynamic stability  - Monitor arterial and/or venous puncture sites for bleeding and/or hematoma  - Assess quality of pulses, skin color and temperature  - Assess for signs of decreased coronary artery perfusion - ex.  Angina  - Evaluate fluid balance, assess for edema, trend weights  Outcome: Progressing  Goal: Absence of cardiac arrhythmias or at baseline  Description: INTERVENTIONS:  - Continuous cardiac monitoring, monitor vital signs, obtain 12 lead EKG if indicated  - Evaluate effectiveness of antiarrhythmic and heart rate control medications as ordered  - Initiate emergency measures for life threatening arrhythmias  - Monitor electrolytes and administer replacement therapy as ordered  Outcome: Progressing     Problem: RESPIRATORY - ADULT  Goal: Achieves optimal ventilation and oxygenation  Description: INTERVENTIONS:  - Assess for changes in respiratory status  - Assess for changes in mentation and behavior  - Position to facilitate oxygenation and minimize respiratory effort  - Oxygen supplementation based on oxygen saturation or ABGs  - Provide Smoking Cessation handout, if applicable  - Encourage broncho-pulmonary hygiene including cough, deep breathe, Incentive Spirometry  - Assess the need for suctioning and perform as needed  - Assess and instruct to report SOB or any respiratory difficulty  - Respiratory Therapy support as indicated  - Manage/alleviate anxiety  - Monitor for signs/symptoms of CO2 retention  Outcome: Progressing     Problem: SKIN/TISSUE INTEGRITY - ADULT  Goal: Skin integrity remains intact  Description: INTERVENTIONS  - Assess and document risk factors for pressure ulcer development  - Assess and document skin integrity  - Monitor for areas of redness and/or skin breakdown  - Initiate interventions, skin care algorithm/standards of care as needed  Outcome: Progressing  Goal: Incision(s), wounds(s) or drain site(s) healing without S/S of infection  Description: INTERVENTIONS:  - Assess and document risk factors for pressure ulcer development  - Assess and document skin integrity  - Assess and document dressing/incision, wound bed, drain sites and surrounding tissue  - Implement wound care per orders  - Initiate isolation precautions as appropriate  - Initiate Pressure Ulcer prevention bundle as indicated  Outcome: Progressing     Problem: HEMATOLOGIC - ADULT  Goal: Maintains hematologic stability  Description: INTERVENTIONS  - Assess for signs and symptoms of bleeding or hemorrhage  - Monitor labs and vital signs for trends  - Administer supportive blood products/factors, fluids and medications as ordered and appropriate  - Administer supportive blood products/factors as ordered and appropriate  Outcome: Progressing  Goal: Free from bleeding injury  Description: (Example usage: patient with low platelets)  INTERVENTIONS:  - Avoid intramuscular injections, enemas and rectal medication administration  - Ensure safe mobilization of patient  - Hold pressure on venipuncture sites to achieve adequate hemostasis  - Assess for signs and symptoms of internal bleeding  - Monitor lab trends  - Patient is to report abnormal signs of bleeding to staff  - Avoid use of toothpicks and dental floss  - Use electric shaver for shaving  - Use soft bristle tooth brush  - Limit straining and forceful nose blowing  Outcome: Progressing     Problem: MUSCULOSKELETAL - ADULT  Goal: Return mobility to safest level of function  Description: INTERVENTIONS:  - Assess patient stability and activity tolerance for standing, transferring and ambulating w/ or w/o assistive devices  - Assist with transfers and ambulation using safe patient handling equipment as needed  - Ensure adequate protection for wounds/incisions during mobilization  - Obtain PT/OT consults as needed  - Advance activity as appropriate  - Communicate ordered activity level and limitations with patient/family  Outcome: Progressing     Problem: Impaired Functional Mobility  Goal: Achieve highest/safest level of mobility/gait  Description: Interventions:  - Assess patient's functional ability and stability  - Promote increasing activity/tolerance for mobility and gait  - Educate and engage patient/family in tolerated activity level and precautions    Outcome: Progressing     Problem: Impaired Activities of Daily Living  Goal: Achieve highest/safest level of independence in self care  Description: Interventions:  - Assess ability and encourage patient to participate in ADLs to maximize function  - Promote sitting position while performing ADLs such as feeding, grooming, and bathing  - Educate and encourage patient/family in tolerated functional activity level and precautions during self-care    Outcome: Progressing     Problem: SAFETY ADULT - FALL  Goal: Free from fall injury  Description: INTERVENTIONS:  - Assess pt frequently for physical needs  - Identify cognitive and physical deficits and behaviors that affect risk of falls.   - [place] fall precautions as indicated by assessment.  - Educate pt/family on patient safety including physical limitations  - Instruct pt to call for assistance with activity based on assessment  - Modify environment to reduce risk of injury  - Provide assistive devices as appropriate  - Consider OT/PT consult to assist with strengthening/mobility  - Encourage toileting schedule  Outcome: Progressing     Problem: SKIN INTEGRITY  Goal: Skin integrity remains intact  Description: Interventions:  - Assess for areas of redness and/or skin breakdown  - Assess vascular sites hourly  - Change oxygen saturation probe minimum once per shift  - Provide humidity as ordered and per policy  - If on oxygen support, assess nasal septum area for skin breakdown and replace protective barrier if needed  - Change diapers as needed  - Minimize the use of adhesives  Outcome: Progressing  Goal: Incision/wound heals without complications  Description: Interventions:  - Assess wound bed/incision and surrounding skin tissue  - Collaborate with physician/APN and implement wound/incision site care and dressing changes as ordered  - Position infant to avoid placing pressure on wound  - Enterostomal/Wound therapy consult as indicated for ostomies/wounds/G-tubes  Outcome: Progressing

## 2023-05-02 NOTE — DISCHARGE INSTRUCTIONS
Sometimes managing your health at home requires assistance. The [facility] team has recognized your preference to use [facility], Phone: [phone]. A representative from the home health agency will contact you or your family to schedule your first visit. Call MD or come to emergency room for any chest pain shortness of breath or any new symptoms.   Please sleep with the head of the bed elevated and on side  Please do not take any sedating medications  Follow-up with your Coumadin clinic for blood test for INR and Coumadin dose

## 2023-05-02 NOTE — PLAN OF CARE
Patient with persistent nonproductive cough was given Robitussin and Tessalon with some improvement in cough, SOB with exertion, given Neb tx by respiratory. Frequent rounding by nursing staff, safety maintained. Problem: CARDIOVASCULAR - ADULT  Goal: Maintains optimal cardiac output and hemodynamic stability  Description: INTERVENTIONS:  - Monitor vital signs, rhythm, and trends  - Monitor for bleeding, hypotension and signs of decreased cardiac output  - Evaluate effectiveness of vasoactive medications to optimize hemodynamic stability  - Monitor arterial and/or venous puncture sites for bleeding and/or hematoma  - Assess quality of pulses, skin color and temperature  - Assess for signs of decreased coronary artery perfusion - ex.  Angina  - Evaluate fluid balance, assess for edema, trend weights  Outcome: Progressing  Goal: Absence of cardiac arrhythmias or at baseline  Description: INTERVENTIONS:  - Continuous cardiac monitoring, monitor vital signs, obtain 12 lead EKG if indicated  - Evaluate effectiveness of antiarrhythmic and heart rate control medications as ordered  - Initiate emergency measures for life threatening arrhythmias  - Monitor electrolytes and administer replacement therapy as ordered  Outcome: Progressing     Problem: RESPIRATORY - ADULT  Goal: Achieves optimal ventilation and oxygenation  Description: INTERVENTIONS:  - Assess for changes in respiratory status  - Assess for changes in mentation and behavior  - Position to facilitate oxygenation and minimize respiratory effort  - Oxygen supplementation based on oxygen saturation or ABGs  - Provide Smoking Cessation handout, if applicable  - Encourage broncho-pulmonary hygiene including cough, deep breathe, Incentive Spirometry  - Assess the need for suctioning and perform as needed  - Assess and instruct to report SOB or any respiratory difficulty  - Respiratory Therapy support as indicated  - Manage/alleviate anxiety  - Monitor for signs/symptoms of CO2 retention  Outcome: Progressing     Problem: SKIN/TISSUE INTEGRITY - ADULT  Goal: Skin integrity remains intact  Description: INTERVENTIONS  - Assess and document risk factors for pressure ulcer development  - Assess and document skin integrity  - Monitor for areas of redness and/or skin breakdown  - Initiate interventions, skin care algorithm/standards of care as needed  Outcome: Progressing  Goal: Incision(s), wounds(s) or drain site(s) healing without S/S of infection  Description: INTERVENTIONS:  - Assess and document risk factors for pressure ulcer development  - Assess and document skin integrity  - Assess and document dressing/incision, wound bed, drain sites and surrounding tissue  - Implement wound care per orders  - Initiate isolation precautions as appropriate  - Initiate Pressure Ulcer prevention bundle as indicated  Outcome: Progressing     Problem: HEMATOLOGIC - ADULT  Goal: Maintains hematologic stability  Description: INTERVENTIONS  - Assess for signs and symptoms of bleeding or hemorrhage  - Monitor labs and vital signs for trends  - Administer supportive blood products/factors, fluids and medications as ordered and appropriate  - Administer supportive blood products/factors as ordered and appropriate  Outcome: Progressing  Goal: Free from bleeding injury  Description: (Example usage: patient with low platelets)  INTERVENTIONS:  - Avoid intramuscular injections, enemas and rectal medication administration  - Ensure safe mobilization of patient  - Hold pressure on venipuncture sites to achieve adequate hemostasis  - Assess for signs and symptoms of internal bleeding  - Monitor lab trends  - Patient is to report abnormal signs of bleeding to staff  - Avoid use of toothpicks and dental floss  - Use electric shaver for shaving  - Use soft bristle tooth brush  - Limit straining and forceful nose blowing  Outcome: Progressing     Problem: MUSCULOSKELETAL - ADULT  Goal: Return mobility to safest level of function  Description: INTERVENTIONS:  - Assess patient stability and activity tolerance for standing, transferring and ambulating w/ or w/o assistive devices  - Assist with transfers and ambulation using safe patient handling equipment as needed  - Ensure adequate protection for wounds/incisions during mobilization  - Obtain PT/OT consults as needed  - Advance activity as appropriate  - Communicate ordered activity level and limitations with patient/family  Outcome: Progressing     Problem: Impaired Functional Mobility  Goal: Achieve highest/safest level of mobility/gait  Description: Interventions:  - Assess patient's functional ability and stability  - Promote increasing activity/tolerance for mobility and gait  - Educate and engage patient/family in tolerated activity level and precautions    Outcome: Progressing     Problem: Impaired Activities of Daily Living  Goal: Achieve highest/safest level of independence in self care  Description: Interventions:  - Assess ability and encourage patient to participate in ADLs to maximize function  - Promote sitting position while performing ADLs such as feeding, grooming, and bathing  - Educate and encourage patient/family in tolerated functional activity level and precautions during self-care  Outcome: Progressing     Problem: SAFETY ADULT - FALL  Goal: Free from fall injury  Description: INTERVENTIONS:  - Assess pt frequently for physical needs  - Identify cognitive and physical deficits and behaviors that affect risk of falls.   - [place] fall precautions as indicated by assessment.  - Educate pt/family on patient safety including physical limitations  - Instruct pt to call for assistance with activity based on assessment  - Modify environment to reduce risk of injury  - Provide assistive devices as appropriate  - Consider OT/PT consult to assist with strengthening/mobility  - Encourage toileting schedule  Outcome: Progressing     Problem: SKIN INTEGRITY  Goal: Skin integrity remains intact  Description: Interventions:  - Assess for areas of redness and/or skin breakdown  - Assess vascular sites hourly  - Change oxygen saturation probe minimum once per shift  - Provide humidity as ordered and per policy  - If on oxygen support, assess nasal septum area for skin breakdown and replace protective barrier if needed  - Change diapers as needed  - Minimize the use of adhesives  Outcome: Progressing  Goal: Incision/wound heals without complications  Description: Interventions:  - Assess wound bed/incision and surrounding skin tissue  - Collaborate with physician/APN and implement wound/incision site care and dressing changes as ordered  - Position infant to avoid placing pressure on wound  - Enterostomal/Wound therapy consult as indicated for ostomies/wounds/G-tubes  Outcome: Progressing

## 2023-05-03 LAB
BASE EXCESS BLD CALC-SCNC: 5.4 MMOL/L (ref ?–2)
HCO3 BLDA-SCNC: 29.1 MEQ/L (ref 21–27)
MODIFIED ALLEN TEST: POSITIVE
O2 CT BLD-SCNC: 17.5 VOL% (ref 15–23)
OXYGEN LITERS/MINUTE: 1 L/MIN
PCO2 BLDA: 91 MM HG (ref 35–45)
PH BLDA: 7.21 [PH] (ref 7.35–7.45)
PO2 BLDA: 88 MM HG (ref 80–100)
PUNCTURE CHARGE: YES
SAO2 % BLDA: 97.7 % (ref 94–100)

## 2023-05-03 RX ORDER — ONDANSETRON 4 MG/1
4 TABLET, FILM COATED ORAL EVERY 8 HOURS PRN
Status: DISCONTINUED | OUTPATIENT
Start: 2023-05-03 | End: 2023-05-22

## 2023-05-03 NOTE — PLAN OF CARE
Problem: Patient Centered Care  Goal: Patient preferences are identified and integrated in the patient's plan of care  Description: Interventions:  - What would you like us to know as we care for you? Patient from home with niece   Problem: RESPIRATORY - ADULT  Goal: Achieves optimal ventilation and oxygenation  Description: INTERVENTIONS:  - Assess for changes in respiratory status  - Assess for changes in mentation and behavior  - Position to facilitate oxygenation and minimize respiratory effort  - Oxygen supplementation based on oxygen saturation or ABGs  - Provide Smoking Cessation handout, if applicable  - Encourage broncho-pulmonary hygiene including cough, deep breathe, Incentive Spirometry  - Assess the need for suctioning and perform as needed  - Assess and instruct to report SOB or any respiratory difficulty  - Respiratory Therapy support as indicated  - Manage/alleviate anxiety  - Monitor for signs/symptoms of CO2 retention  Outcome: Not Progressing     Problem: CARDIOVASCULAR - ADULT  Goal: Maintains optimal cardiac output and hemodynamic stability  Description: INTERVENTIONS:  - Monitor vital signs, rhythm, and trends  - Monitor for bleeding, hypotension and signs of decreased cardiac output  - Evaluate effectiveness of vasoactive medications to optimize hemodynamic stability  - Monitor arterial and/or venous puncture sites for bleeding and/or hematoma  - Assess quality of pulses, skin color and temperature  - Assess for signs of decreased coronary artery perfusion - ex.  Angina  - Evaluate fluid balance, assess for edema, trend weights  Outcome: Progressing  Goal: Absence of cardiac arrhythmias or at baseline  Description: INTERVENTIONS:  - Continuous cardiac monitoring, monitor vital signs, obtain 12 lead EKG if indicated  - Evaluate effectiveness of antiarrhythmic and heart rate control medications as ordered  - Initiate emergency measures for life threatening arrhythmias  - Monitor electrolytes and administer replacement therapy as ordered  Outcome: Progressing     Problem: SKIN/TISSUE INTEGRITY - ADULT  Goal: Skin integrity remains intact  Description: INTERVENTIONS  - Assess and document risk factors for pressure ulcer development  - Assess and document skin integrity  - Monitor for areas of redness and/or skin breakdown  - Initiate interventions, skin care algorithm/standards of care as needed  Outcome: Progressing  Goal: Incision(s), wounds(s) or drain site(s) healing without S/S of infection  Description: INTERVENTIONS:  - Assess and document risk factors for pressure ulcer development  - Assess and document skin integrity  - Assess and document dressing/incision, wound bed, drain sites and surrounding tissue  - Implement wound care per orders  - Initiate isolation precautions as appropriate  - Initiate Pressure Ulcer prevention bundle as indicated  Outcome: Progressing     Problem: HEMATOLOGIC - ADULT  Goal: Maintains hematologic stability  Description: INTERVENTIONS  - Assess for signs and symptoms of bleeding or hemorrhage  - Monitor labs and vital signs for trends  - Administer supportive blood products/factors, fluids and medications as ordered and appropriate  - Administer supportive blood products/factors as ordered and appropriate  Outcome: Progressing  Goal: Free from bleeding injury  Description: (Example usage: patient with low platelets)  INTERVENTIONS:  - Avoid intramuscular injections, enemas and rectal medication administration  - Ensure safe mobilization of patient  - Hold pressure on venipuncture sites to achieve adequate hemostasis  - Assess for signs and symptoms of internal bleeding  - Monitor lab trends  - Patient is to report abnormal signs of bleeding to staff  - Avoid use of toothpicks and dental floss  - Use electric shaver for shaving  - Use soft bristle tooth brush  - Limit straining and forceful nose blowing  Outcome: Progressing     Problem: MUSCULOSKELETAL - ADULT  Goal: Return mobility to safest level of function  Description: INTERVENTIONS:  - Assess patient stability and activity tolerance for standing, transferring and ambulating w/ or w/o assistive devices  - Assist with transfers and ambulation using safe patient handling equipment as needed  - Ensure adequate protection for wounds/incisions during mobilization  - Obtain PT/OT consults as needed  - Advance activity as appropriate  - Communicate ordered activity level and limitations with patient/family  Outcome: Progressing     Problem: Impaired Activities of Daily Living  Goal: Achieve highest/safest level of independence in self care  Description: Interventions:  - Assess ability and encourage patient to participate in ADLs to maximize function  - Promote sitting position while performing ADLs such as feeding, grooming, and bathing  - Educate and encourage patient/family in tolerated functional activity level and precautions during self-care  Outcome: Progressing     Problem: SAFETY ADULT - FALL  Goal: Free from fall injury  Description: INTERVENTIONS:  - Assess pt frequently for physical needs  - Identify cognitive and physical deficits and behaviors that affect risk of falls.   - [place] fall precautions as indicated by assessment.  - Educate pt/family on patient safety including physical limitations  - Instruct pt to call for assistance with activity based on assessment  - Modify environment to reduce risk of injury  - Provide assistive devices as appropriate  - Consider OT/PT consult to assist with strengthening/mobility  - Encourage toileting schedule  Outcome: Progressing     Problem: SKIN INTEGRITY  Goal: Skin integrity remains intact  Description: Interventions:  - Assess for areas of redness and/or skin breakdown  - Assess vascular sites hourly  - Change oxygen saturation probe minimum once per shift  - Provide humidity as ordered and per policy  - If on oxygen support, assess nasal septum area for skin breakdown and replace protective barrier if needed  - Change diapers as needed  - Minimize the use of adhesives  Outcome: Progressing    Patient alert and oriented x4. PRN tesslon and robitussin given for cough. Fluid restriction maintained. Monitoring vital signs- stable at this time. Safety and fall precautions maintained- call light within reach.

## 2023-05-03 NOTE — PLAN OF CARE
Remote tele with no calls. Pt hypotensive; BP meds held. Fluid restriction in place. Dressings changed. Problem: Patient Centered Care  Goal: Patient preferences are identified and integrated in the patient's plan of care  Description: Interventions:  - What would you like us to know as we care for you? I live at home with my niece  - Provide timely, complete, and accurate information to patient/family  - Incorporate patient and family knowledge, values, beliefs, and cultural backgrounds into the planning and delivery of care  - Encourage patient/family to participate in care and decision-making at the level they choose  - Honor patient and family perspectives and choices  Outcome: Progressing     Problem: Patient/Family Goals  Goal: Patient/Family Long Term Goal  Description: Patient's Long Term Goal: To go home    Interventions:  - monitor VS  - pain management  - participate with PT/OT  - See additional Care Plan goals for specific interventions  Outcome: Progressing  Goal: Patient/Family Short Term Goal  Description: Patient's Short Term Goal: to feel better    Interventions:   - monitor VS  - pain management  - participate with PT/OT  - See additional Care Plan goals for specific interventions  Outcome: Progressing     Problem: CARDIOVASCULAR - ADULT  Goal: Maintains optimal cardiac output and hemodynamic stability  Description: INTERVENTIONS:  - Monitor vital signs, rhythm, and trends  - Monitor for bleeding, hypotension and signs of decreased cardiac output  - Evaluate effectiveness of vasoactive medications to optimize hemodynamic stability  - Monitor arterial and/or venous puncture sites for bleeding and/or hematoma  - Assess quality of pulses, skin color and temperature  - Assess for signs of decreased coronary artery perfusion - ex.  Angina  - Evaluate fluid balance, assess for edema, trend weights  Outcome: Progressing  Goal: Absence of cardiac arrhythmias or at baseline  Description: INTERVENTIONS:  - Continuous cardiac monitoring, monitor vital signs, obtain 12 lead EKG if indicated  - Evaluate effectiveness of antiarrhythmic and heart rate control medications as ordered  - Initiate emergency measures for life threatening arrhythmias  - Monitor electrolytes and administer replacement therapy as ordered  Outcome: Progressing     Problem: RESPIRATORY - ADULT  Goal: Achieves optimal ventilation and oxygenation  Description: INTERVENTIONS:  - Assess for changes in respiratory status  - Assess for changes in mentation and behavior  - Position to facilitate oxygenation and minimize respiratory effort  - Oxygen supplementation based on oxygen saturation or ABGs  - Provide Smoking Cessation handout, if applicable  - Encourage broncho-pulmonary hygiene including cough, deep breathe, Incentive Spirometry  - Assess the need for suctioning and perform as needed  - Assess and instruct to report SOB or any respiratory difficulty  - Respiratory Therapy support as indicated  - Manage/alleviate anxiety  - Monitor for signs/symptoms of CO2 retention  Outcome: Progressing     Problem: SKIN/TISSUE INTEGRITY - ADULT  Goal: Skin integrity remains intact  Description: INTERVENTIONS  - Assess and document risk factors for pressure ulcer development  - Assess and document skin integrity  - Monitor for areas of redness and/or skin breakdown  - Initiate interventions, skin care algorithm/standards of care as needed  Outcome: Not Progressing  Goal: Incision(s), wounds(s) or drain site(s) healing without S/S of infection  Description: INTERVENTIONS:  - Assess and document risk factors for pressure ulcer development  - Assess and document skin integrity  - Assess and document dressing/incision, wound bed, drain sites and surrounding tissue  - Implement wound care per orders  - Initiate isolation precautions as appropriate  - Initiate Pressure Ulcer prevention bundle as indicated  Outcome: Progressing     Problem: HEMATOLOGIC - ADULT  Goal: Maintains hematologic stability  Description: INTERVENTIONS  - Assess for signs and symptoms of bleeding or hemorrhage  - Monitor labs and vital signs for trends  - Administer supportive blood products/factors, fluids and medications as ordered and appropriate  - Administer supportive blood products/factors as ordered and appropriate  Outcome: Progressing  Goal: Free from bleeding injury  Description: (Example usage: patient with low platelets)  INTERVENTIONS:  - Avoid intramuscular injections, enemas and rectal medication administration  - Ensure safe mobilization of patient  - Hold pressure on venipuncture sites to achieve adequate hemostasis  - Assess for signs and symptoms of internal bleeding  - Monitor lab trends  - Patient is to report abnormal signs of bleeding to staff  - Avoid use of toothpicks and dental floss  - Use electric shaver for shaving  - Use soft bristle tooth brush  - Limit straining and forceful nose blowing  Outcome: Progressing     Problem: MUSCULOSKELETAL - ADULT  Goal: Return mobility to safest level of function  Description: INTERVENTIONS:  - Assess patient stability and activity tolerance for standing, transferring and ambulating w/ or w/o assistive devices  - Assist with transfers and ambulation using safe patient handling equipment as needed  - Ensure adequate protection for wounds/incisions during mobilization  - Obtain PT/OT consults as needed  - Advance activity as appropriate  - Communicate ordered activity level and limitations with patient/family  Outcome: Not Progressing     Problem: Impaired Functional Mobility  Goal: Achieve highest/safest level of mobility/gait  Description: Interventions:  - Assess patient's functional ability and stability  - Promote increasing activity/tolerance for mobility and gait  - Educate and engage patient/family in tolerated activity level and precautions  - Recommend use of sit-stand lift for transfers  Outcome: Not Progressing     Problem: Impaired Activities of Daily Living  Goal: Achieve highest/safest level of independence in self care  Description: Interventions:  - Assess ability and encourage patient to participate in ADLs to maximize function  - Promote sitting position while performing ADLs such as feeding, grooming, and bathing  - Educate and encourage patient/family in tolerated functional activity level and precautions during self-care  - Provide support under elbow of weak side to prevent shoulder subluxation  Outcome: Not Progressing     Problem: SAFETY ADULT - FALL  Goal: Free from fall injury  Description: INTERVENTIONS:  - Assess pt frequently for physical needs  - Identify cognitive and physical deficits and behaviors that affect risk of falls.   - [place] fall precautions as indicated by assessment.  - Educate pt/family on patient safety including physical limitations  - Instruct pt to call for assistance with activity based on assessment  - Modify environment to reduce risk of injury  - Provide assistive devices as appropriate  - Consider OT/PT consult to assist with strengthening/mobility  - Encourage toileting schedule  Outcome: Progressing     Problem: SKIN INTEGRITY  Goal: Skin integrity remains intact  Description: Interventions:  - Assess for areas of redness and/or skin breakdown  - Assess vascular sites hourly  - Change oxygen saturation probe minimum once per shift  - Provide humidity as ordered and per policy  - If on oxygen support, assess nasal septum area for skin breakdown and replace protective barrier if needed  - Change diapers as needed  - Minimize the use of adhesives  Outcome: Not Progressing  Goal: Incision/wound heals without complications  Description: Interventions:  - Assess wound bed/incision and surrounding skin tissue  - Collaborate with physician/APN and implement wound/incision site care and dressing changes as ordered  - Position infant to avoid placing pressure on wound  - Enterostomal/Wound therapy consult as indicated for ostomies/wounds/G-tubes  Outcome: Progressing

## 2023-05-04 PROBLEM — J96.00 ACUTE RESPIRATORY FAILURE (HCC): Status: ACTIVE | Noted: 2023-05-04

## 2023-05-04 LAB
BASE EXCESS BLD CALC-SCNC: 3.3 MMOL/L (ref ?–2)
BASE EXCESS BLD CALC-SCNC: 3.6 MMOL/L (ref ?–2)
BASE EXCESS BLD CALC-SCNC: 5.2 MMOL/L (ref ?–2)
BLOOD GAS EPAP: 6 CM H2O
BLOOD GAS IPAP: 14 CM H2O
CA-I BLD-SCNC: 1.17 MMOL/L (ref 0.95–1.32)
COHGB MFR BLD: 1.8 % (ref 0–3)
HCO3 BLDA-SCNC: 27.5 MEQ/L (ref 21–27)
HCO3 BLDA-SCNC: 27.7 MEQ/L (ref 21–27)
HCO3 BLDA-SCNC: 29 MEQ/L (ref 21–27)
HGB BLD-MCNC: 13.6 G/DL
LACTATE BLD-SCNC: 0.8 MMOL/L (ref 0.5–2)
METHGB MFR BLD: 1 % SAT (ref 0.4–1.5)
MODIFIED ALLEN TEST: POSITIVE
O2 CT BLD-SCNC: 16.7 VOL% (ref 15–23)
O2 CT BLD-SCNC: 17.8 VOL% (ref 15–23)
O2 CT BLD-SCNC: 18.6 VOL% (ref 15–23)
O2/TOTAL GAS SETTING VFR VENT: 30 %
O2/TOTAL GAS SETTING VFR VENT: 35 %
O2/TOTAL GAS SETTING VFR VENT: 35 %
PCO2 BLDA: 78 MM HG (ref 35–45)
PCO2 BLDA: 80 MM HG (ref 35–45)
PCO2 BLDA: 87 MM HG (ref 35–45)
PEEP SETTING VENT: 6 CM H2O
PH BLDA: 7.22 [PH] (ref 7.35–7.45)
PH BLDA: 7.23 [PH] (ref 7.35–7.45)
PH BLDA: 7.24 [PH] (ref 7.35–7.45)
PO2 BLDA: 103 MM HG (ref 80–100)
PO2 BLDA: 117 MM HG (ref 80–100)
PO2 BLDA: 95 MM HG (ref 80–100)
POTASSIUM BLD-SCNC: 4.8 MMOL/L (ref 3.6–5.1)
PUNCTURE CHARGE: YES
RESP RATE: 14 BPM
RESP RATE: 16 BPM
RESP RATE: 16 BPM
SAO2 % BLDA: 98.7 % (ref 94–100)
SAO2 % BLDA: 98.7 % (ref 94–100)
SAO2 % BLDA: >99 % (ref 94–100)
SODIUM BLD-SCNC: 134 MMOL/L (ref 135–145)
SPECIMEN VOL 24H UR: 450 ML
SPECIMEN VOL 24H UR: 550 ML

## 2023-05-04 RX ORDER — FLUCONAZOLE 200 MG/1
200 TABLET ORAL EVERY 24 HOURS
Status: COMPLETED | OUTPATIENT
Start: 2023-05-05 | End: 2023-05-12

## 2023-05-04 NOTE — OCCUPATIONAL THERAPY NOTE
Chart reviewed. Per RN, patient on BiPAP with respiratory status not stable at this time. OT will hold for today and f/u as appropriate.     Hanna Gibson, [address] [place] [address].  #[phone]

## 2023-05-04 NOTE — PLAN OF CARE
Patient to be transferred to room 223. Patient aware of plan and ready for transfer. Report given to nurse Alhaji Avila.  All belonging with patien

## 2023-05-04 NOTE — PROGRESS NOTES
05/04/23 0358   BiPAP   $ RT Standby Charge (per 15 min) 1   Device V60   BiPAP bacteria filter Yes   BIPAP plugged into main power? Yes   Mode AVAPS   Interface Full face mask   Mask Size Medium   Control Settings   Oxygen Percent 30 %   Inspiratory time 1   Insp rise time 2   AVAPS   Min IPAP 7   Max IPAP 25   EPAP Level 6   Set rate 16   Tidal volume 450   BiPAP/CPAP Alarm Settings   Hi Rate 45   Low Rate 10   Hi VT 1500   Low    Hi Pressure 30   Low Pressure 5   Low Pressure Delay 30   Low MV 3   BiPAP/CPAP Monitored Parameters   PIP 15   Total Rate 21 breaths/min   Minute Volume 12   Tidal Volume 542   Total Leak 14   Trigger % 75   Ti/Ttot % 24   Toleration Well     Initial ABG done with CO2 in the 90s. BiPAP started. Repeat ABg drawn and CO2 was still high. Switched to AVAPS and transferred to ICU. Follow up ABg showed an increase in CO2. AVAPS volume was increased. Subsequent gas showing CO2 level lover. All ABG resulted in Epic. Will continue BiPAP for now.

## 2023-05-04 NOTE — DIETARY NOTE
Brief Nutrition Note:     Pt admitted for cellulitis of left lower extremity and YOSHI. Pt screened at no nutrition risk at admission by RN. Pt re-screened for length of stay by RD. Chart reviewed, pt tolerating current diet with good intake, % over 14 meals noted since admission. Weight appears relatively stable. Oral torsemide daily noted. Non-pitting edema noted. Pt continues at no nutrition risk at this time. F/u per protocol. Please consult nutrition services if earlier intervention is indicated.     Wt Readings from Last 6 Encounters:  05/03/23 : (!) 136.6 kg (301 lb 1.6 oz)  10/13/21 : 134.3 kg (296 lb)  10/26/18 : 132.4 kg (291 lb 12.8 oz)  10/25/18 : (!) 174.2 kg (384 lb)  10/24/18 : 128.8 kg (284 lb)  10/23/18 : 115.2 kg (254 lb)    Percent Meals Eaten (last 6 days)     Date/Time Percent Meals Eaten (%)    04/28/23 0935 100 %    04/28/23 1400 100 %    04/28/23 1743 75 %    04/29/23 0922 100 %    04/29/23 1600 100 %    04/29/23 1838 100 %    04/30/23 0900 100 %    04/30/23 1350 100 %    05/01/23 1100 100 %    05/01/23 1300 100 %    05/01/23 1900 100 %    05/02/23 0900 100 %    05/02/23 1500 100 %    05/02/23 1859 100 %        Hampton Einstein MS, IVETT, RDN, LDN  Clinical Dietitian  P: [phone]

## 2023-05-04 NOTE — PLAN OF CARE
Pt alert and oriented x4 on BIPAP AVAPS mode. Purewick in place w/ good urine output . Pt had a meal. ON 4L nasal canula while eating. Fluconazole switched to oral.  Sister at bedside. Call button and pt belongings within reach.   Problem: Patient/Family Goals  Goal: Patient/Family Long Term Goal  Description: Patient's Long Term Goal: To go home    Interventions:  - monitor VS  - pain management  - participate with PT/OT  - See additional Care Plan goals for specific interventions  5/4/2023 1554 by José Lius Levy RN    5/4/2023 1554 by José Luis Levy, RN  Outcome: Progressing  Goal: Patient/Family Short Term Goal  Description: Patient's Short Term Goal: to feel better    Interventions:   - monitor VS  - pain management  - participate with PT/OT  - See additional Care Plan goals for specific interventions  5/4/2023 1554 by José Luis Levy, RN    5/4/2023 1554 by José Luis Levy, RN  Outcome: Progressing

## 2023-05-04 NOTE — PROGRESS NOTES
05/04/23 1006   VISIT TYPE   PT Inpatient Visit Type (Documentation Required) Attempted Treatment     Attempted PT treatment. Per discussion with RN, pt not appropriate to participate in therapy on this date. Currently on Bipap and respiratory status unstable. Will reschedule.      Thank you,  Reza Garvin, PT, DPT

## 2023-05-04 NOTE — PLAN OF CARE
Problem: RESPIRATORY - ADULT  Goal: Achieves optimal ventilation and oxygenation  Description: INTERVENTIONS:  - Assess for changes in respiratory status  - Assess for changes in mentation   - Position to facilitate oxygenation   - Oxygen supplementation based on oxygen saturation/ABGs  -assess need for suctioning and perform as needed  - Assess any respiratory difficulty  - Respiratory Therapy support as indicated  - manage anxiety  - Monitor for signs/symptoms of CO2 retention  -bipap continuous-NPO for now  Outcome: Not Progressing

## 2023-05-04 NOTE — PROGRESS NOTES
On call Nocturnist 05/04/23    Patient currently awake. ABG trending with improvement PCO@ has gone from 91 to 80. Though still with sig resp acidosis. We discussed options including intubation, she declines, denies being tired, only complains of being thirsty. Will continue NIMV watching closely for decline in effort and/or mental status.

## 2023-05-05 ENCOUNTER — ANTI-COAG (OUTPATIENT)
Dept: CARDIOLOGY | Age: 73
End: 2023-05-05

## 2023-05-05 ENCOUNTER — TELEPHONE (OUTPATIENT)
Dept: CARDIOLOGY | Age: 73
End: 2023-05-05

## 2023-05-05 DIAGNOSIS — Z79.01 LONG TERM (CURRENT) USE OF ANTICOAGULANTS: ICD-10-CM

## 2023-05-05 DIAGNOSIS — I42.0 DILATED CARDIOMYOPATHY (CMD): ICD-10-CM

## 2023-05-05 DIAGNOSIS — I48.0 PAROXYSMAL ATRIAL FIBRILLATION (CMD): Primary | ICD-10-CM

## 2023-05-05 LAB
ALBUMIN SERPL-MCNC: 2.5 G/DL (ref 3.4–5)
ALBUMIN/GLOB SERPL: 0.7 {RATIO} (ref 1–2)
ALP LIVER SERPL-CCNC: 86 U/L
ALT SERPL-CCNC: 7 U/L
ANION GAP SERPL CALC-SCNC: 5 MMOL/L (ref 0–18)
AST SERPL-CCNC: 13 U/L (ref 15–37)
BASE EXCESS BLD CALC-SCNC: 6.3 MMOL/L (ref ?–2)
BILIRUB SERPL-MCNC: 0.1 MG/DL (ref 0.1–2)
BUN BLD-MCNC: 90 MG/DL (ref 7–18)
BUN/CREAT SERPL: 55.9 (ref 10–20)
CALCIUM BLD-MCNC: 8.2 MG/DL (ref 8.5–10.1)
CHLORIDE SERPL-SCNC: 104 MMOL/L (ref 98–112)
CO2 SERPL-SCNC: 30 MMOL/L (ref 21–32)
CREAT BLD-MCNC: 1.61 MG/DL
GFR SERPLBLD BASED ON 1.73 SQ M-ARVRAT: 34 ML/MIN/1.73M2 (ref 60–?)
GLOBULIN PLAS-MCNC: 3.8 G/DL (ref 2.8–4.4)
GLUCOSE BLD-MCNC: 117 MG/DL (ref 70–99)
HCO3 BLDA-SCNC: 29.8 MEQ/L (ref 21–27)
INR BLD: 5.98 (ref 0.85–1.16)
MODIFIED ALLEN TEST: POSITIVE
O2 CT BLD-SCNC: 18.3 VOL% (ref 15–23)
OSMOLALITY SERPL CALC.SUM OF ELEC: 317 MOSM/KG (ref 275–295)
OXYGEN LITERS/MINUTE: 3 L/MIN
PCO2 BLDA: 56 MM HG (ref 35–45)
PH BLDA: 7.38 [PH] (ref 7.35–7.45)
PO2 BLDA: 81 MM HG (ref 80–100)
POTASSIUM SERPL-SCNC: 5 MMOL/L (ref 3.5–5.1)
PROT SERPL-MCNC: 6.3 G/DL (ref 6.4–8.2)
PROTHROMBIN TIME: 51.8 SECONDS (ref 11.6–14.8)
PUNCTURE CHARGE: YES
SAO2 % BLDA: 97.9 % (ref 94–100)
SODIUM SERPL-SCNC: 139 MMOL/L (ref 136–145)

## 2023-05-05 RX ORDER — POLYETHYLENE GLYCOL 3350 17 G/17G
17 POWDER, FOR SOLUTION ORAL DAILY
Status: DISCONTINUED | OUTPATIENT
Start: 2023-05-05 | End: 2023-05-22

## 2023-05-05 NOTE — PLAN OF CARE
Problem: CARDIOVASCULAR - ADULT  Goal: Maintains optimal cardiac output and hemodynamic stability  Description: INTERVENTIONS:  - Monitor vital signs, rhythm, and trends  - Monitor for hypotension   - Assess quality of pulses, skin color and temperature  - Evaluate fluid balance, assess for edema, trend weights  Outcome: Progressing     Problem: RESPIRATORY - ADULT  Goal: Achieves optimal ventilation and oxygenation  Description: INTERVENTIONS:  - Assess for changes in respiratory status  - Assess for changes in mentation   - Position to facilitate oxygenation   - Oxygen supplementation based on oxygen saturation   - Encourage broncho-pulmonary hygiene including cough, deep breathe,   - Assess any respiratory difficulty  - Respiratory Therapy support  - Manage anxiety  - Monitor for signs/symptoms of CO2 retention  -bipap when asleep  Outcome: Progressing     Problem: Impaired Activities of Daily Living  Goal: Achieve highest/safest level of independence in self care  Description: Interventions:  - Assess ability and encourage patient to participate in ADLs to maximize function  - Promote sitting position while performing ADLs such as feeding, grooming, and bathing  Outcome: Progressing

## 2023-05-05 NOTE — PLAN OF CARE
Tolerating nasal cannula all day, remained in bed, orders for PT OT. Per MD, Coumadin to be held due to INR result of 5 today. Patient aware. Eating well, aware of fluid and diet restrictions. Medicated ointment and powder applied as ordered, skin appears to be healing well. Calling appropriately, bed alarm on. REPORT given to fifth floor ELOINA Barrientos. All bedside belongings sent with patient, including own walker from home. Problem: Patient Centered Care  Goal: Patient preferences are identified and integrated in the patient's plan of care  Description: Interventions:  - What would you like us to know as we care for you?  I live at home with my niece  - Provide timely, complete, and accurate information to patient/family  - Incorporate patient and family knowledge, values, beliefs, and cultural backgrounds into the planning and delivery of care  - Encourage patient/family to participate in care and decision-making at the level they choose  - Honor patient and family perspectives and choices  Outcome: Progressing     Problem: Patient/Family Goals  Goal: Patient/Family Long Term Goal  Description: Patient's Long Term Goal: To go home    Interventions:  - monitor VS  - pain management  - participate with PT/OT  - See additional Care Plan goals for specific interventions  Outcome: Progressing  Goal: Patient/Family Short Term Goal  Description: Patient's Short Term Goal: to feel better    Interventions:   - monitor VS  - pain management  - participate with PT/OT  - See additional Care Plan goals for specific interventions  Outcome: Progressing     Problem: CARDIOVASCULAR - ADULT  Goal: Maintains optimal cardiac output and hemodynamic stability  Description: INTERVENTIONS:  - Monitor vital signs, rhythm, and trends  - Monitor for bleeding, hypotension and signs of decreased cardiac output  - Evaluate effectiveness of vasoactive medications to optimize hemodynamic stability  - Monitor arterial and/or venous puncture sites for bleeding and/or hematoma  - Assess quality of pulses, skin color and temperature  - Assess for signs of decreased coronary artery perfusion - ex.  Angina  - Evaluate fluid balance, assess for edema, trend weights  Outcome: Progressing  Goal: Absence of cardiac arrhythmias or at baseline  Description: INTERVENTIONS:  - Continuous cardiac monitoring, monitor vital signs, obtain 12 lead EKG if indicated  - Evaluate effectiveness of antiarrhythmic and heart rate control medications as ordered  - Initiate emergency measures for life threatening arrhythmias  - Monitor electrolytes and administer replacement therapy as ordered  Outcome: Progressing     Problem: RESPIRATORY - ADULT  Goal: Achieves optimal ventilation and oxygenation  Description: INTERVENTIONS:  - Assess for changes in respiratory status  - Assess for changes in mentation and behavior  - Position to facilitate oxygenation and minimize respiratory effort  - Oxygen supplementation based on oxygen saturation or ABGs  - Provide Smoking Cessation handout, if applicable  - Encourage broncho-pulmonary hygiene including cough, deep breathe, Incentive Spirometry  - Assess the need for suctioning and perform as needed  - Assess and instruct to report SOB or any respiratory difficulty  - Respiratory Therapy support as indicated  - Manage/alleviate anxiety  - Monitor for signs/symptoms of CO2 retention  Outcome: Progressing     Problem: SKIN/TISSUE INTEGRITY - ADULT  Goal: Skin integrity remains intact  Description: INTERVENTIONS  - Assess and document risk factors for pressure ulcer development  - Assess and document skin integrity  - Monitor for areas of redness and/or skin breakdown  - Initiate interventions, skin care algorithm/standards of care as needed  Outcome: Progressing  Goal: Incision(s), wounds(s) or drain site(s) healing without S/S of infection  Description: INTERVENTIONS:  - Assess and document risk factors for pressure ulcer development  - Assess and document skin integrity  - Assess and document dressing/incision, wound bed, drain sites and surrounding tissue  - Implement wound care per orders  - Initiate isolation precautions as appropriate  - Initiate Pressure Ulcer prevention bundle as indicated  Outcome: Progressing     Problem: HEMATOLOGIC - ADULT  Goal: Maintains hematologic stability  Description: INTERVENTIONS  - Assess for signs and symptoms of bleeding or hemorrhage  - Monitor labs and vital signs for trends  - Administer supportive blood products/factors, fluids and medications as ordered and appropriate  - Administer supportive blood products/factors as ordered and appropriate  Outcome: Progressing  Goal: Free from bleeding injury  Description: (Example usage: patient with low platelets)  INTERVENTIONS:  - Avoid intramuscular injections, enemas and rectal medication administration  - Ensure safe mobilization of patient  - Hold pressure on venipuncture sites to achieve adequate hemostasis  - Assess for signs and symptoms of internal bleeding  - Monitor lab trends  - Patient is to report abnormal signs of bleeding to staff  - Avoid use of toothpicks and dental floss  - Use electric shaver for shaving  - Use soft bristle tooth brush  - Limit straining and forceful nose blowing  Outcome: Progressing     Problem: MUSCULOSKELETAL - ADULT  Goal: Return mobility to safest level of function  Description: INTERVENTIONS:  - Assess patient stability and activity tolerance for standing, transferring and ambulating w/ or w/o assistive devices  - Assist with transfers and ambulation using safe patient handling equipment as needed  - Ensure adequate protection for wounds/incisions during mobilization  - Obtain PT/OT consults as needed  - Advance activity as appropriate  - Communicate ordered activity level and limitations with patient/family  Outcome: Progressing     Problem: Impaired Functional Mobility  Goal: Achieve highest/safest level of mobility/gait  Description: Interventions:  - Assess patient's functional ability and stability  - Promote increasing activity/tolerance for mobility and gait  - Educate and engage patient/family in tolerated activity level and precautions    Outcome: Progressing     Problem: Impaired Activities of Daily Living  Goal: Achieve highest/safest level of independence in self care  Description: Interventions:  - Assess ability and encourage patient to participate in ADLs to maximize function  - Promote sitting position while performing ADLs such as feeding, grooming, and bathing  - Educate and encourage patient/family in tolerated functional activity level and precautions during self-care    Outcome: Progressing     Problem: SAFETY ADULT - FALL  Goal: Free from fall injury  Description: INTERVENTIONS:  - Assess pt frequently for physical needs  - Identify cognitive and physical deficits and behaviors that affect risk of falls.   - [place] fall precautions as indicated by assessment.  - Educate pt/family on patient safety including physical limitations  - Instruct pt to call for assistance with activity based on assessment  - Modify environment to reduce risk of injury  - Provide assistive devices as appropriate  - Consider OT/PT consult to assist with strengthening/mobility  - Encourage toileting schedule  Outcome: Progressing     Problem: SKIN INTEGRITY  Goal: Skin integrity remains intact  Description: Interventions:  - Assess for areas of redness and/or skin breakdown  - Assess vascular sites hourly  - Change oxygen saturation probe minimum once per shift  - Provide humidity as ordered and per policy  - If on oxygen support, assess nasal septum area for skin breakdown and replace protective barrier if needed  - Change diapers as needed  - Minimize the use of adhesives  Outcome: Progressing  Goal: Incision/wound heals without complications  Description: Interventions:  - Assess wound bed/incision and surrounding skin tissue  - Collaborate with physician/APN and implement wound/incision site care and dressing changes as ordered  - Position infant to avoid placing pressure on wound  - Enterostomal/Wound therapy consult as indicated for ostomies/wounds/G-tubes  Outcome: Progressing

## 2023-05-05 NOTE — PROGRESS NOTES
05/05/23 1429   Clinical Encounter Type   Visited With Patient   Routine Visit Introduction   Continue Visiting No   Crisis Visit Critical care   Referral From   ([place] of Stay Initiative)   Referral To    Taxonomy   Intended Effects Build relationship of care and support   Methods Accompany someone in their spiritual/Confucianism practice outside your deepti tradition; Offer support; Offer spiritual/Confucianism support; Offer emotional support;Encourage sharing of feelings;Encourage story-telling;Encourage self care   Interventions Acknowledge current situation;Silent prayer; Facilitate communication; Active listening; Ask guided questions;Provide hospitality   Trigger for Consult   Trigger for Spiritual Care Consult No         Discussion:  responded to Length of Stay Initiative. When  arrived, patient was awake and alert in bed.  introduced themself and inquired about how the patient was doing and what support might be beneficial at this time. Patient expressed that she is \"doing better\" and that she's likely going to be transferred tomorrow. Patient spoke about her life the last few months and her struggle with illness and rehospitalization.  offered compassionate presence and active listening.  made the patient aware of the Spiritual Care Team's availability and encouraged her to have her nurse call either her or when she's transferred rooms if future support is ever desired. Chaplains are available for a follow-up visit PRN and can be reached at [phone]. Quinton Sandoval (she/Matteo uhffman, Chaplain Resident.

## 2023-05-05 NOTE — OCCUPATIONAL THERAPY NOTE
Chart reviewed. Patient with supratherapeutic INR this morning (5.98) for participation in therapy session. OT will hold at this time and f/u as appropriate.     Denise Munoz[address].  #[id number]

## 2023-05-06 LAB
ANION GAP SERPL CALC-SCNC: 5 MMOL/L (ref 0–18)
BASOPHILS # BLD AUTO: 0.01 X10(3) UL (ref 0–0.2)
BASOPHILS NFR BLD AUTO: 0.2 %
BUN BLD-MCNC: 96 MG/DL (ref 7–18)
BUN/CREAT SERPL: 64.4 (ref 10–20)
CALCIUM BLD-MCNC: 8.2 MG/DL (ref 8.5–10.1)
CHLORIDE SERPL-SCNC: 101 MMOL/L (ref 98–112)
CO2 SERPL-SCNC: 34 MMOL/L (ref 21–32)
CREAT BLD-MCNC: 1.49 MG/DL
DEPRECATED RDW RBC AUTO: 54.2 FL (ref 35.1–46.3)
EOSINOPHIL # BLD AUTO: 0.01 X10(3) UL (ref 0–0.7)
EOSINOPHIL NFR BLD AUTO: 0.2 %
ERYTHROCYTE [DISTWIDTH] IN BLOOD BY AUTOMATED COUNT: 15.3 % (ref 11–15)
GFR SERPLBLD BASED ON 1.73 SQ M-ARVRAT: 37 ML/MIN/1.73M2 (ref 60–?)
GLUCOSE BLD-MCNC: 103 MG/DL (ref 70–99)
HCT VFR BLD AUTO: 45.4 %
HGB BLD-MCNC: 14 G/DL
IMM GRANULOCYTES # BLD AUTO: 0.04 X10(3) UL (ref 0–1)
IMM GRANULOCYTES NFR BLD: 0.6 %
INR BLD: 5.95 (ref 0.85–1.16)
LYMPHOCYTES # BLD AUTO: 0.52 X10(3) UL (ref 1–4)
LYMPHOCYTES NFR BLD AUTO: 8.1 %
MCH RBC QN AUTO: 29.5 PG (ref 26–34)
MCHC RBC AUTO-ENTMCNC: 30.8 G/DL (ref 31–37)
MCV RBC AUTO: 95.8 FL
MONOCYTES # BLD AUTO: 0.71 X10(3) UL (ref 0.1–1)
MONOCYTES NFR BLD AUTO: 11.1 %
NEUTROPHILS # BLD AUTO: 5.11 X10 (3) UL (ref 1.5–7.7)
NEUTROPHILS # BLD AUTO: 5.11 X10(3) UL (ref 1.5–7.7)
NEUTROPHILS NFR BLD AUTO: 79.8 %
OSMOLALITY SERPL CALC.SUM OF ELEC: 320 MOSM/KG (ref 275–295)
PLATELET # BLD AUTO: 129 10(3)UL (ref 150–450)
POTASSIUM SERPL-SCNC: 4.6 MMOL/L (ref 3.5–5.1)
PROTHROMBIN TIME: 51.7 SECONDS (ref 11.6–14.8)
RBC # BLD AUTO: 4.74 X10(6)UL
SODIUM SERPL-SCNC: 140 MMOL/L (ref 136–145)
WBC # BLD AUTO: 6.4 X10(3) UL (ref 4–11)

## 2023-05-06 NOTE — PLAN OF CARE
Problem: Patient Centered Care  Goal: Patient preferences are identified and integrated in the patient's plan of care  Description: Interventions:  - What would you like us to know as we care for you?  I live at home with my niece  - Provide timely, complete, and accurate information to patient/family  - Incorporate patient and family knowledge, values, beliefs, and cultural backgrounds into the planning and delivery of care  - Encourage patient/family to participate in care and decision-making at the level they choose  - Honor patient and family perspectives and choices  5/6/2023 0243 by Cass Martinez RN  Outcome: Progressing  Note: Patient transferred, patient settled and comfortable in room  5/6/2023 0243 by Cass Martinez RN  Note: Patient transferred, patient settled and comfortable in room     Problem: Patient/Family Goals  Goal: Patient/Family Long Term Goal  Description: Patient's Long Term Goal: To go home    Interventions:  - monitor VS  - pain management  - participate with PT/OT  - See additional Care Plan goals for specific interventions  5/6/2023 0243 by Cass Martinez RN  Outcome: Progressing  Note: Patient free from infections after hospital stay   5/6/2023 0243 by Cass Martinez RN  Note: Patient free from infections after hospital stay   Goal: Patient/Family Short Term Goal  Description: Patient's Short Term Goal: to feel better    Interventions:   - monitor VS  - pain management  - participate with PT/OT  - See additional Care Plan goals for specific interventions  5/6/2023 0243 by Cass Martinez RN  Outcome: Progressing  Note: Patient free from pain  5/6/2023 0243 by Cass Martinez RN  Note: Patient free from pain     Problem: CARDIOVASCULAR - ADULT  Goal: Absence of cardiac arrhythmias or at baseline  Description: INTERVENTIONS:  - Continuous cardiac monitoring, monitor vital signs, obtain 12 lead EKG if indicated  - Evaluate effectiveness of antiarrhythmic and heart rate control medications as ordered  - Initiate emergency measures for life threatening arrhythmias  - Monitor electrolytes and administer replacement therapy as ordered  5/6/2023 0243 by Marco Antonio Mcintosh RN  Outcome: Progressing  Note: Patient on remote tele, nurse received no calls  5/6/2023 0243 by Marco Antonio Mcintosh RN  Note: Patient on remote tele, nurse received no calls     Problem: RESPIRATORY - ADULT  Goal: Achieves optimal ventilation and oxygenation  Description: INTERVENTIONS:  - Assess for changes in respiratory status  - Assess for changes in mentation and behavior  - Position to facilitate oxygenation and minimize respiratory effort  - Oxygen supplementation based on oxygen saturation or ABGs  - Provide Smoking Cessation handout, if applicable  - Encourage broncho-pulmonary hygiene including cough, deep breathe, Incentive Spirometry  - Assess the need for suctioning and perform as needed  - Assess and instruct to report SOB or any respiratory difficulty  - Respiratory Therapy support as indicated  - Manage/alleviate anxiety  - Monitor for signs/symptoms of CO2 retention  5/6/2023 0243 by Marco Antonio Mcintosh RN  Outcome: Progressing  Note: Patient on 3L nasal cannula, oxygen saturation above 90%  5/6/2023 0243 by Marco Antonio Mcintosh RN  Note: Patient on 3L nasal cannula, oxygen saturation above 90%     Problem: SKIN/TISSUE INTEGRITY - ADULT  Goal: Incision(s), wounds(s) or drain site(s) healing without S/S of infection  Description: INTERVENTIONS:  - Assess and document risk factors for pressure ulcer development  - Assess and document skin integrity  - Assess and document dressing/incision, wound bed, drain sites and surrounding tissue  - Implement wound care per orders  - Initiate isolation precautions as appropriate  - Initiate Pressure Ulcer prevention bundle as indicated  Outcome: Progressing     Problem: SAFETY ADULT - FALL  Goal: Free from fall injury  Description: INTERVENTIONS:  - Assess pt frequently for physical needs  - Identify cognitive and physical deficits and behaviors that affect risk of falls. - [place] fall precautions as indicated by assessment.  - Educate pt/family on patient safety including physical limitations  - Instruct pt to call for assistance with activity based on assessment  - Modify environment to reduce risk of injury  - Provide assistive devices as appropriate  - Consider OT/PT consult to assist with strengthening/mobility  - Encourage toileting schedule  5/6/2023 0243 by Tess Gernoimo RN  Outcome: Progressing  Note: Patient free from falls during shift  5/6/2023 0243 by Tess Geronimo RN  Note: Patient free from falls during shift     INR 5.95  Dr. Marko Joshi aware,   Doctor requested nurses communication: hold coumadin until INR below 3. Nurse will place communication in chart.

## 2023-05-06 NOTE — PLAN OF CARE
Problem: Impaired Functional Mobility  Goal: Achieve highest/safest level of mobility/gait  Description: Interventions:  - Assess patient's functional ability and stability  - Promote increasing activity/tolerance for mobility and gait  - Educate and engage patient/family in tolerated activity level and precautions  - Recommend use of  RW for transfers and ambulation  5/6/2023 1859 by Daniel Diez RN  Outcome: Progressing  5/6/2023 1859 by Daniel Diez RN  Outcome: Progressing     Problem: Impaired Activities of Daily Living  Goal: Achieve highest/safest level of independence in self care  Description: Interventions:  - Assess ability and encourage patient to participate in ADLs to maximize function  - Promote sitting position while performing ADLs such as feeding, grooming, and bathing  - Educate and encourage patient/family in tolerated functional activity level and precautions during self-care  - Provide support under elbow of weak side to prevent shoulder subluxation  - Encourage patient to incorporate impaired side during daily activities to promote function  5/6/2023 1859 by Daniel Diez RN  Outcome: Progressing  5/6/2023 1859 by Daniel Diez RN  Outcome: Progressing     Problem: SAFETY ADULT - FALL  Goal: Free from fall injury  Description: INTERVENTIONS:  - Assess pt frequently for physical needs  - Identify cognitive and physical deficits and behaviors that affect risk of falls.   - [place] fall precautions as indicated by assessment.  - Educate pt/family on patient safety including physical limitations  - Instruct pt to call for assistance with activity based on assessment  - Modify environment to reduce risk of injury  - Provide assistive devices as appropriate  - Consider OT/PT consult to assist with strengthening/mobility  - Encourage toileting schedule  5/6/2023 1859 by Daniel Diez RN  Outcome: Progressing  5/6/2023 1859 by Daniel Diez RN  Outcome: Progressing     Problem: SKIN INTEGRITY  Goal: Skin integrity remains intact  Description: Interventions:  - Assess for areas of redness and/or skin breakdown  - Assess vascular sites hourly  - Change oxygen saturation probe minimum once per shift  - Provide humidity as ordered and per policy  - If on oxygen support, assess nasal septum area for skin breakdown and replace protective barrier if needed  - Change diapers as needed  - Minimize the use of adhesives  5/6/2023 1859 by Farhan Leary RN  Outcome: Progressing  5/6/2023 1859 by Farhan Leary RN  Outcome: Progressing  Goal: Incision/wound heals without complications  Description: Interventions:  - Assess wound bed/incision and surrounding skin tissue  - Collaborate with physician/APN and implement wound/incision site care and dressing changes as ordered  - Position infant to avoid placing pressure on wound  - Enterostomal/Wound therapy consult as indicated for ostomies/wounds/G-tubes  5/6/2023 1859 by Farhan Leary RN  Outcome: Progressing  5/6/2023 1859 by Farhan Leary RN  Outcome: Progressing   A/O x4,good appetite. Up in the chair for meals, had shower during day. Cont. on O2 3 L sat 94%. No complain of pain.

## 2023-05-07 LAB
ANION GAP SERPL CALC-SCNC: 4 MMOL/L (ref 0–18)
BASOPHILS # BLD AUTO: 0.01 X10(3) UL (ref 0–0.2)
BASOPHILS NFR BLD AUTO: 0.2 %
BUN BLD-MCNC: 98 MG/DL (ref 7–18)
BUN/CREAT SERPL: 64.5 (ref 10–20)
CALCIUM BLD-MCNC: 8.6 MG/DL (ref 8.5–10.1)
CHLORIDE SERPL-SCNC: 100 MMOL/L (ref 98–112)
CO2 SERPL-SCNC: 36 MMOL/L (ref 21–32)
CREAT BLD-MCNC: 1.52 MG/DL
DEPRECATED RDW RBC AUTO: 53.7 FL (ref 35.1–46.3)
EOSINOPHIL # BLD AUTO: 0 X10(3) UL (ref 0–0.7)
EOSINOPHIL NFR BLD AUTO: 0 %
ERYTHROCYTE [DISTWIDTH] IN BLOOD BY AUTOMATED COUNT: 15.5 % (ref 11–15)
GFR SERPLBLD BASED ON 1.73 SQ M-ARVRAT: 36 ML/MIN/1.73M2 (ref 60–?)
GLUCOSE BLD-MCNC: 128 MG/DL (ref 70–99)
HCT VFR BLD AUTO: 42.9 %
HGB BLD-MCNC: 13.5 G/DL
IMM GRANULOCYTES # BLD AUTO: 0.03 X10(3) UL (ref 0–1)
IMM GRANULOCYTES NFR BLD: 0.6 %
INR BLD: 5.25 (ref 0.85–1.16)
LYMPHOCYTES # BLD AUTO: 0.54 X10(3) UL (ref 1–4)
LYMPHOCYTES NFR BLD AUTO: 10.5 %
MCH RBC QN AUTO: 29.6 PG (ref 26–34)
MCHC RBC AUTO-ENTMCNC: 31.5 G/DL (ref 31–37)
MCV RBC AUTO: 94.1 FL
MONOCYTES # BLD AUTO: 0.53 X10(3) UL (ref 0.1–1)
MONOCYTES NFR BLD AUTO: 10.4 %
NEUTROPHILS # BLD AUTO: 4.01 X10 (3) UL (ref 1.5–7.7)
NEUTROPHILS # BLD AUTO: 4.01 X10(3) UL (ref 1.5–7.7)
NEUTROPHILS NFR BLD AUTO: 78.3 %
OSMOLALITY SERPL CALC.SUM OF ELEC: 322 MOSM/KG (ref 275–295)
PLATELET # BLD AUTO: 128 10(3)UL (ref 150–450)
POTASSIUM SERPL-SCNC: 5 MMOL/L (ref 3.5–5.1)
PROTHROMBIN TIME: 46.9 SECONDS (ref 11.6–14.8)
RBC # BLD AUTO: 4.56 X10(6)UL
SODIUM SERPL-SCNC: 140 MMOL/L (ref 136–145)
WBC # BLD AUTO: 5.1 X10(3) UL (ref 4–11)

## 2023-05-07 NOTE — PLAN OF CARE
Problem: Patient Centered Care  Goal: Patient preferences are identified and integrated in the patient's plan of care  Description: Interventions:  - What would you like us to know as we care for you? I live at home with my niece  - Provide timely, complete, and accurate information to patient/family  - Incorporate patient and family knowledge, values, beliefs, and cultural backgrounds into the planning and delivery of care  - Encourage patient/family to participate in care and decision-making at the level they choose  - Honor patient and family perspectives and choices  Outcome: Progressing     Problem: CARDIOVASCULAR - ADULT  Goal: Maintains optimal cardiac output and hemodynamic stability  Description: INTERVENTIONS:  - Monitor vital signs, rhythm, and trends  - Monitor for bleeding, hypotension and signs of decreased cardiac output  - Evaluate effectiveness of vasoactive medications to optimize hemodynamic stability  - Monitor arterial and/or venous puncture sites for bleeding and/or hematoma  - Assess quality of pulses, skin color and temperature  - Assess for signs of decreased coronary artery perfusion - ex.  Angina  - Evaluate fluid balance, assess for edema, trend weights  Outcome: Progressing  Goal: Absence of cardiac arrhythmias or at baseline  Description: INTERVENTIONS:  - Continuous cardiac monitoring, monitor vital signs, obtain 12 lead EKG if indicated  - Evaluate effectiveness of antiarrhythmic and heart rate control medications as ordered  - Initiate emergency measures for life threatening arrhythmias  - Monitor electrolytes and administer replacement therapy as ordered  Outcome: Progressing     Problem: RESPIRATORY - ADULT  Goal: Achieves optimal ventilation and oxygenation  Description: INTERVENTIONS:  - Assess for changes in respiratory status  - Assess for changes in mentation and behavior  - Position to facilitate oxygenation and minimize respiratory effort  - Oxygen supplementation based on oxygen saturation or ABGs  - Provide Smoking Cessation handout, if applicable  - Encourage broncho-pulmonary hygiene including cough, deep breathe, Incentive Spirometry  - Assess the need for suctioning and perform as needed  - Assess and instruct to report SOB or any respiratory difficulty  - Respiratory Therapy support as indicated  - Manage/alleviate anxiety  - Monitor for signs/symptoms of CO2 retention  Outcome: Progressing     Problem: SKIN/TISSUE INTEGRITY - ADULT  Goal: Skin integrity remains intact  Description: INTERVENTIONS  - Assess and document risk factors for pressure ulcer development  - Assess and document skin integrity  - Monitor for areas of redness and/or skin breakdown  - Initiate interventions, skin care algorithm/standards of care as needed  Outcome: Progressing  Goal: Incision(s), wounds(s) or drain site(s) healing without S/S of infection  Description: INTERVENTIONS:  - Assess and document risk factors for pressure ulcer development  - Assess and document skin integrity  - Assess and document dressing/incision, wound bed, drain sites and surrounding tissue  - Implement wound care per orders  - Initiate isolation precautions as appropriate  - Initiate Pressure Ulcer prevention bundle as indicated  Outcome: Progressing     Problem: HEMATOLOGIC - ADULT  Goal: Maintains hematologic stability  Description: INTERVENTIONS  - Assess for signs and symptoms of bleeding or hemorrhage  - Monitor labs and vital signs for trends  - Administer supportive blood products/factors, fluids and medications as ordered and appropriate  - Administer supportive blood products/factors as ordered and appropriate  Outcome: Progressing  Goal: Free from bleeding injury  Description: (Example usage: patient with low platelets)  INTERVENTIONS:  - Avoid intramuscular injections, enemas and rectal medication administration  - Ensure safe mobilization of patient  - Hold pressure on venipuncture sites to achieve adequate hemostasis  - Assess for signs and symptoms of internal bleeding  - Monitor lab trends  - Patient is to report abnormal signs of bleeding to staff  - Avoid use of toothpicks and dental floss  - Use electric shaver for shaving  - Use soft bristle tooth brush  - Limit straining and forceful nose blowing  Outcome: Progressing     Problem: MUSCULOSKELETAL - ADULT  Goal: Return mobility to safest level of function  Description: INTERVENTIONS:  - Assess patient stability and activity tolerance for standing, transferring and ambulating w/ or w/o assistive devices  - Assist with transfers and ambulation using safe patient handling equipment as needed  - Ensure adequate protection for wounds/incisions during mobilization  - Obtain PT/OT consults as needed  - Advance activity as appropriate  - Communicate ordered activity level and limitations with patient/family  Outcome: Progressing     Problem: Impaired Functional Mobility  Goal: Achieve highest/safest level of mobility/gait  Description: Interventions:  - Assess patient's functional ability and stability  - Promote increasing activity/tolerance for mobility and gait  - Educate and engage patient/family in tolerated activity level and precautions  - Recommend use of  RW for transfers and ambulation  Outcome: Progressing     Problem: Impaired Activities of Daily Living  Goal: Achieve highest/safest level of independence in self care  Description: Interventions:  - Assess ability and encourage patient to participate in ADLs to maximize function  - Promote sitting position while performing ADLs such as feeding, grooming, and bathing  - Educate and encourage patient/family in tolerated functional activity level and precautions during self-care  - Provide support under elbow of weak side to prevent shoulder subluxation  - Encourage patient to incorporate impaired side during daily activities to promote function  Outcome: Progressing     Problem: SAFETY ADULT - FALL  Goal: Free from fall injury  Description: INTERVENTIONS:  - Assess pt frequently for physical needs  - Identify cognitive and physical deficits and behaviors that affect risk of falls. - [place] fall precautions as indicated by assessment.  - Educate pt/family on patient safety including physical limitations  - Instruct pt to call for assistance with activity based on assessment  - Modify environment to reduce risk of injury  - Provide assistive devices as appropriate  - Consider OT/PT consult to assist with strengthening/mobility  - Encourage toileting schedule  Outcome: Progressing     Problem: SKIN INTEGRITY  Goal: Skin integrity remains intact  Description: Interventions:  - Assess for areas of redness and/or skin breakdown  - Assess vascular sites hourly  - Change oxygen saturation probe minimum once per shift  - Provide humidity as ordered and per policy  - If on oxygen support, assess nasal septum area for skin breakdown and replace protective barrier if needed  - Change diapers as needed  - Minimize the use of adhesives  Outcome: Progressing  Goal: Incision/wound heals without complications  Description: Interventions:  - Assess wound bed/incision and surrounding skin tissue  - Collaborate with physician/APN and implement wound/incision site care and dressing changes as ordered  - Position infant to avoid placing pressure on wound  - Enterostomal/Wound therapy consult as indicated for ostomies/wounds/G-tubes  Outcome: Progressing   Patient A/O x4, up in the chair for meals, good appetite. Continue to hold coumadin, today INR=5.25.  Dulcolax supp. Given do to complain of constipation, it was effective.

## 2023-05-07 NOTE — PLAN OF CARE
Patient VSS, no acute changes during shift. 3L of O2, weaning as tolerated. Updated patient on plan of care. Frequent rounding, no needs at this time. Call light always in reach and safety precautions in place. Problem: Patient Centered Care  Goal: Patient preferences are identified and integrated in the patient's plan of care  Description: Interventions:  - What would you like us to know as we care for you? I live at home with my niece  - Provide timely, complete, and accurate information to patient/family  - Incorporate patient and family knowledge, values, beliefs, and cultural backgrounds into the planning and delivery of care  - Encourage patient/family to participate in care and decision-making at the level they choose  - Honor patient and family perspectives and choices  Outcome: Progressing     Problem: Patient/Family Goals  Goal: Patient/Family Long Term Goal  Description: Patient's Long Term Goal: To go home    Interventions:  - monitor VS  - pain management  - participate with PT/OT  - See additional Care Plan goals for specific interventions  Outcome: Progressing  Goal: Patient/Family Short Term Goal  Description: Patient's Short Term Goal: to feel better    Interventions:   - monitor VS  - pain management  - participate with PT/OT  - See additional Care Plan goals for specific interventions  Outcome: Progressing     Problem: CARDIOVASCULAR - ADULT  Goal: Maintains optimal cardiac output and hemodynamic stability  Description: INTERVENTIONS:  - Monitor vital signs, rhythm, and trends  - Monitor for bleeding, hypotension and signs of decreased cardiac output  - Evaluate effectiveness of vasoactive medications to optimize hemodynamic stability  - Monitor arterial and/or venous puncture sites for bleeding and/or hematoma  - Assess quality of pulses, skin color and temperature  - Assess for signs of decreased coronary artery perfusion - ex.  Angina  - Evaluate fluid balance, assess for edema, trend weights  Outcome: Progressing  Goal: Absence of cardiac arrhythmias or at baseline  Description: INTERVENTIONS:  - Continuous cardiac monitoring, monitor vital signs, obtain 12 lead EKG if indicated  - Evaluate effectiveness of antiarrhythmic and heart rate control medications as ordered  - Initiate emergency measures for life threatening arrhythmias  - Monitor electrolytes and administer replacement therapy as ordered  Outcome: Progressing     Problem: RESPIRATORY - ADULT  Goal: Achieves optimal ventilation and oxygenation  Description: INTERVENTIONS:  - Assess for changes in respiratory status  - Assess for changes in mentation and behavior  - Position to facilitate oxygenation and minimize respiratory effort  - Oxygen supplementation based on oxygen saturation or ABGs  - Provide Smoking Cessation handout, if applicable  - Encourage broncho-pulmonary hygiene including cough, deep breathe, Incentive Spirometry  - Assess the need for suctioning and perform as needed  - Assess and instruct to report SOB or any respiratory difficulty  - Respiratory Therapy support as indicated  - Manage/alleviate anxiety  - Monitor for signs/symptoms of CO2 retention  Outcome: Progressing     Problem: SKIN/TISSUE INTEGRITY - ADULT  Goal: Skin integrity remains intact  Description: INTERVENTIONS  - Assess and document risk factors for pressure ulcer development  - Assess and document skin integrity  - Monitor for areas of redness and/or skin breakdown  - Initiate interventions, skin care algorithm/standards of care as needed  Outcome: Progressing  Goal: Incision(s), wounds(s) or drain site(s) healing without S/S of infection  Description: INTERVENTIONS:  - Assess and document risk factors for pressure ulcer development  - Assess and document skin integrity  - Assess and document dressing/incision, wound bed, drain sites and surrounding tissue  - Implement wound care per orders  - Initiate isolation precautions as appropriate  - Initiate Pressure Ulcer prevention bundle as indicated  Outcome: Progressing     Problem: HEMATOLOGIC - ADULT  Goal: Maintains hematologic stability  Description: INTERVENTIONS  - Assess for signs and symptoms of bleeding or hemorrhage  - Monitor labs and vital signs for trends  - Administer supportive blood products/factors, fluids and medications as ordered and appropriate  - Administer supportive blood products/factors as ordered and appropriate  Outcome: Progressing  Goal: Free from bleeding injury  Description: (Example usage: patient with low platelets)  INTERVENTIONS:  - Avoid intramuscular injections, enemas and rectal medication administration  - Ensure safe mobilization of patient  - Hold pressure on venipuncture sites to achieve adequate hemostasis  - Assess for signs and symptoms of internal bleeding  - Monitor lab trends  - Patient is to report abnormal signs of bleeding to staff  - Avoid use of toothpicks and dental floss  - Use electric shaver for shaving  - Use soft bristle tooth brush  - Limit straining and forceful nose blowing  Outcome: Progressing     Problem: MUSCULOSKELETAL - ADULT  Goal: Return mobility to safest level of function  Description: INTERVENTIONS:  - Assess patient stability and activity tolerance for standing, transferring and ambulating w/ or w/o assistive devices  - Assist with transfers and ambulation using safe patient handling equipment as needed  - Ensure adequate protection for wounds/incisions during mobilization  - Obtain PT/OT consults as needed  - Advance activity as appropriate  - Communicate ordered activity level and limitations with patient/family  Outcome: Progressing     Problem: Impaired Functional Mobility  Goal: Achieve highest/safest level of mobility/gait  Description: Interventions:  - Assess patient's functional ability and stability  - Promote increasing activity/tolerance for mobility and gait  - Educate and engage patient/family in tolerated activity level and precautions  - Recommend use of  RW for transfers and ambulation  Outcome: Progressing     Problem: Impaired Activities of Daily Living  Goal: Achieve highest/safest level of independence in self care  Description: Interventions:  - Assess ability and encourage patient to participate in ADLs to maximize function  - Promote sitting position while performing ADLs such as feeding, grooming, and bathing  - Educate and encourage patient/family in tolerated functional activity level and precautions during self-care  - Provide support under elbow of weak side to prevent shoulder subluxation  Outcome: Progressing     Problem: SAFETY ADULT - FALL  Goal: Free from fall injury  Description: INTERVENTIONS:  - Assess pt frequently for physical needs  - Identify cognitive and physical deficits and behaviors that affect risk of falls.   - [place] fall precautions as indicated by assessment.  - Educate pt/family on patient safety including physical limitations  - Instruct pt to call for assistance with activity based on assessment  - Modify environment to reduce risk of injury  - Provide assistive devices as appropriate  - Consider OT/PT consult to assist with strengthening/mobility  - Encourage toileting schedule  Outcome: Progressing     Problem: SKIN INTEGRITY  Goal: Skin integrity remains intact  Description: Interventions:  - Assess for areas of redness and/or skin breakdown  - Assess vascular sites hourly  - Change oxygen saturation probe minimum once per shift  - Provide humidity as ordered and per policy  - If on oxygen support, assess nasal septum area for skin breakdown and replace protective barrier if needed  - Change diapers as needed  - Minimize the use of adhesives  Outcome: Progressing  Goal: Incision/wound heals without complications  Description: Interventions:  - Assess wound bed/incision and surrounding skin tissue  - Collaborate with physician/APN and implement wound/incision site care and dressing changes as ordered  - Position infant to avoid placing pressure on wound  - Enterostomal/Wound therapy consult as indicated for ostomies/wounds/G-tubes  Outcome: Progressing

## 2023-05-08 ENCOUNTER — TELEPHONE (OUTPATIENT)
Dept: CARDIOLOGY | Age: 73
End: 2023-05-08

## 2023-05-08 LAB
ANION GAP SERPL CALC-SCNC: 5 MMOL/L (ref 0–18)
BASOPHILS # BLD AUTO: 0.01 X10(3) UL (ref 0–0.2)
BASOPHILS NFR BLD AUTO: 0.2 %
BUN BLD-MCNC: 113 MG/DL (ref 7–18)
BUN/CREAT SERPL: 66.1 (ref 10–20)
CALCIUM BLD-MCNC: 8.7 MG/DL (ref 8.5–10.1)
CHLORIDE SERPL-SCNC: 99 MMOL/L (ref 98–112)
CO2 SERPL-SCNC: 35 MMOL/L (ref 21–32)
CREAT BLD-MCNC: 1.71 MG/DL
DEPRECATED RDW RBC AUTO: 55.6 FL (ref 35.1–46.3)
EOSINOPHIL # BLD AUTO: 0.02 X10(3) UL (ref 0–0.7)
EOSINOPHIL NFR BLD AUTO: 0.3 %
ERYTHROCYTE [DISTWIDTH] IN BLOOD BY AUTOMATED COUNT: 15.8 % (ref 11–15)
GFR SERPLBLD BASED ON 1.73 SQ M-ARVRAT: 31 ML/MIN/1.73M2 (ref 60–?)
GLUCOSE BLD-MCNC: 104 MG/DL (ref 70–99)
HCT VFR BLD AUTO: 44.4 %
HGB BLD-MCNC: 13.5 G/DL
IMM GRANULOCYTES # BLD AUTO: 0.05 X10(3) UL (ref 0–1)
IMM GRANULOCYTES NFR BLD: 0.8 %
INR BLD: 4.34 (ref 0.85–1.16)
LYMPHOCYTES # BLD AUTO: 0.99 X10(3) UL (ref 1–4)
LYMPHOCYTES NFR BLD AUTO: 16.4 %
MCH RBC QN AUTO: 29.2 PG (ref 26–34)
MCHC RBC AUTO-ENTMCNC: 30.4 G/DL (ref 31–37)
MCV RBC AUTO: 96.1 FL
MONOCYTES # BLD AUTO: 0.68 X10(3) UL (ref 0.1–1)
MONOCYTES NFR BLD AUTO: 11.3 %
NEUTROPHILS # BLD AUTO: 4.28 X10 (3) UL (ref 1.5–7.7)
NEUTROPHILS # BLD AUTO: 4.28 X10(3) UL (ref 1.5–7.7)
NEUTROPHILS NFR BLD AUTO: 71 %
OSMOLALITY SERPL CALC.SUM OF ELEC: 324 MOSM/KG (ref 275–295)
PLATELET # BLD AUTO: 128 10(3)UL (ref 150–450)
POTASSIUM SERPL-SCNC: 4.9 MMOL/L (ref 3.5–5.1)
PROTHROMBIN TIME: 40.8 SECONDS (ref 11.6–14.8)
RBC # BLD AUTO: 4.62 X10(6)UL
SODIUM SERPL-SCNC: 139 MMOL/L (ref 136–145)
WBC # BLD AUTO: 6 X10(3) UL (ref 4–11)

## 2023-05-08 NOTE — PLAN OF CARE
Problem: Patient Centered Care  Goal: Patient preferences are identified and integrated in the patient's plan of care  Description: Interventions:  - What would you like us to know as we care for you? I live at home with my niece  - Provide timely, complete, and accurate information to patient/family  - Incorporate patient and family knowledge, values, beliefs, and cultural backgrounds into the planning and delivery of care  - Encourage patient/family to participate in care and decision-making at the level they choose  - Honor patient and family perspectives and choices  Outcome: Progressing     Problem: Patient/Family Goals  Goal: Patient/Family Long Term Goal  Description: Patient's Long Term Goal: To go home    Interventions:  - monitor VS  - pain management  - participate with PT/OT  - See additional Care Plan goals for specific interventions  Outcome: Progressing  Goal: Patient/Family Short Term Goal  Description: Patient's Short Term Goal: to feel better    Interventions:   - monitor VS  - pain management  - participate with PT/OT  - See additional Care Plan goals for specific interventions  Outcome: Progressing     Problem: CARDIOVASCULAR - ADULT  Goal: Maintains optimal cardiac output and hemodynamic stability  Description: INTERVENTIONS:  - Monitor vital signs, rhythm, and trends  - Monitor for bleeding, hypotension and signs of decreased cardiac output  - Evaluate effectiveness of vasoactive medications to optimize hemodynamic stability  - Monitor arterial and/or venous puncture sites for bleeding and/or hematoma  - Assess quality of pulses, skin color and temperature  - Assess for signs of decreased coronary artery perfusion - ex.  Angina  - Evaluate fluid balance, assess for edema, trend weights  Outcome: Progressing  Goal: Absence of cardiac arrhythmias or at baseline  Description: INTERVENTIONS:  - Continuous cardiac monitoring, monitor vital signs, obtain 12 lead EKG if indicated  - Evaluate effectiveness of antiarrhythmic and heart rate control medications as ordered  - Initiate emergency measures for life threatening arrhythmias  - Monitor electrolytes and administer replacement therapy as ordered  Outcome: Progressing     Problem: RESPIRATORY - ADULT  Goal: Achieves optimal ventilation and oxygenation  Description: INTERVENTIONS:  - Assess for changes in respiratory status  - Assess for changes in mentation and behavior  - Position to facilitate oxygenation and minimize respiratory effort  - Oxygen supplementation based on oxygen saturation or ABGs  - Provide Smoking Cessation handout, if applicable  - Encourage broncho-pulmonary hygiene including cough, deep breathe, Incentive Spirometry  - Assess the need for suctioning and perform as needed  - Assess and instruct to report SOB or any respiratory difficulty  - Respiratory Therapy support as indicated  - Manage/alleviate anxiety  - Monitor for signs/symptoms of CO2 retention  Outcome: Progressing     Problem: SKIN/TISSUE INTEGRITY - ADULT  Goal: Skin integrity remains intact  Description: INTERVENTIONS  - Assess and document risk factors for pressure ulcer development  - Assess and document skin integrity  - Monitor for areas of redness and/or skin breakdown  - Initiate interventions, skin care algorithm/standards of care as needed  Outcome: Progressing  Goal: Incision(s), wounds(s) or drain site(s) healing without S/S of infection  Description: INTERVENTIONS:  - Assess and document risk factors for pressure ulcer development  - Assess and document skin integrity  - Assess and document dressing/incision, wound bed, drain sites and surrounding tissue  - Implement wound care per orders  - Initiate isolation precautions as appropriate  - Initiate Pressure Ulcer prevention bundle as indicated  Outcome: Progressing     Problem: HEMATOLOGIC - ADULT  Goal: Maintains hematologic stability  Description: INTERVENTIONS  - Assess for signs and symptoms of bleeding or hemorrhage  - Monitor labs and vital signs for trends  - Administer supportive blood products/factors, fluids and medications as ordered and appropriate  - Administer supportive blood products/factors as ordered and appropriate  Outcome: Progressing  Goal: Free from bleeding injury  Description: (Example usage: patient with low platelets)  INTERVENTIONS:  - Avoid intramuscular injections, enemas and rectal medication administration  - Ensure safe mobilization of patient  - Hold pressure on venipuncture sites to achieve adequate hemostasis  - Assess for signs and symptoms of internal bleeding  - Monitor lab trends  - Patient is to report abnormal signs of bleeding to staff  - Avoid use of toothpicks and dental floss  - Use electric shaver for shaving  - Use soft bristle tooth brush  - Limit straining and forceful nose blowing  Outcome: Progressing     Problem: MUSCULOSKELETAL - ADULT  Goal: Return mobility to safest level of function  Description: INTERVENTIONS:  - Assess patient stability and activity tolerance for standing, transferring and ambulating w/ or w/o assistive devices  - Assist with transfers and ambulation using safe patient handling equipment as needed  - Ensure adequate protection for wounds/incisions during mobilization  - Obtain PT/OT consults as needed  - Advance activity as appropriate  - Communicate ordered activity level and limitations with patient/family  Outcome: Progressing     Problem: Impaired Functional Mobility  Goal: Achieve highest/safest level of mobility/gait  Description: Interventions:  - Assess patient's functional ability and stability  - Promote increasing activity/tolerance for mobility and gait  - Educate and engage patient/family in tolerated activity level and precautions  - Recommend patient transfer to bedside chair toward strongest side  Outcome: Progressing     Problem: Impaired Activities of Daily Living  Goal: Achieve highest/safest level of independence in self care  Description: Interventions:  - Assess ability and encourage patient to participate in ADLs to maximize function  - Promote sitting position while performing ADLs such as feeding, grooming, and bathing  - Educate and encourage patient/family in tolerated functional activity level and precautions during self-care  - Encourage patient to incorporate impaired side during daily activities to promote function  Outcome: Progressing     Problem: SAFETY ADULT - FALL  Goal: Free from fall injury  Description: INTERVENTIONS:  - Assess pt frequently for physical needs  - Identify cognitive and physical deficits and behaviors that affect risk of falls.   - [place] fall precautions as indicated by assessment.  - Educate pt/family on patient safety including physical limitations  - Instruct pt to call for assistance with activity based on assessment  - Modify environment to reduce risk of injury  - Provide assistive devices as appropriate  - Consider OT/PT consult to assist with strengthening/mobility  - Encourage toileting schedule  Outcome: Progressing     Problem: SKIN INTEGRITY  Goal: Skin integrity remains intact  Description: Interventions:  - Assess for areas of redness and/or skin breakdown  - Assess vascular sites hourly  - Change oxygen saturation probe minimum once per shift  - Provide humidity as ordered and per policy  - If on oxygen support, assess nasal septum area for skin breakdown and replace protective barrier if needed  - Change diapers as needed  - Minimize the use of adhesives  Outcome: Progressing  Goal: Incision/wound heals without complications  Description: Interventions:  - Assess wound bed/incision and surrounding skin tissue  - Collaborate with physician/APN and implement wound/incision site care and dressing changes as ordered  - Position infant to avoid placing pressure on wound  - Enterostomal/Wound therapy consult as indicated for ostomies/wounds/G-tubes  Outcome: Progressing Monitoring vital signs, stable at this moment, wore 3L oxygen overnight to maintain oxygen saturation above 90%. Pain medication provided as needed. Wound care provided. Call light within reach, bed locked in lowest position, all fall precautions in place. All needs addressed. Frequent rounding maintained by nursing staff. No acute changes at this time.

## 2023-05-08 NOTE — PHYSICAL THERAPY NOTE
05/08/23 1311   VISIT TYPE   PT Inpatient Visit Type (Documentation Required) Attempted Treatment  (attempted to see for PT treatment.   Pt INR remains supratherapeutic at 4.34, not appropriate for PT.  will re attempt when appropriate.)

## 2023-05-08 NOTE — PLAN OF CARE
Problem: Patient Centered Care  Goal: Patient preferences are identified and integrated in the patient's plan of care  Description: Interventions:  - What would you like us to know as we care for you? I live at home with my niece  - Provide timely, complete, and accurate information to patient/family  - Incorporate patient and family knowledge, values, beliefs, and cultural backgrounds into the planning and delivery of care  - Encourage patient/family to participate in care and decision-making at the level they choose  - Honor patient and family perspectives and choices  Outcome: Progressing     Problem: CARDIOVASCULAR - ADULT  Goal: Maintains optimal cardiac output and hemodynamic stability  Description: INTERVENTIONS:  - Monitor vital signs, rhythm, and trends  - Monitor for bleeding, hypotension and signs of decreased cardiac output  - Evaluate effectiveness of vasoactive medications to optimize hemodynamic stability  - Monitor arterial and/or venous puncture sites for bleeding and/or hematoma  - Assess quality of pulses, skin color and temperature  - Assess for signs of decreased coronary artery perfusion - ex.  Angina  - Evaluate fluid balance, assess for edema, trend weights  Outcome: Progressing  Goal: Absence of cardiac arrhythmias or at baseline  Description: INTERVENTIONS:  - Continuous cardiac monitoring, monitor vital signs, obtain 12 lead EKG if indicated  - Evaluate effectiveness of antiarrhythmic and heart rate control medications as ordered  - Initiate emergency measures for life threatening arrhythmias  - Monitor electrolytes and administer replacement therapy as ordered  Outcome: Progressing     Problem: RESPIRATORY - ADULT  Goal: Achieves optimal ventilation and oxygenation  Description: INTERVENTIONS:  - Assess for changes in respiratory status  - Assess for changes in mentation and behavior  - Position to facilitate oxygenation and minimize respiratory effort  - Oxygen supplementation based on oxygen saturation or ABGs  - Provide Smoking Cessation handout, if applicable  - Encourage broncho-pulmonary hygiene including cough, deep breathe, Incentive Spirometry  - Assess the need for suctioning and perform as needed  - Assess and instruct to report SOB or any respiratory difficulty  - Respiratory Therapy support as indicated  - Manage/alleviate anxiety  - Monitor for signs/symptoms of CO2 retention  Outcome: Progressing     Problem: SKIN/TISSUE INTEGRITY - ADULT  Goal: Skin integrity remains intact  Description: INTERVENTIONS  - Assess and document risk factors for pressure ulcer development  - Assess and document skin integrity  - Monitor for areas of redness and/or skin breakdown  - Initiate interventions, skin care algorithm/standards of care as needed  Outcome: Progressing  Goal: Incision(s), wounds(s) or drain site(s) healing without S/S of infection  Description: INTERVENTIONS:  - Assess and document risk factors for pressure ulcer development  - Assess and document skin integrity  - Assess and document dressing/incision, wound bed, drain sites and surrounding tissue  - Implement wound care per orders  - Initiate isolation precautions as appropriate  - Initiate Pressure Ulcer prevention bundle as indicated  Outcome: Progressing     Problem: HEMATOLOGIC - ADULT  Goal: Maintains hematologic stability  Description: INTERVENTIONS  - Assess for signs and symptoms of bleeding or hemorrhage  - Monitor labs and vital signs for trends  - Administer supportive blood products/factors, fluids and medications as ordered and appropriate  - Administer supportive blood products/factors as ordered and appropriate  Outcome: Progressing  Goal: Free from bleeding injury  Description: (Example usage: patient with low platelets)  INTERVENTIONS:  - Avoid intramuscular injections, enemas and rectal medication administration  - Ensure safe mobilization of patient  - Hold pressure on venipuncture sites to achieve adequate hemostasis  - Assess for signs and symptoms of internal bleeding  - Monitor lab trends  - Patient is to report abnormal signs of bleeding to staff  - Avoid use of toothpicks and dental floss  - Use electric shaver for shaving  - Use soft bristle tooth brush  - Limit straining and forceful nose blowing  Outcome: Progressing     Problem: MUSCULOSKELETAL - ADULT  Goal: Return mobility to safest level of function  Description: INTERVENTIONS:  - Assess patient stability and activity tolerance for standing, transferring and ambulating w/ or w/o assistive devices  - Assist with transfers and ambulation using safe patient handling equipment as needed  - Ensure adequate protection for wounds/incisions during mobilization  - Obtain PT/OT consults as needed  - Advance activity as appropriate  - Communicate ordered activity level and limitations with patient/family  Outcome: Progressing     Problem: Impaired Functional Mobility  Goal: Achieve highest/safest level of mobility/gait  Description: Interventions:  - Assess patient's functional ability and stability  - Promote increasing activity/tolerance for mobility and gait  - Educate and engage patient/family in tolerated activity level and precautions  - Recommend use of  RW for transfers and ambulation  Outcome: Progressing     Problem: Impaired Activities of Daily Living  Goal: Achieve highest/safest level of independence in self care  Description: Interventions:  - Assess ability and encourage patient to participate in ADLs to maximize function  - Promote sitting position while performing ADLs such as feeding, grooming, and bathing  - Educate and encourage patient/family in tolerated functional activity level and precautions during self-care  - Provide support under elbow of weak side to prevent shoulder subluxation  - Encourage patient to incorporate impaired side during daily activities to promote function  Outcome: Progressing     Problem: SAFETY ADULT - FALL  Goal: Free from fall injury  Description: INTERVENTIONS:  - Assess pt frequently for physical needs  - Identify cognitive and physical deficits and behaviors that affect risk of falls. - [place] fall precautions as indicated by assessment.  - Educate pt/family on patient safety including physical limitations  - Instruct pt to call for assistance with activity based on assessment  - Modify environment to reduce risk of injury  - Provide assistive devices as appropriate  - Consider OT/PT consult to assist with strengthening/mobility  - Encourage toileting schedule  Outcome: Progressing     Problem: SKIN INTEGRITY  Goal: Skin integrity remains intact  Description: Interventions:  - Assess for areas of redness and/or skin breakdown  - Assess vascular sites hourly  - Change oxygen saturation probe minimum once per shift  - Provide humidity as ordered and per policy  - If on oxygen support, assess nasal septum area for skin breakdown and replace protective barrier if needed  - Change diapers as needed  - Minimize the use of adhesives  Outcome: Progressing  Goal: Incision/wound heals without complications  Description: Interventions:  - Assess wound bed/incision and surrounding skin tissue  - Collaborate with physician/APN and implement wound/incision site care and dressing changes as ordered  - Position infant to avoid placing pressure on wound  - Enterostomal/Wound therapy consult as indicated for ostomies/wounds/G-tubes  Outcome: Progressing   Patient A/O x4. Up in the chair for meals. Walking to the bathroom with walker and assist. Plan to discharge home with Momo Cohen, possibly tomorrow.

## 2023-05-09 LAB
ANION GAP SERPL CALC-SCNC: 6 MMOL/L (ref 0–18)
BASOPHILS # BLD AUTO: 0.02 X10(3) UL (ref 0–0.2)
BASOPHILS NFR BLD AUTO: 0.3 %
BUN BLD-MCNC: 116 MG/DL (ref 7–18)
BUN/CREAT SERPL: 72 (ref 10–20)
CALCIUM BLD-MCNC: 8.8 MG/DL (ref 8.5–10.1)
CHLORIDE SERPL-SCNC: 101 MMOL/L (ref 98–112)
CO2 SERPL-SCNC: 33 MMOL/L (ref 21–32)
CREAT BLD-MCNC: 1.61 MG/DL
DEPRECATED RDW RBC AUTO: 55.5 FL (ref 35.1–46.3)
EOSINOPHIL # BLD AUTO: 0.02 X10(3) UL (ref 0–0.7)
EOSINOPHIL NFR BLD AUTO: 0.3 %
ERYTHROCYTE [DISTWIDTH] IN BLOOD BY AUTOMATED COUNT: 15.9 % (ref 11–15)
GFR SERPLBLD BASED ON 1.73 SQ M-ARVRAT: 34 ML/MIN/1.73M2 (ref 60–?)
GLUCOSE BLD-MCNC: 109 MG/DL (ref 70–99)
HCT VFR BLD AUTO: 43.7 %
HGB BLD-MCNC: 13.5 G/DL
IMM GRANULOCYTES # BLD AUTO: 0.06 X10(3) UL (ref 0–1)
IMM GRANULOCYTES NFR BLD: 0.9 %
INR BLD: 3.5 (ref 0.85–1.16)
INR BLD: 3.63 (ref 0.85–1.16)
LYMPHOCYTES # BLD AUTO: 0.86 X10(3) UL (ref 1–4)
LYMPHOCYTES NFR BLD AUTO: 13.1 %
MCH RBC QN AUTO: 29.5 PG (ref 26–34)
MCHC RBC AUTO-ENTMCNC: 30.9 G/DL (ref 31–37)
MCV RBC AUTO: 95.4 FL
MONOCYTES # BLD AUTO: 0.57 X10(3) UL (ref 0.1–1)
MONOCYTES NFR BLD AUTO: 8.6 %
NEUTROPHILS # BLD AUTO: 5.06 X10 (3) UL (ref 1.5–7.7)
NEUTROPHILS # BLD AUTO: 5.06 X10(3) UL (ref 1.5–7.7)
NEUTROPHILS NFR BLD AUTO: 76.8 %
OSMOLALITY SERPL CALC.SUM OF ELEC: 327 MOSM/KG (ref 275–295)
PLATELET # BLD AUTO: 126 10(3)UL (ref 150–450)
POTASSIUM SERPL-SCNC: 5.1 MMOL/L (ref 3.5–5.1)
PROTHROMBIN TIME: 34.4 SECONDS (ref 11.6–14.8)
PROTHROMBIN TIME: 35.3 SECONDS (ref 11.6–14.8)
RBC # BLD AUTO: 4.58 X10(6)UL
SODIUM SERPL-SCNC: 140 MMOL/L (ref 136–145)
WBC # BLD AUTO: 6.6 X10(3) UL (ref 4–11)

## 2023-05-09 NOTE — CM/SW NOTE
SW received MDO for [facility]. Pt is current with [facility] services. MICHAEL sent via aidin. SW/CM to remain available for support and/or discharge planning.      Hero Guo MSW, LSW   x [phone]

## 2023-05-09 NOTE — PLAN OF CARE
Problem: Patient Centered Care  Goal: Patient preferences are identified and integrated in the patient's plan of care  Description: Interventions:  - What would you like us to know as we care for you?  I live at home with my niece  - Provide timely, complete, and accurate information to patient/family  - Incorporate patient and family knowledge, values, beliefs, and cultural backgrounds into the planning and delivery of care  - Encourage patient/family to participate in care and decision-making at the level they choose  - Honor patient and family perspectives and choices  5/9/2023 0031 by Elaina Carter RN  Outcome: Progressing  Note: All patients needs met during shift  5/9/2023 0031 by Elaina Carter RN  Note: All patients needs met during shift     Problem: Patient/Family Goals  Goal: Patient/Family Long Term Goal  Description: Patient's Long Term Goal: To go home    Interventions:  - monitor VS  - pain management  - participate with PT/OT  - See additional Care Plan goals for specific interventions  5/9/2023 0031 by Elaina Carter RN  Outcome: Progressing  Note: Patient's INR decreases to 3  5/9/2023 0031 by Elaina Carter RN  Note: Patient's INR decreases to 3  Goal: Patient/Family Short Term Goal  Description: Patient's Short Term Goal: to feel better    Interventions:   - monitor VS  - pain management  - participate with PT/OT  - See additional Care Plan goals for specific interventions  5/9/2023 0031 by Elaina Carter RN  Outcome: Progressing  Note: Patients cough improves  5/9/2023 0031 by Elaina Carter RN  Note: Patients cough improves     Problem: CARDIOVASCULAR - ADULT  Goal: Absence of cardiac arrhythmias or at baseline  Description: INTERVENTIONS:  - Continuous cardiac monitoring, monitor vital signs, obtain 12 lead EKG if indicated  - Evaluate effectiveness of antiarrhythmic and heart rate control medications as ordered  - Initiate emergency measures for life threatening arrhythmias  - Monitor electrolytes and administer replacement therapy as ordered  5/9/2023 0031 by Fatimah Zuleta RN  Outcome: Progressing  Note: No calls from remote tele nurse  5/9/2023 0031 by Fatimah Zuleta RN  Note: No calls from remote tele nurse     Problem: RESPIRATORY - ADULT  Goal: Achieves optimal ventilation and oxygenation  Description: INTERVENTIONS:  - Assess for changes in respiratory status  - Assess for changes in mentation and behavior  - Position to facilitate oxygenation and minimize respiratory effort  - Oxygen supplementation based on oxygen saturation or ABGs  - Provide Smoking Cessation handout, if applicable  - Encourage broncho-pulmonary hygiene including cough, deep breathe, Incentive Spirometry  - Assess the need for suctioning and perform as needed  - Assess and instruct to report SOB or any respiratory difficulty  - Respiratory Therapy support as indicated  - Manage/alleviate anxiety  - Monitor for signs/symptoms of CO2 retention  5/9/2023 0031 by Fatimah Zuleta RN  Outcome: Progressing  Note: Patient wore bipap at night and tolerated well  5/9/2023 0031 by Fatimah Zuleta RN  Note: Patient wore bipap at night and tolerated well     Problem: SKIN/TISSUE INTEGRITY - ADULT  Goal: Skin integrity remains intact  Description: INTERVENTIONS  - Assess and document risk factors for pressure ulcer development  - Assess and document skin integrity  - Monitor for areas of redness and/or skin breakdown  - Initiate interventions, skin care algorithm/standards of care as needed  5/9/2023 0031 by Fatimah Zuleta RN  Outcome: Progressing  Note: Patients skin improving   5/9/2023 0031 by Fatimah Zuleta RN  Note: Patients skin improving      Problem: SAFETY ADULT - FALL  Goal: Free from fall injury  Description: INTERVENTIONS:  - Assess pt frequently for physical needs  - Identify cognitive and physical deficits and behaviors that affect risk of falls.   - [place] fall precautions as indicated by assessment.  - Educate pt/family on patient safety including physical limitations  - Instruct pt to call for assistance with activity based on assessment  - Modify environment to reduce risk of injury  - Provide assistive devices as appropriate  - Consider OT/PT consult to assist with strengthening/mobility  - Encourage toileting schedule  5/9/2023 0031 by Cass Martinez RN  Outcome: Progressing  Note: Patient free from falls during shift  5/9/2023 0031 by Cass Martinez RN  Note: Patient free from falls during shift     Problem: CARDIOVASCULAR - ADULT  Goal: Maintains optimal cardiac output and hemodynamic stability  Description: INTERVENTIONS:  - Monitor vital signs, rhythm, and trends  - Monitor for bleeding, hypotension and signs of decreased cardiac output  - Evaluate effectiveness of vasoactive medications to optimize hemodynamic stability  - Monitor arterial and/or venous puncture sites for bleeding and/or hematoma  - Assess quality of pulses, skin color and temperature  - Assess for signs of decreased coronary artery perfusion - ex.  Angina  - Evaluate fluid balance, assess for edema, trend weights  5/9/2023 0031 by Cass Martinez RN  Outcome: Not Progressing  Note: Patients INR 5.25  5/9/2023 0031 by Cass Martinez RN  Note: Patients INR 5.25     Problem: HEMATOLOGIC - ADULT  Goal: Maintains hematologic stability  Description: INTERVENTIONS  - Assess for signs and symptoms of bleeding or hemorrhage  - Monitor labs and vital signs for trends  - Administer supportive blood products/factors, fluids and medications as ordered and appropriate  - Administer supportive blood products/factors as ordered and appropriate  5/9/2023 0031 by Cass Martinez RN  Outcome: Not Progressing  Note: Patients INR 5.25  5/9/2023 0031 by Cass Martinez RN  Note: Patients INR 5.25     Problem: Impaired Functional Mobility  Goal: Achieve highest/safest level of mobility/gait  Description: Interventions:  - Assess patient's functional ability and stability  - Promote increasing activity/tolerance for mobility and gait  - Educate and engage patient/family in tolerated activity level and precautions  - Recommend use of  RW for transfers and ambulation  5/9/2023 0031 by Marco Antonio Mcintosh RN  Outcome: Not Progressing  Note: Patient walks with walker and 1 assist, general weakness  5/9/2023 0031 by Marco Antonio Mcintosh RN  Note: Patient walks with walker and 1 assist, general weakness     Patient wore bipap at night, tolerated well. No acute changes.

## 2023-05-09 NOTE — PLAN OF CARE
Problem: Patient Centered Care  Goal: Patient preferences are identified and integrated in the patient's plan of care  Description: Interventions:  - What would you like us to know as we care for you? I live at home with my niece  - Provide timely, complete, and accurate information to patient/family  - Incorporate patient and family knowledge, values, beliefs, and cultural backgrounds into the planning and delivery of care  - Encourage patient/family to participate in care and decision-making at the level they choose  - Honor patient and family perspectives and choices  Outcome: Progressing     Problem: CARDIOVASCULAR - ADULT  Goal: Maintains optimal cardiac output and hemodynamic stability  Description: INTERVENTIONS:  - Monitor vital signs, rhythm, and trends  - Monitor for bleeding, hypotension and signs of decreased cardiac output  - Evaluate effectiveness of vasoactive medications to optimize hemodynamic stability  - Monitor arterial and/or venous puncture sites for bleeding and/or hematoma  - Assess quality of pulses, skin color and temperature  - Assess for signs of decreased coronary artery perfusion - ex.  Angina  - Evaluate fluid balance, assess for edema, trend weights  Outcome: Progressing  Goal: Absence of cardiac arrhythmias or at baseline  Description: INTERVENTIONS:  - Continuous cardiac monitoring, monitor vital signs, obtain 12 lead EKG if indicated  - Evaluate effectiveness of antiarrhythmic and heart rate control medications as ordered  - Initiate emergency measures for life threatening arrhythmias  - Monitor electrolytes and administer replacement therapy as ordered  Outcome: Progressing     Problem: RESPIRATORY - ADULT  Goal: Achieves optimal ventilation and oxygenation  Description: INTERVENTIONS:  - Assess for changes in respiratory status  - Assess for changes in mentation and behavior  - Position to facilitate oxygenation and minimize respiratory effort  - Oxygen supplementation based on oxygen saturation or ABGs  - Provide Smoking Cessation handout, if applicable  - Encourage broncho-pulmonary hygiene including cough, deep breathe, Incentive Spirometry  - Assess the need for suctioning and perform as needed  - Assess and instruct to report SOB or any respiratory difficulty  - Respiratory Therapy support as indicated  - Manage/alleviate anxiety  - Monitor for signs/symptoms of CO2 retention  Outcome: Progressing     Problem: SKIN/TISSUE INTEGRITY - ADULT  Goal: Skin integrity remains intact  Description: INTERVENTIONS  - Assess and document risk factors for pressure ulcer development  - Assess and document skin integrity  - Monitor for areas of redness and/or skin breakdown  - Initiate interventions, skin care algorithm/standards of care as needed  Outcome: Progressing  Goal: Incision(s), wounds(s) or drain site(s) healing without S/S of infection  Description: INTERVENTIONS:  - Assess and document risk factors for pressure ulcer development  - Assess and document skin integrity  - Assess and document dressing/incision, wound bed, drain sites and surrounding tissue  - Implement wound care per orders  - Initiate isolation precautions as appropriate  - Initiate Pressure Ulcer prevention bundle as indicated  Outcome: Progressing     Problem: HEMATOLOGIC - ADULT  Goal: Maintains hematologic stability  Description: INTERVENTIONS  - Assess for signs and symptoms of bleeding or hemorrhage  - Monitor labs and vital signs for trends  - Administer supportive blood products/factors, fluids and medications as ordered and appropriate  - Administer supportive blood products/factors as ordered and appropriate  Outcome: Progressing  Goal: Free from bleeding injury  Description: (Example usage: patient with low platelets)  INTERVENTIONS:  - Avoid intramuscular injections, enemas and rectal medication administration  - Ensure safe mobilization of patient  - Hold pressure on venipuncture sites to achieve adequate hemostasis  - Assess for signs and symptoms of internal bleeding  - Monitor lab trends  - Patient is to report abnormal signs of bleeding to staff  - Avoid use of toothpicks and dental floss  - Use electric shaver for shaving  - Use soft bristle tooth brush  - Limit straining and forceful nose blowing  Outcome: Progressing     Problem: MUSCULOSKELETAL - ADULT  Goal: Return mobility to safest level of function  Description: INTERVENTIONS:  - Assess patient stability and activity tolerance for standing, transferring and ambulating w/ or w/o assistive devices  - Assist with transfers and ambulation using safe patient handling equipment as needed  - Ensure adequate protection for wounds/incisions during mobilization  - Obtain PT/OT consults as needed  - Advance activity as appropriate  - Communicate ordered activity level and limitations with patient/family  Outcome: Progressing     Problem: Impaired Functional Mobility  Goal: Achieve highest/safest level of mobility/gait  Description: Interventions:  - Assess patient's functional ability and stability  - Promote increasing activity/tolerance for mobility and gait  - Educate and engage patient/family in tolerated activity level and precautions  - Recommend use of  RW for transfers and ambulation  Outcome: Progressing     Problem: Impaired Activities of Daily Living  Goal: Achieve highest/safest level of independence in self care  Description: Interventions:  - Assess ability and encourage patient to participate in ADLs to maximize function  - Promote sitting position while performing ADLs such as feeding, grooming, and bathing  - Educate and encourage patient/family in tolerated functional activity level and precautions during self-care  - Provide support under elbow of weak side to prevent shoulder subluxation  - Encourage patient to incorporate impaired side during daily activities to promote function  Outcome: Progressing     Problem: SAFETY ADULT - FALL  Goal: Free from fall injury  Description: INTERVENTIONS:  - Assess pt frequently for physical needs  - Identify cognitive and physical deficits and behaviors that affect risk of falls. - [place] fall precautions as indicated by assessment.  - Educate pt/family on patient safety including physical limitations  - Instruct pt to call for assistance with activity based on assessment  - Modify environment to reduce risk of injury  - Provide assistive devices as appropriate  - Consider OT/PT consult to assist with strengthening/mobility  - Encourage toileting schedule  Outcome: Progressing     Problem: SKIN INTEGRITY  Goal: Skin integrity remains intact  Description: Interventions:  - Assess for areas of redness and/or skin breakdown  - Assess vascular sites hourly  - Change oxygen saturation probe minimum once per shift  - Provide humidity as ordered and per policy  - If on oxygen support, assess nasal septum area for skin breakdown and replace protective barrier if needed  - Change diapers as needed  - Minimize the use of adhesives  Outcome: Progressing  Goal: Incision/wound heals without complications  Description: Interventions:  - Assess wound bed/incision and surrounding skin tissue  - Collaborate with physician/APN and implement wound/incision site care and dressing changes as ordered  - Position infant to avoid placing pressure on wound  - Enterostomal/Wound therapy consult as indicated for ostomies/wounds/G-tubes  Outcome: Progressing  Patient A/O x4, up in chair for meals. On RA during day, O2 sat 92-94%. No complain of pain. She had another large form BM. Her INR today was 3.50. Possible home tomorrow.

## 2023-05-10 LAB
ANION GAP SERPL CALC-SCNC: 7 MMOL/L (ref 0–18)
BASOPHILS # BLD AUTO: 0.02 X10(3) UL (ref 0–0.2)
BASOPHILS NFR BLD AUTO: 0.3 %
BUN BLD-MCNC: 113 MG/DL (ref 7–18)
BUN/CREAT SERPL: 71.1 (ref 10–20)
CALCIUM BLD-MCNC: 8.7 MG/DL (ref 8.5–10.1)
CHLORIDE SERPL-SCNC: 101 MMOL/L (ref 98–112)
CO2 SERPL-SCNC: 33 MMOL/L (ref 21–32)
CREAT BLD-MCNC: 1.59 MG/DL
DEPRECATED RDW RBC AUTO: 54.6 FL (ref 35.1–46.3)
EOSINOPHIL # BLD AUTO: 0.07 X10(3) UL (ref 0–0.7)
EOSINOPHIL NFR BLD AUTO: 1.2 %
ERYTHROCYTE [DISTWIDTH] IN BLOOD BY AUTOMATED COUNT: 15.9 % (ref 11–15)
GFR SERPLBLD BASED ON 1.73 SQ M-ARVRAT: 34 ML/MIN/1.73M2 (ref 60–?)
GLUCOSE BLD-MCNC: 96 MG/DL (ref 70–99)
HCT VFR BLD AUTO: 42.9 %
HGB BLD-MCNC: 13.5 G/DL
IMM GRANULOCYTES # BLD AUTO: 0.05 X10(3) UL (ref 0–1)
IMM GRANULOCYTES NFR BLD: 0.9 %
INR BLD: 2.65 (ref 0.85–1.16)
INR BLD: 2.78 (ref 0.85–1.16)
LYMPHOCYTES # BLD AUTO: 1.28 X10(3) UL (ref 1–4)
LYMPHOCYTES NFR BLD AUTO: 22 %
MCH RBC QN AUTO: 29.5 PG (ref 26–34)
MCHC RBC AUTO-ENTMCNC: 31.5 G/DL (ref 31–37)
MCV RBC AUTO: 93.7 FL
MONOCYTES # BLD AUTO: 0.65 X10(3) UL (ref 0.1–1)
MONOCYTES NFR BLD AUTO: 11.2 %
NEUTROPHILS # BLD AUTO: 3.75 X10 (3) UL (ref 1.5–7.7)
NEUTROPHILS # BLD AUTO: 3.75 X10(3) UL (ref 1.5–7.7)
NEUTROPHILS NFR BLD AUTO: 64.4 %
OSMOLALITY SERPL CALC.SUM OF ELEC: 328 MOSM/KG (ref 275–295)
PLATELET # BLD AUTO: 124 10(3)UL (ref 150–450)
POTASSIUM SERPL-SCNC: 4.6 MMOL/L (ref 3.5–5.1)
PROTHROMBIN TIME: 27.7 SECONDS (ref 11.6–14.8)
PROTHROMBIN TIME: 29 SECONDS (ref 11.6–14.8)
RBC # BLD AUTO: 4.58 X10(6)UL
SODIUM SERPL-SCNC: 141 MMOL/L (ref 136–145)
WBC # BLD AUTO: 5.8 X10(3) UL (ref 4–11)

## 2023-05-10 RX ORDER — WARFARIN SODIUM 3 MG/1
3 TABLET ORAL NIGHTLY
Status: DISCONTINUED | OUTPATIENT
Start: 2023-05-10 | End: 2023-05-20

## 2023-05-10 RX ORDER — ALPRAZOLAM 0.25 MG/1
0.25 TABLET ORAL 2 TIMES DAILY PRN
Status: DISCONTINUED | OUTPATIENT
Start: 2023-05-10 | End: 2023-05-22

## 2023-05-10 NOTE — PLAN OF CARE
A/o x4. Denies pain at this time. SBA with walker. One bowel movement. Miconazole applied to areas. Lotrisone applied to Left lower extremity. Pt brings in own depends and is at bedside. Fluid restriction 2000ml. Carvedilol held d/t low BP. Plan of care ongoing. Problem: Patient Centered Care  Goal: Patient preferences are identified and integrated in the patient's plan of care  Description: Interventions:  - What would you like us to know as we care for you? I live at home with my niece  - Provide timely, complete, and accurate information to patient/family  - Incorporate patient and family knowledge, values, beliefs, and cultural backgrounds into the planning and delivery of care  - Encourage patient/family to participate in care and decision-making at the level they choose  - Honor patient and family perspectives and choices  Outcome: Progressing     Problem: Patient/Family Goals  Goal: Patient/Family Long Term Goal  Description: Patient's Long Term Goal: To go home    Interventions:  - monitor VS  - pain management  - participate with PT/OT  - See additional Care Plan goals for specific interventions  Outcome: Progressing  Goal: Patient/Family Short Term Goal  Description: Patient's Short Term Goal: to feel better    Interventions:   - monitor VS  - pain management  - participate with PT/OT  - See additional Care Plan goals for specific interventions  Outcome: Progressing     Problem: CARDIOVASCULAR - ADULT  Goal: Maintains optimal cardiac output and hemodynamic stability  Description: INTERVENTIONS:  - Monitor vital signs, rhythm, and trends  - Monitor for bleeding, hypotension and signs of decreased cardiac output  - Evaluate effectiveness of vasoactive medications to optimize hemodynamic stability  - Monitor arterial and/or venous puncture sites for bleeding and/or hematoma  - Assess quality of pulses, skin color and temperature  - Assess for signs of decreased coronary artery perfusion - ex. Angina  - Evaluate fluid balance, assess for edema, trend weights  Outcome: Progressing  Goal: Absence of cardiac arrhythmias or at baseline  Description: INTERVENTIONS:  - Continuous cardiac monitoring, monitor vital signs, obtain 12 lead EKG if indicated  - Evaluate effectiveness of antiarrhythmic and heart rate control medications as ordered  - Initiate emergency measures for life threatening arrhythmias  - Monitor electrolytes and administer replacement therapy as ordered  Outcome: Progressing     Problem: RESPIRATORY - ADULT  Goal: Achieves optimal ventilation and oxygenation  Description: INTERVENTIONS:  - Assess for changes in respiratory status  - Assess for changes in mentation and behavior  - Position to facilitate oxygenation and minimize respiratory effort  - Oxygen supplementation based on oxygen saturation or ABGs  - Provide Smoking Cessation handout, if applicable  - Encourage broncho-pulmonary hygiene including cough, deep breathe, Incentive Spirometry  - Assess the need for suctioning and perform as needed  - Assess and instruct to report SOB or any respiratory difficulty  - Respiratory Therapy support as indicated  - Manage/alleviate anxiety  - Monitor for signs/symptoms of CO2 retention  Outcome: Progressing     Problem: SKIN/TISSUE INTEGRITY - ADULT  Goal: Skin integrity remains intact  Description: INTERVENTIONS  - Assess and document risk factors for pressure ulcer development  - Assess and document skin integrity  - Monitor for areas of redness and/or skin breakdown  - Initiate interventions, skin care algorithm/standards of care as needed  Outcome: Progressing  Goal: Incision(s), wounds(s) or drain site(s) healing without S/S of infection  Description: INTERVENTIONS:  - Assess and document risk factors for pressure ulcer development  - Assess and document skin integrity  - Assess and document dressing/incision, wound bed, drain sites and surrounding tissue  - Implement wound care per orders  - Initiate isolation precautions as appropriate  - Initiate Pressure Ulcer prevention bundle as indicated  Outcome: Progressing     Problem: HEMATOLOGIC - ADULT  Goal: Maintains hematologic stability  Description: INTERVENTIONS  - Assess for signs and symptoms of bleeding or hemorrhage  - Monitor labs and vital signs for trends  - Administer supportive blood products/factors, fluids and medications as ordered and appropriate  - Administer supportive blood products/factors as ordered and appropriate  Outcome: Progressing  Goal: Free from bleeding injury  Description: (Example usage: patient with low platelets)  INTERVENTIONS:  - Avoid intramuscular injections, enemas and rectal medication administration  - Ensure safe mobilization of patient  - Hold pressure on venipuncture sites to achieve adequate hemostasis  - Assess for signs and symptoms of internal bleeding  - Monitor lab trends  - Patient is to report abnormal signs of bleeding to staff  - Avoid use of toothpicks and dental floss  - Use electric shaver for shaving  - Use soft bristle tooth brush  - Limit straining and forceful nose blowing  Outcome: Progressing     Problem: MUSCULOSKELETAL - ADULT  Goal: Return mobility to safest level of function  Description: INTERVENTIONS:  - Assess patient stability and activity tolerance for standing, transferring and ambulating w/ or w/o assistive devices  - Assist with transfers and ambulation using safe patient handling equipment as needed  - Ensure adequate protection for wounds/incisions during mobilization  - Obtain PT/OT consults as needed  - Advance activity as appropriate  - Communicate ordered activity level and limitations with patient/family  Outcome: Progressing     Problem: Impaired Functional Mobility  Goal: Achieve highest/safest level of mobility/gait  Description: Interventions:  - Assess patient's functional ability and stability  - Promote increasing activity/tolerance for mobility and gait  - Educate and engage patient/family in tolerated activity level and precautions    Outcome: Progressing     Problem: Impaired Activities of Daily Living  Goal: Achieve highest/safest level of independence in self care  Description: Interventions:  - Assess ability and encourage patient to participate in ADLs to maximize function  - Promote sitting position while performing ADLs such as feeding, grooming, and bathing  - Educate and encourage patient/family in tolerated functional activity level and precautions during self-care  - Encourage patient to incorporate impaired side during daily activities to promote function  Outcome: Progressing     Problem: SAFETY ADULT - FALL  Goal: Free from fall injury  Description: INTERVENTIONS:  - Assess pt frequently for physical needs  - Identify cognitive and physical deficits and behaviors that affect risk of falls.   - [place] fall precautions as indicated by assessment.  - Educate pt/family on patient safety including physical limitations  - Instruct pt to call for assistance with activity based on assessment  - Modify environment to reduce risk of injury  - Provide assistive devices as appropriate  - Consider OT/PT consult to assist with strengthening/mobility  - Encourage toileting schedule  Outcome: Progressing     Problem: SKIN INTEGRITY  Goal: Skin integrity remains intact  Description: Interventions:  - Assess for areas of redness and/or skin breakdown  - Assess vascular sites hourly  - Change oxygen saturation probe minimum once per shift  - Provide humidity as ordered and per policy  - If on oxygen support, assess nasal septum area for skin breakdown and replace protective barrier if needed  - Change diapers as needed  - Minimize the use of adhesives  Outcome: Progressing  Goal: Incision/wound heals without complications  Description: Interventions:  - Assess wound bed/incision and surrounding skin tissue  - Collaborate with physician/APN and implement wound/incision site care and dressing changes as ordered  - Position infant to avoid placing pressure on wound  - Enterostomal/Wound therapy consult as indicated for ostomies/wounds/G-tubes  Outcome: Progressing

## 2023-05-10 NOTE — PLAN OF CARE
No acute changes. Warfarin to be resumed tonight. Cough relieved with robitussin and tessalon. Problem: Patient Centered Care  Goal: Patient preferences are identified and integrated in the patient's plan of care  Description: Interventions:  - What would you like us to know as we care for you? I live at home with my niece  - Provide timely, complete, and accurate information to patient/family  - Incorporate patient and family knowledge, values, beliefs, and cultural backgrounds into the planning and delivery of care  - Encourage patient/family to participate in care and decision-making at the level they choose  - Honor patient and family perspectives and choices  Outcome: Progressing     Problem: Patient/Family Goals  Goal: Patient/Family Long Term Goal  Description: Patient's Long Term Goal: To go home    Interventions:  - monitor VS  - pain management  - participate with PT/OT  - See additional Care Plan goals for specific interventions  Outcome: Progressing  Goal: Patient/Family Short Term Goal  Description: Patient's Short Term Goal: to feel better    Interventions:   - monitor VS  - pain management  - participate with PT/OT  - See additional Care Plan goals for specific interventions  Outcome: Progressing     Problem: CARDIOVASCULAR - ADULT  Goal: Maintains optimal cardiac output and hemodynamic stability  Description: INTERVENTIONS:  - Monitor vital signs, rhythm, and trends  - Monitor for bleeding, hypotension and signs of decreased cardiac output  - Evaluate effectiveness of vasoactive medications to optimize hemodynamic stability  - Monitor arterial and/or venous puncture sites for bleeding and/or hematoma  - Assess quality of pulses, skin color and temperature  - Assess for signs of decreased coronary artery perfusion - ex.  Angina  - Evaluate fluid balance, assess for edema, trend weights  Outcome: Progressing  Goal: Absence of cardiac arrhythmias or at baseline  Description: INTERVENTIONS:  - Continuous cardiac monitoring, monitor vital signs, obtain 12 lead EKG if indicated  - Evaluate effectiveness of antiarrhythmic and heart rate control medications as ordered  - Initiate emergency measures for life threatening arrhythmias  - Monitor electrolytes and administer replacement therapy as ordered  Outcome: Progressing     Problem: RESPIRATORY - ADULT  Goal: Achieves optimal ventilation and oxygenation  Description: INTERVENTIONS:  - Assess for changes in respiratory status  - Assess for changes in mentation and behavior  - Position to facilitate oxygenation and minimize respiratory effort  - Oxygen supplementation based on oxygen saturation or ABGs  - Provide Smoking Cessation handout, if applicable  - Encourage broncho-pulmonary hygiene including cough, deep breathe, Incentive Spirometry  - Assess the need for suctioning and perform as needed  - Assess and instruct to report SOB or any respiratory difficulty  - Respiratory Therapy support as indicated  - Manage/alleviate anxiety  - Monitor for signs/symptoms of CO2 retention  Outcome: Progressing     Problem: SKIN/TISSUE INTEGRITY - ADULT  Goal: Skin integrity remains intact  Description: INTERVENTIONS  - Assess and document risk factors for pressure ulcer development  - Assess and document skin integrity  - Monitor for areas of redness and/or skin breakdown  - Initiate interventions, skin care algorithm/standards of care as needed  Outcome: Progressing  Goal: Incision(s), wounds(s) or drain site(s) healing without S/S of infection  Description: INTERVENTIONS:  - Assess and document risk factors for pressure ulcer development  - Assess and document skin integrity  - Assess and document dressing/incision, wound bed, drain sites and surrounding tissue  - Implement wound care per orders  - Initiate isolation precautions as appropriate  - Initiate Pressure Ulcer prevention bundle as indicated  Outcome: Progressing     Problem: HEMATOLOGIC - ADULT  Goal: Maintains hematologic stability  Description: INTERVENTIONS  - Assess for signs and symptoms of bleeding or hemorrhage  - Monitor labs and vital signs for trends  - Administer supportive blood products/factors, fluids and medications as ordered and appropriate  - Administer supportive blood products/factors as ordered and appropriate  Outcome: Progressing  Goal: Free from bleeding injury  Description: (Example usage: patient with low platelets)  INTERVENTIONS:  - Avoid intramuscular injections, enemas and rectal medication administration  - Ensure safe mobilization of patient  - Hold pressure on venipuncture sites to achieve adequate hemostasis  - Assess for signs and symptoms of internal bleeding  - Monitor lab trends  - Patient is to report abnormal signs of bleeding to staff  - Avoid use of toothpicks and dental floss  - Use electric shaver for shaving  - Use soft bristle tooth brush  - Limit straining and forceful nose blowing  Outcome: Progressing     Problem: MUSCULOSKELETAL - ADULT  Goal: Return mobility to safest level of function  Description: INTERVENTIONS:  - Assess patient stability and activity tolerance for standing, transferring and ambulating w/ or w/o assistive devices  - Assist with transfers and ambulation using safe patient handling equipment as needed  - Ensure adequate protection for wounds/incisions during mobilization  - Obtain PT/OT consults as needed  - Advance activity as appropriate  - Communicate ordered activity level and limitations with patient/family  Outcome: Progressing     Problem: SAFETY ADULT - FALL  Goal: Free from fall injury  Description: INTERVENTIONS:  - Assess pt frequently for physical needs  - Identify cognitive and physical deficits and behaviors that affect risk of falls.   - [place] fall precautions as indicated by assessment.  - Educate pt/family on patient safety including physical limitations  - Instruct pt to call for assistance with activity based on assessment  - Modify environment to reduce risk of injury  - Provide assistive devices as appropriate  - Consider OT/PT consult to assist with strengthening/mobility  - Encourage toileting schedule  Outcome: Progressing     Problem: SKIN INTEGRITY  Goal: Skin integrity remains intact  Description: Interventions:  - Assess for areas of redness and/or skin breakdown  - Assess vascular sites hourly  - Change oxygen saturation probe minimum once per shift  - Provide humidity as ordered and per policy  - If on oxygen support, assess nasal septum area for skin breakdown and replace protective barrier if needed  - Change diapers as needed  - Minimize the use of adhesives  Outcome: Progressing  Goal: Incision/wound heals without complications  Description: Interventions:  - Assess wound bed/incision and surrounding skin tissue  - Collaborate with physician/APN and implement wound/incision site care and dressing changes as ordered  - Position infant to avoid placing pressure on wound  - Enterostomal/Wound therapy consult as indicated for ostomies/wounds/G-tubes  Outcome: Progressing

## 2023-05-10 NOTE — DOWNTIME EVENT NOTE
Past Medical History:   Diagnosis Date    Allergy     Arthritis     BPH with obstruction/lower urinary tract symptoms     Environmental allergies     H/O prostatitis     History of urinary retention     [place] (hard of hearing)     WEARS BILATERAL HEARING AIDS    [place] (hard of hearing)     wears hearing aids    Hypertension     Sleep apnea     Urinary tract infection        Past Surgical History:   Procedure Laterality Date    HERNIA REPAIR      INGUNAL     KNEE ARTHROPLASTY Left 8/25/2020    Procedure: ARTHROPLASTY, KNEE;  Surgeon: Max Garcia MD;  Location: [facility];  Service: Orthopedics;  Laterality: Left;    TRANSRECTAL ULTRASOUND EXAMINATION N/A 8/12/2019    Procedure: TRANSRECTAL ULTRASOUND;  Surgeon: Latia Yoon MD;  [facility];  Service: Urology;  Laterality: N/A;    VASECTOMY      WISDOM TOOTH EXTRACTION         Current Outpatient Medications   Medication Sig    acetaminophen (TYLENOL) 325 MG tablet Take 325 mg by mouth every 6 (six) hours as needed for Pain.    ANDROGEL 20.25 mg/1.25 gram (1.62 %) GlPm     aspirin (ECOTRIN) 81 MG EC tablet Take 81 mg by mouth once daily.    azelastine (ASTELIN) 137 mcg nasal spray 2 sprays (274 mcg total) by Nasal route 2 (two) times daily.    diclofenac sodium (VOLTAREN) 1 % Gel     diphenhydrAMINE (BENADRYL) 50 MG capsule Take 50 mg by mouth every 6 (six) hours as needed for Itching.    fluocinolone (DERMA-SMOOTHE) 0.01 % external oil Apply topically 3 (three) times daily. Apply to damp scalp at bedtime, wash off qam, avoid chronic use.    fluticasone propionate (FLONASE) 50 mcg/actuation nasal spray     losartan (COZAAR) 100 MG tablet     montelukast (SINGULAIR) 10 mg tablet TAKE 1 TABLET DAILY    omeprazole (PRILOSEC) 10 MG capsule Take 10 mg by mouth once daily.    tadalafiL (CIALIS) 5 MG tablet Take 1 tablet (5 mg) by mouth daily as needed for Erectile Dysfunction.    tamsulosin (FLOMAX) 0.4 mg Cap Take 1  The EMR was down for 2 hours on 5/10/2023. Ani Wick was responsible for completing the paper charting during this time period.      The following information was re-entered into the system by Pb Siu RN: Hydroxyzine 25mg PO administered at 0319    The following information will remain in the paper chart: Medication administration record    Pb Siu RN  5/10/2023 capsule (0.4 mg total) by mouth after dinner.    triamcinolone acetonide 0.1% (KENALOG) 0.1 % cream aaa bid x 2 weeks then prn, avoid chronic use     No current facility-administered medications for this visit.        Review of patient's allergies indicates:   Allergen Reactions    Adhesive     Codeine Hives    Morphine Hives    Sulfa (sulfonamide antibiotics) Hives and Itching       Family History   Problem Relation Age of Onset    Dementia Mother     Cancer Father         skin    Heart disease Father     Allergic rhinitis Neg Hx     Allergies Neg Hx     Angioedema Neg Hx     Asthma Neg Hx     Atopy Neg Hx     Eczema Neg Hx     Immunodeficiency Neg Hx     Rhinitis Neg Hx     Urticaria Neg Hx        Social History     Socioeconomic History    Marital status:      Spouse name: Not on file    Number of children: Not on file    Years of education: Not on file    Highest education level: Not on file   Occupational History    Not on file   Social Needs    Financial resource strain: Not on file    Food insecurity     Worry: Not on file     Inability: Not on file    Transportation needs     Medical: Not on file     Non-medical: Not on file   Tobacco Use    Smoking status: Former Smoker     Packs/day: 1.00     Years: 10.00     Pack years: 10.00     Quit date: 4/3/1974     Years since quittin.4    Smokeless tobacco: Never Used   Substance and Sexual Activity    Alcohol use: No    Drug use: No    Sexual activity: Not on file   Lifestyle    Physical activity     Days per week: Not on file     Minutes per session: Not on file    Stress: Not on file   Relationships    Social connections     Talks on phone: Not on file     Gets together: Not on file     Attends [employer] service: Not on file     Active member of club or organization: Not on file     Attends meetings of clubs or organizations: Not on file     Relationship status: Not on file   Other Topics Concern    Not on file    Social History Narrative    Not on file       Chief Complaint:   Chief Complaint   Patient presents with    Post-op Evaluation     s/p left knee TKA 8/25/20        Date of surgery:  August 25, 2020    History of present illness:  75-year-old male underwent left total knee arthroplasty about 3 weeks ago.  Patient has done well in regards the actual knee.  Patient has had some issues with overactive bladder after the spinal anesthesia.  He also had some irritation from the tape on the dressing.  Patient went to the ER yesterday because of some calf pain but it was negative for DVT.  Pain is a 3/10.  Still doing some therapy.      Review of Systems:    Musculoskeletal:  See HPI        Physical Examination:    Vital Signs:    Vitals:    09/17/20 0929   Resp: 16       Body mass index is 36.8 kg/m².    This a well-developed, well nourished patient in no acute distress.  They are alert and oriented and cooperative to examination.  Pt. walks without an antalgic gait.      Examination left knee shows well-healing surgical incision.  No erythema or drainage.  Patient has good range of motion for about 5° to 100°.  No calf pain today.  Negative Homans sign.    X-rays:  Two views left knee are ordered and reviewed which show well-aligned total knee arthroplasty components without complication     Assessment::  Status post left [place] CR total knee arthroplasty    Plan:  I reviewed the x-ray with him today.  Continue the physical therapy.  I will see him back in 6 weeks.  No x-ray needed.    This note was created using M Modal voice recognition software that occasionally misinterpreted phrases or words.

## 2023-05-10 NOTE — PLAN OF CARE
Problem: Patient Centered Care  Goal: Patient preferences are identified and integrated in the patient's plan of care  Description: Interventions:  - What would you like us to know as we care for you? I live at home with my niece  - Provide timely, complete, and accurate information to patient/family  - Incorporate patient and family knowledge, values, beliefs, and cultural backgrounds into the planning and delivery of care  - Encourage patient/family to participate in care and decision-making at the level they choose  - Honor patient and family perspectives and choices  Outcome: Progressing     Problem: CARDIOVASCULAR - ADULT  Goal: Maintains optimal cardiac output and hemodynamic stability  Description: INTERVENTIONS:  - Monitor vital signs, rhythm, and trends  - Monitor for bleeding, hypotension and signs of decreased cardiac output  - Evaluate effectiveness of vasoactive medications to optimize hemodynamic stability  - Monitor arterial and/or venous puncture sites for bleeding and/or hematoma  - Assess quality of pulses, skin color and temperature  - Assess for signs of decreased coronary artery perfusion - ex.  Angina  - Evaluate fluid balance, assess for edema, trend weights  Outcome: Progressing  Goal: Absence of cardiac arrhythmias or at baseline  Description: INTERVENTIONS:  - Continuous cardiac monitoring, monitor vital signs, obtain 12 lead EKG if indicated  - Evaluate effectiveness of antiarrhythmic and heart rate control medications as ordered  - Initiate emergency measures for life threatening arrhythmias  - Monitor electrolytes and administer replacement therapy as ordered  Outcome: Progressing     Problem: RESPIRATORY - ADULT  Goal: Achieves optimal ventilation and oxygenation  Description: INTERVENTIONS:  - Assess for changes in respiratory status  - Assess for changes in mentation and behavior  - Position to facilitate oxygenation and minimize respiratory effort  - Oxygen supplementation based on oxygen saturation or ABGs  - Provide Smoking Cessation handout, if applicable  - Encourage broncho-pulmonary hygiene including cough, deep breathe, Incentive Spirometry  - Assess the need for suctioning and perform as needed  - Assess and instruct to report SOB or any respiratory difficulty  - Respiratory Therapy support as indicated  - Manage/alleviate anxiety  - Monitor for signs/symptoms of CO2 retention  Outcome: Progressing     Problem: SKIN/TISSUE INTEGRITY - ADULT  Goal: Skin integrity remains intact  Description: INTERVENTIONS  - Assess and document risk factors for pressure ulcer development  - Assess and document skin integrity  - Monitor for areas of redness and/or skin breakdown  - Initiate interventions, skin care algorithm/standards of care as needed  Outcome: Progressing  Goal: Incision(s), wounds(s) or drain site(s) healing without S/S of infection  Description: INTERVENTIONS:  - Assess and document risk factors for pressure ulcer development  - Assess and document skin integrity  - Assess and document dressing/incision, wound bed, drain sites and surrounding tissue  - Implement wound care per orders  - Initiate isolation precautions as appropriate  - Initiate Pressure Ulcer prevention bundle as indicated  Outcome: Progressing     Problem: SAFETY ADULT - FALL  Goal: Free from fall injury  Description: INTERVENTIONS:  - Assess pt frequently for physical needs  - Identify cognitive and physical deficits and behaviors that affect risk of falls.   - [place] fall precautions as indicated by assessment.  - Educate pt/family on patient safety including physical limitations  - Instruct pt to call for assistance with activity based on assessment  - Modify environment to reduce risk of injury  - Provide assistive devices as appropriate  - Consider OT/PT consult to assist with strengthening/mobility  - Encourage toileting schedule  Outcome: Progressing   Patient a/ox4, in no acute distress, currently denies pain, no pain, continues to have cough, wearing bipap prn. VS stable

## 2023-05-11 ENCOUNTER — APPOINTMENT (OUTPATIENT)
Dept: CARDIOLOGY | Age: 73
End: 2023-05-11
Attending: INTERNAL MEDICINE

## 2023-05-11 ENCOUNTER — TELEPHONE (OUTPATIENT)
Dept: CARDIOLOGY | Age: 73
End: 2023-05-11

## 2023-05-11 LAB
ANION GAP SERPL CALC-SCNC: 5 MMOL/L (ref 0–18)
BASOPHILS # BLD AUTO: 0.01 X10(3) UL (ref 0–0.2)
BASOPHILS NFR BLD AUTO: 0.1 %
BUN BLD-MCNC: 119 MG/DL (ref 7–18)
BUN/CREAT SERPL: 71.7 (ref 10–20)
CALCIUM BLD-MCNC: 8.9 MG/DL (ref 8.5–10.1)
CHLORIDE SERPL-SCNC: 103 MMOL/L (ref 98–112)
CO2 SERPL-SCNC: 31 MMOL/L (ref 21–32)
CREAT BLD-MCNC: 1.66 MG/DL
DEPRECATED RDW RBC AUTO: 55.5 FL (ref 35.1–46.3)
EOSINOPHIL # BLD AUTO: 0 X10(3) UL (ref 0–0.7)
EOSINOPHIL NFR BLD AUTO: 0 %
ERYTHROCYTE [DISTWIDTH] IN BLOOD BY AUTOMATED COUNT: 15.9 % (ref 11–15)
GFR SERPLBLD BASED ON 1.73 SQ M-ARVRAT: 33 ML/MIN/1.73M2 (ref 60–?)
GLUCOSE BLD-MCNC: 126 MG/DL (ref 70–99)
HCT VFR BLD AUTO: 44.4 %
HGB BLD-MCNC: 14 G/DL
IMM GRANULOCYTES # BLD AUTO: 0.06 X10(3) UL (ref 0–1)
IMM GRANULOCYTES NFR BLD: 0.8 %
INR BLD: 2.37 (ref 0.85–1.16)
LYMPHOCYTES # BLD AUTO: 0.62 X10(3) UL (ref 1–4)
LYMPHOCYTES NFR BLD AUTO: 7.9 %
MCH RBC QN AUTO: 29.9 PG (ref 26–34)
MCHC RBC AUTO-ENTMCNC: 31.5 G/DL (ref 31–37)
MCV RBC AUTO: 94.7 FL
MONOCYTES # BLD AUTO: 0.48 X10(3) UL (ref 0.1–1)
MONOCYTES NFR BLD AUTO: 6.1 %
NEUTROPHILS # BLD AUTO: 6.71 X10 (3) UL (ref 1.5–7.7)
NEUTROPHILS # BLD AUTO: 6.71 X10(3) UL (ref 1.5–7.7)
NEUTROPHILS NFR BLD AUTO: 85.1 %
OSMOLALITY SERPL CALC.SUM OF ELEC: 328 MOSM/KG (ref 275–295)
PLATELET # BLD AUTO: 128 10(3)UL (ref 150–450)
POTASSIUM SERPL-SCNC: 4.8 MMOL/L (ref 3.5–5.1)
PROTHROMBIN TIME: 25.4 SECONDS (ref 11.6–14.8)
RBC # BLD AUTO: 4.69 X10(6)UL
SODIUM SERPL-SCNC: 139 MMOL/L (ref 136–145)
WBC # BLD AUTO: 7.9 X10(3) UL (ref 4–11)

## 2023-05-11 RX ORDER — PREDNISONE 20 MG/1
40 TABLET ORAL
Status: DISCONTINUED | OUTPATIENT
Start: 2023-05-12 | End: 2023-05-14

## 2023-05-11 RX ORDER — SODIUM CHLORIDE 9 MG/ML
INJECTION, SOLUTION INTRAVENOUS CONTINUOUS
Status: ACTIVE | OUTPATIENT
Start: 2023-05-11 | End: 2023-05-12

## 2023-05-11 NOTE — PLAN OF CARE
Patient has safety precautions in place bed in the lowest position, bed alarm on, and call light within reach. Continue to monitor patient with intentional nursing rounds. Problem: Patient Centered Care  Goal: Patient preferences are identified and integrated in the patient's plan of care  Description: Interventions:  - What would you like us to know as we care for you? I live at home with my niece  - Provide timely, complete, and accurate information to patient/family  - Incorporate patient and family knowledge, values, beliefs, and cultural backgrounds into the planning and delivery of care  - Encourage patient/family to participate in care and decision-making at the level they choose  - Honor patient and family perspectives and choices  Outcome: Progressing     Problem: Patient/Family Goals  Goal: Patient/Family Long Term Goal  Description: Patient's Long Term Goal: To go home    Interventions:  - monitor VS  - pain management  - participate with PT/OT  - See additional Care Plan goals for specific interventions  Outcome: Progressing  Goal: Patient/Family Short Term Goal  Description: Patient's Short Term Goal: to feel better    Interventions:   - monitor VS  - pain management  - participate with PT/OT  - See additional Care Plan goals for specific interventions  Outcome: Progressing     Problem: CARDIOVASCULAR - ADULT  Goal: Maintains optimal cardiac output and hemodynamic stability  Description: INTERVENTIONS:  - Monitor vital signs, rhythm, and trends  - Monitor for bleeding, hypotension and signs of decreased cardiac output  - Evaluate effectiveness of vasoactive medications to optimize hemodynamic stability  - Monitor arterial and/or venous puncture sites for bleeding and/or hematoma  - Assess quality of pulses, skin color and temperature  - Assess for signs of decreased coronary artery perfusion - ex.  Angina  - Evaluate fluid balance, assess for edema, trend weights  Outcome: Progressing  Goal: Absence of cardiac arrhythmias or at baseline  Description: INTERVENTIONS:  - Continuous cardiac monitoring, monitor vital signs, obtain 12 lead EKG if indicated  - Evaluate effectiveness of antiarrhythmic and heart rate control medications as ordered  - Initiate emergency measures for life threatening arrhythmias  - Monitor electrolytes and administer replacement therapy as ordered  Outcome: Progressing     Problem: RESPIRATORY - ADULT  Goal: Achieves optimal ventilation and oxygenation  Description: INTERVENTIONS:  - Assess for changes in respiratory status  - Assess for changes in mentation and behavior  - Position to facilitate oxygenation and minimize respiratory effort  - Oxygen supplementation based on oxygen saturation or ABGs  - Provide Smoking Cessation handout, if applicable  - Encourage broncho-pulmonary hygiene including cough, deep breathe, Incentive Spirometry  - Assess the need for suctioning and perform as needed  - Assess and instruct to report SOB or any respiratory difficulty  - Respiratory Therapy support as indicated  - Manage/alleviate anxiety  - Monitor for signs/symptoms of CO2 retention  Outcome: Progressing     Problem: SKIN/TISSUE INTEGRITY - ADULT  Goal: Skin integrity remains intact  Description: INTERVENTIONS  - Assess and document risk factors for pressure ulcer development  - Assess and document skin integrity  - Monitor for areas of redness and/or skin breakdown  - Initiate interventions, skin care algorithm/standards of care as needed  Outcome: Progressing  Goal: Incision(s), wounds(s) or drain site(s) healing without S/S of infection  Description: INTERVENTIONS:  - Assess and document risk factors for pressure ulcer development  - Assess and document skin integrity  - Assess and document dressing/incision, wound bed, drain sites and surrounding tissue  - Implement wound care per orders  - Initiate isolation precautions as appropriate  - Initiate Pressure Ulcer prevention bundle as indicated  Outcome: Progressing     Problem: HEMATOLOGIC - ADULT  Goal: Maintains hematologic stability  Description: INTERVENTIONS  - Assess for signs and symptoms of bleeding or hemorrhage  - Monitor labs and vital signs for trends  - Administer supportive blood products/factors, fluids and medications as ordered and appropriate  - Administer supportive blood products/factors as ordered and appropriate  Outcome: Progressing  Goal: Free from bleeding injury  Description: (Example usage: patient with low platelets)  INTERVENTIONS:  - Avoid intramuscular injections, enemas and rectal medication administration  - Ensure safe mobilization of patient  - Hold pressure on venipuncture sites to achieve adequate hemostasis  - Assess for signs and symptoms of internal bleeding  - Monitor lab trends  - Patient is to report abnormal signs of bleeding to staff  - Avoid use of toothpicks and dental floss  - Use electric shaver for shaving  - Use soft bristle tooth brush  - Limit straining and forceful nose blowing  Outcome: Progressing     Problem: MUSCULOSKELETAL - ADULT  Goal: Return mobility to safest level of function  Description: INTERVENTIONS:  - Assess patient stability and activity tolerance for standing, transferring and ambulating w/ or w/o assistive devices  - Assist with transfers and ambulation using safe patient handling equipment as needed  - Ensure adequate protection for wounds/incisions during mobilization  - Obtain PT/OT consults as needed  - Advance activity as appropriate  - Communicate ordered activity level and limitations with patient/family  Outcome: Progressing     Problem: Impaired Functional Mobility  Goal: Achieve highest/safest level of mobility/gait  Description: Interventions:  - Assess patient's functional ability and stability  - Promote increasing activity/tolerance for mobility and gait  - Educate and engage patient/family in tolerated activity level and precautions  - Recommend patient transfer to bedside chair toward strongest side  Outcome: Progressing     Problem: Impaired Activities of Daily Living  Goal: Achieve highest/safest level of independence in self care  Description: Interventions:  - Assess ability and encourage patient to participate in ADLs to maximize function  - Promote sitting position while performing ADLs such as feeding, grooming, and bathing  - Educate and encourage patient/family in tolerated functional activity level and precautions during self-care  - Provide support under elbow of weak side to prevent shoulder subluxation  Outcome: Progressing     Problem: SAFETY ADULT - FALL  Goal: Free from fall injury  Description: INTERVENTIONS:  - Assess pt frequently for physical needs  - Identify cognitive and physical deficits and behaviors that affect risk of falls.   - [place] fall precautions as indicated by assessment.  - Educate pt/family on patient safety including physical limitations  - Instruct pt to call for assistance with activity based on assessment  - Modify environment to reduce risk of injury  - Provide assistive devices as appropriate  - Consider OT/PT consult to assist with strengthening/mobility  - Encourage toileting schedule  Outcome: Progressing     Problem: SKIN INTEGRITY  Goal: Skin integrity remains intact  Description: Interventions:  - Assess for areas of redness and/or skin breakdown  - Assess vascular sites hourly  - Change oxygen saturation probe minimum once per shift  - Provide humidity as ordered and per policy  - If on oxygen support, assess nasal septum area for skin breakdown and replace protective barrier if needed  - Change diapers as needed  - Minimize the use of adhesives  Outcome: Progressing  Goal: Incision/wound heals without complications  Description: Interventions:  - Assess wound bed/incision and surrounding skin tissue  - Collaborate with physician/APN and implement wound/incision site care and dressing changes as ordered  - Position infant to avoid placing pressure on wound  - Enterostomal/Wound therapy consult as indicated for ostomies/wounds/G-tubes  Outcome: Progressing

## 2023-05-11 NOTE — CM/SW NOTE
SW was informed that MD is inquiring for pt to have CPAP. SW was informed that pt would need to do an outpt sleep study which is where a CPAP can be issued then. URIEL/RUBENS to remain available for support and/or discharge planning.      Steven De Leon, MSW, LSW   x [phone]

## 2023-05-11 NOTE — PLAN OF CARE
Problem: Patient Centered Care  Goal: Patient preferences are identified and integrated in the patient's plan of care  Description: Interventions:  - What would you like us to know as we care for you? I live at home with my niece  - Provide timely, complete, and accurate information to patient/family  - Incorporate patient and family knowledge, values, beliefs, and cultural backgrounds into the planning and delivery of care  - Encourage patient/family to participate in care and decision-making at the level they choose  - Honor patient and family perspectives and choices  Outcome: Progressing     Problem: Patient/Family Goals  Goal: Patient/Family Long Term Goal  Description: Patient's Long Term Goal: To go home    Interventions:  - monitor VS  - pain management  - participate with PT/OT  - See additional Care Plan goals for specific interventions  Outcome: Progressing  Goal: Patient/Family Short Term Goal  Description: Patient's Short Term Goal: to feel better    Interventions:   - monitor VS  - pain management  - participate with PT/OT  - See additional Care Plan goals for specific interventions  Outcome: Progressing     Problem: CARDIOVASCULAR - ADULT  Goal: Maintains optimal cardiac output and hemodynamic stability  Description: INTERVENTIONS:  - Monitor vital signs, rhythm, and trends  - Monitor for bleeding, hypotension and signs of decreased cardiac output  - Evaluate effectiveness of vasoactive medications to optimize hemodynamic stability  - Monitor arterial and/or venous puncture sites for bleeding and/or hematoma  - Assess quality of pulses, skin color and temperature  - Assess for signs of decreased coronary artery perfusion - ex.  Angina  - Evaluate fluid balance, assess for edema, trend weights  Outcome: Progressing  Goal: Absence of cardiac arrhythmias or at baseline  Description: INTERVENTIONS:  - Continuous cardiac monitoring, monitor vital signs, obtain 12 lead EKG if indicated  - Evaluate effectiveness of antiarrhythmic and heart rate control medications as ordered  - Initiate emergency measures for life threatening arrhythmias  - Monitor electrolytes and administer replacement therapy as ordered  Outcome: Progressing     Problem: RESPIRATORY - ADULT  Goal: Achieves optimal ventilation and oxygenation  Description: INTERVENTIONS:  - Assess for changes in respiratory status  - Assess for changes in mentation and behavior  - Position to facilitate oxygenation and minimize respiratory effort  - Oxygen supplementation based on oxygen saturation or ABGs  - Provide Smoking Cessation handout, if applicable  - Encourage broncho-pulmonary hygiene including cough, deep breathe, Incentive Spirometry  - Assess the need for suctioning and perform as needed  - Assess and instruct to report SOB or any respiratory difficulty  - Respiratory Therapy support as indicated  - Manage/alleviate anxiety  - Monitor for signs/symptoms of CO2 retention  Outcome: Progressing     Problem: SKIN/TISSUE INTEGRITY - ADULT  Goal: Skin integrity remains intact  Description: INTERVENTIONS  - Assess and document risk factors for pressure ulcer development  - Assess and document skin integrity  - Monitor for areas of redness and/or skin breakdown  - Initiate interventions, skin care algorithm/standards of care as needed  Outcome: Progressing  Goal: Incision(s), wounds(s) or drain site(s) healing without S/S of infection  Description: INTERVENTIONS:  - Assess and document risk factors for pressure ulcer development  - Assess and document skin integrity  - Assess and document dressing/incision, wound bed, drain sites and surrounding tissue  - Implement wound care per orders  - Initiate isolation precautions as appropriate  - Initiate Pressure Ulcer prevention bundle as indicated  Outcome: Progressing     Problem: HEMATOLOGIC - ADULT  Goal: Maintains hematologic stability  Description: INTERVENTIONS  - Assess for signs and symptoms of bleeding or hemorrhage  - Monitor labs and vital signs for trends  - Administer supportive blood products/factors, fluids and medications as ordered and appropriate  - Administer supportive blood products/factors as ordered and appropriate  Outcome: Progressing  Goal: Free from bleeding injury  Description: (Example usage: patient with low platelets)  INTERVENTIONS:  - Avoid intramuscular injections, enemas and rectal medication administration  - Ensure safe mobilization of patient  - Hold pressure on venipuncture sites to achieve adequate hemostasis  - Assess for signs and symptoms of internal bleeding  - Monitor lab trends  - Patient is to report abnormal signs of bleeding to staff  - Avoid use of toothpicks and dental floss  - Use electric shaver for shaving  - Use soft bristle tooth brush  - Limit straining and forceful nose blowing  Outcome: Progressing     Problem: MUSCULOSKELETAL - ADULT  Goal: Return mobility to safest level of function  Description: INTERVENTIONS:  - Assess patient stability and activity tolerance for standing, transferring and ambulating w/ or w/o assistive devices  - Assist with transfers and ambulation using safe patient handling equipment as needed  - Ensure adequate protection for wounds/incisions during mobilization  - Obtain PT/OT consults as needed  - Advance activity as appropriate  - Communicate ordered activity level and limitations with patient/family  Outcome: Progressing     Problem: Impaired Functional Mobility  Goal: Achieve highest/safest level of mobility/gait  Description: Interventions:  - Assess patient's functional ability and stability  - Promote increasing activity/tolerance for mobility and gait  - Educate and engage patient/family in tolerated activity level and precautions  Outcome: Progressing     Problem: Impaired Activities of Daily Living  Goal: Achieve highest/safest level of independence in self care  Description: Interventions:  - Assess ability and encourage patient to participate in ADLs to maximize function  - Promote sitting position while performing ADLs such as feeding, grooming, and bathing  - Educate and encourage patient/family in tolerated functional activity level and precautions during self-care    Outcome: Progressing     Problem: SAFETY ADULT - FALL  Goal: Free from fall injury  Description: INTERVENTIONS:  - Assess pt frequently for physical needs  - Identify cognitive and physical deficits and behaviors that affect risk of falls. - [place] fall precautions as indicated by assessment.  - Educate pt/family on patient safety including physical limitations  - Instruct pt to call for assistance with activity based on assessment  - Modify environment to reduce risk of injury  - Provide assistive devices as appropriate  - Consider OT/PT consult to assist with strengthening/mobility  - Encourage toileting schedule  Outcome: Progressing     Problem: SKIN INTEGRITY  Goal: Skin integrity remains intact  Description: Interventions:  - Assess for areas of redness and/or skin breakdown  - Assess vascular sites hourly  - Change oxygen saturation probe minimum once per shift  - Provide humidity as ordered and per policy  - If on oxygen support, assess nasal septum area for skin breakdown and replace protective barrier if needed  - Change diapers as needed  - Minimize the use of adhesives  Outcome: Progressing  Goal: Incision/wound heals without complications  Description: Interventions:  - Assess wound bed/incision and surrounding skin tissue  - Collaborate with physician/APN and implement wound/incision site care and dressing changes as ordered  - Position infant to avoid placing pressure on wound  - Enterostomal/Wound therapy consult as indicated for ostomies/wounds/G-tubes  Outcome: Progressing     No acute changes overnight. PRNs given at HS. Bipap currently on patient while she's sleeping.

## 2023-05-12 ENCOUNTER — ANTI-COAG (OUTPATIENT)
Dept: CARDIOLOGY | Age: 73
End: 2023-05-12

## 2023-05-12 DIAGNOSIS — I42.0 DILATED CARDIOMYOPATHY (CMD): ICD-10-CM

## 2023-05-12 DIAGNOSIS — I48.0 PAROXYSMAL ATRIAL FIBRILLATION (CMD): Primary | ICD-10-CM

## 2023-05-12 DIAGNOSIS — Z79.01 LONG TERM (CURRENT) USE OF ANTICOAGULANTS: ICD-10-CM

## 2023-05-12 LAB
ALBUMIN SERPL-MCNC: 2.9 G/DL (ref 3.4–5)
ALBUMIN/GLOB SERPL: 0.8 {RATIO} (ref 1–2)
ALP LIVER SERPL-CCNC: 84 U/L
ALT SERPL-CCNC: 12 U/L
ANION GAP SERPL CALC-SCNC: 4 MMOL/L (ref 0–18)
AST SERPL-CCNC: 12 U/L (ref 15–37)
BILIRUB SERPL-MCNC: 0.3 MG/DL (ref 0.1–2)
BUN BLD-MCNC: 100 MG/DL (ref 7–18)
BUN/CREAT SERPL: 64.9 (ref 10–20)
CALCIUM BLD-MCNC: 8.7 MG/DL (ref 8.5–10.1)
CHLORIDE SERPL-SCNC: 108 MMOL/L (ref 98–112)
CO2 SERPL-SCNC: 27 MMOL/L (ref 21–32)
CREAT BLD-MCNC: 1.54 MG/DL
DEPRECATED RDW RBC AUTO: 55.5 FL (ref 35.1–46.3)
ERYTHROCYTE [DISTWIDTH] IN BLOOD BY AUTOMATED COUNT: 15.9 % (ref 11–15)
GFR SERPLBLD BASED ON 1.73 SQ M-ARVRAT: 36 ML/MIN/1.73M2 (ref 60–?)
GLOBULIN PLAS-MCNC: 3.8 G/DL (ref 2.8–4.4)
GLUCOSE BLD-MCNC: 165 MG/DL (ref 70–99)
HCT VFR BLD AUTO: 43.6 %
HGB BLD-MCNC: 13.5 G/DL
INR BLD: 2.7 (ref 0.85–1.16)
MCH RBC QN AUTO: 29.5 PG (ref 26–34)
MCHC RBC AUTO-ENTMCNC: 31 G/DL (ref 31–37)
MCV RBC AUTO: 95.2 FL
OSMOLALITY SERPL CALC.SUM OF ELEC: 323 MOSM/KG (ref 275–295)
PLATELET # BLD AUTO: 117 10(3)UL (ref 150–450)
POTASSIUM SERPL-SCNC: 5.3 MMOL/L (ref 3.5–5.1)
PROT SERPL-MCNC: 6.7 G/DL (ref 6.4–8.2)
PROTHROMBIN TIME: 28.1 SECONDS (ref 11.6–14.8)
RBC # BLD AUTO: 4.58 X10(6)UL
SODIUM SERPL-SCNC: 139 MMOL/L (ref 136–145)
WBC # BLD AUTO: 12.3 X10(3) UL (ref 4–11)

## 2023-05-12 RX ORDER — SODIUM CHLORIDE 9 MG/ML
INJECTION, SOLUTION INTRAVENOUS CONTINUOUS
Status: ACTIVE | OUTPATIENT
Start: 2023-05-12 | End: 2023-05-13

## 2023-05-12 NOTE — CM/SW NOTE
Pt notified CM that she aready has a CPAP at home but has not used it in 10 years which is why she is requesting a new one. CM notified pt that she is not eligible for new CPAP until new outpatient sleep study has been completed. New PULM note instructs for pt to restart CPAP at night with settings at 5-20, pt made aware. Plan: Home w/family with [address] pending medical clearance.     Keri Wong, ALTONN    [phone]

## 2023-05-12 NOTE — PLAN OF CARE
Patient vital signs stable. IV fluids started. No c/o pain. PRN Xanax and cough medication given overnight. BiPap on while asleep. No acute changes. Problem: Patient Centered Care  Goal: Patient preferences are identified and integrated in the patient's plan of care  Description: Interventions:  - What would you like us to know as we care for you? I live at home with my niece  - Provide timely, complete, and accurate information to patient/family  - Incorporate patient and family knowledge, values, beliefs, and cultural backgrounds into the planning and delivery of care  - Encourage patient/family to participate in care and decision-making at the level they choose  - Honor patient and family perspectives and choices  Outcome: Progressing     Problem: Patient/Family Goals  Goal: Patient/Family Long Term Goal  Description: Patient's Long Term Goal: To go home    Interventions:  - monitor VS  - pain management  - participate with PT/OT  - See additional Care Plan goals for specific interventions  Outcome: Progressing  Goal: Patient/Family Short Term Goal  Description: Patient's Short Term Goal: to feel better    Interventions:   - monitor VS  - pain management  - participate with PT/OT  - See additional Care Plan goals for specific interventions  Outcome: Progressing     Problem: CARDIOVASCULAR - ADULT  Goal: Maintains optimal cardiac output and hemodynamic stability  Description: INTERVENTIONS:  - Monitor vital signs, rhythm, and trends  - Monitor for bleeding, hypotension and signs of decreased cardiac output  - Evaluate effectiveness of vasoactive medications to optimize hemodynamic stability  - Monitor arterial and/or venous puncture sites for bleeding and/or hematoma  - Assess quality of pulses, skin color and temperature  - Assess for signs of decreased coronary artery perfusion - ex.  Angina  - Evaluate fluid balance, assess for edema, trend weights  Outcome: Progressing  Goal: Absence of cardiac arrhythmias or at baseline  Description: INTERVENTIONS:  - Continuous cardiac monitoring, monitor vital signs, obtain 12 lead EKG if indicated  - Evaluate effectiveness of antiarrhythmic and heart rate control medications as ordered  - Initiate emergency measures for life threatening arrhythmias  - Monitor electrolytes and administer replacement therapy as ordered  Outcome: Progressing     Problem: RESPIRATORY - ADULT  Goal: Achieves optimal ventilation and oxygenation  Description: INTERVENTIONS:  - Assess for changes in respiratory status  - Assess for changes in mentation and behavior  - Position to facilitate oxygenation and minimize respiratory effort  - Oxygen supplementation based on oxygen saturation or ABGs  - Provide Smoking Cessation handout, if applicable  - Encourage broncho-pulmonary hygiene including cough, deep breathe, Incentive Spirometry  - Assess the need for suctioning and perform as needed  - Assess and instruct to report SOB or any respiratory difficulty  - Respiratory Therapy support as indicated  - Manage/alleviate anxiety  - Monitor for signs/symptoms of CO2 retention  Outcome: Progressing     Problem: SKIN/TISSUE INTEGRITY - ADULT  Goal: Skin integrity remains intact  Description: INTERVENTIONS  - Assess and document risk factors for pressure ulcer development  - Assess and document skin integrity  - Monitor for areas of redness and/or skin breakdown  - Initiate interventions, skin care algorithm/standards of care as needed  Outcome: Progressing  Goal: Incision(s), wounds(s) or drain site(s) healing without S/S of infection  Description: INTERVENTIONS:  - Assess and document risk factors for pressure ulcer development  - Assess and document skin integrity  - Assess and document dressing/incision, wound bed, drain sites and surrounding tissue  - Implement wound care per orders  - Initiate isolation precautions as appropriate  - Initiate Pressure Ulcer prevention bundle as indicated  Outcome: Progressing     Problem: HEMATOLOGIC - ADULT  Goal: Maintains hematologic stability  Description: INTERVENTIONS  - Assess for signs and symptoms of bleeding or hemorrhage  - Monitor labs and vital signs for trends  - Administer supportive blood products/factors, fluids and medications as ordered and appropriate  - Administer supportive blood products/factors as ordered and appropriate  Outcome: Progressing  Goal: Free from bleeding injury  Description: (Example usage: patient with low platelets)  INTERVENTIONS:  - Avoid intramuscular injections, enemas and rectal medication administration  - Ensure safe mobilization of patient  - Hold pressure on venipuncture sites to achieve adequate hemostasis  - Assess for signs and symptoms of internal bleeding  - Monitor lab trends  - Patient is to report abnormal signs of bleeding to staff  - Avoid use of toothpicks and dental floss  - Use electric shaver for shaving  - Use soft bristle tooth brush  - Limit straining and forceful nose blowing  Outcome: Progressing     Problem: MUSCULOSKELETAL - ADULT  Goal: Return mobility to safest level of function  Description: INTERVENTIONS:  - Assess patient stability and activity tolerance for standing, transferring and ambulating w/ or w/o assistive devices  - Assist with transfers and ambulation using safe patient handling equipment as needed  - Ensure adequate protection for wounds/incisions during mobilization  - Obtain PT/OT consults as needed  - Advance activity as appropriate  - Communicate ordered activity level and limitations with patient/family  Outcome: Progressing     Problem: Impaired Functional Mobility  Goal: Achieve highest/safest level of mobility/gait  Description: Interventions:  - Assess patient's functional ability and stability  - Promote increasing activity/tolerance for mobility and gait  - Educate and engage patient/family in tolerated activity level and precautions  - Recommend use of  RW for transfers and ambulation  Outcome: Progressing     Problem: Impaired Activities of Daily Living  Goal: Achieve highest/safest level of independence in self care  Description: Interventions:  - Assess ability and encourage patient to participate in ADLs to maximize function  - Promote sitting position while performing ADLs such as feeding, grooming, and bathing  - Educate and encourage patient/family in tolerated functional activity level and precautions during self-care  - Encourage patient to incorporate impaired side during daily activities to promote function  Outcome: Progressing     Problem: SAFETY ADULT - FALL  Goal: Free from fall injury  Description: INTERVENTIONS:  - Assess pt frequently for physical needs  - Identify cognitive and physical deficits and behaviors that affect risk of falls.   - [place] fall precautions as indicated by assessment.  - Educate pt/family on patient safety including physical limitations  - Instruct pt to call for assistance with activity based on assessment  - Modify environment to reduce risk of injury  - Provide assistive devices as appropriate  - Consider OT/PT consult to assist with strengthening/mobility  - Encourage toileting schedule  Outcome: Progressing     Problem: SKIN INTEGRITY  Goal: Skin integrity remains intact  Description: Interventions:  - Assess for areas of redness and/or skin breakdown  - Assess vascular sites hourly  - Change oxygen saturation probe minimum once per shift  - Provide humidity as ordered and per policy  - If on oxygen support, assess nasal septum area for skin breakdown and replace protective barrier if needed  - Change diapers as needed  - Minimize the use of adhesives  Outcome: Progressing  Goal: Incision/wound heals without complications  Description: Interventions:  - Assess wound bed/incision and surrounding skin tissue  - Collaborate with physician/APN and implement wound/incision site care and dressing changes as ordered  - Position infant to avoid placing pressure on wound  - Enterostomal/Wound therapy consult as indicated for ostomies/wounds/G-tubes  Outcome: Progressing

## 2023-05-12 NOTE — PLAN OF CARE
Patient has safety precautions in place bed in the lowest position, bed alarm on, and call light within reach. Continue to monitor patient with intentional nursing rounds. Patient up to chair. Problem: Patient Centered Care  Goal: Patient preferences are identified and integrated in the patient's plan of care  Description: Interventions:  - What would you like us to know as we care for you? I live at home with my niece  - Provide timely, complete, and accurate information to patient/family  - Incorporate patient and family knowledge, values, beliefs, and cultural backgrounds into the planning and delivery of care  - Encourage patient/family to participate in care and decision-making at the level they choose  - Honor patient and family perspectives and choices  Outcome: Progressing     Problem: Patient/Family Goals  Goal: Patient/Family Long Term Goal  Description: Patient's Long Term Goal: To go home    Interventions:  - monitor VS  - pain management  - participate with PT/OT  - See additional Care Plan goals for specific interventions  Outcome: Progressing  Goal: Patient/Family Short Term Goal  Description: Patient's Short Term Goal: to feel better    Interventions:   - monitor VS  - pain management  - participate with PT/OT  - See additional Care Plan goals for specific interventions  Outcome: Progressing     Problem: CARDIOVASCULAR - ADULT  Goal: Maintains optimal cardiac output and hemodynamic stability  Description: INTERVENTIONS:  - Monitor vital signs, rhythm, and trends  - Monitor for bleeding, hypotension and signs of decreased cardiac output  - Evaluate effectiveness of vasoactive medications to optimize hemodynamic stability  - Monitor arterial and/or venous puncture sites for bleeding and/or hematoma  - Assess quality of pulses, skin color and temperature  - Assess for signs of decreased coronary artery perfusion - ex.  Angina  - Evaluate fluid balance, assess for edema, trend weights  Outcome: Progressing  Goal: Absence of cardiac arrhythmias or at baseline  Description: INTERVENTIONS:  - Continuous cardiac monitoring, monitor vital signs, obtain 12 lead EKG if indicated  - Evaluate effectiveness of antiarrhythmic and heart rate control medications as ordered  - Initiate emergency measures for life threatening arrhythmias  - Monitor electrolytes and administer replacement therapy as ordered  Outcome: Progressing     Problem: RESPIRATORY - ADULT  Goal: Achieves optimal ventilation and oxygenation  Description: INTERVENTIONS:  - Assess for changes in respiratory status  - Assess for changes in mentation and behavior  - Position to facilitate oxygenation and minimize respiratory effort  - Oxygen supplementation based on oxygen saturation or ABGs  - Provide Smoking Cessation handout, if applicable  - Encourage broncho-pulmonary hygiene including cough, deep breathe, Incentive Spirometry  - Assess the need for suctioning and perform as needed  - Assess and instruct to report SOB or any respiratory difficulty  - Respiratory Therapy support as indicated  - Manage/alleviate anxiety  - Monitor for signs/symptoms of CO2 retention  Outcome: Progressing     Problem: SKIN/TISSUE INTEGRITY - ADULT  Goal: Skin integrity remains intact  Description: INTERVENTIONS  - Assess and document risk factors for pressure ulcer development  - Assess and document skin integrity  - Monitor for areas of redness and/or skin breakdown  - Initiate interventions, skin care algorithm/standards of care as needed  Outcome: Progressing  Goal: Incision(s), wounds(s) or drain site(s) healing without S/S of infection  Description: INTERVENTIONS:  - Assess and document risk factors for pressure ulcer development  - Assess and document skin integrity  - Assess and document dressing/incision, wound bed, drain sites and surrounding tissue  - Implement wound care per orders  - Initiate isolation precautions as appropriate  - Initiate Pressure Ulcer prevention bundle as indicated  Outcome: Progressing     Problem: HEMATOLOGIC - ADULT  Goal: Maintains hematologic stability  Description: INTERVENTIONS  - Assess for signs and symptoms of bleeding or hemorrhage  - Monitor labs and vital signs for trends  - Administer supportive blood products/factors, fluids and medications as ordered and appropriate  - Administer supportive blood products/factors as ordered and appropriate  Outcome: Progressing  Goal: Free from bleeding injury  Description: (Example usage: patient with low platelets)  INTERVENTIONS:  - Avoid intramuscular injections, enemas and rectal medication administration  - Ensure safe mobilization of patient  - Hold pressure on venipuncture sites to achieve adequate hemostasis  - Assess for signs and symptoms of internal bleeding  - Monitor lab trends  - Patient is to report abnormal signs of bleeding to staff  - Avoid use of toothpicks and dental floss  - Use electric shaver for shaving  - Use soft bristle tooth brush  - Limit straining and forceful nose blowing  Outcome: Progressing     Problem: MUSCULOSKELETAL - ADULT  Goal: Return mobility to safest level of function  Description: INTERVENTIONS:  - Assess patient stability and activity tolerance for standing, transferring and ambulating w/ or w/o assistive devices  - Assist with transfers and ambulation using safe patient handling equipment as needed  - Ensure adequate protection for wounds/incisions during mobilization  - Obtain PT/OT consults as needed  - Advance activity as appropriate  - Communicate ordered activity level and limitations with patient/family  Outcome: Progressing     Problem: Impaired Functional Mobility  Goal: Achieve highest/safest level of mobility/gait  Description: Interventions:  - Assess patient's functional ability and stability  - Promote increasing activity/tolerance for mobility and gait  - Educate and engage patient/family in tolerated activity level and precautions  - Recommend use of chair position in bed 3 times per day  Outcome: Progressing     Problem: Impaired Activities of Daily Living  Goal: Achieve highest/safest level of independence in self care  Description: Interventions:  - Assess ability and encourage patient to participate in ADLs to maximize function  - Promote sitting position while performing ADLs such as feeding, grooming, and bathing  - Educate and encourage patient/family in tolerated functional activity level and precautions during self-care  - Encourage patient to incorporate impaired side during daily activities to promote function  Outcome: Progressing     Problem: SAFETY ADULT - FALL  Goal: Free from fall injury  Description: INTERVENTIONS:  - Assess pt frequently for physical needs  - Identify cognitive and physical deficits and behaviors that affect risk of falls.   - [place] fall precautions as indicated by assessment.  - Educate pt/family on patient safety including physical limitations  - Instruct pt to call for assistance with activity based on assessment  - Modify environment to reduce risk of injury  - Provide assistive devices as appropriate  - Consider OT/PT consult to assist with strengthening/mobility  - Encourage toileting schedule  Outcome: Progressing     Problem: SKIN INTEGRITY  Goal: Skin integrity remains intact  Description: Interventions:  - Assess for areas of redness and/or skin breakdown  - Assess vascular sites hourly  - Change oxygen saturation probe minimum once per shift  - Provide humidity as ordered and per policy  - If on oxygen support, assess nasal septum area for skin breakdown and replace protective barrier if needed  - Change diapers as needed  - Minimize the use of adhesives  Outcome: Progressing  Goal: Incision/wound heals without complications  Description: Interventions:  - Assess wound bed/incision and surrounding skin tissue  - Collaborate with physician/APN and implement wound/incision site care and dressing changes as ordered  - Position infant to avoid placing pressure on wound  - Enterostomal/Wound therapy consult as indicated for ostomies/wounds/G-tubes  Outcome: Progressing

## 2023-05-13 LAB
ANION GAP SERPL CALC-SCNC: 4 MMOL/L (ref 0–18)
BASOPHILS # BLD AUTO: 0.01 X10(3) UL (ref 0–0.2)
BASOPHILS NFR BLD AUTO: 0.1 %
BUN BLD-MCNC: 88 MG/DL (ref 7–18)
BUN/CREAT SERPL: 71.5 (ref 10–20)
CALCIUM BLD-MCNC: 8.4 MG/DL (ref 8.5–10.1)
CHLORIDE SERPL-SCNC: 108 MMOL/L (ref 98–112)
CO2 SERPL-SCNC: 28 MMOL/L (ref 21–32)
CREAT BLD-MCNC: 1.23 MG/DL
DEPRECATED RDW RBC AUTO: 56.5 FL (ref 35.1–46.3)
EOSINOPHIL # BLD AUTO: 0.01 X10(3) UL (ref 0–0.7)
EOSINOPHIL NFR BLD AUTO: 0.1 %
ERYTHROCYTE [DISTWIDTH] IN BLOOD BY AUTOMATED COUNT: 15.8 % (ref 11–15)
GFR SERPLBLD BASED ON 1.73 SQ M-ARVRAT: 47 ML/MIN/1.73M2 (ref 60–?)
GLUCOSE BLD-MCNC: 104 MG/DL (ref 70–99)
HCT VFR BLD AUTO: 41.8 %
HGB BLD-MCNC: 12.9 G/DL
IMM GRANULOCYTES # BLD AUTO: 0.06 X10(3) UL (ref 0–1)
IMM GRANULOCYTES NFR BLD: 0.7 %
INR BLD: 3.37 (ref 0.85–1.16)
LYMPHOCYTES # BLD AUTO: 0.77 X10(3) UL (ref 1–4)
LYMPHOCYTES NFR BLD AUTO: 8.6 %
MAGNESIUM SERPL-MCNC: 2.3 MG/DL (ref 1.6–2.6)
MCH RBC QN AUTO: 29.8 PG (ref 26–34)
MCHC RBC AUTO-ENTMCNC: 30.9 G/DL (ref 31–37)
MCV RBC AUTO: 96.5 FL
MONOCYTES # BLD AUTO: 0.56 X10(3) UL (ref 0.1–1)
MONOCYTES NFR BLD AUTO: 6.3 %
NEUTROPHILS # BLD AUTO: 7.52 X10 (3) UL (ref 1.5–7.7)
NEUTROPHILS # BLD AUTO: 7.52 X10(3) UL (ref 1.5–7.7)
NEUTROPHILS NFR BLD AUTO: 84.2 %
OSMOLALITY SERPL CALC.SUM OF ELEC: 317 MOSM/KG (ref 275–295)
PLATELET # BLD AUTO: 108 10(3)UL (ref 150–450)
POTASSIUM SERPL-SCNC: 4.6 MMOL/L (ref 3.5–5.1)
POTASSIUM SERPL-SCNC: 5.2 MMOL/L (ref 3.5–5.1)
PROTHROMBIN TIME: 33.4 SECONDS (ref 11.6–14.8)
RBC # BLD AUTO: 4.33 X10(6)UL
SODIUM SERPL-SCNC: 140 MMOL/L (ref 136–145)
WBC # BLD AUTO: 8.9 X10(3) UL (ref 4–11)

## 2023-05-13 RX ORDER — FUROSEMIDE 40 MG/1
40 TABLET ORAL DAILY
Status: DISCONTINUED | OUTPATIENT
Start: 2023-05-13 | End: 2023-05-22

## 2023-05-13 NOTE — PLAN OF CARE
Problem: Patient Centered Care  Goal: Patient preferences are identified and integrated in the patient's plan of care  Description: Interventions:  - What would you like us to know as we care for you? I live at home with my niece  - Provide timely, complete, and accurate information to patient/family  - Incorporate patient and family knowledge, values, beliefs, and cultural backgrounds into the planning and delivery of care  - Encourage patient/family to participate in care and decision-making at the level they choose  - Honor patient and family perspectives and choices  Outcome: Progressing     Problem: Patient/Family Goals  Goal: Patient/Family Long Term Goal  Description: Patient's Long Term Goal: To go home    Interventions:  - monitor VS  - pain management  - participate with PT/OT  - See additional Care Plan goals for specific interventions  Outcome: Progressing  Goal: Patient/Family Short Term Goal  Description: Patient's Short Term Goal: to feel better    Interventions:   - monitor VS  - pain management  - participate with PT/OT  - See additional Care Plan goals for specific interventions  Outcome: Progressing     Problem: CARDIOVASCULAR - ADULT  Goal: Maintains optimal cardiac output and hemodynamic stability  Description: INTERVENTIONS:  - Monitor vital signs, rhythm, and trends  - Monitor for bleeding, hypotension and signs of decreased cardiac output  - Evaluate effectiveness of vasoactive medications to optimize hemodynamic stability  - Monitor arterial and/or venous puncture sites for bleeding and/or hematoma  - Assess quality of pulses, skin color and temperature  - Assess for signs of decreased coronary artery perfusion - ex.  Angina  - Evaluate fluid balance, assess for edema, trend weights  Outcome: Progressing  Goal: Absence of cardiac arrhythmias or at baseline  Description: INTERVENTIONS:  - Continuous cardiac monitoring, monitor vital signs, obtain 12 lead EKG if indicated  - Evaluate effectiveness of antiarrhythmic and heart rate control medications as ordered  - Initiate emergency measures for life threatening arrhythmias  - Monitor electrolytes and administer replacement therapy as ordered  Outcome: Progressing     Problem: RESPIRATORY - ADULT  Goal: Achieves optimal ventilation and oxygenation  Description: INTERVENTIONS:  - Assess for changes in respiratory status  - Assess for changes in mentation and behavior  - Position to facilitate oxygenation and minimize respiratory effort  - Oxygen supplementation based on oxygen saturation or ABGs  - Provide Smoking Cessation handout, if applicable  - Encourage broncho-pulmonary hygiene including cough, deep breathe, Incentive Spirometry  - Assess the need for suctioning and perform as needed  - Assess and instruct to report SOB or any respiratory difficulty  - Respiratory Therapy support as indicated  - Manage/alleviate anxiety  - Monitor for signs/symptoms of CO2 retention  Outcome: Progressing     Problem: SKIN/TISSUE INTEGRITY - ADULT  Goal: Skin integrity remains intact  Description: INTERVENTIONS  - Assess and document risk factors for pressure ulcer development  - Assess and document skin integrity  - Monitor for areas of redness and/or skin breakdown  - Initiate interventions, skin care algorithm/standards of care as needed  Outcome: Progressing  Goal: Incision(s), wounds(s) or drain site(s) healing without S/S of infection  Description: INTERVENTIONS:  - Assess and document risk factors for pressure ulcer development  - Assess and document skin integrity  - Assess and document dressing/incision, wound bed, drain sites and surrounding tissue  - Implement wound care per orders  - Initiate isolation precautions as appropriate  - Initiate Pressure Ulcer prevention bundle as indicated  Outcome: Progressing     Problem: HEMATOLOGIC - ADULT  Goal: Maintains hematologic stability  Description: INTERVENTIONS  - Assess for signs and symptoms of bleeding or hemorrhage  - Monitor labs and vital signs for trends  - Administer supportive blood products/factors, fluids and medications as ordered and appropriate  - Administer supportive blood products/factors as ordered and appropriate  Outcome: Progressing  Goal: Free from bleeding injury  Description: (Example usage: patient with low platelets)  INTERVENTIONS:  - Avoid intramuscular injections, enemas and rectal medication administration  - Ensure safe mobilization of patient  - Hold pressure on venipuncture sites to achieve adequate hemostasis  - Assess for signs and symptoms of internal bleeding  - Monitor lab trends  - Patient is to report abnormal signs of bleeding to staff  - Avoid use of toothpicks and dental floss  - Use electric shaver for shaving  - Use soft bristle tooth brush  - Limit straining and forceful nose blowing  Outcome: Progressing     Problem: MUSCULOSKELETAL - ADULT  Goal: Return mobility to safest level of function  Description: INTERVENTIONS:  - Assess patient stability and activity tolerance for standing, transferring and ambulating w/ or w/o assistive devices  - Assist with transfers and ambulation using safe patient handling equipment as needed  - Ensure adequate protection for wounds/incisions during mobilization  - Obtain PT/OT consults as needed  - Advance activity as appropriate  - Communicate ordered activity level and limitations with patient/family  Outcome: Progressing     Problem: Impaired Functional Mobility  Goal: Achieve highest/safest level of mobility/gait  Description: Interventions:  - Assess patient's functional ability and stability  - Promote increasing activity/tolerance for mobility and gait  - Educate and engage patient/family in tolerated activity level and precautions  Outcome: Progressing     Problem: Impaired Activities of Daily Living  Goal: Achieve highest/safest level of independence in self care  Description: Interventions:  - Assess ability and encourage patient to participate in ADLs to maximize function  - Promote sitting position while performing ADLs such as feeding, grooming, and bathing  - Educate and encourage patient/family in tolerated functional activity level and precautions during self-care  - Encourage patient to incorporate impaired side during daily activities to promote function  Outcome: Progressing     Problem: SAFETY ADULT - FALL  Goal: Free from fall injury  Description: INTERVENTIONS:  - Assess pt frequently for physical needs  - Identify cognitive and physical deficits and behaviors that affect risk of falls. - [place] fall precautions as indicated by assessment.  - Educate pt/family on patient safety including physical limitations  - Instruct pt to call for assistance with activity based on assessment  - Modify environment to reduce risk of injury  - Provide assistive devices as appropriate  - Consider OT/PT consult to assist with strengthening/mobility  - Encourage toileting schedule  Outcome: Progressing     Problem: SKIN INTEGRITY  Goal: Skin integrity remains intact  Description: Interventions:  - Assess for areas of redness and/or skin breakdown  - Assess vascular sites hourly  - Change oxygen saturation probe minimum once per shift  - Provide humidity as ordered and per policy  - If on oxygen support, assess nasal septum area for skin breakdown and replace protective barrier if needed  - Change diapers as needed  - Minimize the use of adhesives  Outcome: Progressing  Goal: Incision/wound heals without complications  Description: Interventions:  - Assess wound bed/incision and surrounding skin tissue  - Collaborate with physician/APN and implement wound/incision site care and dressing changes as ordered  - Position infant to avoid placing pressure on wound  - Enterostomal/Wound therapy consult as indicated for ostomies/wounds/G-tubes  Outcome: Progressing    Patient alert and oriented x4. On RA. NS @83 per nephrology.  Bipap on while patient is sleeping. Monitoring vital signs- stable at this time. Safety and fall precautions maintained- bed alarm on, bed locked in lowest position, call light within reach. Frequent rounding by nursing staff.

## 2023-05-13 NOTE — PLAN OF CARE
No acute changes. MD notified of 6 beats of vtach. Pt ambulating with assist and walker. All safety measures in place. Problem: Patient Centered Care  Goal: Patient preferences are identified and integrated in the patient's plan of care  Description: Interventions:  - What would you like us to know as we care for you? I live at home with my niece  - Provide timely, complete, and accurate information to patient/family  - Incorporate patient and family knowledge, values, beliefs, and cultural backgrounds into the planning and delivery of care  - Encourage patient/family to participate in care and decision-making at the level they choose  - Honor patient and family perspectives and choices  Outcome: Progressing     Problem: Patient/Family Goals  Goal: Patient/Family Long Term Goal  Description: Patient's Long Term Goal: To go home    Interventions:  - monitor VS  - pain management  - participate with PT/OT  - See additional Care Plan goals for specific interventions  Outcome: Progressing  Goal: Patient/Family Short Term Goal  Description: Patient's Short Term Goal: to feel better    Interventions:   - monitor VS  - pain management  - participate with PT/OT  - See additional Care Plan goals for specific interventions  Outcome: Progressing     Problem: CARDIOVASCULAR - ADULT  Goal: Maintains optimal cardiac output and hemodynamic stability  Description: INTERVENTIONS:  - Monitor vital signs, rhythm, and trends  - Monitor for bleeding, hypotension and signs of decreased cardiac output  - Evaluate effectiveness of vasoactive medications to optimize hemodynamic stability  - Monitor arterial and/or venous puncture sites for bleeding and/or hematoma  - Assess quality of pulses, skin color and temperature  - Assess for signs of decreased coronary artery perfusion - ex.  Angina  - Evaluate fluid balance, assess for edema, trend weights  Outcome: Progressing  Goal: Absence of cardiac arrhythmias or at baseline  Description: INTERVENTIONS:  - Continuous cardiac monitoring, monitor vital signs, obtain 12 lead EKG if indicated  - Evaluate effectiveness of antiarrhythmic and heart rate control medications as ordered  - Initiate emergency measures for life threatening arrhythmias  - Monitor electrolytes and administer replacement therapy as ordered  Outcome: Progressing     Problem: RESPIRATORY - ADULT  Goal: Achieves optimal ventilation and oxygenation  Description: INTERVENTIONS:  - Assess for changes in respiratory status  - Assess for changes in mentation and behavior  - Position to facilitate oxygenation and minimize respiratory effort  - Oxygen supplementation based on oxygen saturation or ABGs  - Provide Smoking Cessation handout, if applicable  - Encourage broncho-pulmonary hygiene including cough, deep breathe, Incentive Spirometry  - Assess the need for suctioning and perform as needed  - Assess and instruct to report SOB or any respiratory difficulty  - Respiratory Therapy support as indicated  - Manage/alleviate anxiety  - Monitor for signs/symptoms of CO2 retention  Outcome: Progressing     Problem: SKIN/TISSUE INTEGRITY - ADULT  Goal: Skin integrity remains intact  Description: INTERVENTIONS  - Assess and document risk factors for pressure ulcer development  - Assess and document skin integrity  - Monitor for areas of redness and/or skin breakdown  - Initiate interventions, skin care algorithm/standards of care as needed  Outcome: Progressing  Goal: Incision(s), wounds(s) or drain site(s) healing without S/S of infection  Description: INTERVENTIONS:  - Assess and document risk factors for pressure ulcer development  - Assess and document skin integrity  - Assess and document dressing/incision, wound bed, drain sites and surrounding tissue  - Implement wound care per orders  - Initiate isolation precautions as appropriate  - Initiate Pressure Ulcer prevention bundle as indicated  Outcome: Progressing     Problem: HEMATOLOGIC - ADULT  Goal: Maintains hematologic stability  Description: INTERVENTIONS  - Assess for signs and symptoms of bleeding or hemorrhage  - Monitor labs and vital signs for trends  - Administer supportive blood products/factors, fluids and medications as ordered and appropriate  - Administer supportive blood products/factors as ordered and appropriate  Outcome: Progressing  Goal: Free from bleeding injury  Description: (Example usage: patient with low platelets)  INTERVENTIONS:  - Avoid intramuscular injections, enemas and rectal medication administration  - Ensure safe mobilization of patient  - Hold pressure on venipuncture sites to achieve adequate hemostasis  - Assess for signs and symptoms of internal bleeding  - Monitor lab trends  - Patient is to report abnormal signs of bleeding to staff  - Avoid use of toothpicks and dental floss  - Use electric shaver for shaving  - Use soft bristle tooth brush  - Limit straining and forceful nose blowing  Outcome: Progressing     Problem: MUSCULOSKELETAL - ADULT  Goal: Return mobility to safest level of function  Description: INTERVENTIONS:  - Assess patient stability and activity tolerance for standing, transferring and ambulating w/ or w/o assistive devices  - Assist with transfers and ambulation using safe patient handling equipment as needed  - Ensure adequate protection for wounds/incisions during mobilization  - Obtain PT/OT consults as needed  - Advance activity as appropriate  - Communicate ordered activity level and limitations with patient/family  Outcome: Progressing     Problem: Impaired Functional Mobility  Goal: Achieve highest/safest level of mobility/gait  Description: Interventions:  - Assess patient's functional ability and stability  - Promote increasing activity/tolerance for mobility and gait  - Educate and engage patient/family in tolerated activity level and precautions    Outcome: Progressing     Problem: Impaired Activities of Daily Living  Goal: Achieve highest/safest level of independence in self care  Description: Interventions:  - Assess ability and encourage patient to participate in ADLs to maximize function  - Promote sitting position while performing ADLs such as feeding, grooming, and bathing  - Educate and encourage patient/family in tolerated functional activity level and precautions during self-care  Outcome: Progressing     Problem: SAFETY ADULT - FALL  Goal: Free from fall injury  Description: INTERVENTIONS:  - Assess pt frequently for physical needs  - Identify cognitive and physical deficits and behaviors that affect risk of falls.   - [place] fall precautions as indicated by assessment.  - Educate pt/family on patient safety including physical limitations  - Instruct pt to call for assistance with activity based on assessment  - Modify environment to reduce risk of injury  - Provide assistive devices as appropriate  - Consider OT/PT consult to assist with strengthening/mobility  - Encourage toileting schedule  Outcome: Progressing     Problem: SKIN INTEGRITY  Goal: Skin integrity remains intact  Description: Interventions:  - Assess for areas of redness and/or skin breakdown  - Assess vascular sites hourly  - Change oxygen saturation probe minimum once per shift  - Provide humidity as ordered and per policy  - If on oxygen support, assess nasal septum area for skin breakdown and replace protective barrier if needed  - Change diapers as needed  - Minimize the use of adhesives  Outcome: Progressing  Goal: Incision/wound heals without complications  Description: Interventions:  - Assess wound bed/incision and surrounding skin tissue  - Collaborate with physician/APN and implement wound/incision site care and dressing changes as ordered  - Position infant to avoid placing pressure on wound  - Enterostomal/Wound therapy consult as indicated for ostomies/wounds/G-tubes  Outcome: Progressing

## 2023-05-14 LAB
ANION GAP SERPL CALC-SCNC: 6 MMOL/L (ref 0–18)
BASE EXCESS BLD CALC-SCNC: 3.7 MMOL/L (ref ?–2)
BASOPHILS # BLD AUTO: 0.02 X10(3) UL (ref 0–0.2)
BASOPHILS NFR BLD AUTO: 0.2 %
BUN BLD-MCNC: 72 MG/DL (ref 7–18)
BUN/CREAT SERPL: 63.2 (ref 10–20)
CALCIUM BLD-MCNC: 8.4 MG/DL (ref 8.5–10.1)
CHLORIDE SERPL-SCNC: 110 MMOL/L (ref 98–112)
CO2 SERPL-SCNC: 26 MMOL/L (ref 21–32)
CREAT BLD-MCNC: 1.14 MG/DL
DEPRECATED RDW RBC AUTO: 56.2 FL (ref 35.1–46.3)
EOSINOPHIL # BLD AUTO: 0.02 X10(3) UL (ref 0–0.7)
EOSINOPHIL NFR BLD AUTO: 0.2 %
ERYTHROCYTE [DISTWIDTH] IN BLOOD BY AUTOMATED COUNT: 15.7 % (ref 11–15)
GFR SERPLBLD BASED ON 1.73 SQ M-ARVRAT: 51 ML/MIN/1.73M2 (ref 60–?)
GLUCOSE BLD-MCNC: 97 MG/DL (ref 70–99)
HCO3 BLDA-SCNC: 27.8 MEQ/L (ref 21–27)
HCT VFR BLD AUTO: 42.3 %
HGB BLD-MCNC: 12.9 G/DL
IMM GRANULOCYTES # BLD AUTO: 0.05 X10(3) UL (ref 0–1)
IMM GRANULOCYTES NFR BLD: 0.5 %
INR BLD: 3.83 (ref 0.85–1.16)
LYMPHOCYTES # BLD AUTO: 0.94 X10(3) UL (ref 1–4)
LYMPHOCYTES NFR BLD AUTO: 10.1 %
MCH RBC QN AUTO: 29.5 PG (ref 26–34)
MCHC RBC AUTO-ENTMCNC: 30.5 G/DL (ref 31–37)
MCV RBC AUTO: 96.6 FL
MODIFIED ALLEN TEST: POSITIVE
MONOCYTES # BLD AUTO: 0.63 X10(3) UL (ref 0.1–1)
MONOCYTES NFR BLD AUTO: 6.7 %
NEUTROPHILS # BLD AUTO: 7.69 X10 (3) UL (ref 1.5–7.7)
NEUTROPHILS # BLD AUTO: 7.69 X10(3) UL (ref 1.5–7.7)
NEUTROPHILS NFR BLD AUTO: 82.3 %
O2 CT BLD-SCNC: 17.2 VOL% (ref 15–23)
OSMOLALITY SERPL CALC.SUM OF ELEC: 315 MOSM/KG (ref 275–295)
OXYGEN LITERS/MINUTE: 2 L/MIN
PCO2 BLDA: 52 MM HG (ref 35–45)
PH BLDA: 7.37 [PH] (ref 7.35–7.45)
PLATELET # BLD AUTO: 104 10(3)UL (ref 150–450)
PO2 BLDA: 98 MM HG (ref 80–100)
POTASSIUM SERPL-SCNC: 5.1 MMOL/L (ref 3.5–5.1)
PROTHROMBIN TIME: 36.8 SECONDS (ref 11.6–14.8)
PUNCTURE CHARGE: YES
RBC # BLD AUTO: 4.38 X10(6)UL
SAO2 % BLDA: 98.8 % (ref 94–100)
SODIUM SERPL-SCNC: 142 MMOL/L (ref 136–145)
WBC # BLD AUTO: 9.4 X10(3) UL (ref 4–11)

## 2023-05-14 RX ORDER — CALCIUM CARBONATE 500 MG/1
500 TABLET, CHEWABLE ORAL EVERY 6 HOURS PRN
Status: DISCONTINUED | OUTPATIENT
Start: 2023-05-14 | End: 2023-05-22

## 2023-05-14 RX ORDER — PREDNISONE 20 MG/1
20 TABLET ORAL
Status: DISCONTINUED | OUTPATIENT
Start: 2023-05-15 | End: 2023-05-16

## 2023-05-14 RX ORDER — MAGNESIUM HYDROXIDE/ALUMINUM HYDROXICE/SIMETHICONE 120; 1200; 1200 MG/30ML; MG/30ML; MG/30ML
30 SUSPENSION ORAL 4 TIMES DAILY PRN
Status: DISCONTINUED | OUTPATIENT
Start: 2023-05-14 | End: 2023-05-22

## 2023-05-14 NOTE — PLAN OF CARE
Esther Bones complained of heart burn during shift, Tums given as charted. Patient had lower BP in the morning, some medications held and MD made aware. Patient had no other acute changes during shift. Call light was placed within reach at all times and bed locked in lowest position with bed alarm set. Patient called appropriately and was rounded on frequently. Problem: Patient Centered Care  Goal: Patient preferences are identified and integrated in the patient's plan of care  Description: Interventions:  - What would you like us to know as we care for you?  I live at home with my niece  - Provide timely, complete, and accurate information to patient/family  - Incorporate patient and family knowledge, values, beliefs, and cultural backgrounds into the planning and delivery of care  - Encourage patient/family to participate in care and decision-making at the level they choose  - Honor patient and family perspectives and choices  Outcome: Progressing     Problem: Patient/Family Goals  Goal: Patient/Family Long Term Goal  Description: Patient's Long Term Goal: To go home    Interventions:  - monitor VS  - pain management  - participate with PT/OT  - See additional Care Plan goals for specific interventions  Outcome: Progressing  Goal: Patient/Family Short Term Goal  Description: Patient's Short Term Goal: to feel better    Interventions:   - monitor VS  - pain management  - participate with PT/OT  - See additional Care Plan goals for specific interventions  Outcome: Progressing     Problem: CARDIOVASCULAR - ADULT  Goal: Maintains optimal cardiac output and hemodynamic stability  Description: INTERVENTIONS:  - Monitor vital signs, rhythm, and trends  - Monitor for bleeding, hypotension and signs of decreased cardiac output  - Evaluate effectiveness of vasoactive medications to optimize hemodynamic stability  - Monitor arterial and/or venous puncture sites for bleeding and/or hematoma  - Assess quality of pulses, skin color and temperature  - Assess for signs of decreased coronary artery perfusion - ex.  Angina  - Evaluate fluid balance, assess for edema, trend weights  Outcome: Progressing  Goal: Absence of cardiac arrhythmias or at baseline  Description: INTERVENTIONS:  - Continuous cardiac monitoring, monitor vital signs, obtain 12 lead EKG if indicated  - Evaluate effectiveness of antiarrhythmic and heart rate control medications as ordered  - Initiate emergency measures for life threatening arrhythmias  - Monitor electrolytes and administer replacement therapy as ordered  Outcome: Progressing     Problem: RESPIRATORY - ADULT  Goal: Achieves optimal ventilation and oxygenation  Description: INTERVENTIONS:  - Assess for changes in respiratory status  - Assess for changes in mentation and behavior  - Position to facilitate oxygenation and minimize respiratory effort  - Oxygen supplementation based on oxygen saturation or ABGs  - Provide Smoking Cessation handout, if applicable  - Encourage broncho-pulmonary hygiene including cough, deep breathe, Incentive Spirometry  - Assess the need for suctioning and perform as needed  - Assess and instruct to report SOB or any respiratory difficulty  - Respiratory Therapy support as indicated  - Manage/alleviate anxiety  - Monitor for signs/symptoms of CO2 retention  Outcome: Progressing     Problem: SKIN/TISSUE INTEGRITY - ADULT  Goal: Skin integrity remains intact  Description: INTERVENTIONS  - Assess and document risk factors for pressure ulcer development  - Assess and document skin integrity  - Monitor for areas of redness and/or skin breakdown  - Initiate interventions, skin care algorithm/standards of care as needed  Outcome: Progressing  Goal: Incision(s), wounds(s) or drain site(s) healing without S/S of infection  Description: INTERVENTIONS:  - Assess and document risk factors for pressure ulcer development  - Assess and document skin integrity  - Assess and document dressing/incision, wound bed, drain sites and surrounding tissue  - Implement wound care per orders  - Initiate isolation precautions as appropriate  - Initiate Pressure Ulcer prevention bundle as indicated  Outcome: Progressing     Problem: HEMATOLOGIC - ADULT  Goal: Maintains hematologic stability  Description: INTERVENTIONS  - Assess for signs and symptoms of bleeding or hemorrhage  - Monitor labs and vital signs for trends  - Administer supportive blood products/factors, fluids and medications as ordered and appropriate  - Administer supportive blood products/factors as ordered and appropriate  Outcome: Progressing  Goal: Free from bleeding injury  Description: (Example usage: patient with low platelets)  INTERVENTIONS:  - Avoid intramuscular injections, enemas and rectal medication administration  - Ensure safe mobilization of patient  - Hold pressure on venipuncture sites to achieve adequate hemostasis  - Assess for signs and symptoms of internal bleeding  - Monitor lab trends  - Patient is to report abnormal signs of bleeding to staff  - Avoid use of toothpicks and dental floss  - Use electric shaver for shaving  - Use soft bristle tooth brush  - Limit straining and forceful nose blowing  Outcome: Progressing     Problem: MUSCULOSKELETAL - ADULT  Goal: Return mobility to safest level of function  Description: INTERVENTIONS:  - Assess patient stability and activity tolerance for standing, transferring and ambulating w/ or w/o assistive devices  - Assist with transfers and ambulation using safe patient handling equipment as needed  - Ensure adequate protection for wounds/incisions during mobilization  - Obtain PT/OT consults as needed  - Advance activity as appropriate  - Communicate ordered activity level and limitations with patient/family  Outcome: Progressing     Problem: Impaired Functional Mobility  Goal: Achieve highest/safest level of mobility/gait  Description: Interventions:  - Assess patient's functional ability and stability  - Promote increasing activity/tolerance for mobility and gait  - Educate and engage patient/family in tolerated activity level and precautions  Outcome: Progressing     Problem: Impaired Activities of Daily Living  Goal: Achieve highest/safest level of independence in self care  Description: Interventions:  - Assess ability and encourage patient to participate in ADLs to maximize function  - Promote sitting position while performing ADLs such as feeding, grooming, and bathing  - Educate and encourage patient/family in tolerated functional activity level and precautions during self-care  Outcome: Progressing     Problem: SAFETY ADULT - FALL  Goal: Free from fall injury  Description: INTERVENTIONS:  - Assess pt frequently for physical needs  - Identify cognitive and physical deficits and behaviors that affect risk of falls.   - [place] fall precautions as indicated by assessment.  - Educate pt/family on patient safety including physical limitations  - Instruct pt to call for assistance with activity based on assessment  - Modify environment to reduce risk of injury  - Provide assistive devices as appropriate  - Consider OT/PT consult to assist with strengthening/mobility  - Encourage toileting schedule  Outcome: Progressing     Problem: SKIN INTEGRITY  Goal: Skin integrity remains intact  Description: Interventions:  - Assess for areas of redness and/or skin breakdown  - Assess vascular sites hourly  - Change oxygen saturation probe minimum once per shift  - Provide humidity as ordered and per policy  - If on oxygen support, assess nasal septum area for skin breakdown and replace protective barrier if needed  - Change diapers as needed  - Minimize the use of adhesives  Outcome: Progressing  Goal: Incision/wound heals without complications  Description: Interventions:  - Assess wound bed/incision and surrounding skin tissue  - Collaborate with physician/APN and implement wound/incision site care and dressing changes as ordered  - Position infant to avoid placing pressure on wound  - Enterostomal/Wound therapy consult as indicated for ostomies/wounds/G-tubes  Outcome: Progressing

## 2023-05-14 NOTE — PROGRESS NOTES
05/14/23 1550   Mobility   O2 walk?  Yes   SPO2% on Room Air at Rest 98   At rest oxygen flow (liters per minute) 0   SPO2% Ambulation on Room Air 95   Ambulation oxygen flow (liters per minute) 0

## 2023-05-14 NOTE — PLAN OF CARE
Problem: Patient Centered Care  Goal: Patient preferences are identified and integrated in the patient's plan of care  Description: Interventions:  - What would you like us to know as we care for you?  I live at home with my niece  - Provide timely, complete, and accurate information to patient/family  - Incorporate patient and family knowledge, values, beliefs, and cultural backgrounds into the planning and delivery of care  - Encourage patient/family to participate in care and decision-making at the level they choose  - Honor patient and family perspectives and choices  Outcome: Progressing     Problem: Patient/Family Goals  Goal: Patient/Family Long Term Goal  Description: Patient's Long Term Goal: To go home    Interventions:  - monitor VS  - pain management  - participate with PT/OT  - See additional Care Plan goals for specific interventions  Outcome: Progressing  Goal: Patient/Family Short Term Goal  Description: Patient's Short Term Goal: to feel better    Interventions:   - monitor VS  - pain management  - participate with PT/OT  - See additional Care Plan goals for specific interventions  Outcome: Progressing     Problem: CARDIOVASCULAR - ADULT  Goal: Maintains optimal cardiac output and hemodynamic stability  Description: INTERVENTIONS:  - Monitor vital signs, rhythm, and trends  - Monitor for bleeding, hypotension and signs of decreased cardiac output  - Evaluate effectiveness of vasoactive medications to optimize hemodynamic stability  - Monitor arterial and/or venous puncture sites for bleeding and/or hematoma  - Assess quality of pulses, skin color and temperature  - Evaluate fluid balance, assess for edema, trend weights  Outcome: Progressing  Goal: Absence of cardiac arrhythmias or at baseline  Description: INTERVENTIONS:  - Continuous cardiac monitoring, monitor vital signs, obtain 12 lead EKG if indicated  - Evaluate effectiveness of antiarrhythmic and heart rate control medications as ordered  - Initiate emergency measures for life threatening arrhythmias  - Monitor electrolytes and administer replacement therapy as ordered  Outcome: Progressing     Problem: RESPIRATORY - ADULT  Goal: Achieves optimal ventilation and oxygenation  Description: INTERVENTIONS:  - Assess for changes in respiratory status  - Assess for changes in mentation and behavior  - Position to facilitate oxygenation and minimize respiratory effort  - Oxygen supplementation based on oxygen saturation or ABGs  - Provide Smoking Cessation handout, if applicable  - Encourage broncho-pulmonary hygiene including cough, deep breathe, Incentive Spirometry  - Assess the need for suctioning and perform as needed  - Assess and instruct to report SOB or any respiratory difficulty  - Respiratory Therapy support as indicated  - Manage/alleviate anxiety  - Monitor for signs/symptoms of CO2 retention  Outcome: Progressing     Problem: SKIN/TISSUE INTEGRITY - ADULT  Goal: Skin integrity remains intact  Description: INTERVENTIONS  - Assess and document risk factors for pressure ulcer development  - Assess and document skin integrity  - Monitor for areas of redness and/or skin breakdown  - Initiate interventions, skin care algorithm/standards of care as needed  Outcome: Progressing  Goal: Incision(s), wounds(s) or drain site(s) healing without S/S of infection  Description: INTERVENTIONS:  - Assess and document risk factors for pressure ulcer development  - Assess and document skin integrity  - Assess and document dressing/incision, wound bed, drain sites and surrounding tissue  - Implement wound care per orders  - Initiate isolation precautions as appropriate  - Initiate Pressure Ulcer prevention bundle as indicated  Outcome: Progressing     Problem: HEMATOLOGIC - ADULT  Goal: Maintains hematologic stability  Description: INTERVENTIONS  - Assess for signs and symptoms of bleeding or hemorrhage  - Monitor labs and vital signs for trends  - Administer supportive blood products/factors, fluids and medications as ordered and appropriate  - Administer supportive blood products/factors as ordered and appropriate  Outcome: Progressing  Goal: Free from bleeding injury  Description: (Example usage: patient with low platelets)  INTERVENTIONS:  - Avoid intramuscular injections, enemas and rectal medication administration  - Ensure safe mobilization of patient  - Hold pressure on venipuncture sites to achieve adequate hemostasis  - Assess for signs and symptoms of internal bleeding  - Monitor lab trends  - Patient is to report abnormal signs of bleeding to staff  - Avoid use of toothpicks and dental floss  - Use electric shaver for shaving  - Use soft bristle tooth brush  - Limit straining and forceful nose blowing  Outcome: Progressing       Problem: MUSCULOSKELETAL - ADULT  Goal: Return mobility to safest level of function  Description: INTERVENTIONS:  - Assess patient stability and activity tolerance for standing, transferring and ambulating w/ or w/o assistive devices  - Assist with transfers and ambulation using safe patient handling equipment as needed  - Ensure adequate protection for wounds/incisions during mobilization  - Obtain PT/OT consults as needed  - Advance activity as appropriate  - Communicate ordered activity level and limitations with patient/family  Outcome: Progressing    BIPAP on for three hours while asleep. Complaining of GERD symptoms while on BIPAP. Placed on 3L. Room air while awake. Monitoring vital signs- stable at this time. Safety and fall precautions maintained- bed alarm on, bed locked in lowest position, call light within reach. Frequent rounding by nursing staff.

## 2023-05-15 LAB
ANION GAP SERPL CALC-SCNC: 1 MMOL/L (ref 0–18)
BASOPHILS # BLD AUTO: 0.01 X10(3) UL (ref 0–0.2)
BASOPHILS NFR BLD AUTO: 0.1 %
BUN BLD-MCNC: 71 MG/DL (ref 7–18)
BUN/CREAT SERPL: 64 (ref 10–20)
CALCIUM BLD-MCNC: 8.7 MG/DL (ref 8.5–10.1)
CHLORIDE SERPL-SCNC: 112 MMOL/L (ref 98–112)
CO2 SERPL-SCNC: 27 MMOL/L (ref 21–32)
CREAT BLD-MCNC: 1.11 MG/DL
DEPRECATED RDW RBC AUTO: 54.5 FL (ref 35.1–46.3)
EOSINOPHIL # BLD AUTO: 0.03 X10(3) UL (ref 0–0.7)
EOSINOPHIL NFR BLD AUTO: 0.3 %
ERYTHROCYTE [DISTWIDTH] IN BLOOD BY AUTOMATED COUNT: 15.3 % (ref 11–15)
GFR SERPLBLD BASED ON 1.73 SQ M-ARVRAT: 53 ML/MIN/1.73M2 (ref 60–?)
GLUCOSE BLD-MCNC: 87 MG/DL (ref 70–99)
HCT VFR BLD AUTO: 41.6 %
HGB BLD-MCNC: 12.9 G/DL
IMM GRANULOCYTES # BLD AUTO: 0.03 X10(3) UL (ref 0–1)
IMM GRANULOCYTES NFR BLD: 0.3 %
INR BLD: 4.07 (ref 0.85–1.16)
LYMPHOCYTES # BLD AUTO: 0.85 X10(3) UL (ref 1–4)
LYMPHOCYTES NFR BLD AUTO: 9 %
MCH RBC QN AUTO: 29.7 PG (ref 26–34)
MCHC RBC AUTO-ENTMCNC: 31 G/DL (ref 31–37)
MCV RBC AUTO: 95.6 FL
MONOCYTES # BLD AUTO: 0.56 X10(3) UL (ref 0.1–1)
MONOCYTES NFR BLD AUTO: 6 %
NEUTROPHILS # BLD AUTO: 7.93 X10 (3) UL (ref 1.5–7.7)
NEUTROPHILS # BLD AUTO: 7.93 X10(3) UL (ref 1.5–7.7)
NEUTROPHILS NFR BLD AUTO: 84.3 %
OSMOLALITY SERPL CALC.SUM OF ELEC: 310 MOSM/KG (ref 275–295)
PLATELET # BLD AUTO: 110 10(3)UL (ref 150–450)
POTASSIUM SERPL-SCNC: 5.2 MMOL/L (ref 3.5–5.1)
PROTHROMBIN TIME: 38.6 SECONDS (ref 11.6–14.8)
RBC # BLD AUTO: 4.35 X10(6)UL
SODIUM SERPL-SCNC: 140 MMOL/L (ref 136–145)
WBC # BLD AUTO: 9.4 X10(3) UL (ref 4–11)

## 2023-05-15 NOTE — PLAN OF CARE
Kaley Valadez had no acute changes during shift. Medications were given as charted. Call light was placed within reach at all times with bed locked in lowest position and bed alarm set. Patient called appropriately and was rounded on frequently. Problem: Patient Centered Care  Goal: Patient preferences are identified and integrated in the patient's plan of care  Description: Interventions:  - What would you like us to know as we care for you? I live at home with my niece  - Provide timely, complete, and accurate information to patient/family  - Incorporate patient and family knowledge, values, beliefs, and cultural backgrounds into the planning and delivery of care  - Encourage patient/family to participate in care and decision-making at the level they choose  - Honor patient and family perspectives and choices  Outcome: Progressing     Problem: Patient/Family Goals  Goal: Patient/Family Long Term Goal  Description: Patient's Long Term Goal: To go home    Interventions:  - monitor VS  - pain management  - participate with PT/OT  - See additional Care Plan goals for specific interventions  Outcome: Progressing  Goal: Patient/Family Short Term Goal  Description: Patient's Short Term Goal: to feel better    Interventions:   - monitor VS  - pain management  - participate with PT/OT  - See additional Care Plan goals for specific interventions  Outcome: Progressing     Problem: CARDIOVASCULAR - ADULT  Goal: Maintains optimal cardiac output and hemodynamic stability  Description: INTERVENTIONS:  - Monitor vital signs, rhythm, and trends  - Monitor for bleeding, hypotension and signs of decreased cardiac output  - Evaluate effectiveness of vasoactive medications to optimize hemodynamic stability  - Monitor arterial and/or venous puncture sites for bleeding and/or hematoma  - Assess quality of pulses, skin color and temperature  - Assess for signs of decreased coronary artery perfusion - ex.  Angina  - Evaluate fluid balance, assess for edema, trend weights  Outcome: Progressing  Goal: Absence of cardiac arrhythmias or at baseline  Description: INTERVENTIONS:  - Continuous cardiac monitoring, monitor vital signs, obtain 12 lead EKG if indicated  - Evaluate effectiveness of antiarrhythmic and heart rate control medications as ordered  - Initiate emergency measures for life threatening arrhythmias  - Monitor electrolytes and administer replacement therapy as ordered  Outcome: Progressing     Problem: RESPIRATORY - ADULT  Goal: Achieves optimal ventilation and oxygenation  Description: INTERVENTIONS:  - Assess for changes in respiratory status  - Assess for changes in mentation and behavior  - Position to facilitate oxygenation and minimize respiratory effort  - Oxygen supplementation based on oxygen saturation or ABGs  - Provide Smoking Cessation handout, if applicable  - Encourage broncho-pulmonary hygiene including cough, deep breathe, Incentive Spirometry  - Assess the need for suctioning and perform as needed  - Assess and instruct to report SOB or any respiratory difficulty  - Respiratory Therapy support as indicated  - Manage/alleviate anxiety  - Monitor for signs/symptoms of CO2 retention  Outcome: Progressing     Problem: SKIN/TISSUE INTEGRITY - ADULT  Goal: Skin integrity remains intact  Description: INTERVENTIONS  - Assess and document risk factors for pressure ulcer development  - Assess and document skin integrity  - Monitor for areas of redness and/or skin breakdown  - Initiate interventions, skin care algorithm/standards of care as needed  Outcome: Progressing  Goal: Incision(s), wounds(s) or drain site(s) healing without S/S of infection  Description: INTERVENTIONS:  - Assess and document risk factors for pressure ulcer development  - Assess and document skin integrity  - Assess and document dressing/incision, wound bed, drain sites and surrounding tissue  - Implement wound care per orders  - Initiate isolation precautions as appropriate  - Initiate Pressure Ulcer prevention bundle as indicated  Outcome: Progressing     Problem: HEMATOLOGIC - ADULT  Goal: Maintains hematologic stability  Description: INTERVENTIONS  - Assess for signs and symptoms of bleeding or hemorrhage  - Monitor labs and vital signs for trends  - Administer supportive blood products/factors, fluids and medications as ordered and appropriate  - Administer supportive blood products/factors as ordered and appropriate  Outcome: Progressing  Goal: Free from bleeding injury  Description: (Example usage: patient with low platelets)  INTERVENTIONS:  - Avoid intramuscular injections, enemas and rectal medication administration  - Ensure safe mobilization of patient  - Hold pressure on venipuncture sites to achieve adequate hemostasis  - Assess for signs and symptoms of internal bleeding  - Monitor lab trends  - Patient is to report abnormal signs of bleeding to staff  - Avoid use of toothpicks and dental floss  - Use electric shaver for shaving  - Use soft bristle tooth brush  - Limit straining and forceful nose blowing  Outcome: Progressing     Problem: MUSCULOSKELETAL - ADULT  Goal: Return mobility to safest level of function  Description: INTERVENTIONS:  - Assess patient stability and activity tolerance for standing, transferring and ambulating w/ or w/o assistive devices  - Assist with transfers and ambulation using safe patient handling equipment as needed  - Ensure adequate protection for wounds/incisions during mobilization  - Obtain PT/OT consults as needed  - Advance activity as appropriate  - Communicate ordered activity level and limitations with patient/family  Outcome: Progressing     Problem: Impaired Functional Mobility  Goal: Achieve highest/safest level of mobility/gait  Description: Interventions:  - Assess patient's functional ability and stability  - Promote increasing activity/tolerance for mobility and gait  - Educate and engage patient/family in tolerated activity level and precautions  Outcome: Progressing     Problem: Impaired Activities of Daily Living  Goal: Achieve highest/safest level of independence in self care  Description: Interventions:  - Assess ability and encourage patient to participate in ADLs to maximize function  - Promote sitting position while performing ADLs such as feeding, grooming, and bathing  - Educate and encourage patient/family in tolerated functional activity level and precautions during self-care  Outcome: Progressing     Problem: SAFETY ADULT - FALL  Goal: Free from fall injury  Description: INTERVENTIONS:  - Assess pt frequently for physical needs  - Identify cognitive and physical deficits and behaviors that affect risk of falls.   - [place] fall precautions as indicated by assessment.  - Educate pt/family on patient safety including physical limitations  - Instruct pt to call for assistance with activity based on assessment  - Modify environment to reduce risk of injury  - Provide assistive devices as appropriate  - Consider OT/PT consult to assist with strengthening/mobility  - Encourage toileting schedule  Outcome: Progressing     Problem: SKIN INTEGRITY  Goal: Skin integrity remains intact  Description: Interventions:  - Assess for areas of redness and/or skin breakdown  - Assess vascular sites hourly  - Change oxygen saturation probe minimum once per shift  - Provide humidity as ordered and per policy  - If on oxygen support, assess nasal septum area for skin breakdown and replace protective barrier if needed  - Change diapers as needed  - Minimize the use of adhesives  Outcome: Progressing  Goal: Incision/wound heals without complications  Description: Interventions:  - Assess wound bed/incision and surrounding skin tissue  - Collaborate with physician/APN and implement wound/incision site care and dressing changes as ordered  - Position infant to avoid placing pressure on wound  - Enterostomal/Wound therapy consult as indicated for ostomies/wounds/G-tubes  Outcome: Progressing

## 2023-05-15 NOTE — PLAN OF CARE
Problem: Patient Centered Care  Goal: Patient preferences are identified and integrated in the patient's plan of care  Description: Interventions:  - What would you like us to know as we care for you? I live at home with my niece  - Provide timely, complete, and accurate information to patient/family  - Incorporate patient and family knowledge, values, beliefs, and cultural backgrounds into the planning and delivery of care  - Encourage patient/family to participate in care and decision-making at the level they choose  - Honor patient and family perspectives and choices  Outcome: Progressing     Problem: Patient/Family Goals  Goal: Patient/Family Long Term Goal  Description: Patient's Long Term Goal: To go home    Interventions:  - monitor VS  - pain management  - participate with PT/OT  - See additional Care Plan goals for specific interventions  Outcome: Progressing  Goal: Patient/Family Short Term Goal  Description: Patient's Short Term Goal: to feel better    Interventions:   - monitor VS  - pain management  - participate with PT/OT  - See additional Care Plan goals for specific interventions  Outcome: Progressing     Problem: CARDIOVASCULAR - ADULT  Goal: Maintains optimal cardiac output and hemodynamic stability  Description: INTERVENTIONS:  - Monitor vital signs, rhythm, and trends  - Monitor for bleeding, hypotension and signs of decreased cardiac output  - Evaluate effectiveness of vasoactive medications to optimize hemodynamic stability  - Monitor arterial and/or venous puncture sites for bleeding and/or hematoma  - Assess quality of pulses, skin color and temperature  - Assess for signs of decreased coronary artery perfusion - ex.  Angina  - Evaluate fluid balance, assess for edema, trend weights  Outcome: Progressing  Goal: Absence of cardiac arrhythmias or at baseline  Description: INTERVENTIONS:  - Continuous cardiac monitoring, monitor vital signs, obtain 12 lead EKG if indicated  - Evaluate effectiveness of antiarrhythmic and heart rate control medications as ordered  - Initiate emergency measures for life threatening arrhythmias  - Monitor electrolytes and administer replacement therapy as ordered  Outcome: Progressing     Problem: RESPIRATORY - ADULT  Goal: Achieves optimal ventilation and oxygenation  Description: INTERVENTIONS:  - Assess for changes in respiratory status  - Assess for changes in mentation and behavior  - Position to facilitate oxygenation and minimize respiratory effort  - Oxygen supplementation based on oxygen saturation or ABGs  - Provide Smoking Cessation handout, if applicable  - Encourage broncho-pulmonary hygiene including cough, deep breathe, Incentive Spirometry  - Assess the need for suctioning and perform as needed  - Assess and instruct to report SOB or any respiratory difficulty  - Respiratory Therapy support as indicated  - Manage/alleviate anxiety  - Monitor for signs/symptoms of CO2 retention  Outcome: Progressing     Problem: SKIN/TISSUE INTEGRITY - ADULT  Goal: Skin integrity remains intact  Description: INTERVENTIONS  - Assess and document risk factors for pressure ulcer development  - Assess and document skin integrity  - Monitor for areas of redness and/or skin breakdown  - Initiate interventions, skin care algorithm/standards of care as needed  Outcome: Progressing  Goal: Incision(s), wounds(s) or drain site(s) healing without S/S of infection  Description: INTERVENTIONS:  - Assess and document risk factors for pressure ulcer development  - Assess and document skin integrity  - Assess and document dressing/incision, wound bed, drain sites and surrounding tissue  - Implement wound care per orders  - Initiate isolation precautions as appropriate  - Initiate Pressure Ulcer prevention bundle as indicated  Outcome: Progressing     Problem: HEMATOLOGIC - ADULT  Goal: Maintains hematologic stability  Description: INTERVENTIONS  - Assess for signs and symptoms of bleeding or hemorrhage  - Monitor labs and vital signs for trends  - Administer supportive blood products/factors, fluids and medications as ordered and appropriate  - Administer supportive blood products/factors as ordered and appropriate  Outcome: Progressing  Goal: Free from bleeding injury  Description: (Example usage: patient with low platelets)  INTERVENTIONS:  - Avoid intramuscular injections, enemas and rectal medication administration  - Ensure safe mobilization of patient  - Hold pressure on venipuncture sites to achieve adequate hemostasis  - Assess for signs and symptoms of internal bleeding  - Monitor lab trends  - Patient is to report abnormal signs of bleeding to staff  - Avoid use of toothpicks and dental floss  - Use electric shaver for shaving  - Use soft bristle tooth brush  - Limit straining and forceful nose blowing  Outcome: Progressing     Problem: MUSCULOSKELETAL - ADULT  Goal: Return mobility to safest level of function  Description: INTERVENTIONS:  - Assess patient stability and activity tolerance for standing, transferring and ambulating w/ or w/o assistive devices  - Assist with transfers and ambulation using safe patient handling equipment as needed  - Ensure adequate protection for wounds/incisions during mobilization  - Obtain PT/OT consults as needed  - Advance activity as appropriate  - Communicate ordered activity level and limitations with patient/family  Outcome: Progressing     Problem: Impaired Functional Mobility  Goal: Achieve highest/safest level of mobility/gait  Description: Interventions:  - Assess patient's functional ability and stability  - Promote increasing activity/tolerance for mobility and gait  - Educate and engage patient/family in tolerated activity level and precautions  Outcome: Progressing     Problem: Impaired Activities of Daily Living  Goal: Achieve highest/safest level of independence in self care  Description: Interventions:  - Assess ability and encourage patient to participate in ADLs to maximize function  - Promote sitting position while performing ADLs such as feeding, grooming, and bathing  - Educate and encourage patient/family in tolerated functional activity level and precautions during self-care  Outcome: Progressing     Problem: SAFETY ADULT - FALL  Goal: Free from fall injury  Description: INTERVENTIONS:  - Assess pt frequently for physical needs  - Identify cognitive and physical deficits and behaviors that affect risk of falls. - [place] fall precautions as indicated by assessment.  - Educate pt/family on patient safety including physical limitations  - Instruct pt to call for assistance with activity based on assessment  - Modify environment to reduce risk of injury  - Provide assistive devices as appropriate  - Consider OT/PT consult to assist with strengthening/mobility  - Encourage toileting schedule  Outcome: Progressing     Problem: SKIN INTEGRITY  Goal: Skin integrity remains intact  Description: Interventions:  - Assess for areas of redness and/or skin breakdown  - Assess vascular sites hourly  - Change oxygen saturation probe minimum once per shift  - Provide humidity as ordered and per policy  - If on oxygen support, assess nasal septum area for skin breakdown and replace protective barrier if needed  - Change diapers as needed  - Minimize the use of adhesives  Outcome: Progressing  Goal: Incision/wound heals without complications  Description: Interventions:  - Assess wound bed/incision and surrounding skin tissue  - Collaborate with physician/APN and implement wound/incision site care and dressing changes as ordered  - Position infant to avoid placing pressure on wound  - Enterostomal/Wound therapy consult as indicated for ostomies/wounds/G-tubes  Outcome: Progressing     Patient updated on plan of care. No acute changes noted at this time. On BIPAP while asleep.  Safety and fall precautions maintained- bed alarm on, bed locked in lowest position, call light within reach. Frequent rounding by nursing staff.

## 2023-05-16 ENCOUNTER — APPOINTMENT (OUTPATIENT)
Dept: CT IMAGING | Facility: HOSPITAL | Age: 73
End: 2023-05-16
Attending: INTERNAL MEDICINE
Payer: MEDICARE

## 2023-05-16 LAB
ANION GAP SERPL CALC-SCNC: 3 MMOL/L (ref 0–18)
ANTIBODY SCREEN: NEGATIVE
BASOPHILS # BLD AUTO: 0.01 X10(3) UL (ref 0–0.2)
BASOPHILS NFR BLD AUTO: 0.1 %
BUN BLD-MCNC: 62 MG/DL (ref 7–18)
BUN/CREAT SERPL: 56.9 (ref 10–20)
CALCIUM BLD-MCNC: 8.5 MG/DL (ref 8.5–10.1)
CHLORIDE SERPL-SCNC: 115 MMOL/L (ref 98–112)
CO2 SERPL-SCNC: 24 MMOL/L (ref 21–32)
CREAT BLD-MCNC: 1.09 MG/DL
DEPRECATED RDW RBC AUTO: 55 FL (ref 35.1–46.3)
EOSINOPHIL # BLD AUTO: 0.04 X10(3) UL (ref 0–0.7)
EOSINOPHIL NFR BLD AUTO: 0.5 %
ERYTHROCYTE [DISTWIDTH] IN BLOOD BY AUTOMATED COUNT: 15.6 % (ref 11–15)
GFR SERPLBLD BASED ON 1.73 SQ M-ARVRAT: 54 ML/MIN/1.73M2 (ref 60–?)
GLUCOSE BLD-MCNC: 89 MG/DL (ref 70–99)
HCT VFR BLD AUTO: 40.6 %
HGB BLD-MCNC: 12.6 G/DL
IMM GRANULOCYTES # BLD AUTO: 0.03 X10(3) UL (ref 0–1)
IMM GRANULOCYTES NFR BLD: 0.4 %
INR BLD: 1.77 (ref 0.85–1.16)
INR BLD: 3.54 (ref 0.85–1.16)
LYMPHOCYTES # BLD AUTO: 0.92 X10(3) UL (ref 1–4)
LYMPHOCYTES NFR BLD AUTO: 11.9 %
MCH RBC QN AUTO: 29.9 PG (ref 26–34)
MCHC RBC AUTO-ENTMCNC: 31 G/DL (ref 31–37)
MCV RBC AUTO: 96.2 FL
MONOCYTES # BLD AUTO: 0.46 X10(3) UL (ref 0.1–1)
MONOCYTES NFR BLD AUTO: 6 %
NEUTROPHILS # BLD AUTO: 6.26 X10 (3) UL (ref 1.5–7.7)
NEUTROPHILS # BLD AUTO: 6.26 X10(3) UL (ref 1.5–7.7)
NEUTROPHILS NFR BLD AUTO: 81.1 %
OSMOLALITY SERPL CALC.SUM OF ELEC: 311 MOSM/KG (ref 275–295)
PLATELET # BLD AUTO: 106 10(3)UL (ref 150–450)
POTASSIUM SERPL-SCNC: 5.1 MMOL/L (ref 3.5–5.1)
PROTHROMBIN TIME: 20.3 SECONDS (ref 11.6–14.8)
PROTHROMBIN TIME: 34.6 SECONDS (ref 11.6–14.8)
RBC # BLD AUTO: 4.22 X10(6)UL
RH BLOOD TYPE: POSITIVE
SODIUM SERPL-SCNC: 142 MMOL/L (ref 136–145)
WBC # BLD AUTO: 7.7 X10(3) UL (ref 4–11)

## 2023-05-16 PROCEDURE — 70450 CT HEAD/BRAIN W/O DYE: CPT | Performed by: INTERNAL MEDICINE

## 2023-05-16 RX ORDER — PREDNISONE 10 MG/1
10 TABLET ORAL
Status: COMPLETED | OUTPATIENT
Start: 2023-05-17 | End: 2023-05-20

## 2023-05-16 NOTE — PLAN OF CARE
Problem: Patient Centered Care  Goal: Patient preferences are identified and integrated in the patient's plan of care  Description: Interventions:  - What would you like us to know as we care for you? I live at home with my niece  - Provide timely, complete, and accurate information to patient/family  - Incorporate patient and family knowledge, values, beliefs, and cultural backgrounds into the planning and delivery of care  - Encourage patient/family to participate in care and decision-making at the level they choose  - Honor patient and family perspectives and choices  Outcome: Progressing     Problem: Patient/Family Goals  Goal: Patient/Family Long Term Goal  Description: Patient's Long Term Goal: To go home    Interventions:  - monitor VS  - pain management  - participate with PT/OT  - See additional Care Plan goals for specific interventions  Outcome: Progressing  Goal: Patient/Family Short Term Goal  Description: Patient's Short Term Goal: to feel better    Interventions:   - monitor VS  - pain management  - participate with PT/OT  - See additional Care Plan goals for specific interventions  Outcome: Progressing     Problem: CARDIOVASCULAR - ADULT  Goal: Maintains optimal cardiac output and hemodynamic stability  Description: INTERVENTIONS:  - Monitor vital signs, rhythm, and trends  - Monitor for bleeding, hypotension and signs of decreased cardiac output  - Evaluate effectiveness of vasoactive medications to optimize hemodynamic stability  - Monitor arterial and/or venous puncture sites for bleeding and/or hematoma  - Assess quality of pulses, skin color and temperature  - Assess for signs of decreased coronary artery perfusion - ex.  Angina  - Evaluate fluid balance, assess for edema, trend weights  Outcome: Progressing  Goal: Absence of cardiac arrhythmias or at baseline  Description: INTERVENTIONS:  - Continuous cardiac monitoring, monitor vital signs, obtain 12 lead EKG if indicated  - Evaluate effectiveness of antiarrhythmic and heart rate control medications as ordered  - Initiate emergency measures for life threatening arrhythmias  - Monitor electrolytes and administer replacement therapy as ordered  Outcome: Progressing     Problem: MUSCULOSKELETAL - ADULT  Goal: Return mobility to safest level of function  Description: INTERVENTIONS:  - Assess patient stability and activity tolerance for standing, transferring and ambulating w/ or w/o assistive devices  - Assist with transfers and ambulation using safe patient handling equipment as needed  - Ensure adequate protection for wounds/incisions during mobilization  - Obtain PT/OT consults as needed  - Advance activity as appropriate  - Communicate ordered activity level and limitations with patient/family  Outcome: Progressing     Problem: Impaired Functional Mobility  Goal: Achieve highest/safest level of mobility/gait  Description: Interventions:  - Assess patient's functional ability and stability  - Promote increasing activity/tolerance for mobility and gait  - Educate and engage patient/family in tolerated activity level and precautions    Outcome: Progressing     Problem: SAFETY ADULT - FALL  Goal: Free from fall injury  Description: INTERVENTIONS:  - Assess pt frequently for physical needs  - Identify cognitive and physical deficits and behaviors that affect risk of falls.   - [place] fall precautions as indicated by assessment.  - Educate pt/family on patient safety including physical limitations  - Instruct pt to call for assistance with activity based on assessment  - Modify environment to reduce risk of injury  - Provide assistive devices as appropriate  - Consider OT/PT consult to assist with strengthening/mobility  - Encourage toileting schedule  Outcome: Progressing     Problem: SKIN INTEGRITY  Goal: Skin integrity remains intact  Description: Interventions:  - Assess for areas of redness and/or skin breakdown  - Assess vascular sites hourly  - Change oxygen saturation probe minimum once per shift  - Provide humidity as ordered and per policy  - If on oxygen support, assess nasal septum area for skin breakdown and replace protective barrier if needed  - Change diapers as needed  - Minimize the use of adhesives  Outcome: Progressing  Goal: Incision/wound heals without complications  Description: Interventions:  - Assess wound bed/incision and surrounding skin tissue  - Collaborate with physician/APN and implement wound/incision site care and dressing changes as ordered  - Position infant to avoid placing pressure on wound  - Enterostomal/Wound therapy consult as indicated for ostomies/wounds/G-tubes  Outcome: Progressing   Last night PCT was helping patient to get back to bed and accidentally  patient got hit by tele box in her right eye, the eye started having bruising and swelling  Immediately,INR 4.09 at that time,  ice pack applied. Amadou Terrell notified of injury but no new orders received. Around 430 during nursing rounds RN notice that swelling increased, patient not able to open her eye.  RN  Notified  and called house doctor to assess patient , see noted and orders

## 2023-05-16 NOTE — PROGRESS NOTES
Patient was in bed and PCT was pulling the patients gown from underneath her and the Tele box flew up and hit the patient on top of her right eye. Patient immediately started having swelling and bruising above right right and on top of eye lid. Right eye Sclera appeared to be pink/red when patient opened her eye. Ice pack applied. Patient initially yelled in pain but then stated she was ok, and denied need for pain medication. RN notified and states will notify MD for further orders.

## 2023-05-17 LAB
ANION GAP SERPL CALC-SCNC: 4 MMOL/L (ref 0–18)
BASOPHILS # BLD AUTO: 0.01 X10(3) UL (ref 0–0.2)
BASOPHILS NFR BLD AUTO: 0.1 %
BUN BLD-MCNC: 56 MG/DL (ref 7–18)
BUN/CREAT SERPL: 52.3 (ref 10–20)
CALCIUM BLD-MCNC: 8.4 MG/DL (ref 8.5–10.1)
CHLORIDE SERPL-SCNC: 112 MMOL/L (ref 98–112)
CO2 SERPL-SCNC: 24 MMOL/L (ref 21–32)
CREAT BLD-MCNC: 1.07 MG/DL
DEPRECATED RDW RBC AUTO: 54.9 FL (ref 35.1–46.3)
EOSINOPHIL # BLD AUTO: 0.05 X10(3) UL (ref 0–0.7)
EOSINOPHIL NFR BLD AUTO: 0.7 %
ERYTHROCYTE [DISTWIDTH] IN BLOOD BY AUTOMATED COUNT: 15.4 % (ref 11–15)
GFR SERPLBLD BASED ON 1.73 SQ M-ARVRAT: 55 ML/MIN/1.73M2 (ref 60–?)
GLUCOSE BLD-MCNC: 98 MG/DL (ref 70–99)
HCT VFR BLD AUTO: 40 %
HGB BLD-MCNC: 12.2 G/DL
IMM GRANULOCYTES # BLD AUTO: 0.03 X10(3) UL (ref 0–1)
IMM GRANULOCYTES NFR BLD: 0.4 %
LYMPHOCYTES # BLD AUTO: 0.91 X10(3) UL (ref 1–4)
LYMPHOCYTES NFR BLD AUTO: 12.1 %
MCH RBC QN AUTO: 29.5 PG (ref 26–34)
MCHC RBC AUTO-ENTMCNC: 30.5 G/DL (ref 31–37)
MCV RBC AUTO: 96.6 FL
MONOCYTES # BLD AUTO: 0.4 X10(3) UL (ref 0.1–1)
MONOCYTES NFR BLD AUTO: 5.3 %
NEUTROPHILS # BLD AUTO: 6.13 X10 (3) UL (ref 1.5–7.7)
NEUTROPHILS # BLD AUTO: 6.13 X10(3) UL (ref 1.5–7.7)
NEUTROPHILS NFR BLD AUTO: 81.4 %
OSMOLALITY SERPL CALC.SUM OF ELEC: 305 MOSM/KG (ref 275–295)
PLATELET # BLD AUTO: 98 10(3)UL (ref 150–450)
POTASSIUM SERPL-SCNC: 4.9 MMOL/L (ref 3.5–5.1)
RBC # BLD AUTO: 4.14 X10(6)UL
SODIUM SERPL-SCNC: 140 MMOL/L (ref 136–145)
WBC # BLD AUTO: 7.5 X10(3) UL (ref 4–11)

## 2023-05-17 NOTE — CONSULTS
Mike Reynaga    PATIENT'S NAME: Mala Boss   ATTENDING PHYSICIAN: Marianne Marquis MD   CONSULTING PHYSICIAN: Kelsie Roberts. Aleena Cho MD   PATIENT ACCOUNT#:   [id number]    LOCATION:  [facility] [address]   [id number]       YOB: 1950  ADMISSION DATE:       04/27/2023      CONSULT DATE:  05/16/2023    REPORT OF CONSULTATION    HISTORY OF PRESENT ILLNESS:  This 71-year-old lady was seen by me after the call from Dr. Corinne Castle. When I saw her, her history was that she was accidentally hit in the right eyebrow region by the 1 of the gadgets which the nursing assistant was holding. It caused bleeding in the upper lid and lower lid, and she got black and blue on the right side. She had a CAT scan done, and it was negative. She is on blood thinners, Coumadin and aspirin. PAST MEDICAL HISTORY:  When I examined her, her past history was history of cataract surgery, both eyes, several years ago. OPHTHALMOLOGIC EXAMINATION:  The right eye upper lid, there was marked as ecchymosis of the right upper lid and right lower lid. After  the lids, there was some mucus discharge, but there was also subconjunctival hemorrhage. The cornea was completely clear. The anterior chamber was normal depth and clear. The ocular motility was normal.  There was no diplopia on looking right, left, up, or down. The pupils were equal and reacting to direct and indirect light. The fundus examination, the optic discs and blood vessels appeared to be normal.  The macula appeared to be normal.  She did not have any tenderness much in the right supraorbital region, so obviously this is 1 of the broken blood vessels which caused hemorrhage in the right upper and lower lid and subconjunctival hemorrhage, and by gravity, it has spread a little bit to the right side of the face. I did fluorescein staining also to make sure she does not have any corneal abrasion.   The intra-ocular pressure was not checked because the lids were swollen and puffy. ASSESSMENT AND PLAN:  I told her that this is a result of trauma, and it will take about 1 to 3 weeks to clear. I put her on ice packs twice a day, and I also put her on artificial tear drops 4 times a day in the right eye. I gave her my card. I said if she has any further problems, any pain, discomfort, or double vision to call me and come and see me in the office, either me or see her own doctor, Dr. Cecilia Reed, who did cataract surgery on her. Obviously it looks bad, but fortunately it is not serious. Thank you for the consult. Dictated By Mihaela Burr MD  d: 05/16/2023 17:26:16  t: 05/16/2023 18:41:36  Job [id number]  LDQ/

## 2023-05-17 NOTE — PLAN OF CARE
Problem: Patient Centered Care  Goal: Patient preferences are identified and integrated in the patient's plan of care  Description: Interventions:  - What would you like us to know as we care for you? I live at home with my niece  - Provide timely, complete, and accurate information to patient/family  - Incorporate patient and family knowledge, values, beliefs, and cultural backgrounds into the planning and delivery of care  - Encourage patient/family to participate in care and decision-making at the level they choose  - Honor patient and family perspectives and choices  Outcome: Progressing     Problem: CARDIOVASCULAR - ADULT  Goal: Maintains optimal cardiac output and hemodynamic stability  Description: INTERVENTIONS:  - Monitor vital signs, rhythm, and trends  - Monitor for bleeding, hypotension and signs of decreased cardiac output  - Evaluate effectiveness of vasoactive medications to optimize hemodynamic stability  - Monitor arterial and/or venous puncture sites for bleeding and/or hematoma  - Assess quality of pulses, skin color and temperature  - Assess for signs of decreased coronary artery perfusion - ex.  Angina  - Evaluate fluid balance, assess for edema, trend weights  Outcome: Progressing  Goal: Absence of cardiac arrhythmias or at baseline  Description: INTERVENTIONS:  - Continuous cardiac monitoring, monitor vital signs, obtain 12 lead EKG if indicated  - Evaluate effectiveness of antiarrhythmic and heart rate control medications as ordered  - Initiate emergency measures for life threatening arrhythmias  - Monitor electrolytes and administer replacement therapy as ordered  Outcome: Progressing     Problem: SKIN/TISSUE INTEGRITY - ADULT  Goal: Skin integrity remains intact  Description: INTERVENTIONS  - Assess and document risk factors for pressure ulcer development  - Assess and document skin integrity  - Monitor for areas of redness and/or skin breakdown  - Initiate interventions, skin care algorithm/standards of care as needed  Outcome: Progressing  Goal: Incision(s), wounds(s) or drain site(s) healing without S/S of infection  Description: INTERVENTIONS:  - Assess and document risk factors for pressure ulcer development  - Assess and document skin integrity  - Assess and document dressing/incision, wound bed, drain sites and surrounding tissue  - Implement wound care per orders  - Initiate isolation precautions as appropriate  - Initiate Pressure Ulcer prevention bundle as indicated  Outcome: Progressing     Problem: HEMATOLOGIC - ADULT  Goal: Maintains hematologic stability  Description: INTERVENTIONS  - Assess for signs and symptoms of bleeding or hemorrhage  - Monitor labs and vital signs for trends  - Administer supportive blood products/factors, fluids and medications as ordered and appropriate  - Administer supportive blood products/factors as ordered and appropriate  Outcome: Progressing  Goal: Free from bleeding injury  Description: (Example usage: patient with low platelets)  INTERVENTIONS:  - Avoid intramuscular injections, enemas and rectal medication administration  - Ensure safe mobilization of patient  - Hold pressure on venipuncture sites to achieve adequate hemostasis  - Assess for signs and symptoms of internal bleeding  - Monitor lab trends  - Patient is to report abnormal signs of bleeding to staff  - Avoid use of toothpicks and dental floss  - Use electric shaver for shaving  - Use soft bristle tooth brush  - Limit straining and forceful nose blowing  Outcome: Progressing     Problem: MUSCULOSKELETAL - ADULT  Goal: Return mobility to safest level of function  Description: INTERVENTIONS:  - Assess patient stability and activity tolerance for standing, transferring and ambulating w/ or w/o assistive devices  - Assist with transfers and ambulation using safe patient handling equipment as needed  - Ensure adequate protection for wounds/incisions during mobilization  - Obtain PT/OT consults as needed  - Advance activity as appropriate  - Communicate ordered activity level and limitations with patient/family  Outcome: Progressing     Problem: Impaired Functional Mobility  Goal: Achieve highest/safest level of mobility/gait  Description: Interventions:  - Assess patient's functional ability and stability  - Promote increasing activity/tolerance for mobility and gait  - Educate and engage patient/family in tolerated activity level and precautions    Outcome: Progressing     Problem: Impaired Activities of Daily Living  Goal: Achieve highest/safest level of independence in self care  Description: Interventions:  - Assess ability and encourage patient to participate in ADLs to maximize function  - Promote sitting position while performing ADLs such as feeding, grooming, and bathing  - Educate and encourage patient/family in tolerated functional activity level and precautions during self-care    Outcome: Progressing     Problem: SAFETY ADULT - FALL  Goal: Free from fall injury  Description: INTERVENTIONS:  - Assess pt frequently for physical needs  - Identify cognitive and physical deficits and behaviors that affect risk of falls.   - [place] fall precautions as indicated by assessment.  - Educate pt/family on patient safety including physical limitations  - Instruct pt to call for assistance with activity based on assessment  - Modify environment to reduce risk of injury  - Provide assistive devices as appropriate  - Consider OT/PT consult to assist with strengthening/mobility  - Encourage toileting schedule  Outcome: Progressing     Problem: SKIN INTEGRITY  Goal: Skin integrity remains intact  Description: Interventions:  - Assess for areas of redness and/or skin breakdown  - Assess vascular sites hourly  - Change oxygen saturation probe minimum once per shift  - Provide humidity as ordered and per policy  - If on oxygen support, assess nasal septum area for skin breakdown and replace protective barrier if needed  - Change diapers as needed  - Minimize the use of adhesives  Outcome: Progressing  Goal: Incision/wound heals without complications  Description: Interventions:  - Assess wound bed/incision and surrounding skin tissue  - Collaborate with physician/APN and implement wound/incision site care and dressing changes as ordered  - Position infant to avoid placing pressure on wound  - Enterostomal/Wound therapy consult as indicated for ostomies/wounds/G-tubes  Outcome: Progressing

## 2023-05-17 NOTE — PLAN OF CARE
Patient updated on plan of care. PRN Tylenol. Plan to monitor patient overnight oxygen levels. Plan depending INR levels. Call light within reach. Problem: Patient Centered Care  Goal: Patient preferences are identified and integrated in the patient's plan of care  Description: Interventions:  - What would you like us to know as we care for you? I live at home with my niece  - Provide timely, complete, and accurate information to patient/family  - Incorporate patient and family knowledge, values, beliefs, and cultural backgrounds into the planning and delivery of care  - Encourage patient/family to participate in care and decision-making at the level they choose  - Honor patient and family perspectives and choices  Outcome: Progressing     Problem: Patient/Family Goals  Goal: Patient/Family Long Term Goal  Description: Patient's Long Term Goal: To go home    Interventions:  - monitor VS  - pain management  - participate with PT/OT  - See additional Care Plan goals for specific interventions  Outcome: Progressing  Goal: Patient/Family Short Term Goal  Description: Patient's Short Term Goal: to feel better    Interventions:   - monitor VS  - pain management  - participate with PT/OT  - See additional Care Plan goals for specific interventions  Outcome: Progressing     Problem: CARDIOVASCULAR - ADULT  Goal: Maintains optimal cardiac output and hemodynamic stability  Description: INTERVENTIONS:  - Monitor vital signs, rhythm, and trends  - Monitor for bleeding, hypotension and signs of decreased cardiac output  - Evaluate effectiveness of vasoactive medications to optimize hemodynamic stability  - Monitor arterial and/or venous puncture sites for bleeding and/or hematoma  - Assess quality of pulses, skin color and temperature  - Assess for signs of decreased coronary artery perfusion - ex.  Angina  - Evaluate fluid balance, assess for edema, trend weights  Outcome: Progressing  Goal: Absence of cardiac arrhythmias or at baseline  Description: INTERVENTIONS:  - Continuous cardiac monitoring, monitor vital signs, obtain 12 lead EKG if indicated  - Evaluate effectiveness of antiarrhythmic and heart rate control medications as ordered  - Initiate emergency measures for life threatening arrhythmias  - Monitor electrolytes and administer replacement therapy as ordered  Outcome: Progressing     Problem: RESPIRATORY - ADULT  Goal: Achieves optimal ventilation and oxygenation  Description: INTERVENTIONS:  - Assess for changes in respiratory status  - Assess for changes in mentation and behavior  - Position to facilitate oxygenation and minimize respiratory effort  - Oxygen supplementation based on oxygen saturation or ABGs  - Provide Smoking Cessation handout, if applicable  - Encourage broncho-pulmonary hygiene including cough, deep breathe, Incentive Spirometry  - Assess the need for suctioning and perform as needed  - Assess and instruct to report SOB or any respiratory difficulty  - Respiratory Therapy support as indicated  - Manage/alleviate anxiety  - Monitor for signs/symptoms of CO2 retention  Outcome: Progressing     Problem: SKIN/TISSUE INTEGRITY - ADULT  Goal: Skin integrity remains intact  Description: INTERVENTIONS  - Assess and document risk factors for pressure ulcer development  - Assess and document skin integrity  - Monitor for areas of redness and/or skin breakdown  - Initiate interventions, skin care algorithm/standards of care as needed  Outcome: Progressing  Goal: Incision(s), wounds(s) or drain site(s) healing without S/S of infection  Description: INTERVENTIONS:  - Assess and document risk factors for pressure ulcer development  - Assess and document skin integrity  - Assess and document dressing/incision, wound bed, drain sites and surrounding tissue  - Implement wound care per orders  - Initiate isolation precautions as appropriate  - Initiate Pressure Ulcer prevention bundle as indicated  Outcome: Progressing Problem: HEMATOLOGIC - ADULT  Goal: Maintains hematologic stability  Description: INTERVENTIONS  - Assess for signs and symptoms of bleeding or hemorrhage  - Monitor labs and vital signs for trends  - Administer supportive blood products/factors, fluids and medications as ordered and appropriate  - Administer supportive blood products/factors as ordered and appropriate  Outcome: Progressing  Goal: Free from bleeding injury  Description: (Example usage: patient with low platelets)  INTERVENTIONS:  - Avoid intramuscular injections, enemas and rectal medication administration  - Ensure safe mobilization of patient  - Hold pressure on venipuncture sites to achieve adequate hemostasis  - Assess for signs and symptoms of internal bleeding  - Monitor lab trends  - Patient is to report abnormal signs of bleeding to staff  - Avoid use of toothpicks and dental floss  - Use electric shaver for shaving  - Use soft bristle tooth brush  - Limit straining and forceful nose blowing  Outcome: Progressing     Problem: MUSCULOSKELETAL - ADULT  Goal: Return mobility to safest level of function  Description: INTERVENTIONS:  - Assess patient stability and activity tolerance for standing, transferring and ambulating w/ or w/o assistive devices  - Assist with transfers and ambulation using safe patient handling equipment as needed  - Ensure adequate protection for wounds/incisions during mobilization  - Obtain PT/OT consults as needed  - Advance activity as appropriate  - Communicate ordered activity level and limitations with patient/family  Outcome: Progressing     Problem: Impaired Functional Mobility  Goal: Achieve highest/safest level of mobility/gait  Description: Interventions:  - Assess patient's functional ability and stability  - Promote increasing activity/tolerance for mobility and gait  - Educate and engage patient/family in tolerated activity level and precautions  Outcome: Progressing     Problem: Impaired Activities of Daily Living  Goal: Achieve highest/safest level of independence in self care  Description: Interventions:  - Assess ability and encourage patient to participate in ADLs to maximize function  - Promote sitting position while performing ADLs such as feeding, grooming, and bathing  - Educate and encourage patient/family in tolerated functional activity level and precautions during self-care  Outcome: Progressing     Problem: SAFETY ADULT - FALL  Goal: Free from fall injury  Description: INTERVENTIONS:  - Assess pt frequently for physical needs  - Identify cognitive and physical deficits and behaviors that affect risk of falls.   - [place] fall precautions as indicated by assessment.  - Educate pt/family on patient safety including physical limitations  - Instruct pt to call for assistance with activity based on assessment  - Modify environment to reduce risk of injury  - Provide assistive devices as appropriate  - Consider OT/PT consult to assist with strengthening/mobility  - Encourage toileting schedule  Outcome: Progressing     Problem: SKIN INTEGRITY  Goal: Skin integrity remains intact  Description: Interventions:  - Assess for areas of redness and/or skin breakdown  - Assess vascular sites hourly  - Change oxygen saturation probe minimum once per shift  - Provide humidity as ordered and per policy  - If on oxygen support, assess nasal septum area for skin breakdown and replace protective barrier if needed  - Change diapers as needed  - Minimize the use of adhesives  Outcome: Progressing  Goal: Incision/wound heals without complications  Description: Interventions:  - Assess wound bed/incision and surrounding skin tissue  - Collaborate with physician/APN and implement wound/incision site care and dressing changes as ordered  - Position infant to avoid placing pressure on wound  - Enterostomal/Wound therapy consult as indicated for ostomies/wounds/G-tubes  Outcome: Progressing

## 2023-05-17 NOTE — PHYSICAL THERAPY NOTE
Physical Therapy Contact Note    Orders received and chart reviewed. Attempted to see patient for Physical Therapy services. Patient not available secondary to patient refusing, requesting therapy to return after [employer]. 05/17/23 1537   VISIT TYPE   PT Inpatient Visit Type (Documentation Required) Attempted Treatment  (refusal)     Will re-attempt pending patient availability/appropriateness as schedule allows, otherwise will re-schedule visit.     Theelizabeth Palmer PT, DPT  [id number]  Inpatient Rehabilitation  Physical Therapy  [phone]

## 2023-05-17 NOTE — PLAN OF CARE
Patient updated on plan of care. Continues on 2L NC. Patient will need O2 walk test on day of discharge. Eye drops and ice packs to right eye. Swelling to area reduced. Plan for possible discharge tomorrow. Call light within reach. Problem: Patient Centered Care  Goal: Patient preferences are identified and integrated in the patient's plan of care  Description: Interventions:  - What would you like us to know as we care for you? I live at home with my niece  - Provide timely, complete, and accurate information to patient/family  - Incorporate patient and family knowledge, values, beliefs, and cultural backgrounds into the planning and delivery of care  - Encourage patient/family to participate in care and decision-making at the level they choose  - Honor patient and family perspectives and choices  Outcome: Progressing     Problem: Patient/Family Goals  Goal: Patient/Family Long Term Goal  Description: Patient's Long Term Goal: To go home    Interventions:  - monitor VS  - pain management  - participate with PT/OT  - See additional Care Plan goals for specific interventions  Outcome: Progressing  Goal: Patient/Family Short Term Goal  Description: Patient's Short Term Goal: to feel better    Interventions:   - monitor VS  - pain management  - participate with PT/OT  - See additional Care Plan goals for specific interventions  Outcome: Progressing     Problem: CARDIOVASCULAR - ADULT  Goal: Maintains optimal cardiac output and hemodynamic stability  Description: INTERVENTIONS:  - Monitor vital signs, rhythm, and trends  - Monitor for bleeding, hypotension and signs of decreased cardiac output  - Evaluate effectiveness of vasoactive medications to optimize hemodynamic stability  - Monitor arterial and/or venous puncture sites for bleeding and/or hematoma  - Assess quality of pulses, skin color and temperature  - Assess for signs of decreased coronary artery perfusion - ex.  Angina  - Evaluate fluid balance, assess for edema, trend weights  Outcome: Progressing  Goal: Absence of cardiac arrhythmias or at baseline  Description: INTERVENTIONS:  - Continuous cardiac monitoring, monitor vital signs, obtain 12 lead EKG if indicated  - Evaluate effectiveness of antiarrhythmic and heart rate control medications as ordered  - Initiate emergency measures for life threatening arrhythmias  - Monitor electrolytes and administer replacement therapy as ordered  Outcome: Progressing     Problem: RESPIRATORY - ADULT  Goal: Achieves optimal ventilation and oxygenation  Description: INTERVENTIONS:  - Assess for changes in respiratory status  - Assess for changes in mentation and behavior  - Position to facilitate oxygenation and minimize respiratory effort  - Oxygen supplementation based on oxygen saturation or ABGs  - Provide Smoking Cessation handout, if applicable  - Encourage broncho-pulmonary hygiene including cough, deep breathe, Incentive Spirometry  - Assess the need for suctioning and perform as needed  - Assess and instruct to report SOB or any respiratory difficulty  - Respiratory Therapy support as indicated  - Manage/alleviate anxiety  - Monitor for signs/symptoms of CO2 retention  Outcome: Progressing     Problem: SKIN/TISSUE INTEGRITY - ADULT  Goal: Skin integrity remains intact  Description: INTERVENTIONS  - Assess and document risk factors for pressure ulcer development  - Assess and document skin integrity  - Monitor for areas of redness and/or skin breakdown  - Initiate interventions, skin care algorithm/standards of care as needed  Outcome: Progressing  Goal: Incision(s), wounds(s) or drain site(s) healing without S/S of infection  Description: INTERVENTIONS:  - Assess and document risk factors for pressure ulcer development  - Assess and document skin integrity  - Assess and document dressing/incision, wound bed, drain sites and surrounding tissue  - Implement wound care per orders  - Initiate isolation precautions as appropriate  - Initiate Pressure Ulcer prevention bundle as indicated  Outcome: Progressing     Problem: HEMATOLOGIC - ADULT  Goal: Maintains hematologic stability  Description: INTERVENTIONS  - Assess for signs and symptoms of bleeding or hemorrhage  - Monitor labs and vital signs for trends  - Administer supportive blood products/factors, fluids and medications as ordered and appropriate  - Administer supportive blood products/factors as ordered and appropriate  Outcome: Progressing  Goal: Free from bleeding injury  Description: (Example usage: patient with low platelets)  INTERVENTIONS:  - Avoid intramuscular injections, enemas and rectal medication administration  - Ensure safe mobilization of patient  - Hold pressure on venipuncture sites to achieve adequate hemostasis  - Assess for signs and symptoms of internal bleeding  - Monitor lab trends  - Patient is to report abnormal signs of bleeding to staff  - Avoid use of toothpicks and dental floss  - Use electric shaver for shaving  - Use soft bristle tooth brush  - Limit straining and forceful nose blowing  Outcome: Progressing     Problem: MUSCULOSKELETAL - ADULT  Goal: Return mobility to safest level of function  Description: INTERVENTIONS:  - Assess patient stability and activity tolerance for standing, transferring and ambulating w/ or w/o assistive devices  - Assist with transfers and ambulation using safe patient handling equipment as needed  - Ensure adequate protection for wounds/incisions during mobilization  - Obtain PT/OT consults as needed  - Advance activity as appropriate  - Communicate ordered activity level and limitations with patient/family  Outcome: Progressing     Problem: Impaired Functional Mobility  Goal: Achieve highest/safest level of mobility/gait  Description: Interventions:  - Assess patient's functional ability and stability  - Promote increasing activity/tolerance for mobility and gait  - Educate and engage patient/family in tolerated activity level and precautions  - Recommend use of  RW for transfers and ambulation  Outcome: Progressing     Problem: Impaired Activities of Daily Living  Goal: Achieve highest/safest level of independence in self care  Description: Interventions:  - Assess ability and encourage patient to participate in ADLs to maximize function  - Promote sitting position while performing ADLs such as feeding, grooming, and bathing  - Educate and encourage patient/family in tolerated functional activity level and precautions during self-care  - Provide support under elbow of weak side to prevent shoulder subluxation  - Encourage patient to incorporate impaired side during daily activities to promote function  Outcome: Progressing     Problem: SAFETY ADULT - FALL  Goal: Free from fall injury  Description: INTERVENTIONS:  - Assess pt frequently for physical needs  - Identify cognitive and physical deficits and behaviors that affect risk of falls.   - [place] fall precautions as indicated by assessment.  - Educate pt/family on patient safety including physical limitations  - Instruct pt to call for assistance with activity based on assessment  - Modify environment to reduce risk of injury  - Provide assistive devices as appropriate  - Consider OT/PT consult to assist with strengthening/mobility  - Encourage toileting schedule  Outcome: Progressing     Problem: SKIN INTEGRITY  Goal: Skin integrity remains intact  Description: Interventions:  - Assess for areas of redness and/or skin breakdown  - Assess vascular sites hourly  - Change oxygen saturation probe minimum once per shift  - Provide humidity as ordered and per policy  - If on oxygen support, assess nasal septum area for skin breakdown and replace protective barrier if needed  - Change diapers as needed  - Minimize the use of adhesives  Outcome: Progressing  Goal: Incision/wound heals without complications  Description: Interventions:  - Assess wound bed/incision and surrounding skin tissue  - Collaborate with physician/APN and implement wound/incision site care and dressing changes as ordered  - Position infant to avoid placing pressure on wound  - Enterostomal/Wound therapy consult as indicated for ostomies/wounds/G-tubes  Outcome: Progressing

## 2023-05-17 NOTE — PROGRESS NOTES
Patient was on room air from bed time, 2300, until 0200. After maintaining 94-97% throughout the night, until 0200, patient's O2 saturation dropped to 85%. Patient was immediately placed on oxygen via nasal cannula by this RN. Saturation gradually increased to levels in the mid 90's. Oxygen remained on throughout the rest of the shift. Dr. Briseyda Pedraza was informed of the event.

## 2023-05-18 LAB
ANION GAP SERPL CALC-SCNC: 5 MMOL/L (ref 0–18)
BASOPHILS # BLD AUTO: 0.02 X10(3) UL (ref 0–0.2)
BASOPHILS NFR BLD AUTO: 0.3 %
BUN BLD-MCNC: 58 MG/DL (ref 7–18)
BUN/CREAT SERPL: 54.2 (ref 10–20)
CALCIUM BLD-MCNC: 8.2 MG/DL (ref 8.5–10.1)
CHLORIDE SERPL-SCNC: 111 MMOL/L (ref 98–112)
CO2 SERPL-SCNC: 25 MMOL/L (ref 21–32)
CREAT BLD-MCNC: 1.07 MG/DL
DEPRECATED RDW RBC AUTO: 54.4 FL (ref 35.1–46.3)
EOSINOPHIL # BLD AUTO: 0.13 X10(3) UL (ref 0–0.7)
EOSINOPHIL NFR BLD AUTO: 1.7 %
ERYTHROCYTE [DISTWIDTH] IN BLOOD BY AUTOMATED COUNT: 15.2 % (ref 11–15)
GFR SERPLBLD BASED ON 1.73 SQ M-ARVRAT: 55 ML/MIN/1.73M2 (ref 60–?)
GLUCOSE BLD-MCNC: 91 MG/DL (ref 70–99)
HCT VFR BLD AUTO: 38.9 %
HGB BLD-MCNC: 12 G/DL
IMM GRANULOCYTES # BLD AUTO: 0.04 X10(3) UL (ref 0–1)
IMM GRANULOCYTES NFR BLD: 0.5 %
INR BLD: 1.3 (ref 0.85–1.16)
LYMPHOCYTES # BLD AUTO: 1.08 X10(3) UL (ref 1–4)
LYMPHOCYTES NFR BLD AUTO: 14.4 %
MCH RBC QN AUTO: 29.6 PG (ref 26–34)
MCHC RBC AUTO-ENTMCNC: 30.8 G/DL (ref 31–37)
MCV RBC AUTO: 96 FL
MONOCYTES # BLD AUTO: 0.48 X10(3) UL (ref 0.1–1)
MONOCYTES NFR BLD AUTO: 6.4 %
NEUTROPHILS # BLD AUTO: 5.74 X10 (3) UL (ref 1.5–7.7)
NEUTROPHILS # BLD AUTO: 5.74 X10(3) UL (ref 1.5–7.7)
NEUTROPHILS NFR BLD AUTO: 76.7 %
OSMOLALITY SERPL CALC.SUM OF ELEC: 308 MOSM/KG (ref 275–295)
PLATELET # BLD AUTO: 95 10(3)UL (ref 150–450)
POTASSIUM SERPL-SCNC: 5.1 MMOL/L (ref 3.5–5.1)
PROTHROMBIN TIME: 16.1 SECONDS (ref 11.6–14.8)
RBC # BLD AUTO: 4.05 X10(6)UL
SODIUM SERPL-SCNC: 141 MMOL/L (ref 136–145)
WBC # BLD AUTO: 7.5 X10(3) UL (ref 4–11)

## 2023-05-18 NOTE — DIETARY NOTE
Brief Nutrition Note    Pt re-screened for LOS. Pt continues to have good PO intake, 100% for all meals since last screen. Pt on lasix, weight up to 292lbs since last screening (282lbs 5/5) (304lbs at admit). Skin intact. Pt not currently at nutrition risk. Will re-screen per protocol.     Gideon Alves MS, [address] [place] [address], [address] [place] [address] Nutrition  [phone]

## 2023-05-18 NOTE — PLAN OF CARE
Pt is aox4, ambulating 1 assist and walker. Voiding wln. SCD for DVT prophylaxis. Denies pain. Pt plans to d/c home. Plan for walk test. Patient needs home O2. Disease process discussed with pt. Bed in lowest position, call light and personal possessions within reach. Pt instructed to call for assistance before getting up. Problem: Patient Centered Care  Goal: Patient preferences are identified and integrated in the patient's plan of care  Description: Interventions:  - What would you like us to know as we care for you? I live at home with my niece  - Provide timely, complete, and accurate information to patient/family  - Incorporate patient and family knowledge, values, beliefs, and cultural backgrounds into the planning and delivery of care  - Encourage patient/family to participate in care and decision-making at the level they choose  - Honor patient and family perspectives and choices  Outcome: Progressing     Problem: CARDIOVASCULAR - ADULT  Goal: Maintains optimal cardiac output and hemodynamic stability  Description: INTERVENTIONS:  - Monitor vital signs, rhythm, and trends  - Monitor for bleeding, hypotension and signs of decreased cardiac output  - Evaluate effectiveness of vasoactive medications to optimize hemodynamic stability  - Monitor arterial and/or venous puncture sites for bleeding and/or hematoma  - Assess quality of pulses, skin color and temperature  - Assess for signs of decreased coronary artery perfusion - ex.  Angina  - Evaluate fluid balance, assess for edema, trend weights  Outcome: Progressing  Goal: Absence of cardiac arrhythmias or at baseline  Description: INTERVENTIONS:  - Continuous cardiac monitoring, monitor vital signs, obtain 12 lead EKG if indicated  - Evaluate effectiveness of antiarrhythmic and heart rate control medications as ordered  - Initiate emergency measures for life threatening arrhythmias  - Monitor electrolytes and administer replacement therapy as ordered  Outcome: Progressing     Problem: RESPIRATORY - ADULT  Goal: Achieves optimal ventilation and oxygenation  Description: INTERVENTIONS:  - Assess for changes in respiratory status  - Assess for changes in mentation and behavior  - Position to facilitate oxygenation and minimize respiratory effort  - Oxygen supplementation based on oxygen saturation or ABGs  - Provide Smoking Cessation handout, if applicable  - Encourage broncho-pulmonary hygiene including cough, deep breathe, Incentive Spirometry  - Assess the need for suctioning and perform as needed  - Assess and instruct to report SOB or any respiratory difficulty  - Respiratory Therapy support as indicated  - Manage/alleviate anxiety  - Monitor for signs/symptoms of CO2 retention  Outcome: Progressing     Problem: SKIN/TISSUE INTEGRITY - ADULT  Goal: Skin integrity remains intact  Description: INTERVENTIONS  - Assess and document risk factors for pressure ulcer development  - Assess and document skin integrity  - Monitor for areas of redness and/or skin breakdown  - Initiate interventions, skin care algorithm/standards of care as needed  Outcome: Progressing  Goal: Incision(s), wounds(s) or drain site(s) healing without S/S of infection  Description: INTERVENTIONS:  - Assess and document risk factors for pressure ulcer development  - Assess and document skin integrity  - Assess and document dressing/incision, wound bed, drain sites and surrounding tissue  - Implement wound care per orders  - Initiate isolation precautions as appropriate  - Initiate Pressure Ulcer prevention bundle as indicated  Outcome: Progressing     Problem: HEMATOLOGIC - ADULT  Goal: Maintains hematologic stability  Description: INTERVENTIONS  - Assess for signs and symptoms of bleeding or hemorrhage  - Monitor labs and vital signs for trends  - Administer supportive blood products/factors, fluids and medications as ordered and appropriate  - Administer supportive blood products/factors as ordered and appropriate  Outcome: Progressing  Goal: Free from bleeding injury  Description: (Example usage: patient with low platelets)  INTERVENTIONS:  - Avoid intramuscular injections, enemas and rectal medication administration  - Ensure safe mobilization of patient  - Hold pressure on venipuncture sites to achieve adequate hemostasis  - Assess for signs and symptoms of internal bleeding  - Monitor lab trends  - Patient is to report abnormal signs of bleeding to staff  - Avoid use of toothpicks and dental floss  - Use electric shaver for shaving  - Use soft bristle tooth brush  - Limit straining and forceful nose blowing  Outcome: Progressing

## 2023-05-18 NOTE — CM/SW NOTE
Pt discussed in RN rounds. Pt is on room air  throughout the day, does not require oxygen. Pt requires oxygen at night time due to unable to use Bi PAP this time. RN was inform to do walk test during day time, and if does not pass, ask RN at night time to walk test. SW will need walk test, MD bhatti, and MD signed order done on the same day. 4pm- pt will be doing a nocturnal pulse oximetry test overnight to test for possible need for oxygen at home. URIEL initiated referral to [facility]. Saúl Castro from [address] [place] [address] is aware, requesting signed orders and veribage tomorrow 05/19. Pt provided updated. Pt requested list of sleep studies places near her home. SW provided pt a list of sleep studies near her home. Plan: Pending medical clearance, DC to Home w Tristan EARLY, *nocturnal pulse oximetry test results, *o2 orders, *vershayy TREVINO/RUBENS to remain available for support and/or discharge planning.      Brenna Mota, MSW, LSW   x [phone]

## 2023-05-18 NOTE — PLAN OF CARE
Problem: Patient Centered Care  Goal: Patient preferences are identified and integrated in the patient's plan of care  Description: Interventions:  - What would you like us to know as we care for you? I live at home with my niece  - Provide timely, complete, and accurate information to patient/family  - Incorporate patient and family knowledge, values, beliefs, and cultural backgrounds into the planning and delivery of care  - Encourage patient/family to participate in care and decision-making at the level they choose  - Honor patient and family perspectives and choices  Outcome: Progressing     Problem: CARDIOVASCULAR - ADULT  Goal: Maintains optimal cardiac output and hemodynamic stability  Description: INTERVENTIONS:  - Monitor vital signs, rhythm, and trends  - Monitor for bleeding, hypotension and signs of decreased cardiac output  - Evaluate effectiveness of vasoactive medications to optimize hemodynamic stability  - Monitor arterial and/or venous puncture sites for bleeding and/or hematoma  - Assess quality of pulses, skin color and temperature  - Assess for signs of decreased coronary artery perfusion - ex.  Angina  - Evaluate fluid balance, assess for edema, trend weights  Outcome: Progressing  Goal: Absence of cardiac arrhythmias or at baseline  Description: INTERVENTIONS:  - Continuous cardiac monitoring, monitor vital signs, obtain 12 lead EKG if indicated  - Evaluate effectiveness of antiarrhythmic and heart rate control medications as ordered  - Initiate emergency measures for life threatening arrhythmias  - Monitor electrolytes and administer replacement therapy as ordered  Outcome: Progressing     Problem: RESPIRATORY - ADULT  Goal: Achieves optimal ventilation and oxygenation  Description: INTERVENTIONS:  - Assess for changes in respiratory status  - Assess for changes in mentation and behavior  - Position to facilitate oxygenation and minimize respiratory effort  - Oxygen supplementation based on oxygen saturation or ABGs  - Provide Smoking Cessation handout, if applicable  - Encourage broncho-pulmonary hygiene including cough, deep breathe, Incentive Spirometry  - Assess the need for suctioning and perform as needed  - Assess and instruct to report SOB or any respiratory difficulty  - Respiratory Therapy support as indicated  - Manage/alleviate anxiety  - Monitor for signs/symptoms of CO2 retention  Outcome: Progressing     Problem: SKIN/TISSUE INTEGRITY - ADULT  Goal: Skin integrity remains intact  Description: INTERVENTIONS  - Assess and document risk factors for pressure ulcer development  - Assess and document skin integrity  - Monitor for areas of redness and/or skin breakdown  - Initiate interventions, skin care algorithm/standards of care as needed  Outcome: Progressing  Goal: Incision(s), wounds(s) or drain site(s) healing without S/S of infection  Description: INTERVENTIONS:  - Assess and document risk factors for pressure ulcer development  - Assess and document skin integrity  - Assess and document dressing/incision, wound bed, drain sites and surrounding tissue  - Implement wound care per orders  - Initiate isolation precautions as appropriate  - Initiate Pressure Ulcer prevention bundle as indicated  Outcome: Progressing     Problem: HEMATOLOGIC - ADULT  Goal: Maintains hematologic stability  Description: INTERVENTIONS  - Assess for signs and symptoms of bleeding or hemorrhage  - Monitor labs and vital signs for trends  - Administer supportive blood products/factors, fluids and medications as ordered and appropriate  - Administer supportive blood products/factors as ordered and appropriate  Outcome: Progressing  Goal: Free from bleeding injury  Description: (Example usage: patient with low platelets)  INTERVENTIONS:  - Avoid intramuscular injections, enemas and rectal medication administration  - Ensure safe mobilization of patient  - Hold pressure on venipuncture sites to achieve adequate hemostasis  - Assess for signs and symptoms of internal bleeding  - Monitor lab trends  - Patient is to report abnormal signs of bleeding to staff  - Avoid use of toothpicks and dental floss  - Use electric shaver for shaving  - Use soft bristle tooth brush  - Limit straining and forceful nose blowing  Outcome: Progressing     Problem: MUSCULOSKELETAL - ADULT  Goal: Return mobility to safest level of function  Description: INTERVENTIONS:  - Assess patient stability and activity tolerance for standing, transferring and ambulating w/ or w/o assistive devices  - Assist with transfers and ambulation using safe patient handling equipment as needed  - Ensure adequate protection for wounds/incisions during mobilization  - Obtain PT/OT consults as needed  - Advance activity as appropriate  - Communicate ordered activity level and limitations with patient/family  Outcome: Progressing     Problem: Impaired Functional Mobility  Goal: Achieve highest/safest level of mobility/gait  Description: Interventions:  - Assess patient's functional ability and stability  - Promote increasing activity/tolerance for mobility and gait  - Educate and engage patient/family in tolerated activity level and precautions    Outcome: Progressing     Problem: Impaired Activities of Daily Living  Goal: Achieve highest/safest level of independence in self care  Description: Interventions:  - Assess ability and encourage patient to participate in ADLs to maximize function  - Promote sitting position while performing ADLs such as feeding, grooming, and bathing  - Educate and encourage patient/family in tolerated functional activity level and precautions during self-care    Outcome: Progressing     Problem: SAFETY ADULT - FALL  Goal: Free from fall injury  Description: INTERVENTIONS:  - Assess pt frequently for physical needs  - Identify cognitive and physical deficits and behaviors that affect risk of falls.   - [place] fall precautions as indicated by assessment.  - Educate pt/family on patient safety including physical limitations  - Instruct pt to call for assistance with activity based on assessment  - Modify environment to reduce risk of injury  - Provide assistive devices as appropriate  - Consider OT/PT consult to assist with strengthening/mobility  - Encourage toileting schedule  Outcome: Progressing

## 2023-05-18 NOTE — PROGRESS NOTES
05/18/23 1302   Mobility   O2 walk? Yes   SPO2% on Room Air at Rest 99   At rest oxygen flow (liters per minute) 0   SPO2% Ambulation on Room Air 97   Ambulation oxygen flow (liters per minute) 0     Pt ambulated in room, oxygen levels stable, pt still SOB, pt unable to wear bipap at night d/t facial swelling/bruising, RT able to do nocturnal pulse oximetry testing overnight to test for possible oxygen requirement at home until pt able to return to regular bipap usage, MD to be notified for order.

## 2023-05-18 NOTE — PROGRESS NOTES
Oxygen saturation maintained 94-97% via NC overnight. No spot-check deviations were observed by this RN, nor supporting staff.

## 2023-05-19 ENCOUNTER — ANTI-COAG (OUTPATIENT)
Dept: CARDIOLOGY | Age: 73
End: 2023-05-19

## 2023-05-19 DIAGNOSIS — I42.0 DILATED CARDIOMYOPATHY (CMD): ICD-10-CM

## 2023-05-19 DIAGNOSIS — I48.0 PAROXYSMAL ATRIAL FIBRILLATION (CMD): Primary | ICD-10-CM

## 2023-05-19 DIAGNOSIS — Z79.01 LONG TERM (CURRENT) USE OF ANTICOAGULANTS: ICD-10-CM

## 2023-05-19 LAB
ANION GAP SERPL CALC-SCNC: 5 MMOL/L (ref 0–18)
BASOPHILS # BLD AUTO: 0.02 X10(3) UL (ref 0–0.2)
BASOPHILS NFR BLD AUTO: 0.3 %
BUN BLD-MCNC: 53 MG/DL (ref 7–18)
BUN/CREAT SERPL: 54.1 (ref 10–20)
CALCIUM BLD-MCNC: 8.3 MG/DL (ref 8.5–10.1)
CHLORIDE SERPL-SCNC: 110 MMOL/L (ref 98–112)
CO2 SERPL-SCNC: 25 MMOL/L (ref 21–32)
CREAT BLD-MCNC: 0.98 MG/DL
DEPRECATED RDW RBC AUTO: 52.9 FL (ref 35.1–46.3)
EOSINOPHIL # BLD AUTO: 0.13 X10(3) UL (ref 0–0.7)
EOSINOPHIL NFR BLD AUTO: 1.9 %
ERYTHROCYTE [DISTWIDTH] IN BLOOD BY AUTOMATED COUNT: 15 % (ref 11–15)
GFR SERPLBLD BASED ON 1.73 SQ M-ARVRAT: 61 ML/MIN/1.73M2 (ref 60–?)
GLUCOSE BLD-MCNC: 91 MG/DL (ref 70–99)
HCT VFR BLD AUTO: 39 %
HGB BLD-MCNC: 12.1 G/DL
IMM GRANULOCYTES # BLD AUTO: 0.02 X10(3) UL (ref 0–1)
IMM GRANULOCYTES NFR BLD: 0.3 %
INR BLD: 1.3 (ref 0.85–1.16)
INR BLD: 1.37 (ref 0.85–1.16)
LYMPHOCYTES # BLD AUTO: 1.17 X10(3) UL (ref 1–4)
LYMPHOCYTES NFR BLD AUTO: 16.9 %
MCH RBC QN AUTO: 29.7 PG (ref 26–34)
MCHC RBC AUTO-ENTMCNC: 31 G/DL (ref 31–37)
MCV RBC AUTO: 95.8 FL
MONOCYTES # BLD AUTO: 0.47 X10(3) UL (ref 0.1–1)
MONOCYTES NFR BLD AUTO: 6.8 %
NEUTROPHILS # BLD AUTO: 5.13 X10 (3) UL (ref 1.5–7.7)
NEUTROPHILS # BLD AUTO: 5.13 X10(3) UL (ref 1.5–7.7)
NEUTROPHILS NFR BLD AUTO: 73.8 %
OSMOLALITY SERPL CALC.SUM OF ELEC: 304 MOSM/KG (ref 275–295)
PLATELET # BLD AUTO: 96 10(3)UL (ref 150–450)
POTASSIUM SERPL-SCNC: 4.8 MMOL/L (ref 3.5–5.1)
PROTHROMBIN TIME: 16 SECONDS (ref 11.6–14.8)
PROTHROMBIN TIME: 16.7 SECONDS (ref 11.6–14.8)
RBC # BLD AUTO: 4.07 X10(6)UL
SODIUM SERPL-SCNC: 140 MMOL/L (ref 136–145)
WBC # BLD AUTO: 6.9 X10(3) UL (ref 4–11)

## 2023-05-19 RX ORDER — WARFARIN SODIUM 5 MG/1
5 TABLET ORAL NIGHTLY
Status: DISCONTINUED | OUTPATIENT
Start: 2023-05-20 | End: 2023-05-22

## 2023-05-19 NOTE — PLAN OF CARE
Problem: Patient Centered Care  Goal: Patient preferences are identified and integrated in the patient's plan of care  Description: Interventions:  - What would you like us to know as we care for you? I live at home with my niece  - Provide timely, complete, and accurate information to patient/family  - Incorporate patient and family knowledge, values, beliefs, and cultural backgrounds into the planning and delivery of care  - Encourage patient/family to participate in care and decision-making at the level they choose  - Honor patient and family perspectives and choices  Outcome: Progressing     Problem: CARDIOVASCULAR - ADULT  Goal: Maintains optimal cardiac output and hemodynamic stability  Description: INTERVENTIONS:  - Monitor vital signs, rhythm, and trends  - Monitor for bleeding, hypotension and signs of decreased cardiac output  - Evaluate effectiveness of vasoactive medications to optimize hemodynamic stability  - Monitor arterial and/or venous puncture sites for bleeding and/or hematoma  - Assess quality of pulses, skin color and temperature  - Assess for signs of decreased coronary artery perfusion - ex.  Angina  - Evaluate fluid balance, assess for edema, trend weights  Outcome: Progressing  Goal: Absence of cardiac arrhythmias or at baseline  Description: INTERVENTIONS:  - Continuous cardiac monitoring, monitor vital signs, obtain 12 lead EKG if indicated  - Evaluate effectiveness of antiarrhythmic and heart rate control medications as ordered  - Initiate emergency measures for life threatening arrhythmias  - Monitor electrolytes and administer replacement therapy as ordered  Outcome: Progressing     Problem: RESPIRATORY - ADULT  Goal: Achieves optimal ventilation and oxygenation  Description: INTERVENTIONS:  - Assess for changes in respiratory status  - Assess for changes in mentation and behavior  - Position to facilitate oxygenation and minimize respiratory effort  - Oxygen supplementation based on oxygen saturation or ABGs  - Provide Smoking Cessation handout, if applicable  - Encourage broncho-pulmonary hygiene including cough, deep breathe, Incentive Spirometry  - Assess the need for suctioning and perform as needed  - Assess and instruct to report SOB or any respiratory difficulty  - Respiratory Therapy support as indicated  - Manage/alleviate anxiety  - Monitor for signs/symptoms of CO2 retention  Outcome: Progressing     Problem: SKIN/TISSUE INTEGRITY - ADULT  Goal: Skin integrity remains intact  Description: INTERVENTIONS  - Assess and document risk factors for pressure ulcer development  - Assess and document skin integrity  - Monitor for areas of redness and/or skin breakdown  - Initiate interventions, skin care algorithm/standards of care as needed  Outcome: Progressing  Goal: Incision(s), wounds(s) or drain site(s) healing without S/S of infection  Description: INTERVENTIONS:  - Assess and document risk factors for pressure ulcer development  - Assess and document skin integrity  - Assess and document dressing/incision, wound bed, drain sites and surrounding tissue  - Implement wound care per orders  - Initiate isolation precautions as appropriate  - Initiate Pressure Ulcer prevention bundle as indicated  Outcome: Progressing     Problem: HEMATOLOGIC - ADULT  Goal: Maintains hematologic stability  Description: INTERVENTIONS  - Assess for signs and symptoms of bleeding or hemorrhage  - Monitor labs and vital signs for trends  - Administer supportive blood products/factors, fluids and medications as ordered and appropriate  - Administer supportive blood products/factors as ordered and appropriate  Outcome: Progressing  Goal: Free from bleeding injury  Description: (Example usage: patient with low platelets)  INTERVENTIONS:  - Avoid intramuscular injections, enemas and rectal medication administration  - Ensure safe mobilization of patient  - Hold pressure on venipuncture sites to achieve adequate hemostasis  - Assess for signs and symptoms of internal bleeding  - Monitor lab trends  - Patient is to report abnormal signs of bleeding to staff  - Avoid use of toothpicks and dental floss  - Use electric shaver for shaving  - Use soft bristle tooth brush  - Limit straining and forceful nose blowing  Outcome: Progressing     Problem: MUSCULOSKELETAL - ADULT  Goal: Return mobility to safest level of function  Description: INTERVENTIONS:  - Assess patient stability and activity tolerance for standing, transferring and ambulating w/ or w/o assistive devices  - Assist with transfers and ambulation using safe patient handling equipment as needed  - Ensure adequate protection for wounds/incisions during mobilization  - Obtain PT/OT consults as needed  - Advance activity as appropriate  - Communicate ordered activity level and limitations with patient/family  Outcome: Progressing     Problem: Impaired Functional Mobility  Goal: Achieve highest/safest level of mobility/gait  Description: Interventions:  - Assess patient's functional ability and stability  - Promote increasing activity/tolerance for mobility and gait  - Educate and engage patient/family in tolerated activity level and precautions    Outcome: Progressing     Problem: Impaired Activities of Daily Living  Goal: Achieve highest/safest level of independence in self care  Description: Interventions:  - Assess ability and encourage patient to participate in ADLs to maximize function  - Promote sitting position while performing ADLs such as feeding, grooming, and bathing  - Educate and encourage patient/family in tolerated functional activity level and precautions during self-care    Outcome: Progressing     Problem: SAFETY ADULT - FALL  Goal: Free from fall injury  Description: INTERVENTIONS:  - Assess pt frequently for physical needs  - Identify cognitive and physical deficits and behaviors that affect risk of falls.   - [place] fall precautions as indicated by assessment.  - Educate pt/family on patient safety including physical limitations  - Instruct pt to call for assistance with activity based on assessment  - Modify environment to reduce risk of injury  - Provide assistive devices as appropriate  - Consider OT/PT consult to assist with strengthening/mobility  - Encourage toileting schedule  Outcome: Progressing     Problem: SKIN INTEGRITY  Goal: Skin integrity remains intact  Description: Interventions:  - Assess for areas of redness and/or skin breakdown  - Assess vascular sites hourly  - Change oxygen saturation probe minimum once per shift  - Provide humidity as ordered and per policy  - If on oxygen support, assess nasal septum area for skin breakdown and replace protective barrier if needed  - Change diapers as needed  - Minimize the use of adhesives  Outcome: Progressing

## 2023-05-19 NOTE — CM/SW NOTE
SW received sleep study results. SW updated into aidin via E referral.    11:38am- SW placed order for home 02. MD was made aware via perfect serve in regards to request for order to be signed, and verbiage that is needed in MD's note. I had a face to face visit with Alexandra Gonsalves and I evaluated the patient's O2 saturations. Alexandra Gonsalves is mobile in her home and is in need of a portable oxygen tank. The home oxygen will improve the Meenu's condition. 408pm- Home 02 order has not been signed, and Verbiage has not been placed. Pt has a referral placed through [facility]      Plan: Pending medical clearance, DC to Home, pending *signed order and veribage    URIEL/RUBENS to remain available for support and/or discharge planning.      Bird Cantu, MSW, LSW   x [phone]

## 2023-05-20 LAB
ANION GAP SERPL CALC-SCNC: 5 MMOL/L (ref 0–18)
BASOPHILS # BLD AUTO: 0.01 X10(3) UL (ref 0–0.2)
BASOPHILS NFR BLD AUTO: 0.2 %
BUN BLD-MCNC: 45 MG/DL (ref 7–18)
BUN/CREAT SERPL: 50 (ref 10–20)
CALCIUM BLD-MCNC: 8.2 MG/DL (ref 8.5–10.1)
CHLORIDE SERPL-SCNC: 113 MMOL/L (ref 98–112)
CO2 SERPL-SCNC: 25 MMOL/L (ref 21–32)
CREAT BLD-MCNC: 0.9 MG/DL
DEPRECATED RDW RBC AUTO: 54 FL (ref 35.1–46.3)
EOSINOPHIL # BLD AUTO: 0.1 X10(3) UL (ref 0–0.7)
EOSINOPHIL NFR BLD AUTO: 1.7 %
ERYTHROCYTE [DISTWIDTH] IN BLOOD BY AUTOMATED COUNT: 15.1 % (ref 11–15)
GFR SERPLBLD BASED ON 1.73 SQ M-ARVRAT: 68 ML/MIN/1.73M2 (ref 60–?)
GLUCOSE BLD-MCNC: 94 MG/DL (ref 70–99)
HCT VFR BLD AUTO: 38.5 %
HGB BLD-MCNC: 12 G/DL
IMM GRANULOCYTES # BLD AUTO: 0.02 X10(3) UL (ref 0–1)
IMM GRANULOCYTES NFR BLD: 0.3 %
INR BLD: 1.47 (ref 0.85–1.16)
LYMPHOCYTES # BLD AUTO: 0.97 X10(3) UL (ref 1–4)
LYMPHOCYTES NFR BLD AUTO: 16.7 %
MCH RBC QN AUTO: 30.1 PG (ref 26–34)
MCHC RBC AUTO-ENTMCNC: 31.2 G/DL (ref 31–37)
MCV RBC AUTO: 96.5 FL
MONOCYTES # BLD AUTO: 0.36 X10(3) UL (ref 0.1–1)
MONOCYTES NFR BLD AUTO: 6.2 %
NEUTROPHILS # BLD AUTO: 4.36 X10 (3) UL (ref 1.5–7.7)
NEUTROPHILS # BLD AUTO: 4.36 X10(3) UL (ref 1.5–7.7)
NEUTROPHILS NFR BLD AUTO: 74.9 %
OSMOLALITY SERPL CALC.SUM OF ELEC: 307 MOSM/KG (ref 275–295)
PLATELET # BLD AUTO: 99 10(3)UL (ref 150–450)
POTASSIUM SERPL-SCNC: 4.8 MMOL/L (ref 3.5–5.1)
PROTHROMBIN TIME: 17.6 SECONDS (ref 11.6–14.8)
RBC # BLD AUTO: 3.99 X10(6)UL
SODIUM SERPL-SCNC: 143 MMOL/L (ref 136–145)
WBC # BLD AUTO: 5.8 X10(3) UL (ref 4–11)

## 2023-05-20 NOTE — PLAN OF CARE
Vital signs monitored per protocol with no acute changes at this time. Frequent rounding provided by nursing staff. Safety precautions maintained. Problem: Patient Centered Care  Goal: Patient preferences are identified and integrated in the patient's plan of care  Description: Interventions:  - What would you like us to know as we care for you? I live at home with my niece  - Provide timely, complete, and accurate information to patient/family  - Incorporate patient and family knowledge, values, beliefs, and cultural backgrounds into the planning and delivery of care  - Encourage patient/family to participate in care and decision-making at the level they choose  - Honor patient and family perspectives and choices  Outcome: Progressing     Problem: Patient/Family Goals  Goal: Patient/Family Long Term Goal  Description: Patient's Long Term Goal: To go home    Interventions:  - monitor VS  - pain management  - participate with PT/OT  - See additional Care Plan goals for specific interventions  Outcome: Progressing  Goal: Patient/Family Short Term Goal  Description: Patient's Short Term Goal: to feel better    Interventions:   - monitor VS  - pain management  - participate with PT/OT  - See additional Care Plan goals for specific interventions  Outcome: Progressing     Problem: CARDIOVASCULAR - ADULT  Goal: Maintains optimal cardiac output and hemodynamic stability  Description: INTERVENTIONS:  - Monitor vital signs, rhythm, and trends  - Monitor for bleeding, hypotension and signs of decreased cardiac output  - Evaluate effectiveness of vasoactive medications to optimize hemodynamic stability  - Monitor arterial and/or venous puncture sites for bleeding and/or hematoma  - Assess quality of pulses, skin color and temperature  - Assess for signs of decreased coronary artery perfusion - ex.  Angina  - Evaluate fluid balance, assess for edema, trend weights  Outcome: Progressing  Goal: Absence of cardiac arrhythmias or at baseline  Description: INTERVENTIONS:  - Continuous cardiac monitoring, monitor vital signs, obtain 12 lead EKG if indicated  - Evaluate effectiveness of antiarrhythmic and heart rate control medications as ordered  - Initiate emergency measures for life threatening arrhythmias  - Monitor electrolytes and administer replacement therapy as ordered  Outcome: Progressing     Problem: RESPIRATORY - ADULT  Goal: Achieves optimal ventilation and oxygenation  Description: INTERVENTIONS:  - Assess for changes in respiratory status  - Assess for changes in mentation and behavior  - Position to facilitate oxygenation and minimize respiratory effort  - Oxygen supplementation based on oxygen saturation or ABGs  - Provide Smoking Cessation handout, if applicable  - Encourage broncho-pulmonary hygiene including cough, deep breathe, Incentive Spirometry  - Assess the need for suctioning and perform as needed  - Assess and instruct to report SOB or any respiratory difficulty  - Respiratory Therapy support as indicated  - Manage/alleviate anxiety  - Monitor for signs/symptoms of CO2 retention  Outcome: Progressing     Problem: SKIN/TISSUE INTEGRITY - ADULT  Goal: Skin integrity remains intact  Description: INTERVENTIONS  - Assess and document risk factors for pressure ulcer development  - Assess and document skin integrity  - Monitor for areas of redness and/or skin breakdown  - Initiate interventions, skin care algorithm/standards of care as needed  Outcome: Progressing  Goal: Incision(s), wounds(s) or drain site(s) healing without S/S of infection  Description: INTERVENTIONS:  - Assess and document risk factors for pressure ulcer development  - Assess and document skin integrity  - Assess and document dressing/incision, wound bed, drain sites and surrounding tissue  - Implement wound care per orders  - Initiate isolation precautions as appropriate  - Initiate Pressure Ulcer prevention bundle as indicated  Outcome: Progressing Problem: HEMATOLOGIC - ADULT  Goal: Maintains hematologic stability  Description: INTERVENTIONS  - Assess for signs and symptoms of bleeding or hemorrhage  - Monitor labs and vital signs for trends  - Administer supportive blood products/factors, fluids and medications as ordered and appropriate  - Administer supportive blood products/factors as ordered and appropriate  Outcome: Progressing  Goal: Free from bleeding injury  Description: (Example usage: patient with low platelets)  INTERVENTIONS:  - Avoid intramuscular injections, enemas and rectal medication administration  - Ensure safe mobilization of patient  - Hold pressure on venipuncture sites to achieve adequate hemostasis  - Assess for signs and symptoms of internal bleeding  - Monitor lab trends  - Patient is to report abnormal signs of bleeding to staff  - Avoid use of toothpicks and dental floss  - Use electric shaver for shaving  - Use soft bristle tooth brush  - Limit straining and forceful nose blowing  Outcome: Progressing     Problem: MUSCULOSKELETAL - ADULT  Goal: Return mobility to safest level of function  Description: INTERVENTIONS:  - Assess patient stability and activity tolerance for standing, transferring and ambulating w/ or w/o assistive devices  - Assist with transfers and ambulation using safe patient handling equipment as needed  - Ensure adequate protection for wounds/incisions during mobilization  - Obtain PT/OT consults as needed  - Advance activity as appropriate  - Communicate ordered activity level and limitations with patient/family  Outcome: Progressing     Problem: Impaired Functional Mobility  Goal: Achieve highest/safest level of mobility/gait  Description: Interventions:  - Assess patient's functional ability and stability  - Promote increasing activity/tolerance for mobility and gait  - Educate and engage patient/family in tolerated activity level and precautions  - Recommend patient transfer to bedside chair toward strongest side  Outcome: Progressing     Problem: Impaired Activities of Daily Living  Goal: Achieve highest/safest level of independence in self care  Description: Interventions:  - Assess ability and encourage patient to participate in ADLs to maximize function  - Promote sitting position while performing ADLs such as feeding, grooming, and bathing  - Educate and encourage patient/family in tolerated functional activity level and precautions during self-care  - Provide support under elbow of weak side to prevent shoulder subluxation  Outcome: Progressing     Problem: SAFETY ADULT - FALL  Goal: Free from fall injury  Description: INTERVENTIONS:  - Assess pt frequently for physical needs  - Identify cognitive and physical deficits and behaviors that affect risk of falls.   - [place] fall precautions as indicated by assessment.  - Educate pt/family on patient safety including physical limitations  - Instruct pt to call for assistance with activity based on assessment  - Modify environment to reduce risk of injury  - Provide assistive devices as appropriate  - Consider OT/PT consult to assist with strengthening/mobility  - Encourage toileting schedule  Outcome: Progressing     Problem: SKIN INTEGRITY  Goal: Skin integrity remains intact  Description: Interventions:  - Assess for areas of redness and/or skin breakdown  - Assess vascular sites hourly  - Change oxygen saturation probe minimum once per shift  - Provide humidity as ordered and per policy  - If on oxygen support, assess nasal septum area for skin breakdown and replace protective barrier if needed  - Change diapers as needed  - Minimize the use of adhesives  Outcome: Progressing  Goal: Incision/wound heals without complications  Description: Interventions:  - Assess wound bed/incision and surrounding skin tissue  - Collaborate with physician/APN and implement wound/incision site care and dressing changes as ordered  - Position infant to avoid placing pressure on wound  - Enterostomal/Wound therapy consult as indicated for ostomies/wounds/G-tubes  Outcome: Progressing     Problem: Diabetes/Glucose Control  Goal: Glucose maintained within prescribed range  Description: INTERVENTIONS:  - Monitor Blood Glucose as ordered  - Assess for signs and symptoms of hyperglycemia and hypoglycemia  - Administer ordered medications to maintain glucose within target range  - Assess barriers to adequate nutritional intake and initiate nutrition consult as needed  - Instruct patient on self management of diabetes  Outcome: Progressing

## 2023-05-20 NOTE — PLAN OF CARE
Patient is alert and oriented x's 4, vital signs are stable. Patient denies pain/discomfort. Fall and safety precautions are in place, call light within reach. Frequent rounding by RN. Problem: Patient Centered Care  Goal: Patient preferences are identified and integrated in the patient's plan of care  Description: Interventions:  - What would you like us to know as we care for you? I live at home with my niece  - Provide timely, complete, and accurate information to patient/family  - Incorporate patient and family knowledge, values, beliefs, and cultural backgrounds into the planning and delivery of care  - Encourage patient/family to participate in care and decision-making at the level they choose  - Honor patient and family perspectives and choices  Outcome: Progressing     Problem: Patient/Family Goals  Goal: Patient/Family Long Term Goal  Description: Patient's Long Term Goal: To go home    Interventions:  - monitor VS  - pain management  - participate with PT/OT  - See additional Care Plan goals for specific interventions  Outcome: Progressing  Goal: Patient/Family Short Term Goal  Description: Patient's Short Term Goal: to feel better    Interventions:   - monitor VS  - pain management  - participate with PT/OT  - See additional Care Plan goals for specific interventions  Outcome: Progressing     Problem: CARDIOVASCULAR - ADULT  Goal: Maintains optimal cardiac output and hemodynamic stability  Description: INTERVENTIONS:  - Monitor vital signs, rhythm, and trends  - Monitor for bleeding, hypotension and signs of decreased cardiac output  - Evaluate effectiveness of vasoactive medications to optimize hemodynamic stability  - Monitor arterial and/or venous puncture sites for bleeding and/or hematoma  - Assess quality of pulses, skin color and temperature  - Assess for signs of decreased coronary artery perfusion - ex.  Angina  - Evaluate fluid balance, assess for edema, trend weights  Outcome: Progressing  Goal: Absence of cardiac arrhythmias or at baseline  Description: INTERVENTIONS:  - Continuous cardiac monitoring, monitor vital signs, obtain 12 lead EKG if indicated  - Evaluate effectiveness of antiarrhythmic and heart rate control medications as ordered  - Initiate emergency measures for life threatening arrhythmias  - Monitor electrolytes and administer replacement therapy as ordered  Outcome: Progressing     Problem: RESPIRATORY - ADULT  Goal: Achieves optimal ventilation and oxygenation  Description: INTERVENTIONS:  - Assess for changes in respiratory status  - Assess for changes in mentation and behavior  - Position to facilitate oxygenation and minimize respiratory effort  - Oxygen supplementation based on oxygen saturation or ABGs  - Provide Smoking Cessation handout, if applicable  - Encourage broncho-pulmonary hygiene including cough, deep breathe, Incentive Spirometry  - Assess the need for suctioning and perform as needed  - Assess and instruct to report SOB or any respiratory difficulty  - Respiratory Therapy support as indicated  - Manage/alleviate anxiety  - Monitor for signs/symptoms of CO2 retention  Outcome: Progressing     Problem: SKIN/TISSUE INTEGRITY - ADULT  Goal: Skin integrity remains intact  Description: INTERVENTIONS  - Assess and document risk factors for pressure ulcer development  - Assess and document skin integrity  - Monitor for areas of redness and/or skin breakdown  - Initiate interventions, skin care algorithm/standards of care as needed  Outcome: Progressing  Goal: Incision(s), wounds(s) or drain site(s) healing without S/S of infection  Description: INTERVENTIONS:  - Assess and document risk factors for pressure ulcer development  - Assess and document skin integrity  - Assess and document dressing/incision, wound bed, drain sites and surrounding tissue  - Implement wound care per orders  - Initiate isolation precautions as appropriate  - Initiate Pressure Ulcer prevention bundle as indicated  Outcome: Progressing     Problem: HEMATOLOGIC - ADULT  Goal: Maintains hematologic stability  Description: INTERVENTIONS  - Assess for signs and symptoms of bleeding or hemorrhage  - Monitor labs and vital signs for trends  - Administer supportive blood products/factors, fluids and medications as ordered and appropriate  - Administer supportive blood products/factors as ordered and appropriate  Outcome: Progressing  Goal: Free from bleeding injury  Description: (Example usage: patient with low platelets)  INTERVENTIONS:  - Avoid intramuscular injections, enemas and rectal medication administration  - Ensure safe mobilization of patient  - Hold pressure on venipuncture sites to achieve adequate hemostasis  - Assess for signs and symptoms of internal bleeding  - Monitor lab trends  - Patient is to report abnormal signs of bleeding to staff  - Avoid use of toothpicks and dental floss  - Use electric shaver for shaving  - Use soft bristle tooth brush  - Limit straining and forceful nose blowing  Outcome: Progressing     Problem: MUSCULOSKELETAL - ADULT  Goal: Return mobility to safest level of function  Description: INTERVENTIONS:  - Assess patient stability and activity tolerance for standing, transferring and ambulating w/ or w/o assistive devices  - Assist with transfers and ambulation using safe patient handling equipment as needed  - Ensure adequate protection for wounds/incisions during mobilization  - Obtain PT/OT consults as needed  - Advance activity as appropriate  - Communicate ordered activity level and limitations with patient/family  Outcome: Progressing     Problem: Impaired Functional Mobility  Goal: Achieve highest/safest level of mobility/gait  Description: Interventions:  - Assess patient's functional ability and stability  - Promote increasing activity/tolerance for mobility and gait  - Educate and engage patient/family in tolerated activity level and precautions  - Use towel roll for neutral alignment of cervical spine  Outcome: Progressing     Problem: Impaired Activities of Daily Living  Goal: Achieve highest/safest level of independence in self care  Description: Interventions:  - Assess ability and encourage patient to participate in ADLs to maximize function  - Promote sitting position while performing ADLs such as feeding, grooming, and bathing  - Educate and encourage patient/family in tolerated functional activity level and precautions during self-care  - Encourage patient to incorporate impaired side during daily activities to promote function  Outcome: Progressing     Problem: SAFETY ADULT - FALL  Goal: Free from fall injury  Description: INTERVENTIONS:  - Assess pt frequently for physical needs  - Identify cognitive and physical deficits and behaviors that affect risk of falls.   - [place] fall precautions as indicated by assessment.  - Educate pt/family on patient safety including physical limitations  - Instruct pt to call for assistance with activity based on assessment  - Modify environment to reduce risk of injury  - Provide assistive devices as appropriate  - Consider OT/PT consult to assist with strengthening/mobility  - Encourage toileting schedule  Outcome: Progressing     Problem: SKIN INTEGRITY  Goal: Skin integrity remains intact  Description: Interventions:  - Assess for areas of redness and/or skin breakdown  - Assess vascular sites hourly  - Change oxygen saturation probe minimum once per shift  - Provide humidity as ordered and per policy  - If on oxygen support, assess nasal septum area for skin breakdown and replace protective barrier if needed  - Change diapers as needed  - Minimize the use of adhesives  Outcome: Progressing  Goal: Incision/wound heals without complications  Description: Interventions:  - Assess wound bed/incision and surrounding skin tissue  - Collaborate with physician/APN and implement wound/incision site care and dressing changes as ordered  - Position infant to avoid placing pressure on wound  - Enterostomal/Wound therapy consult as indicated for ostomies/wounds/G-tubes  Outcome: Progressing     Problem: Diabetes/Glucose Control  Goal: Glucose maintained within prescribed range  Description: INTERVENTIONS:  - Monitor Blood Glucose as ordered  - Assess for signs and symptoms of hyperglycemia and hypoglycemia  - Administer ordered medications to maintain glucose within target range  - Assess barriers to adequate nutritional intake and initiate nutrition consult as needed  - Instruct patient on self management of diabetes  Outcome: Progressing

## 2023-05-20 NOTE — CM/SW NOTE
Addendum 1:33:  Sats and MDO uploaded to [facility]. MD verbiage uploaded, awaiting MD to sign O2 order and RN to upload sats. Documentation for O2 sats: (RN to add to progress note)  Patient's O2 sat on room air is ____% at rest. Pt's O2 sat on room air is ____% when ambulating, and ____% on ____ liter while ambulating. (Reminder: The \"at rest\" and \"while ambulating\" are required statements. If patient is non ambulatory you can use \"with exertion\" such as during a transfer to assess their O2 level)       SW/CM to remain available for support and/or discharge planning.      JOHNSON Bennett, [facility]  Social Work   IGO:#[id number]

## 2023-05-20 NOTE — CM/SW NOTE
Pt does not qualify for not qualify for nocturnal oxygen per Josi. Medicare guidelines state that pt needs to stat at or below 88% for 5 minutes and pt does not.   SW informed pt and careteam.    JOHNSON Ford, [facility] Work   JCD:#[id number]

## 2023-05-20 NOTE — PLAN OF CARE
No acute changes. Fall precautions and safety maintained. Call light within reach. Frequent rounding by nursing staff. Problem: Patient Centered Care  Goal: Patient preferences are identified and integrated in the patient's plan of care  Description: Interventions:  - What would you like us to know as we care for you? I live at home with my niece  - Provide timely, complete, and accurate information to patient/family  - Incorporate patient and family knowledge, values, beliefs, and cultural backgrounds into the planning and delivery of care  - Encourage patient/family to participate in care and decision-making at the level they choose  - Honor patient and family perspectives and choices  Outcome: Progressing     Problem: Patient/Family Goals  Goal: Patient/Family Long Term Goal  Description: Patient's Long Term Goal: To go home    Interventions:  - monitor VS  - pain management  - participate with PT/OT  - See additional Care Plan goals for specific interventions  Outcome: Progressing  Goal: Patient/Family Short Term Goal  Description: Patient's Short Term Goal: to feel better    Interventions:   - monitor VS  - pain management  - participate with PT/OT  - See additional Care Plan goals for specific interventions  Outcome: Progressing     Problem: CARDIOVASCULAR - ADULT  Goal: Maintains optimal cardiac output and hemodynamic stability  Description: INTERVENTIONS:  - Monitor vital signs, rhythm, and trends  - Monitor for bleeding, hypotension and signs of decreased cardiac output  - Evaluate effectiveness of vasoactive medications to optimize hemodynamic stability  - Monitor arterial and/or venous puncture sites for bleeding and/or hematoma  - Assess quality of pulses, skin color and temperature  - Assess for signs of decreased coronary artery perfusion - ex.  Angina  - Evaluate fluid balance, assess for edema, trend weights  Outcome: Progressing  Goal: Absence of cardiac arrhythmias or at baseline  Description: INTERVENTIONS:  - Continuous cardiac monitoring, monitor vital signs, obtain 12 lead EKG if indicated  - Evaluate effectiveness of antiarrhythmic and heart rate control medications as ordered  - Initiate emergency measures for life threatening arrhythmias  - Monitor electrolytes and administer replacement therapy as ordered  Outcome: Progressing     Problem: RESPIRATORY - ADULT  Goal: Achieves optimal ventilation and oxygenation  Description: INTERVENTIONS:  - Assess for changes in respiratory status  - Assess for changes in mentation and behavior  - Position to facilitate oxygenation and minimize respiratory effort  - Oxygen supplementation based on oxygen saturation or ABGs  - Provide Smoking Cessation handout, if applicable  - Encourage broncho-pulmonary hygiene including cough, deep breathe, Incentive Spirometry  - Assess the need for suctioning and perform as needed  - Assess and instruct to report SOB or any respiratory difficulty  - Respiratory Therapy support as indicated  - Manage/alleviate anxiety  - Monitor for signs/symptoms of CO2 retention  Outcome: Progressing     Problem: SKIN/TISSUE INTEGRITY - ADULT  Goal: Skin integrity remains intact  Description: INTERVENTIONS  - Assess and document risk factors for pressure ulcer development  - Assess and document skin integrity  - Monitor for areas of redness and/or skin breakdown  - Initiate interventions, skin care algorithm/standards of care as needed  Outcome: Progressing  Goal: Incision(s), wounds(s) or drain site(s) healing without S/S of infection  Description: INTERVENTIONS:  - Assess and document risk factors for pressure ulcer development  - Assess and document skin integrity  - Assess and document dressing/incision, wound bed, drain sites and surrounding tissue  - Implement wound care per orders  - Initiate isolation precautions as appropriate  - Initiate Pressure Ulcer prevention bundle as indicated  Outcome: Progressing     Problem: HEMATOLOGIC - ADULT  Goal: Maintains hematologic stability  Description: INTERVENTIONS  - Assess for signs and symptoms of bleeding or hemorrhage  - Monitor labs and vital signs for trends  - Administer supportive blood products/factors, fluids and medications as ordered and appropriate  - Administer supportive blood products/factors as ordered and appropriate  Outcome: Progressing  Goal: Free from bleeding injury  Description: (Example usage: patient with low platelets)  INTERVENTIONS:  - Avoid intramuscular injections, enemas and rectal medication administration  - Ensure safe mobilization of patient  - Hold pressure on venipuncture sites to achieve adequate hemostasis  - Assess for signs and symptoms of internal bleeding  - Monitor lab trends  - Patient is to report abnormal signs of bleeding to staff  - Avoid use of toothpicks and dental floss  - Use electric shaver for shaving  - Use soft bristle tooth brush  - Limit straining and forceful nose blowing  Outcome: Progressing     Problem: MUSCULOSKELETAL - ADULT  Goal: Return mobility to safest level of function  Description: INTERVENTIONS:  - Assess patient stability and activity tolerance for standing, transferring and ambulating w/ or w/o assistive devices  - Assist with transfers and ambulation using safe patient handling equipment as needed  - Ensure adequate protection for wounds/incisions during mobilization  - Obtain PT/OT consults as needed  - Advance activity as appropriate  - Communicate ordered activity level and limitations with patient/family  Outcome: Progressing     Problem: Impaired Functional Mobility  Goal: Achieve highest/safest level of mobility/gait  Description: Interventions:  - Assess patient's functional ability and stability  - Promote increasing activity/tolerance for mobility and gait  - Educate and engage patient/family in tolerated activity level and precautions    Outcome: Progressing     Problem: Impaired Activities of Daily Living  Goal: Achieve highest/safest level of independence in self care  Description: Interventions:  - Assess ability and encourage patient to participate in ADLs to maximize function  - Promote sitting position while performing ADLs such as feeding, grooming, and bathing  - Educate and encourage patient/family in tolerated functional activity level and precautions during self-care    Outcome: Progressing     Problem: SAFETY ADULT - FALL  Goal: Free from fall injury  Description: INTERVENTIONS:  - Assess pt frequently for physical needs  - Identify cognitive and physical deficits and behaviors that affect risk of falls.   - [place] fall precautions as indicated by assessment.  - Educate pt/family on patient safety including physical limitations  - Instruct pt to call for assistance with activity based on assessment  - Modify environment to reduce risk of injury  - Provide assistive devices as appropriate  - Consider OT/PT consult to assist with strengthening/mobility  - Encourage toileting schedule  Outcome: Progressing     Problem: SKIN INTEGRITY  Goal: Skin integrity remains intact  Description: Interventions:  - Assess for areas of redness and/or skin breakdown  - Assess vascular sites hourly  - Change oxygen saturation probe minimum once per shift  - Provide humidity as ordered and per policy  - If on oxygen support, assess nasal septum area for skin breakdown and replace protective barrier if needed  - Change diapers as needed  - Minimize the use of adhesives  Outcome: Progressing  Goal: Incision/wound heals without complications  Description: Interventions:  - Assess wound bed/incision and surrounding skin tissue  - Collaborate with physician/APN and implement wound/incision site care and dressing changes as ordered  - Position infant to avoid placing pressure on wound  - Enterostomal/Wound therapy consult as indicated for ostomies/wounds/G-tubes  Outcome: Progressing     Problem: Diabetes/Glucose Control  Goal: Glucose maintained within prescribed range  Description: INTERVENTIONS:  - Monitor Blood Glucose as ordered  - Assess for signs and symptoms of hyperglycemia and hypoglycemia  - Administer ordered medications to maintain glucose within target range  - Assess barriers to adequate nutritional intake and initiate nutrition consult as needed  - Instruct patient on self management of diabetes  Outcome: Progressing

## 2023-05-21 LAB
ANION GAP SERPL CALC-SCNC: 3 MMOL/L (ref 0–18)
BASOPHILS # BLD AUTO: 0.02 X10(3) UL (ref 0–0.2)
BASOPHILS NFR BLD AUTO: 0.4 %
BUN BLD-MCNC: 45 MG/DL (ref 7–18)
BUN/CREAT SERPL: 52.9 (ref 10–20)
CALCIUM BLD-MCNC: 8.5 MG/DL (ref 8.5–10.1)
CHLORIDE SERPL-SCNC: 112 MMOL/L (ref 98–112)
CO2 SERPL-SCNC: 26 MMOL/L (ref 21–32)
CREAT BLD-MCNC: 0.85 MG/DL
DEPRECATED RDW RBC AUTO: 53.4 FL (ref 35.1–46.3)
EOSINOPHIL # BLD AUTO: 0.13 X10(3) UL (ref 0–0.7)
EOSINOPHIL NFR BLD AUTO: 2.3 %
ERYTHROCYTE [DISTWIDTH] IN BLOOD BY AUTOMATED COUNT: 15.2 % (ref 11–15)
GFR SERPLBLD BASED ON 1.73 SQ M-ARVRAT: 73 ML/MIN/1.73M2 (ref 60–?)
GLUCOSE BLD-MCNC: 87 MG/DL (ref 70–99)
HCT VFR BLD AUTO: 39.5 %
HGB BLD-MCNC: 12.2 G/DL
IMM GRANULOCYTES # BLD AUTO: 0.02 X10(3) UL (ref 0–1)
IMM GRANULOCYTES NFR BLD: 0.4 %
INR BLD: 1.48 (ref 0.85–1.16)
LYMPHOCYTES # BLD AUTO: 1.1 X10(3) UL (ref 1–4)
LYMPHOCYTES NFR BLD AUTO: 19.3 %
MCH RBC QN AUTO: 29.4 PG (ref 26–34)
MCHC RBC AUTO-ENTMCNC: 30.9 G/DL (ref 31–37)
MCV RBC AUTO: 95.2 FL
MONOCYTES # BLD AUTO: 0.32 X10(3) UL (ref 0.1–1)
MONOCYTES NFR BLD AUTO: 5.6 %
NEUTROPHILS # BLD AUTO: 4.12 X10 (3) UL (ref 1.5–7.7)
NEUTROPHILS # BLD AUTO: 4.12 X10(3) UL (ref 1.5–7.7)
NEUTROPHILS NFR BLD AUTO: 72 %
OSMOLALITY SERPL CALC.SUM OF ELEC: 303 MOSM/KG (ref 275–295)
PLATELET # BLD AUTO: 97 10(3)UL (ref 150–450)
POTASSIUM SERPL-SCNC: 4.3 MMOL/L (ref 3.5–5.1)
PROTHROMBIN TIME: 17.8 SECONDS (ref 11.6–14.8)
RBC # BLD AUTO: 4.15 X10(6)UL
SODIUM SERPL-SCNC: 141 MMOL/L (ref 136–145)
WBC # BLD AUTO: 5.7 X10(3) UL (ref 4–11)

## 2023-05-21 NOTE — PLAN OF CARE
Problem: Patient Centered Care  Goal: Patient preferences are identified and integrated in the patient's plan of care  Description: Interventions:  - What would you like us to know as we care for you? I live at home with my niece  - Provide timely, complete, and accurate information to patient/family  - Incorporate patient and family knowledge, values, beliefs, and cultural backgrounds into the planning and delivery of care  - Encourage patient/family to participate in care and decision-making at the level they choose  - Honor patient and family perspectives and choices  Outcome: Progressing     Problem: Patient/Family Goals  Goal: Patient/Family Long Term Goal  Description: Patient's Long Term Goal: To go home    Interventions:  - monitor VS  - pain management  - participate with PT/OT  - See additional Care Plan goals for specific interventions  Outcome: Progressing  Goal: Patient/Family Short Term Goal  Description: Patient's Short Term Goal: to feel better    Interventions:   - monitor VS  - pain management  - participate with PT/OT  - See additional Care Plan goals for specific interventions  Outcome: Progressing     Problem: CARDIOVASCULAR - ADULT  Goal: Maintains optimal cardiac output and hemodynamic stability  Description: INTERVENTIONS:  - Monitor vital signs, rhythm, and trends  - Monitor for bleeding, hypotension and signs of decreased cardiac output  - Evaluate effectiveness of vasoactive medications to optimize hemodynamic stability  - Monitor arterial and/or venous puncture sites for bleeding and/or hematoma  - Assess quality of pulses, skin color and temperature  - Assess for signs of decreased coronary artery perfusion - ex.  Angina  - Evaluate fluid balance, assess for edema, trend weights  Outcome: Progressing  Goal: Absence of cardiac arrhythmias or at baseline  Description: INTERVENTIONS:  - Continuous cardiac monitoring, monitor vital signs, obtain 12 lead EKG if indicated  - Evaluate effectiveness of antiarrhythmic and heart rate control medications as ordered  - Initiate emergency measures for life threatening arrhythmias  - Monitor electrolytes and administer replacement therapy as ordered  Outcome: Progressing     Problem: RESPIRATORY - ADULT  Goal: Achieves optimal ventilation and oxygenation  Description: INTERVENTIONS:  - Assess for changes in respiratory status  - Assess for changes in mentation and behavior  - Position to facilitate oxygenation and minimize respiratory effort  - Oxygen supplementation based on oxygen saturation or ABGs  - Provide Smoking Cessation handout, if applicable  - Encourage broncho-pulmonary hygiene including cough, deep breathe, Incentive Spirometry  - Assess the need for suctioning and perform as needed  - Assess and instruct to report SOB or any respiratory difficulty  - Respiratory Therapy support as indicated  - Manage/alleviate anxiety  - Monitor for signs/symptoms of CO2 retention  Outcome: Progressing     Problem: SKIN/TISSUE INTEGRITY - ADULT  Goal: Skin integrity remains intact  Description: INTERVENTIONS  - Assess and document risk factors for pressure ulcer development  - Assess and document skin integrity  - Monitor for areas of redness and/or skin breakdown  - Initiate interventions, skin care algorithm/standards of care as needed  Outcome: Progressing  Goal: Incision(s), wounds(s) or drain site(s) healing without S/S of infection  Description: INTERVENTIONS:  - Assess and document risk factors for pressure ulcer development  - Assess and document skin integrity  - Assess and document dressing/incision, wound bed, drain sites and surrounding tissue  - Implement wound care per orders  - Initiate isolation precautions as appropriate  - Initiate Pressure Ulcer prevention bundle as indicated  Outcome: Progressing     Problem: HEMATOLOGIC - ADULT  Goal: Maintains hematologic stability  Description: INTERVENTIONS  - Assess for signs and symptoms of bleeding or hemorrhage  - Monitor labs and vital signs for trends  - Administer supportive blood products/factors, fluids and medications as ordered and appropriate  - Administer supportive blood products/factors as ordered and appropriate  Outcome: Progressing  Goal: Free from bleeding injury  Description: (Example usage: patient with low platelets)  INTERVENTIONS:  - Avoid intramuscular injections, enemas and rectal medication administration  - Ensure safe mobilization of patient  - Hold pressure on venipuncture sites to achieve adequate hemostasis  - Assess for signs and symptoms of internal bleeding  - Monitor lab trends  - Patient is to report abnormal signs of bleeding to staff  - Avoid use of toothpicks and dental floss  - Use electric shaver for shaving  - Use soft bristle tooth brush  - Limit straining and forceful nose blowing  Outcome: Progressing     Problem: MUSCULOSKELETAL - ADULT  Goal: Return mobility to safest level of function  Description: INTERVENTIONS:  - Assess patient stability and activity tolerance for standing, transferring and ambulating w/ or w/o assistive devices  - Assist with transfers and ambulation using safe patient handling equipment as needed  - Ensure adequate protection for wounds/incisions during mobilization  - Obtain PT/OT consults as needed  - Advance activity as appropriate  - Communicate ordered activity level and limitations with patient/family  Outcome: Progressing     Problem: Impaired Functional Mobility  Goal: Achieve highest/safest level of mobility/gait  Description: Interventions:  - Assess patient's functional ability and stability  - Promote increasing activity/tolerance for mobility and gait  - Educate and engage patient/family in tolerated activity level and precautions  - Recommend use of  RW for transfers and ambulation  Outcome: Progressing     Problem: Impaired Activities of Daily Living  Goal: Achieve highest/safest level of independence in self care  Description: Interventions:  - Assess ability and encourage patient to participate in ADLs to maximize function  - Promote sitting position while performing ADLs such as feeding, grooming, and bathing  - Educate and encourage patient/family in tolerated functional activity level and precautions during self-care  - Provide support under elbow of weak side to prevent shoulder subluxation  Outcome: Progressing     Problem: SAFETY ADULT - FALL  Goal: Free from fall injury  Description: INTERVENTIONS:  - Assess pt frequently for physical needs  - Identify cognitive and physical deficits and behaviors that affect risk of falls.   - Rogers fall precautions as indicated by assessment.  - Educate pt/family on patient safety including physical limitations  - Instruct pt to call for assistance with activity based on assessment  - Modify environment to reduce risk of injury  - Provide assistive devices as appropriate  - Consider OT/PT consult to assist with strengthening/mobility  - Encourage toileting schedule  Outcome: Progressing     Problem: SKIN INTEGRITY  Goal: Skin integrity remains intact  Description: Interventions:  - Assess for areas of redness and/or skin breakdown  - Assess vascular sites hourly  - Change oxygen saturation probe minimum once per shift  - Provide humidity as ordered and per policy  - If on oxygen support, assess nasal septum area for skin breakdown and replace protective barrier if needed  - Change diapers as needed  - Minimize the use of adhesives  Outcome: Progressing  Goal: Incision/wound heals without complications  Description: Interventions:  - Assess wound bed/incision and surrounding skin tissue  - Collaborate with physician/APN and implement wound/incision site care and dressing changes as ordered  - Position infant to avoid placing pressure on wound  - Enterostomal/Wound therapy consult as indicated for ostomies/wounds/G-tubes  Outcome: Progressing     Problem: Diabetes/Glucose Control  Goal: Glucose maintained within prescribed range  Description: INTERVENTIONS:  - Monitor Blood Glucose as ordered  - Assess for signs and symptoms of hyperglycemia and hypoglycemia  - Administer ordered medications to maintain glucose within target range  - Assess barriers to adequate nutritional intake and initiate nutrition consult as needed  - Instruct patient on self management of diabetes  Outcome: Progressing     Monitoring vital signs, stable at this moment. Pain medication provided as needed. Wound care provided. Call light within reach, bed locked in lowest position, all fall precautions in place. All needs addressed. Frequent rounding maintained by nursing staff. Patient oxygen saturation being monitored for possibility of home O2. No acute changes at this time.

## 2023-05-21 NOTE — PLAN OF CARE
Patient is alert and oriented x's 4, vital signs are stable. Denies pain or discomfort. Wound care provided. Fall and safety precautions are in place, call light within reach. Frequent rounding performed by RN and POCT. Problem: Patient Centered Care  Goal: Patient preferences are identified and integrated in the patient's plan of care  Description: Interventions:  - What would you like us to know as we care for you? I live at home with my niece  - Provide timely, complete, and accurate information to patient/family  - Incorporate patient and family knowledge, values, beliefs, and cultural backgrounds into the planning and delivery of care  - Encourage patient/family to participate in care and decision-making at the level they choose  - Honor patient and family perspectives and choices  Outcome: Progressing     Problem: Patient/Family Goals  Goal: Patient/Family Long Term Goal  Description: Patient's Long Term Goal: To go home    Interventions:  - monitor VS  - pain management  - participate with PT/OT  - See additional Care Plan goals for specific interventions  Outcome: Progressing  Goal: Patient/Family Short Term Goal  Description: Patient's Short Term Goal: to feel better    Interventions:   - monitor VS  - pain management  - participate with PT/OT  - See additional Care Plan goals for specific interventions  Outcome: Progressing     Problem: CARDIOVASCULAR - ADULT  Goal: Maintains optimal cardiac output and hemodynamic stability  Description: INTERVENTIONS:  - Monitor vital signs, rhythm, and trends  - Monitor for bleeding, hypotension and signs of decreased cardiac output  - Evaluate effectiveness of vasoactive medications to optimize hemodynamic stability  - Monitor arterial and/or venous puncture sites for bleeding and/or hematoma  - Assess quality of pulses, skin color and temperature  - Assess for signs of decreased coronary artery perfusion - ex.  Angina  - Evaluate fluid balance, assess for edema, trend weights  Outcome: Progressing  Goal: Absence of cardiac arrhythmias or at baseline  Description: INTERVENTIONS:  - Continuous cardiac monitoring, monitor vital signs, obtain 12 lead EKG if indicated  - Evaluate effectiveness of antiarrhythmic and heart rate control medications as ordered  - Initiate emergency measures for life threatening arrhythmias  - Monitor electrolytes and administer replacement therapy as ordered  Outcome: Progressing     Problem: RESPIRATORY - ADULT  Goal: Achieves optimal ventilation and oxygenation  Description: INTERVENTIONS:  - Assess for changes in respiratory status  - Assess for changes in mentation and behavior  - Position to facilitate oxygenation and minimize respiratory effort  - Oxygen supplementation based on oxygen saturation or ABGs  - Provide Smoking Cessation handout, if applicable  - Encourage broncho-pulmonary hygiene including cough, deep breathe, Incentive Spirometry  - Assess the need for suctioning and perform as needed  - Assess and instruct to report SOB or any respiratory difficulty  - Respiratory Therapy support as indicated  - Manage/alleviate anxiety  - Monitor for signs/symptoms of CO2 retention  Outcome: Progressing     Problem: SKIN/TISSUE INTEGRITY - ADULT  Goal: Skin integrity remains intact  Description: INTERVENTIONS  - Assess and document risk factors for pressure ulcer development  - Assess and document skin integrity  - Monitor for areas of redness and/or skin breakdown  - Initiate interventions, skin care algorithm/standards of care as needed  Outcome: Progressing  Goal: Incision(s), wounds(s) or drain site(s) healing without S/S of infection  Description: INTERVENTIONS:  - Assess and document risk factors for pressure ulcer development  - Assess and document skin integrity  - Assess and document dressing/incision, wound bed, drain sites and surrounding tissue  - Implement wound care per orders  - Initiate isolation precautions as appropriate  - Initiate Pressure Ulcer prevention bundle as indicated  Outcome: Progressing     Problem: HEMATOLOGIC - ADULT  Goal: Maintains hematologic stability  Description: INTERVENTIONS  - Assess for signs and symptoms of bleeding or hemorrhage  - Monitor labs and vital signs for trends  - Administer supportive blood products/factors, fluids and medications as ordered and appropriate  - Administer supportive blood products/factors as ordered and appropriate  Outcome: Progressing  Goal: Free from bleeding injury  Description: (Example usage: patient with low platelets)  INTERVENTIONS:  - Avoid intramuscular injections, enemas and rectal medication administration  - Ensure safe mobilization of patient  - Hold pressure on venipuncture sites to achieve adequate hemostasis  - Assess for signs and symptoms of internal bleeding  - Monitor lab trends  - Patient is to report abnormal signs of bleeding to staff  - Avoid use of toothpicks and dental floss  - Use electric shaver for shaving  - Use soft bristle tooth brush  - Limit straining and forceful nose blowing  Outcome: Progressing     Problem: MUSCULOSKELETAL - ADULT  Goal: Return mobility to safest level of function  Description: INTERVENTIONS:  - Assess patient stability and activity tolerance for standing, transferring and ambulating w/ or w/o assistive devices  - Assist with transfers and ambulation using safe patient handling equipment as needed  - Ensure adequate protection for wounds/incisions during mobilization  - Obtain PT/OT consults as needed  - Advance activity as appropriate  - Communicate ordered activity level and limitations with patient/family  Outcome: Progressing     Problem: Impaired Functional Mobility  Goal: Achieve highest/safest level of mobility/gait  Description: Interventions:  - Assess patient's functional ability and stability  - Promote increasing activity/tolerance for mobility and gait  - Educate and engage patient/family in tolerated activity level and precautions  - Recommend use of chair position in bed 3 times per day  Outcome: Progressing     Problem: Impaired Activities of Daily Living  Goal: Achieve highest/safest level of independence in self care  Description: Interventions:  - Assess ability and encourage patient to participate in ADLs to maximize function  - Promote sitting position while performing ADLs such as feeding, grooming, and bathing  - Educate and encourage patient/family in tolerated functional activity level and precautions during self-care  - Provide support under elbow of weak side to prevent shoulder subluxation  Outcome: Progressing     Problem: SAFETY ADULT - FALL  Goal: Free from fall injury  Description: INTERVENTIONS:  - Assess pt frequently for physical needs  - Identify cognitive and physical deficits and behaviors that affect risk of falls.   - Edwards fall precautions as indicated by assessment.  - Educate pt/family on patient safety including physical limitations  - Instruct pt to call for assistance with activity based on assessment  - Modify environment to reduce risk of injury  - Provide assistive devices as appropriate  - Consider OT/PT consult to assist with strengthening/mobility  - Encourage toileting schedule  Outcome: Progressing     Problem: SKIN INTEGRITY  Goal: Skin integrity remains intact  Description: Interventions:  - Assess for areas of redness and/or skin breakdown  - Assess vascular sites hourly  - Change oxygen saturation probe minimum once per shift  - Provide humidity as ordered and per policy  - If on oxygen support, assess nasal septum area for skin breakdown and replace protective barrier if needed  - Change diapers as needed  - Minimize the use of adhesives  Outcome: Progressing  Goal: Incision/wound heals without complications  Description: Interventions:  - Assess wound bed/incision and surrounding skin tissue  - Collaborate with physician/APN and implement wound/incision site care and dressing changes as ordered  - Position infant to avoid placing pressure on wound  - Enterostomal/Wound therapy consult as indicated for ostomies/wounds/G-tubes  Outcome: Progressing     Problem: Diabetes/Glucose Control  Goal: Glucose maintained within prescribed range  Description: INTERVENTIONS:  - Monitor Blood Glucose as ordered  - Assess for signs and symptoms of hyperglycemia and hypoglycemia  - Administer ordered medications to maintain glucose within target range  - Assess barriers to adequate nutritional intake and initiate nutrition consult as needed  - Instruct patient on self management of diabetes  Outcome: Progressing (2) good, crying

## 2023-05-21 NOTE — PROGRESS NOTES
Patient oxygen saturation monitored closely throughout the night to evaluate the requirement on home O2. 3 times throughout the night patient dropped to 88% on room air, for less than 10 seconds each time. 11 times throughout the night patient dropped to 89% on room air, for less than 10 seconds each time. For the remainder of the night patient saturated over 90% on room air.

## 2023-05-22 VITALS
BODY MASS INDEX: 51.79 KG/M2 | SYSTOLIC BLOOD PRESSURE: 100 MMHG | DIASTOLIC BLOOD PRESSURE: 50 MMHG | RESPIRATION RATE: 18 BRPM | HEIGHT: 62.99 IN | OXYGEN SATURATION: 96 % | TEMPERATURE: 99 F | WEIGHT: 292.31 LBS | HEART RATE: 83 BPM

## 2023-05-22 LAB
ANION GAP SERPL CALC-SCNC: 3 MMOL/L (ref 0–18)
BASOPHILS # BLD AUTO: 0.02 X10(3) UL (ref 0–0.2)
BASOPHILS NFR BLD AUTO: 0.3 %
BUN BLD-MCNC: 52 MG/DL (ref 7–18)
BUN/CREAT SERPL: 48.1 (ref 10–20)
CALCIUM BLD-MCNC: 8.5 MG/DL (ref 8.5–10.1)
CHLORIDE SERPL-SCNC: 111 MMOL/L (ref 98–112)
CO2 SERPL-SCNC: 26 MMOL/L (ref 21–32)
CREAT BLD-MCNC: 1.08 MG/DL
DEPRECATED RDW RBC AUTO: 55.3 FL (ref 35.1–46.3)
EOSINOPHIL # BLD AUTO: 0.19 X10(3) UL (ref 0–0.7)
EOSINOPHIL NFR BLD AUTO: 2.6 %
ERYTHROCYTE [DISTWIDTH] IN BLOOD BY AUTOMATED COUNT: 15.4 % (ref 11–15)
GFR SERPLBLD BASED ON 1.73 SQ M-ARVRAT: 55 ML/MIN/1.73M2 (ref 60–?)
GLUCOSE BLD-MCNC: 97 MG/DL (ref 70–99)
HCT VFR BLD AUTO: 38.8 %
HGB BLD-MCNC: 11.7 G/DL
IMM GRANULOCYTES # BLD AUTO: 0.03 X10(3) UL (ref 0–1)
IMM GRANULOCYTES NFR BLD: 0.4 %
INR BLD: 1.64 (ref 0.85–1.16)
LYMPHOCYTES # BLD AUTO: 1.21 X10(3) UL (ref 1–4)
LYMPHOCYTES NFR BLD AUTO: 16.7 %
MCH RBC QN AUTO: 29.8 PG (ref 26–34)
MCHC RBC AUTO-ENTMCNC: 30.2 G/DL (ref 31–37)
MCV RBC AUTO: 99 FL
MONOCYTES # BLD AUTO: 0.48 X10(3) UL (ref 0.1–1)
MONOCYTES NFR BLD AUTO: 6.6 %
NEUTROPHILS # BLD AUTO: 5.32 X10 (3) UL (ref 1.5–7.7)
NEUTROPHILS # BLD AUTO: 5.32 X10(3) UL (ref 1.5–7.7)
NEUTROPHILS NFR BLD AUTO: 73.4 %
OSMOLALITY SERPL CALC.SUM OF ELEC: 304 MOSM/KG (ref 275–295)
PLATELET # BLD AUTO: 94 10(3)UL (ref 150–450)
POTASSIUM SERPL-SCNC: 4.4 MMOL/L (ref 3.5–5.1)
PROTHROMBIN TIME: 19.3 SECONDS (ref 11.6–14.8)
RBC # BLD AUTO: 3.92 X10(6)UL
SODIUM SERPL-SCNC: 140 MMOL/L (ref 136–145)
WBC # BLD AUTO: 7.3 X10(3) UL (ref 4–11)

## 2023-05-22 RX ORDER — FUROSEMIDE 40 MG/1
40 TABLET ORAL DAILY
Qty: 30 TABLET | Refills: 0 | Status: SHIPPED | OUTPATIENT
Start: 2023-05-23 | End: 2023-06-22

## 2023-05-22 RX ORDER — WARFARIN SODIUM 5 MG/1
5 TABLET ORAL NIGHTLY
Qty: 30 TABLET | Refills: 0 | Status: SHIPPED | OUTPATIENT
Start: 2023-05-22

## 2023-05-22 RX ORDER — CLOTRIMAZOLE AND BETAMETHASONE DIPROPIONATE 10; .64 MG/G; MG/G
1 CREAM TOPICAL 2 TIMES DAILY
Qty: 30 G | Refills: 2 | Status: SHIPPED | OUTPATIENT
Start: 2023-05-22 | End: 2023-06-21

## 2023-05-22 NOTE — CM/SW NOTE
Pt discussed during RN rounds. Over the weekend, SW was notified by [facility] that pt does not qualify. SW initiated the referral for E prior to the weekend. Uriel called Miguelina Gaston from [facility] in regards to see how long the nocturnal test is good for. SW will await for call back. 348pm- SW called to follow up with Miguelina Gaston from [address] [place] [address]. Miguelina Gaston informed SW that [facility] has additional questions such as dx. SW confirmed the dx that pt has.     410pm- SW received a call back from Miguelina Gaston at [facility] that pt does not qualify due to MAURICIO dx and the nocturnal test is now invalid, past 48 hours. Pt made aware. SW notified [address] [place] [address] that pt has dc order in and pt will be leaving today, via aidin. SW informed pt. Pt was made aware that the nocturnal test has , is only valid for 48 hours, and pt does not qualify due to pt's MAURICIO dx. Pt was encourage to schedule an appointment with an outpt sleep study, pt informed SW that she has a sleep study being done in a month. Plan: DC to home with [address] [place] [address]. URIEL/RUBENS to remain available for support and/or discharge planning.      Virgle Meigs, MSW, LSW   x [phone]

## 2023-05-22 NOTE — PLAN OF CARE
Problem: Patient Centered Care  Goal: Patient preferences are identified and integrated in the patient's plan of care  Description: Interventions:  - What would you like us to know as we care for you? I live at home with my niece  - Provide timely, complete, and accurate information to patient/family  - Incorporate patient and family knowledge, values, beliefs, and cultural backgrounds into the planning and delivery of care  - Encourage patient/family to participate in care and decision-making at the level they choose  - Honor patient and family perspectives and choices  Outcome: Completed     Problem: Patient/Family Goals  Goal: Patient/Family Long Term Goal  Description: Patient's Long Term Goal: To go home    Interventions:  - monitor VS  - pain management  - participate with PT/OT  - See additional Care Plan goals for specific interventions  Outcome: Completed  Goal: Patient/Family Short Term Goal  Description: Patient's Short Term Goal: to feel better    Interventions:   - monitor VS  - pain management  - participate with PT/OT  - See additional Care Plan goals for specific interventions  Outcome: Completed     Problem: CARDIOVASCULAR - ADULT  Goal: Maintains optimal cardiac output and hemodynamic stability  Description: INTERVENTIONS:  - Monitor vital signs, rhythm, and trends  - Monitor for bleeding, hypotension and signs of decreased cardiac output  - Evaluate effectiveness of vasoactive medications to optimize hemodynamic stability  - Monitor arterial and/or venous puncture sites for bleeding and/or hematoma  - Assess quality of pulses, skin color and temperature  - Assess for signs of decreased coronary artery perfusion - ex.  Angina  - Evaluate fluid balance, assess for edema, trend weights  Outcome: Completed  Goal: Absence of cardiac arrhythmias or at baseline  Description: INTERVENTIONS:  - Continuous cardiac monitoring, monitor vital signs, obtain 12 lead EKG if indicated  - Evaluate effectiveness of antiarrhythmic and heart rate control medications as ordered  - Initiate emergency measures for life threatening arrhythmias  - Monitor electrolytes and administer replacement therapy as ordered  Outcome: Completed     Problem: RESPIRATORY - ADULT  Goal: Achieves optimal ventilation and oxygenation  Description: INTERVENTIONS:  - Assess for changes in respiratory status  - Assess for changes in mentation and behavior  - Position to facilitate oxygenation and minimize respiratory effort  - Oxygen supplementation based on oxygen saturation or ABGs  - Provide Smoking Cessation handout, if applicable  - Encourage broncho-pulmonary hygiene including cough, deep breathe, Incentive Spirometry  - Assess the need for suctioning and perform as needed  - Assess and instruct to report SOB or any respiratory difficulty  - Respiratory Therapy support as indicated  - Manage/alleviate anxiety  - Monitor for signs/symptoms of CO2 retention  Outcome: Completed     Problem: SKIN/TISSUE INTEGRITY - ADULT  Goal: Skin integrity remains intact  Description: INTERVENTIONS  - Assess and document risk factors for pressure ulcer development  - Assess and document skin integrity  - Monitor for areas of redness and/or skin breakdown  - Initiate interventions, skin care algorithm/standards of care as needed  Outcome: Completed  Goal: Incision(s), wounds(s) or drain site(s) healing without S/S of infection  Description: INTERVENTIONS:  - Assess and document risk factors for pressure ulcer development  - Assess and document skin integrity  - Assess and document dressing/incision, wound bed, drain sites and surrounding tissue  - Implement wound care per orders  - Initiate isolation precautions as appropriate  - Initiate Pressure Ulcer prevention bundle as indicated  Outcome: Completed     Problem: HEMATOLOGIC - ADULT  Goal: Maintains hematologic stability  Description: INTERVENTIONS  - Assess for signs and symptoms of bleeding or hemorrhage  - Monitor labs and vital signs for trends  - Administer supportive blood products/factors, fluids and medications as ordered and appropriate  - Administer supportive blood products/factors as ordered and appropriate  Outcome: Completed  Goal: Free from bleeding injury  Description: (Example usage: patient with low platelets)  INTERVENTIONS:  - Avoid intramuscular injections, enemas and rectal medication administration  - Ensure safe mobilization of patient  - Hold pressure on venipuncture sites to achieve adequate hemostasis  - Assess for signs and symptoms of internal bleeding  - Monitor lab trends  - Patient is to report abnormal signs of bleeding to staff  - Avoid use of toothpicks and dental floss  - Use electric shaver for shaving  - Use soft bristle tooth brush  - Limit straining and forceful nose blowing  Outcome: Completed     Problem: MUSCULOSKELETAL - ADULT  Goal: Return mobility to safest level of function  Description: INTERVENTIONS:  - Assess patient stability and activity tolerance for standing, transferring and ambulating w/ or w/o assistive devices  - Assist with transfers and ambulation using safe patient handling equipment as needed  - Ensure adequate protection for wounds/incisions during mobilization  - Obtain PT/OT consults as needed  - Advance activity as appropriate  - Communicate ordered activity level and limitations with patient/family  Outcome: Completed     Problem: Impaired Functional Mobility  Goal: Achieve highest/safest level of mobility/gait  Description: Interventions:  - Assess patient's functional ability and stability  - Promote increasing activity/tolerance for mobility and gait  - Educate and engage patient/family in tolerated activity level and precautions  - Recommend use of  RW for transfers and ambulation  Outcome: Completed     Problem: Impaired Activities of Daily Living  Goal: Achieve highest/safest level of independence in self care  Description: Interventions:  - Assess ability and encourage patient to participate in ADLs to maximize function  - Promote sitting position while performing ADLs such as feeding, grooming, and bathing  - Educate and encourage patient/family in tolerated functional activity level and precautions during self-care  - Encourage patient to incorporate impaired side during daily activities to promote function  Outcome: Completed     Problem: SAFETY ADULT - FALL  Goal: Free from fall injury  Description: INTERVENTIONS:  - Assess pt frequently for physical needs  - Identify cognitive and physical deficits and behaviors that affect risk of falls.   - [place] fall precautions as indicated by assessment.  - Educate pt/family on patient safety including physical limitations  - Instruct pt to call for assistance with activity based on assessment  - Modify environment to reduce risk of injury  - Provide assistive devices as appropriate  - Consider OT/PT consult to assist with strengthening/mobility  - Encourage toileting schedule  Outcome: Completed     Problem: SKIN INTEGRITY  Goal: Skin integrity remains intact  Description: Interventions:  - Assess for areas of redness and/or skin breakdown  - Assess vascular sites hourly  - Change oxygen saturation probe minimum once per shift  - Provide humidity as ordered and per policy  - If on oxygen support, assess nasal septum area for skin breakdown and replace protective barrier if needed  - Change diapers as needed  - Minimize the use of adhesives  Outcome: Completed  Goal: Incision/wound heals without complications  Description: Interventions:  - Assess wound bed/incision and surrounding skin tissue  - Collaborate with physician/APN and implement wound/incision site care and dressing changes as ordered  - Position infant to avoid placing pressure on wound  - Enterostomal/Wound therapy consult as indicated for ostomies/wounds/G-tubes  Outcome: Completed     Problem: Diabetes/Glucose Control  Goal: Glucose maintained within prescribed range  Description: INTERVENTIONS:  - Monitor Blood Glucose as ordered  - Assess for signs and symptoms of hyperglycemia and hypoglycemia  - Administer ordered medications to maintain glucose within target range  - Assess barriers to adequate nutritional intake and initiate nutrition consult as needed  - Instruct patient on self management of diabetes  Outcome: Completed     Vital signs stable on room air at this time. Patient denies pain at this marti. Ambulating with walker. Tolerating cardiac diet, 2000 ml fluid restriction enforced. Per MD, patient cleared to discharge home. PIV removed and remote telemetry monitor returned. Discharge education and AVS provided to patient and daughter at bedside. Patient discharged to home, transport to [facility] via wheelchair.

## 2023-05-23 ENCOUNTER — ANTI-COAG (OUTPATIENT)
Dept: CARDIOLOGY | Age: 73
End: 2023-05-23

## 2023-05-23 DIAGNOSIS — I42.0 DILATED CARDIOMYOPATHY (CMD): ICD-10-CM

## 2023-05-23 DIAGNOSIS — Z79.01 LONG TERM (CURRENT) USE OF ANTICOAGULANTS: ICD-10-CM

## 2023-05-23 DIAGNOSIS — I48.0 PAROXYSMAL ATRIAL FIBRILLATION (CMD): Primary | ICD-10-CM

## 2023-05-30 ENCOUNTER — TELEPHONE (OUTPATIENT)
Dept: CARDIOLOGY | Age: 73
End: 2023-05-30

## 2023-05-30 ENCOUNTER — LAB ENCOUNTER (OUTPATIENT)
Dept: LAB | Facility: HOSPITAL | Age: 73
End: 2023-05-30
Attending: INTERNAL MEDICINE
Payer: MEDICARE

## 2023-05-30 ENCOUNTER — ANTI-COAG (OUTPATIENT)
Dept: CARDIOLOGY | Age: 73
End: 2023-05-30

## 2023-05-30 DIAGNOSIS — I48.0 PAROXYSMAL ATRIAL FIBRILLATION (HCC): Primary | ICD-10-CM

## 2023-05-30 DIAGNOSIS — I48.0 PAROXYSMAL ATRIAL FIBRILLATION (CMD): Primary | ICD-10-CM

## 2023-05-30 DIAGNOSIS — Z79.01 LONG TERM (CURRENT) USE OF ANTICOAGULANTS: ICD-10-CM

## 2023-05-30 DIAGNOSIS — I42.0 DILATED CARDIOMYOPATHY (CMD): ICD-10-CM

## 2023-05-30 LAB
INR BLD: 9.38 (ref 0.85–1.16)
INR PPP: 8
INR PPP: 9.3
PROTHROMBIN TIME: 73.3 SECONDS (ref 11.6–14.8)

## 2023-05-30 PROCEDURE — 85610 PROTHROMBIN TIME: CPT

## 2023-05-30 PROCEDURE — 36415 COLL VENOUS BLD VENIPUNCTURE: CPT

## 2023-05-30 RX ORDER — PHYTONADIONE 5 MG/1
2.5 TABLET ORAL ONCE
Qty: 0.5 TABLET | Refills: 0 | Status: SHIPPED | OUTPATIENT
Start: 2023-05-30 | End: 2023-05-30

## 2023-05-31 ENCOUNTER — TELEPHONE (OUTPATIENT)
Dept: CARDIOLOGY | Age: 73
End: 2023-05-31

## 2023-05-31 ENCOUNTER — ANTI-COAG (OUTPATIENT)
Dept: CARDIOLOGY | Age: 73
End: 2023-05-31

## 2023-05-31 DIAGNOSIS — Z79.01 LONG TERM (CURRENT) USE OF ANTICOAGULANTS: ICD-10-CM

## 2023-05-31 DIAGNOSIS — I42.0 DILATED CARDIOMYOPATHY (CMD): ICD-10-CM

## 2023-05-31 DIAGNOSIS — I48.0 PAROXYSMAL ATRIAL FIBRILLATION (CMD): Primary | ICD-10-CM

## 2023-05-31 LAB — INR PPP: 7.3

## 2023-06-01 ENCOUNTER — ANTI-COAG (OUTPATIENT)
Dept: CARDIOLOGY | Age: 73
End: 2023-06-01

## 2023-06-01 DIAGNOSIS — Z79.01 LONG TERM (CURRENT) USE OF ANTICOAGULANTS: ICD-10-CM

## 2023-06-01 DIAGNOSIS — I48.0 PAROXYSMAL ATRIAL FIBRILLATION (CMD): Primary | ICD-10-CM

## 2023-06-01 DIAGNOSIS — I42.0 DILATED CARDIOMYOPATHY (CMD): ICD-10-CM

## 2023-06-01 LAB — INR PPP: 3.8

## 2023-06-02 ENCOUNTER — ANTI-COAG (OUTPATIENT)
Dept: CARDIOLOGY | Age: 73
End: 2023-06-02

## 2023-06-02 DIAGNOSIS — I48.0 PAROXYSMAL ATRIAL FIBRILLATION (CMD): Primary | ICD-10-CM

## 2023-06-02 DIAGNOSIS — Z79.01 LONG TERM (CURRENT) USE OF ANTICOAGULANTS: ICD-10-CM

## 2023-06-02 DIAGNOSIS — I42.0 DILATED CARDIOMYOPATHY (CMD): ICD-10-CM

## 2023-06-02 LAB — INR PPP: 3.9

## 2023-06-05 ENCOUNTER — TELEPHONE (OUTPATIENT)
Dept: CARDIOLOGY | Age: 73
End: 2023-06-05

## 2023-06-05 ENCOUNTER — ANTI-COAG (OUTPATIENT)
Dept: CARDIOLOGY | Age: 73
End: 2023-06-05

## 2023-06-05 DIAGNOSIS — I48.0 PAROXYSMAL ATRIAL FIBRILLATION (CMD): Primary | ICD-10-CM

## 2023-06-05 DIAGNOSIS — Z79.01 LONG TERM (CURRENT) USE OF ANTICOAGULANTS: ICD-10-CM

## 2023-06-05 DIAGNOSIS — I42.0 DILATED CARDIOMYOPATHY (CMD): ICD-10-CM

## 2023-06-05 LAB — INR PPP: 6.3

## 2023-06-06 ENCOUNTER — TELEPHONE (OUTPATIENT)
Dept: CARDIOLOGY | Age: 73
End: 2023-06-06

## 2023-06-06 ENCOUNTER — ANTI-COAG (OUTPATIENT)
Dept: CARDIOLOGY | Age: 73
End: 2023-06-06

## 2023-06-06 DIAGNOSIS — Z79.01 LONG TERM (CURRENT) USE OF ANTICOAGULANTS: ICD-10-CM

## 2023-06-06 DIAGNOSIS — I42.0 DILATED CARDIOMYOPATHY (CMD): ICD-10-CM

## 2023-06-06 DIAGNOSIS — I48.0 PAROXYSMAL ATRIAL FIBRILLATION (CMD): Primary | ICD-10-CM

## 2023-06-06 LAB — INR PPP: 5.7

## 2023-06-08 ENCOUNTER — ANTI-COAG (OUTPATIENT)
Dept: CARDIOLOGY | Age: 73
End: 2023-06-08

## 2023-06-08 DIAGNOSIS — I48.0 PAROXYSMAL ATRIAL FIBRILLATION (CMD): Primary | ICD-10-CM

## 2023-06-08 DIAGNOSIS — Z79.01 LONG TERM (CURRENT) USE OF ANTICOAGULANTS: ICD-10-CM

## 2023-06-08 DIAGNOSIS — I42.0 DILATED CARDIOMYOPATHY (CMD): ICD-10-CM

## 2023-06-08 LAB — INR PPP: 4.8

## 2023-06-09 ENCOUNTER — ANTI-COAG (OUTPATIENT)
Dept: CARDIOLOGY | Age: 73
End: 2023-06-09

## 2023-06-09 DIAGNOSIS — I42.0 DILATED CARDIOMYOPATHY (CMD): ICD-10-CM

## 2023-06-09 DIAGNOSIS — I48.0 PAROXYSMAL ATRIAL FIBRILLATION (CMD): Primary | ICD-10-CM

## 2023-06-09 DIAGNOSIS — Z79.01 LONG TERM (CURRENT) USE OF ANTICOAGULANTS: ICD-10-CM

## 2023-06-09 LAB — INR PPP: 4.1

## 2023-06-12 ENCOUNTER — ANTI-COAG (OUTPATIENT)
Dept: CARDIOLOGY | Age: 73
End: 2023-06-12

## 2023-06-12 DIAGNOSIS — I42.0 DILATED CARDIOMYOPATHY (CMD): ICD-10-CM

## 2023-06-12 DIAGNOSIS — I48.0 PAROXYSMAL ATRIAL FIBRILLATION (CMD): Primary | ICD-10-CM

## 2023-06-12 DIAGNOSIS — Z79.01 LONG TERM (CURRENT) USE OF ANTICOAGULANTS: ICD-10-CM

## 2023-06-12 LAB — INR PPP: 1.9

## 2023-06-14 ENCOUNTER — ANTI-COAG (OUTPATIENT)
Dept: CARDIOLOGY | Age: 73
End: 2023-06-14

## 2023-06-14 DIAGNOSIS — I42.0 DILATED CARDIOMYOPATHY (CMD): ICD-10-CM

## 2023-06-14 DIAGNOSIS — Z79.01 LONG TERM (CURRENT) USE OF ANTICOAGULANTS: ICD-10-CM

## 2023-06-14 DIAGNOSIS — I48.0 PAROXYSMAL ATRIAL FIBRILLATION (CMD): Primary | ICD-10-CM

## 2023-06-14 LAB — INR PPP: 1.8

## 2023-06-19 ENCOUNTER — ANTI-COAG (OUTPATIENT)
Dept: CARDIOLOGY | Age: 73
End: 2023-06-19

## 2023-06-19 DIAGNOSIS — I48.0 PAROXYSMAL ATRIAL FIBRILLATION (CMD): Primary | ICD-10-CM

## 2023-06-19 DIAGNOSIS — Z79.01 LONG TERM (CURRENT) USE OF ANTICOAGULANTS: ICD-10-CM

## 2023-06-19 DIAGNOSIS — I42.0 DILATED CARDIOMYOPATHY (CMD): ICD-10-CM

## 2023-06-19 LAB — INR PPP: 2.4

## 2023-06-20 ENCOUNTER — TELEPHONE (OUTPATIENT)
Dept: CARDIOLOGY | Age: 73
End: 2023-06-20

## 2023-06-23 ENCOUNTER — ANTI-COAG (OUTPATIENT)
Dept: CARDIOLOGY | Age: 73
End: 2023-06-23

## 2023-06-23 DIAGNOSIS — I48.0 PAROXYSMAL ATRIAL FIBRILLATION (CMD): Primary | ICD-10-CM

## 2023-06-23 DIAGNOSIS — I42.0 DILATED CARDIOMYOPATHY (CMD): ICD-10-CM

## 2023-06-23 DIAGNOSIS — Z79.01 LONG TERM (CURRENT) USE OF ANTICOAGULANTS: ICD-10-CM

## 2023-06-23 LAB — INR PPP: 4

## 2023-06-26 ENCOUNTER — TELEPHONE (OUTPATIENT)
Dept: CARDIOLOGY | Age: 73
End: 2023-06-26

## 2023-06-26 ENCOUNTER — ANTI-COAG (OUTPATIENT)
Dept: CARDIOLOGY | Age: 73
End: 2023-06-26

## 2023-06-26 DIAGNOSIS — Z79.01 LONG TERM (CURRENT) USE OF ANTICOAGULANTS: ICD-10-CM

## 2023-06-26 DIAGNOSIS — I48.0 PAROXYSMAL ATRIAL FIBRILLATION (CMD): Primary | ICD-10-CM

## 2023-06-26 DIAGNOSIS — I42.0 DILATED CARDIOMYOPATHY (CMD): ICD-10-CM

## 2023-06-26 LAB — INR PPP: 5.2

## 2023-06-27 ENCOUNTER — ANTI-COAG (OUTPATIENT)
Dept: CARDIOLOGY | Age: 73
End: 2023-06-27

## 2023-06-27 DIAGNOSIS — I48.0 PAROXYSMAL ATRIAL FIBRILLATION (CMD): Primary | ICD-10-CM

## 2023-06-27 DIAGNOSIS — I42.0 DILATED CARDIOMYOPATHY (CMD): ICD-10-CM

## 2023-06-27 DIAGNOSIS — Z79.01 LONG TERM (CURRENT) USE OF ANTICOAGULANTS: ICD-10-CM

## 2023-06-27 LAB — INR PPP: 4.9

## 2023-06-29 ENCOUNTER — ANTI-COAG (OUTPATIENT)
Dept: CARDIOLOGY | Age: 73
End: 2023-06-29

## 2023-06-29 ENCOUNTER — TELEPHONE (OUTPATIENT)
Dept: CARDIOLOGY | Age: 73
End: 2023-06-29

## 2023-06-29 DIAGNOSIS — I48.0 PAROXYSMAL ATRIAL FIBRILLATION (CMD): Primary | ICD-10-CM

## 2023-06-29 DIAGNOSIS — I42.0 DILATED CARDIOMYOPATHY (CMD): ICD-10-CM

## 2023-06-29 DIAGNOSIS — Z79.01 LONG TERM (CURRENT) USE OF ANTICOAGULANTS: ICD-10-CM

## 2023-06-29 LAB — INR PPP: 3.4

## 2023-07-02 DIAGNOSIS — I50.22 CHRONIC SYSTOLIC CONGESTIVE HEART FAILURE (CMD): ICD-10-CM

## 2023-07-03 ENCOUNTER — ANTI-COAG (OUTPATIENT)
Dept: CARDIOLOGY | Age: 73
End: 2023-07-03

## 2023-07-03 DIAGNOSIS — I48.0 PAROXYSMAL ATRIAL FIBRILLATION (CMD): Primary | ICD-10-CM

## 2023-07-03 DIAGNOSIS — Z79.01 LONG TERM (CURRENT) USE OF ANTICOAGULANTS: ICD-10-CM

## 2023-07-03 DIAGNOSIS — I42.0 DILATED CARDIOMYOPATHY (CMD): ICD-10-CM

## 2023-07-03 LAB — INR PPP: 2.1

## 2023-07-03 RX ORDER — ALLOPURINOL 100 MG/1
100 TABLET ORAL DAILY
Qty: 90 TABLET | Refills: 3 | Status: SHIPPED | OUTPATIENT
Start: 2023-07-03

## 2023-07-07 ENCOUNTER — ANTI-COAG (OUTPATIENT)
Dept: CARDIOLOGY | Age: 73
End: 2023-07-07

## 2023-07-07 DIAGNOSIS — I42.0 DILATED CARDIOMYOPATHY (CMD): ICD-10-CM

## 2023-07-07 DIAGNOSIS — I48.0 PAROXYSMAL ATRIAL FIBRILLATION (CMD): Primary | ICD-10-CM

## 2023-07-07 DIAGNOSIS — Z79.01 LONG TERM (CURRENT) USE OF ANTICOAGULANTS: ICD-10-CM

## 2023-07-07 LAB
INR PPP: 2.2
INR PPP: 2.2

## 2023-07-10 ENCOUNTER — ANTI-COAG (OUTPATIENT)
Dept: CARDIOLOGY | Age: 73
End: 2023-07-10

## 2023-07-10 DIAGNOSIS — I42.0 DILATED CARDIOMYOPATHY (CMD): ICD-10-CM

## 2023-07-10 DIAGNOSIS — Z79.01 LONG TERM (CURRENT) USE OF ANTICOAGULANTS: ICD-10-CM

## 2023-07-10 DIAGNOSIS — I48.0 PAROXYSMAL ATRIAL FIBRILLATION (CMD): Primary | ICD-10-CM

## 2023-07-10 LAB — INR PPP: 2.1

## 2023-07-14 ENCOUNTER — TELEPHONE (OUTPATIENT)
Dept: CARDIOLOGY | Age: 73
End: 2023-07-14

## 2023-07-17 ENCOUNTER — ANTI-COAG (OUTPATIENT)
Dept: CARDIOLOGY | Age: 73
End: 2023-07-17

## 2023-07-17 DIAGNOSIS — Z79.01 LONG TERM (CURRENT) USE OF ANTICOAGULANTS: ICD-10-CM

## 2023-07-17 DIAGNOSIS — I48.0 PAROXYSMAL ATRIAL FIBRILLATION (CMD): Primary | ICD-10-CM

## 2023-07-17 DIAGNOSIS — I42.0 DILATED CARDIOMYOPATHY (CMD): ICD-10-CM

## 2023-07-17 LAB — INR PPP: 2.1

## 2023-07-18 ENCOUNTER — TELEPHONE (OUTPATIENT)
Dept: CARDIOLOGY | Age: 73
End: 2023-07-18

## 2023-07-18 DIAGNOSIS — I48.0 PAROXYSMAL ATRIAL FIBRILLATION (CMD): Primary | ICD-10-CM

## 2023-07-24 ENCOUNTER — ANTI-COAG (OUTPATIENT)
Dept: CARDIOLOGY | Age: 73
End: 2023-07-24

## 2023-07-24 DIAGNOSIS — Z79.01 LONG TERM (CURRENT) USE OF ANTICOAGULANTS: ICD-10-CM

## 2023-07-24 DIAGNOSIS — I48.0 PAROXYSMAL ATRIAL FIBRILLATION (CMD): Primary | ICD-10-CM

## 2023-07-24 DIAGNOSIS — I42.0 DILATED CARDIOMYOPATHY (CMD): ICD-10-CM

## 2023-07-24 LAB — INR PPP: 2

## 2023-07-31 ENCOUNTER — ANTI-COAG (OUTPATIENT)
Dept: CARDIOLOGY | Age: 73
End: 2023-07-31

## 2023-07-31 DIAGNOSIS — I48.0 PAROXYSMAL ATRIAL FIBRILLATION (CMD): Primary | ICD-10-CM

## 2023-07-31 DIAGNOSIS — Z79.01 LONG TERM (CURRENT) USE OF ANTICOAGULANTS: ICD-10-CM

## 2023-07-31 DIAGNOSIS — I42.0 DILATED CARDIOMYOPATHY (CMD): ICD-10-CM

## 2023-07-31 LAB — INR PPP: 1.9

## 2023-08-01 ENCOUNTER — ANCILLARY PROCEDURE (OUTPATIENT)
Dept: CARDIOLOGY | Age: 73
End: 2023-08-01
Attending: INTERNAL MEDICINE

## 2023-08-01 DIAGNOSIS — I50.22 CHRONIC SYSTOLIC HEART FAILURE (CMD): ICD-10-CM

## 2023-08-01 PROCEDURE — 93264 REM MNTR WRLS P-ART PRS SNR: CPT | Performed by: INTERNAL MEDICINE

## 2023-08-02 ENCOUNTER — ANCILLARY PROCEDURE (OUTPATIENT)
Dept: CARDIOLOGY | Age: 73
End: 2023-08-02
Attending: INTERNAL MEDICINE

## 2023-08-02 DIAGNOSIS — Z95.810 ICD (IMPLANTABLE CARDIOVERTER-DEFIBRILLATOR) IN PLACE: ICD-10-CM

## 2023-08-07 ENCOUNTER — ANTI-COAG (OUTPATIENT)
Dept: CARDIOLOGY | Age: 73
End: 2023-08-07

## 2023-08-07 DIAGNOSIS — I48.0 PAROXYSMAL ATRIAL FIBRILLATION (CMD): Primary | ICD-10-CM

## 2023-08-07 DIAGNOSIS — Z79.01 LONG TERM (CURRENT) USE OF ANTICOAGULANTS: ICD-10-CM

## 2023-08-07 DIAGNOSIS — I42.0 DILATED CARDIOMYOPATHY (CMD): ICD-10-CM

## 2023-08-07 LAB — INR PPP: 1.7

## 2023-08-08 ENCOUNTER — TELEPHONE (OUTPATIENT)
Dept: CARDIOLOGY | Age: 73
End: 2023-08-08

## 2023-08-11 ENCOUNTER — TELEPHONE (OUTPATIENT)
Dept: CARDIOLOGY | Age: 73
End: 2023-08-11

## 2023-08-11 ENCOUNTER — ANTI-COAG (OUTPATIENT)
Dept: CARDIOLOGY | Age: 73
End: 2023-08-11

## 2023-08-11 DIAGNOSIS — Z79.01 LONG TERM (CURRENT) USE OF ANTICOAGULANTS: ICD-10-CM

## 2023-08-11 DIAGNOSIS — I48.0 PAROXYSMAL ATRIAL FIBRILLATION (CMD): Primary | ICD-10-CM

## 2023-08-11 DIAGNOSIS — I42.0 DILATED CARDIOMYOPATHY (CMD): ICD-10-CM

## 2023-08-11 LAB — INR PPP: 2.4

## 2023-08-15 ENCOUNTER — TELEPHONE (OUTPATIENT)
Dept: CARDIOLOGY | Age: 73
End: 2023-08-15

## 2023-08-17 ENCOUNTER — ANTI-COAG (OUTPATIENT)
Dept: CARDIOLOGY | Age: 73
End: 2023-08-17

## 2023-08-17 DIAGNOSIS — I48.0 PAROXYSMAL ATRIAL FIBRILLATION (CMD): Primary | ICD-10-CM

## 2023-08-17 DIAGNOSIS — I42.0 DILATED CARDIOMYOPATHY (CMD): ICD-10-CM

## 2023-08-17 DIAGNOSIS — Z79.01 LONG TERM (CURRENT) USE OF ANTICOAGULANTS: ICD-10-CM

## 2023-08-17 LAB — INR PPP: 2.6

## 2023-08-20 ENCOUNTER — EXTERNAL LAB (OUTPATIENT)
Dept: OTHER | Age: 73
End: 2023-08-20

## 2023-08-20 LAB
ALBUMIN SERPL-MCNC: 4.1 G/DL (ref 3.5–5.2)
ALBUMIN/GLOB SERPL: 1.95 {RATIO}
ALP SERPL-CCNC: 147 U/L (ref 35–104)
ALT SERPL-CCNC: 9 U/L (ref 0–33)
ANION GAP SERPL CALC-SCNC: 13.22 MMOL/L (ref 12–15)
AST SERPL-CCNC: 14 U/L (ref 0–32)
BASOPHILS # BLD: 0.06 X 10^3 (ref 0–0.2)
BASOPHILS NFR BLD: 0.9 % (ref 0–2)
BILIRUB SERPL-MCNC: 0.4 MG/DL (ref 0–1.2)
BUN SERPL-MCNC: 59 MG/DL (ref 6–20)
CALCIUM SERPL-MCNC: 8.9 MG/DL (ref 8.6–10.2)
CHLORIDE SERPL-SCNC: 102 MEQ/L (ref 98–107)
CO2 SERPL-SCNC: 28.28 MEQ/L (ref 22–29)
CREAT SERPL-MCNC: 1.65 MG/DL (ref 0.5–0.9)
EOSINOPHIL # BLD: 0.11 X 10^3 (ref 0–0.4)
EOSINOPHIL NFR BLD: 1.8 % (ref 0–5)
ERYTHROCYTE [DISTWIDTH] IN BLOOD: 16.8 % (ref 10–20)
GLOBULIN SER-MCNC: 2.1 G/DL (ref 2–3.5)
GLUCOSE SERPL-MCNC: 81 MG/DL (ref 74–106)
HCT VFR BLD CALC: 41.3 % (ref 34.9–44.3)
HGB BLD-MCNC: 13.8 G/DL (ref 11.7–14.9)
IRON SERPL-MCNC: 59.44 UG/DL (ref 37–145)
LENGTH OF FAST TIME PATIENT: ABNORMAL H
LYMPHOCYTES # BLD: 1.09 X 10^3 (ref 1–5)
LYMPHOCYTES NFR BLD: 17.5 % (ref 25–50)
MAGNESIUM SERPL-MCNC: 2.31 MG/DL (ref 1.6–2.6)
MCH RBC QN AUTO: 31.36 PG (ref 26.5–32.5)
MCHC RBC AUTO-ENTMCNC: 33.41 G/DL (ref 32–36)
MCV RBC AUTO: 93.86 FL (ref 85–101)
MONOCYTES # BLD: 0.39 X 10^3 (ref 0.1–1)
MONOCYTES NFR BLD: 6.3 % (ref 2–10)
MPV (OFFPRE2): 4.3 FL
NEUTROPHILS # BLD: 4.57 X 10^3 (ref 2–8)
NEUTROPHILS NFR BLD: 73.5 % (ref 50–80)
PHOSPHATE SERPL-MCNC: 3.46 MG/DL (ref 3–4.3)
PLATELET # BLD: 281 X10^3 U/L (ref 150–400)
POTASSIUM SERPL-SCNC: 4.5 MEQ/L (ref 3.5–5.1)
PROT SERPL-MCNC: 6.2 G/DL (ref 6.8–8.7)
RBC # BLD: 4.4 X 10^6 (ref 3.8–5)
SODIUM SERPL-SCNC: 139 MEQ/L (ref 136–145)
TIBC SERPL-MCNC: 257.62 UG/DL (ref 250–450)
WBC # BLD: 6.22 X 10^3/L (ref 5–10.5)

## 2023-08-21 ENCOUNTER — ANTI-COAG (OUTPATIENT)
Dept: CARDIOLOGY | Age: 73
End: 2023-08-21

## 2023-08-21 DIAGNOSIS — Z79.01 LONG TERM (CURRENT) USE OF ANTICOAGULANTS: ICD-10-CM

## 2023-08-21 DIAGNOSIS — I42.0 DILATED CARDIOMYOPATHY (CMD): ICD-10-CM

## 2023-08-21 DIAGNOSIS — I48.0 PAROXYSMAL ATRIAL FIBRILLATION (CMD): Primary | ICD-10-CM

## 2023-08-21 LAB — INR PPP: 2.9

## 2023-08-28 ENCOUNTER — EXTERNAL LAB (OUTPATIENT)
Dept: OTHER | Age: 73
End: 2023-08-28

## 2023-08-28 ENCOUNTER — ANTI-COAG (OUTPATIENT)
Dept: CARDIOLOGY | Age: 73
End: 2023-08-28

## 2023-08-28 DIAGNOSIS — I42.0 DILATED CARDIOMYOPATHY (CMD): ICD-10-CM

## 2023-08-28 DIAGNOSIS — Z79.01 LONG TERM (CURRENT) USE OF ANTICOAGULANTS: ICD-10-CM

## 2023-08-28 DIAGNOSIS — I48.0 PAROXYSMAL ATRIAL FIBRILLATION (CMD): Primary | ICD-10-CM

## 2023-08-28 LAB
ALBUMIN SERPL-MCNC: 4.1 G/DL (ref 3.5–5.2)
ALBUMIN/GLOB SERPL: 1.95 {RATIO}
ALP SERPL-CCNC: 147 U/L (ref 35–104)
ALT SERPL-CCNC: 9 U/L (ref 0–33)
ANION GAP SERPL CALC-SCNC: 13.22 MMOL/L (ref 12–15)
AST SERPL-CCNC: 14 U/L (ref 0–32)
BASOPHILS # BLD: 0.06 X 10^3 (ref 0–0.2)
BASOPHILS NFR BLD: 0.9 % (ref 0–2)
BILIRUB SERPL-MCNC: 0.4 MG/DL (ref 0–1.2)
BUN SERPL-MCNC: 59 MG/DL (ref 6–20)
BUN/CREAT SERPL: 35.76
CALCIUM SERPL-MCNC: 8.9 MG/DL (ref 8.6–10.2)
CHLORIDE SERPL-SCNC: 102 MEQ/L (ref 98–107)
CO2 SERPL-SCNC: 28.28 MEQ/L (ref 22–29)
CREAT SERPL-MCNC: 1.65 MG/DL (ref 0.5–0.9)
EOSINOPHIL # BLD: 0.11 X 10^3 (ref 0–0.4)
EOSINOPHIL NFR BLD: 1.8 % (ref 0–5)
ERYTHROCYTE [DISTWIDTH] IN BLOOD: 16.8 % (ref 10–20)
GFR SERPLBLD SCHWARTZ-ARVRAT: 30.56 ML/MIN/1.73M2
GLOBULIN SER-MCNC: 2.1 G/DL (ref 2–3.5)
GLUCOSE SERPL-MCNC: 81 MG/DL (ref 74–106)
HCT VFR BLD CALC: 41.3 % (ref 34.9–44.3)
HGB BLD-MCNC: 13.8 G/DL (ref 11.7–14.9)
INR PPP: 3.1
IRON SERPL-MCNC: 59.44 UG/DL (ref 37–145)
LENGTH OF FAST TIME PATIENT: ABNORMAL H
LYMPHOCYTES # BLD: 1.09 X 10^3 (ref 1–5)
LYMPHOCYTES NFR BLD: 17.5 % (ref 25–50)
MAGNESIUM SERPL-MCNC: 2.31 MG/DL (ref 1.6–2.6)
MCH RBC QN AUTO: 31.36 PG (ref 26.5–32.5)
MCHC RBC AUTO-ENTMCNC: 33.41 G/DL (ref 32–36)
MCV RBC AUTO: 93.86 FL (ref 85–101)
MONOCYTES # BLD: 0.39 X 10^3 (ref 0.1–1)
MONOCYTES NFR BLD: 6.3 % (ref 2–10)
MPV (OFFPRE2): 4.3 FL (ref 6–10)
NEUTROPHILS # BLD: 4.57 X 10^3 (ref 2–8)
NEUTROPHILS NFR BLD: 73.5 % (ref 50–80)
PHOSPHATE SERPL-MCNC: 3.46 MG/DL (ref 3–4.3)
PLATELET # BLD: 281 X10^3 U/L (ref 150–400)
POTASSIUM SERPL-SCNC: 4.5 MEQ/L (ref 3.5–5.1)
PROT SERPL-MCNC: 6.2 G/DL (ref 6.6–8.7)
PTH-INTACT SERPL-MCNC: 371.8 PG/ML (ref 12–88)
RBC # BLD: 4.4 X 10^6 (ref 3.8–5)
SODIUM SERPL-SCNC: 139 MEQ/L (ref 136–145)
TIBC SERPL-MCNC: 257.62 UG/DL (ref 250–450)
URATE SERPL-MCNC: 6.7 MG/DL (ref 2.3–6.6)
WBC # BLD: 6.22 X 10^3/L (ref 5–10.5)

## 2023-09-05 ENCOUNTER — ANCILLARY PROCEDURE (OUTPATIENT)
Dept: CARDIOLOGY | Age: 73
End: 2023-09-05
Attending: INTERNAL MEDICINE

## 2023-09-05 ENCOUNTER — ANTI-COAG (OUTPATIENT)
Dept: CARDIOLOGY | Age: 73
End: 2023-09-05

## 2023-09-05 DIAGNOSIS — I42.0 DILATED CARDIOMYOPATHY (CMD): ICD-10-CM

## 2023-09-05 DIAGNOSIS — I50.22 CHRONIC SYSTOLIC HEART FAILURE (CMD): ICD-10-CM

## 2023-09-05 DIAGNOSIS — Z79.01 LONG TERM (CURRENT) USE OF ANTICOAGULANTS: ICD-10-CM

## 2023-09-05 DIAGNOSIS — I48.0 PAROXYSMAL ATRIAL FIBRILLATION (CMD): Primary | ICD-10-CM

## 2023-09-05 LAB — INR PPP: 3

## 2023-09-05 PROCEDURE — 93264 REM MNTR WRLS P-ART PRS SNR: CPT | Performed by: INTERNAL MEDICINE

## 2023-09-11 ENCOUNTER — ANTI-COAG (OUTPATIENT)
Dept: CARDIOLOGY | Age: 73
End: 2023-09-11

## 2023-09-11 DIAGNOSIS — I48.0 PAROXYSMAL ATRIAL FIBRILLATION (CMD): Primary | ICD-10-CM

## 2023-09-11 DIAGNOSIS — I42.0 DILATED CARDIOMYOPATHY (CMD): ICD-10-CM

## 2023-09-11 DIAGNOSIS — Z79.01 LONG TERM (CURRENT) USE OF ANTICOAGULANTS: ICD-10-CM

## 2023-09-11 LAB — INR PPP: 3.3

## 2023-09-13 ENCOUNTER — TELEPHONE (OUTPATIENT)
Dept: CARDIOLOGY | Age: 73
End: 2023-09-13

## 2023-09-18 ENCOUNTER — ANTI-COAG (OUTPATIENT)
Dept: CARDIOLOGY | Age: 73
End: 2023-09-18

## 2023-09-18 DIAGNOSIS — Z79.01 LONG TERM (CURRENT) USE OF ANTICOAGULANTS: ICD-10-CM

## 2023-09-18 DIAGNOSIS — I48.0 PAROXYSMAL ATRIAL FIBRILLATION (CMD): Primary | ICD-10-CM

## 2023-09-18 DIAGNOSIS — I42.0 DILATED CARDIOMYOPATHY (CMD): ICD-10-CM

## 2023-09-18 LAB
INR PPP: 3
INR PPP: 3

## 2023-09-25 ENCOUNTER — ANTI-COAG (OUTPATIENT)
Dept: CARDIOLOGY | Age: 73
End: 2023-09-25

## 2023-09-25 DIAGNOSIS — I48.0 PAROXYSMAL ATRIAL FIBRILLATION (CMD): Primary | ICD-10-CM

## 2023-09-25 DIAGNOSIS — Z79.01 LONG TERM (CURRENT) USE OF ANTICOAGULANTS: ICD-10-CM

## 2023-09-25 DIAGNOSIS — I42.0 DILATED CARDIOMYOPATHY (CMD): ICD-10-CM

## 2023-09-25 LAB — INR PPP: 1.8

## 2023-10-02 ENCOUNTER — ANTI-COAG (OUTPATIENT)
Dept: CARDIOLOGY | Age: 73
End: 2023-10-02

## 2023-10-02 DIAGNOSIS — I48.0 PAROXYSMAL ATRIAL FIBRILLATION (CMD): Primary | ICD-10-CM

## 2023-10-02 DIAGNOSIS — Z79.01 LONG TERM (CURRENT) USE OF ANTICOAGULANTS: ICD-10-CM

## 2023-10-02 DIAGNOSIS — I42.0 DILATED CARDIOMYOPATHY (CMD): ICD-10-CM

## 2023-10-02 LAB — INR PPP: 2.9

## 2023-10-09 ENCOUNTER — ANTI-COAG (OUTPATIENT)
Dept: CARDIOLOGY | Age: 73
End: 2023-10-09

## 2023-10-09 DIAGNOSIS — I42.0 DILATED CARDIOMYOPATHY (CMD): ICD-10-CM

## 2023-10-09 DIAGNOSIS — I48.0 PAROXYSMAL ATRIAL FIBRILLATION (CMD): Primary | ICD-10-CM

## 2023-10-09 DIAGNOSIS — Z79.01 LONG TERM (CURRENT) USE OF ANTICOAGULANTS: ICD-10-CM

## 2023-10-09 LAB — INR PPP: 3.8

## 2023-10-10 ENCOUNTER — ANCILLARY PROCEDURE (OUTPATIENT)
Dept: CARDIOLOGY | Age: 73
End: 2023-10-10
Attending: INTERNAL MEDICINE

## 2023-10-10 DIAGNOSIS — I50.22 CHRONIC SYSTOLIC HEART FAILURE (CMD): ICD-10-CM

## 2023-10-10 PROCEDURE — 93264 REM MNTR WRLS P-ART PRS SNR: CPT | Performed by: INTERNAL MEDICINE

## 2023-10-11 ENCOUNTER — APPOINTMENT (OUTPATIENT)
Dept: CARDIOLOGY | Age: 73
End: 2023-10-11

## 2023-10-11 ENCOUNTER — OFFICE VISIT (OUTPATIENT)
Dept: CARDIOLOGY | Age: 73
End: 2023-10-11

## 2023-10-11 ENCOUNTER — ANCILLARY PROCEDURE (OUTPATIENT)
Dept: CARDIOLOGY | Age: 73
End: 2023-10-11
Attending: INTERNAL MEDICINE

## 2023-10-11 VITALS
SYSTOLIC BLOOD PRESSURE: 130 MMHG | BODY MASS INDEX: 46.46 KG/M2 | HEART RATE: 87 BPM | DIASTOLIC BLOOD PRESSURE: 79 MMHG | RESPIRATION RATE: 18 BRPM | HEIGHT: 64 IN | WEIGHT: 272.16 LBS

## 2023-10-11 VITALS — HEART RATE: 70 BPM

## 2023-10-11 DIAGNOSIS — N28.9 RENAL INSUFFICIENCY: ICD-10-CM

## 2023-10-11 DIAGNOSIS — I48.0 PAROXYSMAL ATRIAL FIBRILLATION (CMD): ICD-10-CM

## 2023-10-11 DIAGNOSIS — E78.5 DYSLIPIDEMIA: ICD-10-CM

## 2023-10-11 DIAGNOSIS — I50.22 CHRONIC SYSTOLIC CONGESTIVE HEART FAILURE (CMD): Primary | ICD-10-CM

## 2023-10-11 DIAGNOSIS — E55.9 VITAMIN D DEFICIENCY: ICD-10-CM

## 2023-10-11 DIAGNOSIS — I50.22 CHRONIC SYSTOLIC CONGESTIVE HEART FAILURE (CMD): ICD-10-CM

## 2023-10-11 LAB
AORTIC VALVE AREA (AVA): 0.84
AV PEAK GRADIENT (AVPG): 4
AV PEAK VELOCITY (AVPV): 0.95
AV STENOSIS SEVERITY TEXT: NORMAL
AVI LVOT PEAK GRADIENT (LVOTMG): 0.9
E WAVE DECELARATION TIME (MDT): 22.32
LEFT INTERNAL DIMENSION IN SYSTOLE (LVSD): 0.7
LEFT VENTRICULAR INTERNAL DIMENSION IN DIASTOLE (LVDD): 6.9
LEFT VENTRICULAR POSTERIOR WALL IN END DIASTOLE (LVPW): 7.4
LV EF: NORMAL %
LVOT VTI (LVOTVTI): 0.49
MDC_IDC_LEAD_IMPLANT_DT: NORMAL
MDC_IDC_LEAD_IMPLANT_DT: NORMAL
MDC_IDC_LEAD_LOCATION: NORMAL
MDC_IDC_LEAD_LOCATION: NORMAL
MDC_IDC_LEAD_MFG: NORMAL
MDC_IDC_LEAD_MFG: NORMAL
MDC_IDC_LEAD_MODEL: 157
MDC_IDC_LEAD_MODEL: 4470
MDC_IDC_LEAD_SERIAL: NORMAL
MDC_IDC_LEAD_SERIAL: NORMAL
MDC_IDC_MSMT_BATTERY_DTM: NORMAL
MDC_IDC_MSMT_BATTERY_REMAINING_LONGEVITY: 24 MO
MDC_IDC_MSMT_BATTERY_REMAINING_PERCENTAGE: 30 %
MDC_IDC_MSMT_CAP_CHARGE_DTM: NORMAL
MDC_IDC_MSMT_CAP_CHARGE_DTM: NORMAL
MDC_IDC_MSMT_CAP_CHARGE_ENERGY: 41 J
MDC_IDC_MSMT_CAP_CHARGE_TIME: 11.4 S
MDC_IDC_MSMT_CAP_CHARGE_TIME: 8.6 S
MDC_IDC_MSMT_CAP_CHARGE_TYPE: NORMAL
MDC_IDC_MSMT_CAP_CHARGE_TYPE: NORMAL
MDC_IDC_MSMT_LEADCHNL_RA_IMPEDANCE_VALUE: 820 OHM
MDC_IDC_MSMT_LEADCHNL_RA_IMPEDANCE_VALUE: 820 OHM
MDC_IDC_MSMT_LEADCHNL_RA_PACING_THRESHOLD_AMPLITUDE: 0.6 V
MDC_IDC_MSMT_LEADCHNL_RA_PACING_THRESHOLD_PULSEWIDTH: 0.5 MS
MDC_IDC_MSMT_LEADCHNL_RA_SENSING_INTR_AMPL: 5.6 MV
MDC_IDC_MSMT_LEADCHNL_RV_IMPEDANCE_VALUE: 397 OHM
MDC_IDC_MSMT_LEADCHNL_RV_IMPEDANCE_VALUE: 397 OHM
MDC_IDC_MSMT_LEADCHNL_RV_LEAD_CHANNEL_STATUS: NORMAL
MDC_IDC_MSMT_LEADCHNL_RV_PACING_THRESHOLD_AMPLITUDE: 1.4 V
MDC_IDC_MSMT_LEADCHNL_RV_PACING_THRESHOLD_PULSEWIDTH: 0.5 MS
MDC_IDC_MSMT_LEADCHNL_RV_SENSING_INTR_AMPL: 3.3 MV
MDC_IDC_PG_IMPLANT_DTM: NORMAL
MDC_IDC_PG_MFG: NORMAL
MDC_IDC_PG_MODEL: NORMAL
MDC_IDC_PG_SERIAL: NORMAL
MDC_IDC_PG_TYPE: NORMAL
MDC_IDC_SESS_CLINIC_NAME: NORMAL
MDC_IDC_SESS_DTM: NORMAL
MDC_IDC_SESS_TYPE: NORMAL
MDC_IDC_SET_BRADY_AT_MODE_SWITCH_MODE: NORMAL
MDC_IDC_SET_BRADY_AT_MODE_SWITCH_RATE: 170 {BEATS}/MIN
MDC_IDC_SET_BRADY_LOWRATE: 60 {BEATS}/MIN
MDC_IDC_SET_BRADY_MAX_SENSOR_RATE: 130 {BEATS}/MIN
MDC_IDC_SET_BRADY_MAX_TRACKING_RATE: 130 {BEATS}/MIN
MDC_IDC_SET_BRADY_MODE: NORMAL
MDC_IDC_SET_BRADY_PAV_DELAY_HIGH: 140 MS
MDC_IDC_SET_BRADY_PAV_DELAY_LOW: 280 MS
MDC_IDC_SET_BRADY_SAV_DELAY_HIGH: 140 MS
MDC_IDC_SET_BRADY_SAV_DELAY_LOW: 280 MS
MDC_IDC_SET_LEADCHNL_RA_PACING_AMPLITUDE: 1.5 V
MDC_IDC_SET_LEADCHNL_RA_PACING_POLARITY: NORMAL
MDC_IDC_SET_LEADCHNL_RA_PACING_PULSEWIDTH: 0.5 MS
MDC_IDC_SET_LEADCHNL_RA_SENSING_ADAPTATION_MODE: NORMAL
MDC_IDC_SET_LEADCHNL_RA_SENSING_POLARITY: NORMAL
MDC_IDC_SET_LEADCHNL_RA_SENSING_SENSITIVITY: 0.7 MV
MDC_IDC_SET_LEADCHNL_RV_PACING_AMPLITUDE: 3.2 V
MDC_IDC_SET_LEADCHNL_RV_PACING_POLARITY: NORMAL
MDC_IDC_SET_LEADCHNL_RV_PACING_PULSEWIDTH: 0.5 MS
MDC_IDC_SET_LEADCHNL_RV_SENSING_ADAPTATION_MODE: NORMAL
MDC_IDC_SET_LEADCHNL_RV_SENSING_POLARITY: NORMAL
MDC_IDC_SET_LEADCHNL_RV_SENSING_SENSITIVITY: 0.6 MV
MDC_IDC_SET_ZONE_DETECTION_INTERVAL: 316 MS
MDC_IDC_SET_ZONE_DETECTION_INTERVAL: 353 MS
MDC_IDC_SET_ZONE_TYPE: NORMAL
MDC_IDC_SET_ZONE_TYPE: NORMAL
MDC_IDC_SET_ZONE_VENDOR_TYPE: NORMAL
MDC_IDC_SET_ZONE_VENDOR_TYPE: NORMAL
MDC_IDC_STAT_AT_BURDEN_PERCENT: 1 %
MDC_IDC_STAT_AT_DTM_END: NORMAL
MDC_IDC_STAT_AT_DTM_START: NORMAL
MDC_IDC_STAT_BRADY_DTM_END: NORMAL
MDC_IDC_STAT_BRADY_DTM_START: NORMAL
MDC_IDC_STAT_BRADY_RA_PERCENT_PACED: 5 %
MDC_IDC_STAT_BRADY_RV_PERCENT_PACED: 2 %
MDC_IDC_STAT_EPISODE_RECENT_COUNT: 0
MDC_IDC_STAT_EPISODE_RECENT_COUNT: 5
MDC_IDC_STAT_EPISODE_RECENT_COUNT: 5
MDC_IDC_STAT_EPISODE_RECENT_COUNT_DTM_END: NORMAL
MDC_IDC_STAT_EPISODE_RECENT_COUNT_DTM_START: NORMAL
MDC_IDC_STAT_EPISODE_TYPE: NORMAL
MDC_IDC_STAT_EPISODE_VENDOR_TYPE: NORMAL
MDC_IDC_STAT_TACHYTHERAPY_ATP_DELIVERED_RECENT: 0
MDC_IDC_STAT_TACHYTHERAPY_ATP_DELIVERED_TOTAL: 0
MDC_IDC_STAT_TACHYTHERAPY_RECENT_DTM_END: NORMAL
MDC_IDC_STAT_TACHYTHERAPY_RECENT_DTM_START: NORMAL
MDC_IDC_STAT_TACHYTHERAPY_SHOCKS_ABORTED_RECENT: 0
MDC_IDC_STAT_TACHYTHERAPY_SHOCKS_ABORTED_TOTAL: 1
MDC_IDC_STAT_TACHYTHERAPY_SHOCKS_DELIVERED_RECENT: 0
MDC_IDC_STAT_TACHYTHERAPY_SHOCKS_DELIVERED_TOTAL: 1
MDC_IDC_STAT_TACHYTHERAPY_TOTAL_DTM_END: NORMAL
MDC_IDC_STAT_TACHYTHERAPY_TOTAL_DTM_START: NORMAL
MV E TISSUE VEL MED (MESV): 3.68
MV E WAVE VEL/E TISSUE VEL MED(MSR): 3.75
MV PEAK A VELOCITY (MVPAV): 179
MV PEAK E VELOCITY (MVPEV): 0.87
RV END SYSTOLIC LONGITUDINAL STRAIN FREE WALL (RVGS): 2.1

## 2023-10-11 PROCEDURE — 93283 PRGRMG EVAL IMPLANTABLE DFB: CPT | Performed by: INTERNAL MEDICINE

## 2023-10-11 PROCEDURE — 93306 TTE W/DOPPLER COMPLETE: CPT | Performed by: INTERNAL MEDICINE

## 2023-10-11 PROCEDURE — 99215 OFFICE O/P EST HI 40 MIN: CPT | Performed by: INTERNAL MEDICINE

## 2023-10-11 RX ORDER — SEMAGLUTIDE 1.34 MG/ML
INJECTION, SOLUTION SUBCUTANEOUS
COMMUNITY
Start: 2023-10-09

## 2023-10-11 SDOH — HEALTH STABILITY: PHYSICAL HEALTH: ON AVERAGE, HOW MANY DAYS PER WEEK DO YOU ENGAGE IN MODERATE TO STRENUOUS EXERCISE (LIKE A BRISK WALK)?: 0 DAYS

## 2023-10-11 SDOH — HEALTH STABILITY: PHYSICAL HEALTH: ON AVERAGE, HOW MANY MINUTES DO YOU ENGAGE IN EXERCISE AT THIS LEVEL?: 0 MIN

## 2023-10-11 ASSESSMENT — ENCOUNTER SYMPTOMS
BRUISES/BLEEDS EASILY: 0
DEPRESSION: 0
HEADACHES: 0
CHILLS: 0
LOSS OF BALANCE: 1
SLEEP DISTURBANCES DUE TO BREATHING: 0
HEMATOCHEZIA: 0
INSOMNIA: 0
WEIGHT GAIN: 0
HEMOPTYSIS: 0
WEAKNESS: 0
COUGH: 0
DIARRHEA: 0
NERVOUS/ANXIOUS: 1
SHORTNESS OF BREATH: 0
WEIGHT LOSS: 1
BLOATING: 0
DIZZINESS: 0
FEVER: 0
ABDOMINAL PAIN: 0
LIGHT-HEADEDNESS: 0
ALLERGIC/IMMUNOLOGIC COMMENTS: NO NEW FOOD ALLERGIES
CONSTIPATION: 0
BACK PAIN: 0
SUSPICIOUS LESIONS: 0

## 2023-10-11 ASSESSMENT — PATIENT HEALTH QUESTIONNAIRE - PHQ9
1. LITTLE INTEREST OR PLEASURE IN DOING THINGS: NOT AT ALL
2. FEELING DOWN, DEPRESSED OR HOPELESS: NOT AT ALL
SUM OF ALL RESPONSES TO PHQ9 QUESTIONS 1 AND 2: 0
CLINICAL INTERPRETATION OF PHQ2 SCORE: NO FURTHER SCREENING NEEDED
SUM OF ALL RESPONSES TO PHQ9 QUESTIONS 1 AND 2: 0

## 2023-10-13 DIAGNOSIS — I50.22 CHRONIC SYSTOLIC CONGESTIVE HEART FAILURE (CMD): ICD-10-CM

## 2023-10-13 DIAGNOSIS — E78.5 DYSLIPIDEMIA: ICD-10-CM

## 2023-10-13 RX ORDER — POTASSIUM CHLORIDE 1500 MG/1
20 TABLET, EXTENDED RELEASE ORAL DAILY
Qty: 90 TABLET | Refills: 3 | Status: SHIPPED | OUTPATIENT
Start: 2023-10-13

## 2023-10-13 RX ORDER — ATORVASTATIN CALCIUM 20 MG/1
20 TABLET, FILM COATED ORAL AT BEDTIME
Qty: 90 TABLET | Refills: 3 | Status: SHIPPED | OUTPATIENT
Start: 2023-10-13

## 2023-10-16 ENCOUNTER — ANTI-COAG (OUTPATIENT)
Dept: CARDIOLOGY | Age: 73
End: 2023-10-16

## 2023-10-16 DIAGNOSIS — Z79.01 LONG TERM (CURRENT) USE OF ANTICOAGULANTS: ICD-10-CM

## 2023-10-16 DIAGNOSIS — I42.0 DILATED CARDIOMYOPATHY (CMD): ICD-10-CM

## 2023-10-16 DIAGNOSIS — I48.0 PAROXYSMAL ATRIAL FIBRILLATION (CMD): Primary | ICD-10-CM

## 2023-10-16 LAB — INR PPP: 3.4

## 2023-10-23 ENCOUNTER — HOSPITAL ENCOUNTER (OUTPATIENT)
Dept: NUCLEAR MEDICINE | Facility: HOSPITAL | Age: 73
Discharge: HOME | End: 2023-10-23
Attending: INTERNAL MEDICINE

## 2023-10-23 ENCOUNTER — ANTI-COAG (OUTPATIENT)
Dept: CARDIOLOGY | Age: 73
End: 2023-10-23

## 2023-10-23 DIAGNOSIS — I42.0 DILATED CARDIOMYOPATHY (CMD): ICD-10-CM

## 2023-10-23 DIAGNOSIS — Z79.01 LONG TERM (CURRENT) USE OF ANTICOAGULANTS: ICD-10-CM

## 2023-10-23 DIAGNOSIS — E21.5 PARATHYROID DISEASE (HCC): ICD-10-CM

## 2023-10-23 DIAGNOSIS — I48.0 PAROXYSMAL ATRIAL FIBRILLATION (CMD): Primary | ICD-10-CM

## 2023-10-23 LAB — INR PPP: 3

## 2023-10-23 PROCEDURE — 78071 PARATHYRD PLANAR W/WO SUBTRJ: CPT | Performed by: INTERNAL MEDICINE

## 2023-10-23 PROCEDURE — 78070 PARATHYROID PLANAR IMAGING: CPT | Performed by: INTERNAL MEDICINE

## 2023-10-30 ENCOUNTER — ANTI-COAG (OUTPATIENT)
Dept: CARDIOLOGY | Age: 73
End: 2023-10-30

## 2023-10-30 DIAGNOSIS — I48.0 PAROXYSMAL ATRIAL FIBRILLATION (CMD): Primary | ICD-10-CM

## 2023-10-30 DIAGNOSIS — Z79.01 LONG TERM (CURRENT) USE OF ANTICOAGULANTS: ICD-10-CM

## 2023-10-30 DIAGNOSIS — Z79.01 ANTICOAGULATED ON COUMADIN: ICD-10-CM

## 2023-10-30 DIAGNOSIS — I42.0 DILATED CARDIOMYOPATHY (CMD): ICD-10-CM

## 2023-10-30 LAB — INR PPP: 3.2

## 2023-11-01 ENCOUNTER — LAB ENCOUNTER (OUTPATIENT)
Dept: LAB | Age: 73
End: 2023-11-01
Attending: INTERNAL MEDICINE
Payer: MEDICARE

## 2023-11-01 DIAGNOSIS — E55.9 AVITAMINOSIS D: Primary | ICD-10-CM

## 2023-11-01 DIAGNOSIS — E78.5 HYPERLIPEMIA: ICD-10-CM

## 2023-11-01 DIAGNOSIS — I50.9 HEART FAILURE, UNSPECIFIED (HCC): ICD-10-CM

## 2023-11-01 DIAGNOSIS — N39.0 URINARY TRACT INFECTION, SITE NOT SPECIFIED: ICD-10-CM

## 2023-11-01 DIAGNOSIS — E11.9 DIABETES MELLITUS (HCC): ICD-10-CM

## 2023-11-01 LAB
ALBUMIN SERPL-MCNC: 4.2 G/DL (ref 3.2–4.8)
ALBUMIN/GLOB SERPL: 1.8 {RATIO} (ref 1–2)
ALP LIVER SERPL-CCNC: 129 U/L
ALT SERPL-CCNC: 12 U/L
ANION GAP SERPL CALC-SCNC: 5 MMOL/L (ref 0–18)
AST SERPL-CCNC: 19 U/L (ref ?–34)
BASOPHILS # BLD AUTO: 0.05 X10(3) UL (ref 0–0.2)
BASOPHILS NFR BLD AUTO: 0.7 %
BILIRUB DIRECT SERPL-MCNC: 0.2 MG/DL (ref ?–0.3)
BILIRUB SERPL-MCNC: 0.5 MG/DL (ref 0.2–1.1)
BILIRUB UR QL: NEGATIVE
BUN BLD-MCNC: 46 MG/DL (ref 9–23)
BUN/CREAT SERPL: 28.2 (ref 10–20)
CALCIUM BLD-MCNC: 9.6 MG/DL (ref 8.7–10.4)
CHLORIDE SERPL-SCNC: 101 MMOL/L (ref 98–112)
CK SERPL-CCNC: 53 U/L
CLARITY UR: CLEAR
CO2 SERPL-SCNC: 30 MMOL/L (ref 21–32)
CREAT BLD-MCNC: 1.63 MG/DL
DEPRECATED RDW RBC AUTO: 51.3 FL (ref 35.1–46.3)
EGFRCR SERPLBLD CKD-EPI 2021: 33 ML/MIN/1.73M2 (ref 60–?)
EOSINOPHIL # BLD AUTO: 0.27 X10(3) UL (ref 0–0.7)
EOSINOPHIL NFR BLD AUTO: 3.6 %
ERYTHROCYTE [DISTWIDTH] IN BLOOD BY AUTOMATED COUNT: 14.8 % (ref 11–15)
EST. AVERAGE GLUCOSE BLD GHB EST-MCNC: 123 MG/DL (ref 68–126)
FASTING STATUS PATIENT QL REPORTED: YES
GLOBULIN PLAS-MCNC: 2.4 G/DL (ref 2.8–4.4)
GLUCOSE BLD-MCNC: 98 MG/DL (ref 70–99)
GLUCOSE UR-MCNC: NORMAL MG/DL
HBA1C MFR BLD: 5.9 % (ref ?–5.7)
HCT VFR BLD AUTO: 43.4 %
HGB BLD-MCNC: 13.6 G/DL
HGB UR QL STRIP.AUTO: NEGATIVE
HYALINE CASTS #/AREA URNS AUTO: PRESENT /LPF
IMM GRANULOCYTES # BLD AUTO: 0.03 X10(3) UL (ref 0–1)
IMM GRANULOCYTES NFR BLD: 0.4 %
IRON SERPL-MCNC: 47 UG/DL
KETONES UR-MCNC: NEGATIVE MG/DL
LEUKOCYTE ESTERASE UR QL STRIP.AUTO: 500
LYMPHOCYTES # BLD AUTO: 1.13 X10(3) UL (ref 1–4)
LYMPHOCYTES NFR BLD AUTO: 15.2 %
MAGNESIUM SERPL-MCNC: 2.3 MG/DL (ref 1.6–2.6)
MCH RBC QN AUTO: 29.6 PG (ref 26–34)
MCHC RBC AUTO-ENTMCNC: 31.3 G/DL (ref 31–37)
MCV RBC AUTO: 94.6 FL
MONOCYTES # BLD AUTO: 0.67 X10(3) UL (ref 0.1–1)
MONOCYTES NFR BLD AUTO: 9 %
NEUTROPHILS # BLD AUTO: 5.28 X10 (3) UL (ref 1.5–7.7)
NEUTROPHILS # BLD AUTO: 5.28 X10(3) UL (ref 1.5–7.7)
NEUTROPHILS NFR BLD AUTO: 71.1 %
NITRITE UR QL STRIP.AUTO: NEGATIVE
OSMOLALITY SERPL CALC.SUM OF ELEC: 294 MOSM/KG (ref 275–295)
PH UR: 6 [PH] (ref 5–8)
PHOSPHATE SERPL-MCNC: 3.5 MG/DL (ref 2.4–5.1)
PLATELET # BLD AUTO: 178 10(3)UL (ref 150–450)
POTASSIUM SERPL-SCNC: 4.3 MMOL/L (ref 3.5–5.1)
PROT SERPL-MCNC: 6.6 G/DL (ref 5.7–8.2)
PROT UR-MCNC: NEGATIVE MG/DL
RBC # BLD AUTO: 4.59 X10(6)UL
SODIUM SERPL-SCNC: 136 MMOL/L (ref 136–145)
SP GR UR STRIP: 1 (ref 1–1.03)
T4 FREE SERPL-MCNC: 1.2 NG/DL (ref 0.8–1.7)
TSI SER-ACNC: 2.22 MIU/ML (ref 0.55–4.78)
URATE SERPL-MCNC: 6.5 MG/DL
UROBILINOGEN UR STRIP-ACNC: NORMAL
VIT D+METAB SERPL-MCNC: 46.8 NG/ML (ref 30–100)
WBC # BLD AUTO: 7.4 X10(3) UL (ref 4–11)

## 2023-11-01 PROCEDURE — 81001 URINALYSIS AUTO W/SCOPE: CPT

## 2023-11-01 PROCEDURE — 36415 COLL VENOUS BLD VENIPUNCTURE: CPT

## 2023-11-01 PROCEDURE — 84100 ASSAY OF PHOSPHORUS: CPT

## 2023-11-01 PROCEDURE — 83036 HEMOGLOBIN GLYCOSYLATED A1C: CPT

## 2023-11-01 PROCEDURE — 82550 ASSAY OF CK (CPK): CPT

## 2023-11-01 PROCEDURE — 84550 ASSAY OF BLOOD/URIC ACID: CPT

## 2023-11-01 PROCEDURE — 84439 ASSAY OF FREE THYROXINE: CPT

## 2023-11-01 PROCEDURE — 80053 COMPREHEN METABOLIC PANEL: CPT

## 2023-11-01 PROCEDURE — 82306 VITAMIN D 25 HYDROXY: CPT

## 2023-11-01 PROCEDURE — 82248 BILIRUBIN DIRECT: CPT

## 2023-11-01 PROCEDURE — 85025 COMPLETE CBC W/AUTO DIFF WBC: CPT

## 2023-11-01 PROCEDURE — 84443 ASSAY THYROID STIM HORMONE: CPT

## 2023-11-01 PROCEDURE — 83735 ASSAY OF MAGNESIUM: CPT

## 2023-11-01 PROCEDURE — 83540 ASSAY OF IRON: CPT

## 2023-11-06 ENCOUNTER — ANTI-COAG (OUTPATIENT)
Dept: CARDIOLOGY | Age: 73
End: 2023-11-06

## 2023-11-06 DIAGNOSIS — Z79.01 LONG TERM (CURRENT) USE OF ANTICOAGULANTS: ICD-10-CM

## 2023-11-06 DIAGNOSIS — I42.0 DILATED CARDIOMYOPATHY (CMD): ICD-10-CM

## 2023-11-06 DIAGNOSIS — Z79.01 ANTICOAGULATED ON COUMADIN: ICD-10-CM

## 2023-11-06 DIAGNOSIS — I48.0 PAROXYSMAL ATRIAL FIBRILLATION (CMD): Primary | ICD-10-CM

## 2023-11-06 LAB — INR PPP: 2.1

## 2023-11-10 ENCOUNTER — ANCILLARY PROCEDURE (OUTPATIENT)
Dept: CARDIOLOGY | Age: 73
End: 2023-11-10
Attending: INTERNAL MEDICINE

## 2023-11-10 DIAGNOSIS — I50.22 CHRONIC SYSTOLIC HEART FAILURE (CMD): ICD-10-CM

## 2023-11-10 PROCEDURE — 93264 REM MNTR WRLS P-ART PRS SNR: CPT | Performed by: INTERNAL MEDICINE

## 2023-11-13 ENCOUNTER — ANTI-COAG (OUTPATIENT)
Dept: CARDIOLOGY | Age: 73
End: 2023-11-13

## 2023-11-13 DIAGNOSIS — Z79.01 ANTICOAGULATED ON COUMADIN: ICD-10-CM

## 2023-11-13 DIAGNOSIS — Z79.01 LONG TERM (CURRENT) USE OF ANTICOAGULANTS: ICD-10-CM

## 2023-11-13 DIAGNOSIS — I48.0 PAROXYSMAL ATRIAL FIBRILLATION (CMD): Primary | ICD-10-CM

## 2023-11-13 DIAGNOSIS — I42.0 DILATED CARDIOMYOPATHY (CMD): ICD-10-CM

## 2023-11-13 LAB — INR PPP: 2.6

## 2023-11-20 ENCOUNTER — TELEPHONE (OUTPATIENT)
Dept: CARDIOLOGY | Age: 73
End: 2023-11-20

## 2023-11-20 ENCOUNTER — ANTI-COAG (OUTPATIENT)
Dept: CARDIOLOGY | Age: 73
End: 2023-11-20

## 2023-11-20 DIAGNOSIS — Z79.01 LONG TERM (CURRENT) USE OF ANTICOAGULANTS: ICD-10-CM

## 2023-11-20 DIAGNOSIS — I48.0 PAROXYSMAL ATRIAL FIBRILLATION (CMD): Primary | ICD-10-CM

## 2023-11-20 DIAGNOSIS — I42.0 DILATED CARDIOMYOPATHY (CMD): ICD-10-CM

## 2023-11-20 DIAGNOSIS — Z79.01 ANTICOAGULATED ON COUMADIN: ICD-10-CM

## 2023-11-20 LAB — INR PPP: 2.8

## 2023-11-27 ENCOUNTER — ANTI-COAG (OUTPATIENT)
Dept: CARDIOLOGY | Age: 73
End: 2023-11-27

## 2023-11-27 DIAGNOSIS — Z79.01 ANTICOAGULATED ON COUMADIN: ICD-10-CM

## 2023-11-27 DIAGNOSIS — I42.0 DILATED CARDIOMYOPATHY (CMD): ICD-10-CM

## 2023-11-27 DIAGNOSIS — I48.0 PAROXYSMAL ATRIAL FIBRILLATION (CMD): Primary | ICD-10-CM

## 2023-11-27 DIAGNOSIS — Z79.01 LONG TERM (CURRENT) USE OF ANTICOAGULANTS: ICD-10-CM

## 2023-11-27 LAB — INR PPP: 3.4

## 2023-11-30 RX ORDER — SACUBITRIL AND VALSARTAN 97; 103 MG/1; MG/1
1 TABLET, FILM COATED ORAL 2 TIMES DAILY
Qty: 180 TABLET | Refills: 3 | Status: SHIPPED | OUTPATIENT
Start: 2023-11-30

## 2023-12-01 ENCOUNTER — TELEPHONE (OUTPATIENT)
Dept: CARDIOLOGY | Age: 73
End: 2023-12-01

## 2023-12-04 ENCOUNTER — ANTI-COAG (OUTPATIENT)
Dept: CARDIOLOGY | Age: 73
End: 2023-12-04

## 2023-12-04 DIAGNOSIS — Z79.01 ANTICOAGULATED ON COUMADIN: ICD-10-CM

## 2023-12-04 DIAGNOSIS — I48.0 PAROXYSMAL ATRIAL FIBRILLATION (CMD): Primary | ICD-10-CM

## 2023-12-04 DIAGNOSIS — I42.0 DILATED CARDIOMYOPATHY (CMD): ICD-10-CM

## 2023-12-04 DIAGNOSIS — Z79.01 LONG TERM (CURRENT) USE OF ANTICOAGULANTS: ICD-10-CM

## 2023-12-04 LAB — INR PPP: 2.8

## 2023-12-08 ENCOUNTER — OFF PREMISE (OUTPATIENT)
Dept: CARDIOLOGY | Age: 73
End: 2023-12-08

## 2023-12-11 ENCOUNTER — ANTI-COAG (OUTPATIENT)
Dept: CARDIOLOGY | Age: 73
End: 2023-12-11

## 2023-12-11 ENCOUNTER — TELEPHONE (OUTPATIENT)
Dept: CARDIOLOGY | Age: 73
End: 2023-12-11

## 2023-12-11 DIAGNOSIS — I42.0 DILATED CARDIOMYOPATHY (CMD): ICD-10-CM

## 2023-12-11 DIAGNOSIS — I48.0 PAROXYSMAL ATRIAL FIBRILLATION (CMD): Primary | ICD-10-CM

## 2023-12-11 DIAGNOSIS — Z79.01 LONG TERM (CURRENT) USE OF ANTICOAGULANTS: ICD-10-CM

## 2023-12-11 DIAGNOSIS — Z79.01 ANTICOAGULATED ON COUMADIN: ICD-10-CM

## 2023-12-11 LAB — INR PPP: 6.1

## 2023-12-12 ENCOUNTER — ANCILLARY PROCEDURE (OUTPATIENT)
Dept: CARDIOLOGY | Age: 73
End: 2023-12-12
Attending: INTERNAL MEDICINE

## 2023-12-13 ENCOUNTER — TELEPHONE (OUTPATIENT)
Dept: CARDIOLOGY | Age: 73
End: 2023-12-13

## 2023-12-13 ENCOUNTER — ANTI-COAG (OUTPATIENT)
Dept: CARDIOLOGY | Age: 73
End: 2023-12-13

## 2023-12-13 DIAGNOSIS — Z79.01 ANTICOAGULATED ON COUMADIN: ICD-10-CM

## 2023-12-13 DIAGNOSIS — I48.0 PAROXYSMAL ATRIAL FIBRILLATION (CMD): Primary | ICD-10-CM

## 2023-12-13 DIAGNOSIS — I42.0 DILATED CARDIOMYOPATHY (CMD): ICD-10-CM

## 2023-12-13 DIAGNOSIS — Z79.01 LONG TERM (CURRENT) USE OF ANTICOAGULANTS: ICD-10-CM

## 2023-12-13 LAB — INR PPP: 5.5

## 2023-12-13 PROCEDURE — 93264 REM MNTR WRLS P-ART PRS SNR: CPT | Performed by: INTERNAL MEDICINE

## 2023-12-14 ENCOUNTER — ANTI-COAG (OUTPATIENT)
Dept: CARDIOLOGY | Age: 73
End: 2023-12-14

## 2023-12-14 DIAGNOSIS — Z79.01 ANTICOAGULATED ON COUMADIN: ICD-10-CM

## 2023-12-14 DIAGNOSIS — I42.0 DILATED CARDIOMYOPATHY (CMD): ICD-10-CM

## 2023-12-14 DIAGNOSIS — I48.0 PAROXYSMAL ATRIAL FIBRILLATION (CMD): Primary | ICD-10-CM

## 2023-12-14 DIAGNOSIS — Z79.01 LONG TERM (CURRENT) USE OF ANTICOAGULANTS: ICD-10-CM

## 2023-12-14 LAB — INR PPP: 4

## 2023-12-15 ENCOUNTER — ANTI-COAG (OUTPATIENT)
Dept: CARDIOLOGY | Age: 73
End: 2023-12-15

## 2023-12-15 DIAGNOSIS — I42.0 DILATED CARDIOMYOPATHY (CMD): ICD-10-CM

## 2023-12-15 DIAGNOSIS — Z79.01 LONG TERM (CURRENT) USE OF ANTICOAGULANTS: ICD-10-CM

## 2023-12-15 DIAGNOSIS — Z79.01 ANTICOAGULATED ON COUMADIN: ICD-10-CM

## 2023-12-15 DIAGNOSIS — I48.0 PAROXYSMAL ATRIAL FIBRILLATION (CMD): Primary | ICD-10-CM

## 2023-12-15 LAB — INR PPP: 3.2

## 2023-12-18 ENCOUNTER — ANTI-COAG (OUTPATIENT)
Dept: CARDIOLOGY | Age: 73
End: 2023-12-18

## 2023-12-18 DIAGNOSIS — I48.0 PAROXYSMAL ATRIAL FIBRILLATION (CMD): Primary | ICD-10-CM

## 2023-12-18 DIAGNOSIS — Z79.01 LONG TERM (CURRENT) USE OF ANTICOAGULANTS: ICD-10-CM

## 2023-12-18 DIAGNOSIS — I42.0 DILATED CARDIOMYOPATHY (CMD): ICD-10-CM

## 2023-12-18 DIAGNOSIS — Z79.01 ANTICOAGULATED ON COUMADIN: ICD-10-CM

## 2023-12-18 LAB — INR PPP: 1.8

## 2023-12-22 ENCOUNTER — ANTI-COAG (OUTPATIENT)
Dept: CARDIOLOGY | Age: 73
End: 2023-12-22

## 2023-12-22 DIAGNOSIS — I48.0 PAROXYSMAL ATRIAL FIBRILLATION (CMD): Primary | ICD-10-CM

## 2023-12-22 DIAGNOSIS — Z79.01 ANTICOAGULATED ON COUMADIN: ICD-10-CM

## 2023-12-22 DIAGNOSIS — Z79.01 LONG TERM (CURRENT) USE OF ANTICOAGULANTS: ICD-10-CM

## 2023-12-22 DIAGNOSIS — I42.0 DILATED CARDIOMYOPATHY (CMD): ICD-10-CM

## 2023-12-22 LAB — INR PPP: 2.8

## 2023-12-26 ENCOUNTER — ANTI-COAG (OUTPATIENT)
Dept: CARDIOLOGY | Age: 73
End: 2023-12-26

## 2023-12-26 DIAGNOSIS — Z79.01 ANTICOAGULATED ON COUMADIN: ICD-10-CM

## 2023-12-26 DIAGNOSIS — I42.0 DILATED CARDIOMYOPATHY (CMD): ICD-10-CM

## 2023-12-26 DIAGNOSIS — Z79.01 LONG TERM (CURRENT) USE OF ANTICOAGULANTS: ICD-10-CM

## 2023-12-26 DIAGNOSIS — I48.0 PAROXYSMAL ATRIAL FIBRILLATION (CMD): Primary | ICD-10-CM

## 2023-12-26 LAB — INR PPP: 4.3

## 2023-12-27 ENCOUNTER — ANTI-COAG (OUTPATIENT)
Dept: CARDIOLOGY | Age: 73
End: 2023-12-27

## 2023-12-27 ENCOUNTER — TELEPHONE (OUTPATIENT)
Dept: CARDIOLOGY | Age: 73
End: 2023-12-27

## 2023-12-27 DIAGNOSIS — I42.0 DILATED CARDIOMYOPATHY (CMD): ICD-10-CM

## 2023-12-27 DIAGNOSIS — I48.0 PAROXYSMAL ATRIAL FIBRILLATION (CMD): Primary | ICD-10-CM

## 2023-12-27 DIAGNOSIS — Z79.01 ANTICOAGULATED ON COUMADIN: ICD-10-CM

## 2023-12-27 DIAGNOSIS — Z79.01 LONG TERM (CURRENT) USE OF ANTICOAGULANTS: ICD-10-CM

## 2023-12-29 ENCOUNTER — ANTI-COAG (OUTPATIENT)
Dept: CARDIOLOGY | Age: 73
End: 2023-12-29

## 2023-12-29 DIAGNOSIS — I42.0 DILATED CARDIOMYOPATHY (CMD): ICD-10-CM

## 2023-12-29 DIAGNOSIS — I48.0 PAROXYSMAL ATRIAL FIBRILLATION (CMD): Primary | ICD-10-CM

## 2023-12-29 DIAGNOSIS — Z79.01 LONG TERM (CURRENT) USE OF ANTICOAGULANTS: ICD-10-CM

## 2023-12-29 DIAGNOSIS — Z79.01 ANTICOAGULATED ON COUMADIN: ICD-10-CM

## 2023-12-29 LAB — INR PPP: 2.9

## 2024-01-01 LAB — INR PPP: 2

## 2024-01-02 ENCOUNTER — ANTI-COAG (OUTPATIENT)
Dept: CARDIOLOGY | Age: 74
End: 2024-01-02

## 2024-01-02 DIAGNOSIS — Z79.01 ANTICOAGULATED ON COUMADIN: ICD-10-CM

## 2024-01-02 DIAGNOSIS — Z79.01 LONG TERM (CURRENT) USE OF ANTICOAGULANTS: ICD-10-CM

## 2024-01-02 DIAGNOSIS — I48.0 PAROXYSMAL ATRIAL FIBRILLATION (CMD): Primary | ICD-10-CM

## 2024-01-02 DIAGNOSIS — I42.0 DILATED CARDIOMYOPATHY (CMD): ICD-10-CM

## 2024-01-09 ENCOUNTER — ANTI-COAG (OUTPATIENT)
Dept: CARDIOLOGY | Age: 74
End: 2024-01-09

## 2024-01-09 DIAGNOSIS — Z79.01 LONG TERM (CURRENT) USE OF ANTICOAGULANTS: ICD-10-CM

## 2024-01-09 DIAGNOSIS — I48.0 PAROXYSMAL ATRIAL FIBRILLATION (CMD): Primary | ICD-10-CM

## 2024-01-09 DIAGNOSIS — I42.0 DILATED CARDIOMYOPATHY (CMD): ICD-10-CM

## 2024-01-09 DIAGNOSIS — Z79.01 ANTICOAGULATED ON COUMADIN: ICD-10-CM

## 2024-01-11 ENCOUNTER — TELEPHONE (OUTPATIENT)
Dept: CARDIOLOGY | Age: 74
End: 2024-01-11

## 2024-01-15 ENCOUNTER — ANCILLARY PROCEDURE (OUTPATIENT)
Dept: CARDIOLOGY | Age: 74
End: 2024-01-15
Attending: INTERNAL MEDICINE

## 2024-01-15 DIAGNOSIS — I50.22 CHRONIC SYSTOLIC HEART FAILURE (CMD): ICD-10-CM

## 2024-09-03 NOTE — PLAN OF CARE
Infection Control and Prevention:   notified IC of family reporting grandchild diagnosed with head lice was in patient's bed.   IC notified Dr. Jasvir Craft of exposure and CDC recommendations to treat prophylactically one time dose of permetheri Neck , no lymphadenopathy

## (undated) NOTE — LETTER
[address] [facility]     I agree to have a Midline catheter placed in my arm.      1. The PICC insertion procedure, care, maintenance, risks, benefits, and complications have been explained to also explained the risks and benefits involved in refusal of proposed treatment and have answered the patient's questions.       _____________________ ___________ ____________________________________   Date        Time    Patient Signature     _____________

## (undated) NOTE — IP AVS SNAPSHOT
Patient Demographics     Address Phone E-mail Address    [address] [facility] [address] [phone] [facility])  [phone] (Work) [email or web address]      Emergency Contact(s)     Name Relation Home Work [address] [facility] Sister [phone] Commonly known Dao Bond   Next dose due:  March 18 at Night         Take 1 tablet (0.25 mg total) by mouth 2 (two) times daily.     Margarita Izaguirre                           aspirin 81 MG Chew   Last time this was given:  81 mg on 3/18/2017 10:09 A Margarita IzaguirreFENesin  MG Tb12   Last time this was given:  600 mg on 3/18/2017 10:07 AM   Commonly known as:  Ava Prajapati   Next dose due:  March 18 at Night         Take 1 tablet (600 mg total) by mouth 2 (two) t Next dose due:  March 19 in AM         Take 1 tablet (20 mg total) by mouth daily with breakfast.    Margarita Izaguirre                           simvastatin 40 MG Tabs   Commonly known as:  ZOCOR   Next dose due:  March 18 at Night         Take 40 mg Order ID Medication Name Action Time Action Reason Comments    [id number] ALPRAZolam Foster [place]onnet) tab 0.25 mg 03/18/17 1008 Given      [id number] Atorvastatin Calcium (LIPITOR) tab 20 mg 03/17/17 2023 Given      [id number] Doxycycline Monohydrate (MONODOX) cap 10 [id number] nystatin (MYCOSTATIN) 605886 UNIT/GM cream 100,000 Units 03/17/17 2029 Given      [id number] nystatin (MYCOSTATIN) 991560 UNIT/GM cream 100,000 Units 03/18/17 1014 Given      [id number] permethrin (ELIMITE) 5 % cream 03/18/17 1151 Given      [address] Testing Performed By     Aurelio Ramírez Name Director Address Valid Date Range    [address] [facility] Kelley Drew. Josefina Duran M.D. [facility]. [address] [place] [id number] 04/29/14 1047 - Present            Microbiology Results (All) Filed:  3/17/2017 12:23 PM Note Time:  3/8/2017 10:18 PM Status:  Signed    :  Josue Robb MD (Physician)           [facility]    PATIENT'S NAME: Wade Mendez   ATTENDING PHYSICIAN: Liset Whitt MD   PATIENT ACCOUNT#: NECK:  Supple. No lymphadenopathy, no thyromegaly. Carotids:  No bruits. LUNGS:  Wheezing. HEART:  Regular. ABDOMEN:  Obese. No mass. No hepatosplenomegaly. SKIN:  Warm, dry, and well perfused. NEUROLOGIC:  Moving all extremities equally.   Good ADMISSION DATE:       03/08/2017      CONSULT DATE:      REPORT OF CONSULTATION    REASON FOR CONSULTATION:  Congestive heart failure, chronic cough.     HISTORY OF PRESENT ILLNESS:  The patient is a 55-year-old lady, morbidly obese, with severe dilated car EXTREMITIES:  Minimal pedal edema. No calf tenderness. LABORATORY DATA:  Troponin 0.03. MRSA positive. Influenza A and B and RSV negative. Glucose 118, sodium 139, potassium 5.5, chloride 108, CO2 of 24, BUN 24, creatinine 1.29. PT 24.9 with INR 2. DISCHARGE SUMMARY      DISCHARGE DIAGNOSES:    1. Congestive heart failure decompensation, acute diastolic on chronic diastolic dysfunction. 2.   Ischemic cardiomyopathy. 3.   Defibrillator. 4.   Severe obesity.    5.   History of pulmonary embolism, Chest X-Ray Results (HF patients only)    Xr Chest Pa + Lat Chest (thq=11172)    3/14/2017  PROCEDURE: XR CHEST PA + LAT CHEST (CPT=71020)  COMPARISON: [facility], XR CHEST AP PORTABLE (CPT=71010), 3/08/2017, 9:07.   INDICATIONS: Shortness PHYSICAL THERAPY TREATMENT NOTE - INPATIENT    Room Number: 325/325-A            Problem List  Principal Problem:    Congestive heart failure of unknown etiology ([facility] [address]  Active Problems:    Asthmatic bronchitis , chronic (HCC)    Severe obesity (BMI 35.0-3 wheelchair, bedside commode, etc.): A Little   -   Moving from lying on back to sitting on the side of the bed?: A Little   How much help from another person does the patient currently need. ..   -   Moving to and from a bed to a chair (including a wheelcha

## (undated) NOTE — IP AVS SNAPSHOT
[address]  [address] [facility] [phone]                Discharge Summary   3/8/2017    [place]           Admission Information        Provider Department    3/8/2017 Timothy Dailey MD [facility] 3 Doxycycline Monohydrate 100 MG Caps   Last time this was given:  100 mg on 3/18/2017 10:07 AM   Commonly known as:  Nicki Reyes   Next dose due:  March 18 at Night    Notes to Patient:  End Date 3/20/17 at 0378        Take 1 capsule (100 mg total) by mouth ev What changed:  Another medication with the same name was added. Make sure you understand how and when to take each. Next dose due:  March 19 in AM         Take 1 tablet (100 mg total) by mouth daily.     Liseth Bain Next dose due:  March 18 at Night         Take 50 mg by mouth 2 (two) times daily.                                eplerenone 25 MG Tabs   Last time this was given:  25 mg on 3/9/2017  8:46 AM   Commonly known as:  INSPRA   Next dose due:  March 19 in AM Last time this was given:  20 mg on 3/9/2017  8:47 AM   Commonly known as:  BUDDY BERRY   Notes to Patient:  Medication Discontinued 3/9/17        Take 20 mg by mouth 2 (two) times daily.                               Where to Get Your Medications 4.17 (03/08/17)  11.7 (L) (03/08/17)  36.1 (03/08/17)  86.5    (03/08/17)  150 (03/08/17)  10.1      Recent Hematology Lab Results (cont.)  (Last 3 results in the past 90 days)    Neutrophil % Lymphocyte % Monocyte % Eosinophil % Basophil % Prelim Neut Abs Patient [address] to help you get signed up for insurance coverage. Patient [address] is a Federal Navigator program that can help with your Affordable Care Act coverage, as well as all types of Medicaid plans.   To get signed up and covere Most common side effects:  Allergic reactions, rash, nausea, diarrhea   What to report to your healthcare team: Tolerance of medications, temperature, rash, itching, shortness of breath, chills, nausea, and diarrhea           Blood Pressure and Cardiac Medi What to report to your healthcare team: Unusual bleeding, abdominal pain, dark, loose bowel movements           Non-Narcotic Pain Medications     allopurinol 100 MG Oral Tab    aspirin 81 MG Oral Chew Tab    allopurinol 100 MG Oral Tab       Use: Treat sherwin ALPRAZolam 0.25 MG Oral Tab    temazepam 15 MG Oral Cap       Use: Treat agitation and difficulty sleeping   Most common side effects: Drowsiness, slows breathing   What to report to your healthcare team: Problems staying awake, confusion, memory problems

## (undated) NOTE — IP AVS SNAPSHOT
[address]  [address] [facility] [phone]                Discharge Summary   4/9/2017    [facility]           Admission Information        Provider Department    4/9/2017 Mario Rodriges MD [facility] 5 Take 5 mL by mouth 3 (three) times daily as needed for cough.     Margarita Izaguirre                           ipratropium-albuterol 0.5-2.5 (3) MG/3ML Soln   Last time this was given:  3 mL on 4/15/2017  3:40 PM   Commonly known as:  [id number]  [facility] ALPRAZolam 0.25 MG Tabs   Last time this was given:  0.25 mg on 4/15/2017  8:07 AM   Commonly known as:  Narinder Lopez   Next dose due:  4-15-17 9PM          Take 1 tablet (0.25 mg total) by mouth 2 (two) times daily.     Barbara Helms furosemide 40 MG Tabs   Last time this was given:  40 mg on 4/15/2017  8:08 AM   Commonly known as:  LASIX   Next dose due:  4-16-17 9AM          Take 1 tablet (40 mg total) by mouth daily.     Margarita Izaguirre E Next dose due:  4-15-17 9PM          Take 20 mEq by mouth 2 (two) times daily.                                temazepam 15 MG Caps   Last time this was given:  15 mg on 4/14/2017 10:09 PM   Commonly known as:  RESTORIL        Take 1 capsule (15 mg total) by Recent Hematology Lab Results (cont.)  (Last 3 results in the past 90 days)    Neutrophil % Lymphocyte % Monocyte % Eosinophil % Basophil % Prelim Neut Abs Final Neut Abs Lymphocyte Abso Monocyte Absolu Eosinophil Abso Basophil Absolu    (04/13/17)  80 (04 and ask to get set up for an insurance coverage that is in-network with [employer] [facility]. [employer]     Sign up for [employer], your secure online medical record.   [employer] will allow you to access patient instructions from your recent visit,  view with standing), heart rate changes, headaches, nausea/vomiting   What to report to your healthcare team:  Dizziness, nausea, chest pain, weakness, numbness           Water Pills     furosemide 40 MG Oral Tab       Use: Treat high blood pressure, swelling i generally include: diarrhea, constipation, headache, allergic reaction (itching, rash, hives)   What to report to your healthcare team: Pain, nausea/vomiting, no bowel movement in 2+ days, diarrhea           Respiratory Medications     ipratropium-albutero